# Patient Record
Sex: FEMALE | Race: WHITE | NOT HISPANIC OR LATINO | Employment: FULL TIME | URBAN - METROPOLITAN AREA
[De-identification: names, ages, dates, MRNs, and addresses within clinical notes are randomized per-mention and may not be internally consistent; named-entity substitution may affect disease eponyms.]

---

## 2017-12-07 ENCOUNTER — OFFICE VISIT (OUTPATIENT)
Dept: URGENT CARE | Age: 57
End: 2017-12-07
Payer: OTHER MISCELLANEOUS

## 2017-12-07 PROCEDURE — 99284 EMERGENCY DEPT VISIT MOD MDM: CPT | Performed by: PREVENTIVE MEDICINE

## 2017-12-07 PROCEDURE — G0383 LEV 4 HOSP TYPE B ED VISIT: HCPCS | Performed by: PREVENTIVE MEDICINE

## 2018-01-05 ENCOUNTER — OFFICE VISIT (OUTPATIENT)
Dept: URGENT CARE | Facility: CLINIC | Age: 58
End: 2018-01-05
Payer: COMMERCIAL

## 2018-01-05 LAB
BILIRUB UR QL STRIP: NEGATIVE
CLARITY UR: NORMAL
COLOR UR: YELLOW
GLUCOSE (HISTORICAL): 1000
HGB UR QL STRIP.AUTO: NEGATIVE
KETONES UR STRIP-MCNC: NEGATIVE MG/DL
LEUKOCYTE ESTERASE UR QL STRIP: NEGATIVE
NITRITE UR QL STRIP: NEGATIVE
PH UR STRIP.AUTO: 5 [PH]
PROT UR STRIP-MCNC: 100 MG/DL
SP GR UR STRIP.AUTO: 1.01
UROBILINOGEN UR QL STRIP.AUTO: 0.2

## 2018-01-05 PROCEDURE — 99213 OFFICE O/P EST LOW 20 MIN: CPT

## 2018-01-05 PROCEDURE — 81002 URINALYSIS NONAUTO W/O SCOPE: CPT

## 2018-01-10 NOTE — PROGRESS NOTES
Assessment   1  Abdominal pain (789 00) (R10 9)   2  Right flank pain (789 09) (R10 9)    Plan   Abdominal pain    · Urine Dip Non-Automated- POC; Status:Resulted - Requires Verification,Retrospective    By Protocol Authorization;   Done: [id number] 01:28PM    Discussion/Summary   Discussion Summary:    Patient is exquisitely tender right upper quadrant and has flank pain  May be muscular in nature but I recommend the patient go to the emergency room for further testing and evaluation  She agrees, will go to [facility]  Chief Complaint   1  Back Pain  Chief Complaint Free Text Note Form: C/O pain in right low back with no known injury x 4 days  Pt denies any radiation of pain  Pt denies any urinary symptoms  is being treated for sinusitis with Bactrim and has taken 2 days worth  History of Present Illness   HPI: 62year old female here with pain in her right flank area  Denies any injury, no radiation of the pain  No urinary symptoms  Is being treated for a sinusitis with Bactrim and was given a steroid 2 days ago  No fever, chills, nausea, vomiting  Has a good appetite  Hospital Based Practices Required Assessment:      Pain Assessment      the patient states they have pain  The pain is located in the right low back  The patient describes the pain as aching  (on a scale of 0 to 10, the patient rates the pain at 10 )      Abuse And Domestic Violence Screen       Yes, the patient is safe at home  -- The patient states no one is hurting them  Depression And Suicide Screen  No, the patient has not had thoughts of hurting themself  No, the patient has not felt depressed in the past 7 days  Back Pain: Rafaela Cavazos presents with complaints of back pain        Associated symptoms include abdominal pain, but-- no spasm,-- no stiffness,-- no fever,-- no night sweats,-- no general malaise,-- no weight loss,-- no arm numbness,-- no leg numbness,-- no arm weakness,-- no leg weakness,-- no urinary incontinence,-- no fecal incontinence,-- no urinary retention-- and-- no rash localized to the area of pain  Review of Systems   Focused-Female:      Constitutional: No fever, no chills, feels well, no tiredness, no recent weight gain or loss  ENT: no ear ache, no loss of hearing, no nosebleeds or nasal discharge, no sore throat or hoarseness  Cardiovascular: no complaints of slow or fast heart rate, no chest pain, no palpitations, no leg claudication or lower extremity edema  Respiratory: no complaints of shortness of breath, no wheezing, no dyspnea on exertion, no orthopnea or PND  Breasts: no complaints of breast pain, breast lump or nipple discharge  Gastrointestinal: as noted in HPI  Musculoskeletal: as noted in HPI  ROS Reviewed:    ROS reviewed  Active Problems   1  Abdominal pain (789 00) (R10 9)   2  Abnormal mammogram (793 80) (R92 8)   3  Arthritis (716 90) (M19 90)   4  Chest pain (786 50) (R07 9)   5  Costal chondritis (733 6) (M94 0)   6  Diabetes mellitus, type 2 (250 00) (E11 9)   7  Encounter for routine gynecological examination (V72 31) (Z01 419)   8  Encounter for screening mammogram for malignant neoplasm of breast (V76 12)     (Z12 31)   9  Hypercholesterolemia (272 0) (E78 00)   10  Hypertension (401 9) (I10)   11  Malignant neoplasm of breast, unspecified laterality   12  Neuropathy (355 9) (G62 9)   13  Right shoulder tendinitis (726 10) (M75 81)   14  Screening for STD (sexually transmitted disease) (V74 5) (Z11 3)   15  Status post arthroscopy of shoulder (V45 89) (Z98 890)   16  Superior glenoid labrum lesion of right shoulder, subsequent encounter (V58 89,840 7)      (S43 431D)   17  Uterine Disorders (621 9)    Past Medical History   1  History Of 4  Previous Pregnancies (V61 5)   2  History of Previously Pregnant Including 2  Miscarriage(S)  Active Problems And Past Medical History Reviewed:     The active problems and past medical history were reviewed and updated today  Family History   Mother    1  Family history of Cancer   2  Family history of Hypertension  Father    3  Family history of Cancer  Sister    4  Family history of Coronary artery disease, occlusive   5  Family history of Diabetes mellitus   6  Family history of Hypertension  Maternal Aunt    7  Family history of Malignant neoplasm of breast, unspecified laterality  Family History    8  Family history of Cancer  Family History Reviewed: The family history was reviewed and updated today  Social History    · Denied: History of Alcohol use   · Denied: History of Drug use   · Former smoker (V15 82) (U99 554)   ·   Social History Reviewed: The social history was reviewed and updated today  The social history was reviewed and is unchanged  Surgical History   1  History of Breast Surgery Lumpectomy   2  History of Cholecystectomy   3  History of Dental Surgery   4  History of Knee Surgery   5  History of Shoulder Surgery   6  History of Tubal Ligation  Surgical History Reviewed: The surgical history was reviewed and updated today  Current Meds    1  No Reported Medications Recorded  Medication List Reviewed: The medication list was reviewed and updated today  Allergies   1  Betadine Skin Cleanser SOLN   2  Codeine Derivatives   3  Iodine SOLN   4  Penicillins    Vitals   Signs   Recorded: 30ARM5181 01:09PM   Temperature: 96 8 F  Heart Rate: 94  Respiration: 18  Systolic: 887  Diastolic: 88  O2 Saturation: 98    Physical Exam        Constitutional      General appearance: No acute distress, well appearing and well nourished  Ears, Nose, Mouth, and Throat      External inspection of ears and nose: Normal        Otoscopic examination: Tympanic membranes translucent with normal light reflex  Canals patent without erythema  Nasal mucosa, septum, and turbinates: Abnormal   There was a mucoid discharge from both nares   The bilateral nasal mucosa was edematous  Oropharynx: Abnormal   The posterior pharynx was erythematous, but-- did not have an exudate  Pulmonary      Respiratory effort: No increased work of breathing or signs of respiratory distress  Auscultation of lungs: Clear to auscultation  Cardiovascular      Auscultation of heart: Normal rate and rhythm, normal S1 and S2, without murmurs  Abdomen      Abdomen: Abnormal   The abdomen was flat  Bowel sounds were normal  There was moderate tenderness in the right upper quadrant  The abdomen was not firm-- and-- not rigid  No rebound tenderness  No guarding  no masses palpated  The abdomen was normal to percussion  right CVA tenderness        Results/Data   Urine Dip Non-Automated- POC [id number] 01:28PM Joe Santosr      Test Name Result Flag Reference   Color Yellow     Clarity Transparent     Leukocytes negative     Nitrite negative     Blood negative     Bilirubin negative     Urobilinogen 0 2     Protein 100     Ph 5 0     Specific Gravity 1 015     Ketone negative     Glucose 1000     Color Yellow     Clarity Transparent     Leukocytes negative     Nitrite negative     Blood negative     Bilirubin negative     Urobilinogen 0 2     Protein 100     Ph 5 0     Specific Gravity 1 015     Ketone negative     Glucose 1000                Signatures    Electronically signed by : YONIS Rudolph; Jan 5 2018  1:45PM EST                       (Author)     Electronically signed by : NITIN Stringer ; Jan 9 2018  9:08AM EST                       (Co-author)

## 2018-01-15 NOTE — PROGRESS NOTES
Assessment    1  Encounter for routine gynecological examination (V72 31) (Z01 419)    Plan  Encounter for routine gynecological examination    · (1) THIN PREP PAP WITH IMAGING; Status: In Progress - Specimen/Data  Collected,Retrospective By Protocol Authorization;   Done: [id number]   Perform:[facility] In Office Collection; CRX:[id number]; Last Updated By:Maura Durán; 1/18/2016 2:36:14 PM;Ordered;  For:Encounter for routine gynecological examination; Ordered By:Brando Kincaid; Maturation index required? : No  Date? : 30YSF2285  HPV? : if ASCUS    Discussion/Summary    Results of mammograms and breast ultrasound discussed with patient  After her second opinion regarding her breast cyst even though biopsy of the same was not recommended-only bilateral mammogram in one year  He will consider the same  Chief Complaint  Pt presents today for annual exam and post mammogram/post brest US  Active Problems    1  Abdominal pain (789 00) (R10 9)   2  Abnormal mammogram (793 80) (R92 8)   3  Chest pain (786 50) (R07 9)   4  Costal chondritis (733 6) (M94 0)   5  Diabetes mellitus, type 2 (250 00) (E11 9)   6  Encounter for routine gynecological examination (V72 31) (Z01 419)   7  Encounter for screening mammogram for malignant neoplasm of breast (V76 12)   (Z12 31)   8  Hypercholesterolemia (272 0) (E78 0)   9  Hypertension (401 9) (I10)   10  Malignant neoplasm of breast, unspecified laterality   11  Neuropathy (355 9) (G62 9)   12  Screening for STD (sexually transmitted disease) (V74 5) (Z11 3)   13   Uterine Disorders (621 9)    Past Medical History    · History Of 4  Previous Pregnancies (V61 5)   · History of Previously Pregnant Including 2  Miscarriage(S)    Surgical History    · History of Breast Surgery Lumpectomy   · History of Cholecystectomy   · History of Dental Surgery   · History of Knee Surgery   · History of Tubal Ligation    Family History    · Family history of Cancer   · Family history of Hypertension    · Family history of Cancer    · Family history of Coronary artery disease, occlusive   · Family history of Diabetes mellitus   · Family history of Hypertension    · Family history of Malignant neoplasm of breast, unspecified laterality    · Family history of Cancer    Social History    · Denied: History of Alcohol use   · Denied: History of Drug use   · Former smoker (V15 82) (G08 593)   ·     Current Meds   1  Ibuprofen 800 MG Oral Tablet; TAKE 1 TABLET 3 TIMES DAILY AFTER MEALS; Therapy: 84CTF4068 to (Evaluate:58Qgj8335)  Requested for: 10WLE4233; Last   Rx:04Jan2016 Ordered    Allergies    1  Betadine Skin Cleanser SOLN   2  Codeine Derivatives   3  Iodine SOLN   4  Penicillins    Vitals   Recorded: 97OJS6278 55:54ST   Systolic 997   Diastolic 84   Height 5 ft 10 in   Weight 260 lb    BMI Calculated 37 31   BSA Calculated 2 34     Physical Exam    Genitourinary   External genitalia: Normal and no lesions appreciated  Vagina: Normal, no lesions or dryness appreciated  Urethra: Normal     Urethral meatus: Normal     Bladder: Normal, soft, non-tender and no prolapse or masses appreciated  Cervix: Normal, no palpable masses  Uterus: Normal, non-tender, not enlarged, and no palpable masses  Adnexa/parametria: Normal, non-tender and no fullness or masses appreciated  Breasts: normal in appearance, no palpable masses and no nipple discharge        Signatures   Electronically signed by : Ej Atkinson MD; Jan 18 2016  3:01PM EST                       (Author)

## 2018-01-23 VITALS
TEMPERATURE: 96.8 F | DIASTOLIC BLOOD PRESSURE: 88 MMHG | RESPIRATION RATE: 18 BRPM | SYSTOLIC BLOOD PRESSURE: 142 MMHG | HEART RATE: 94 BPM | OXYGEN SATURATION: 98 %

## 2018-02-02 RX ORDER — IBUPROFEN 800 MG/1
1 TABLET ORAL EVERY 8 HOURS
COMMUNITY
Start: 2016-01-04 | End: 2018-06-30 | Stop reason: HOSPADM

## 2018-02-02 RX ORDER — NAPROXEN SODIUM 220 MG
TABLET ORAL
COMMUNITY
End: 2018-06-28

## 2018-02-05 ENCOUNTER — OFFICE VISIT (OUTPATIENT)
Dept: PULMONOLOGY | Facility: CLINIC | Age: 58
End: 2018-02-05
Payer: COMMERCIAL

## 2018-02-05 VITALS
HEART RATE: 76 BPM | RESPIRATION RATE: 16 BRPM | SYSTOLIC BLOOD PRESSURE: 130 MMHG | WEIGHT: 238 LBS | DIASTOLIC BLOOD PRESSURE: 84 MMHG | HEIGHT: 69 IN | TEMPERATURE: 97 F | OXYGEN SATURATION: 96 % | BODY MASS INDEX: 35.25 KG/M2

## 2018-02-05 DIAGNOSIS — R06.00 DOE (DYSPNEA ON EXERTION): ICD-10-CM

## 2018-02-05 DIAGNOSIS — Z77.120 MOLD EXPOSURE: ICD-10-CM

## 2018-02-05 DIAGNOSIS — Z12.2 ENCOUNTER FOR SCREENING FOR LUNG CANCER: ICD-10-CM

## 2018-02-05 DIAGNOSIS — J18.9 PNEUMONIA OF RIGHT UPPER LOBE DUE TO INFECTIOUS ORGANISM: Primary | ICD-10-CM

## 2018-02-05 PROBLEM — R93.89 ABNORMAL CXR (CHEST X-RAY): Status: RESOLVED | Noted: 2018-02-05 | Resolved: 2018-02-05

## 2018-02-05 PROBLEM — R93.89 ABNORMAL CXR (CHEST X-RAY): Status: ACTIVE | Noted: 2018-02-05

## 2018-02-05 PROBLEM — R06.09 DOE (DYSPNEA ON EXERTION): Status: ACTIVE | Noted: 2018-02-05

## 2018-02-05 PROCEDURE — 99244 OFF/OP CNSLTJ NEW/EST MOD 40: CPT | Performed by: INTERNAL MEDICINE

## 2018-02-05 NOTE — ASSESSMENT & PLAN NOTE
Doubt of any significance, patient did not have much symptoms after the exposure and it was a one time incident  No further intervention at this time
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
## 2018-02-05 NOTE — ASSESSMENT & PLAN NOTE
Resolved, improved on chest x-ray in November, patient will bring the chest x-ray from January  Also will see low-dose CT scan for lung cancer screening to evaluate lung parenchyma

## 2018-02-20 ENCOUNTER — HOSPITAL ENCOUNTER (OUTPATIENT)
Dept: RADIOLOGY | Facility: HOSPITAL | Age: 58
Discharge: HOME | End: 2018-02-20
Attending: INTERNAL MEDICINE
Payer: COMMERCIAL

## 2018-02-20 ENCOUNTER — HOSPITAL ENCOUNTER (OUTPATIENT)
Dept: PULMONOLOGY | Facility: HOSPITAL | Age: 58
Discharge: HOME | End: 2018-02-20
Attending: INTERNAL MEDICINE
Payer: COMMERCIAL

## 2018-02-20 DIAGNOSIS — Z12.2 ENCOUNTER FOR SCREENING FOR LUNG CANCER: ICD-10-CM

## 2018-02-20 DIAGNOSIS — R06.00 DOE (DYSPNEA ON EXERTION): ICD-10-CM

## 2018-02-20 PROCEDURE — 94726 PLETHYSMOGRAPHY LUNG VOLUMES: CPT | Performed by: INTERNAL MEDICINE

## 2018-02-20 PROCEDURE — 94726 PLETHYSMOGRAPHY LUNG VOLUMES: CPT

## 2018-02-20 PROCEDURE — 94060 EVALUATION OF WHEEZING: CPT

## 2018-02-20 PROCEDURE — 94760 N-INVAS EAR/PLS OXIMETRY 1: CPT

## 2018-02-20 PROCEDURE — 94729 DIFFUSING CAPACITY: CPT | Performed by: INTERNAL MEDICINE

## 2018-02-20 PROCEDURE — 94060 EVALUATION OF WHEEZING: CPT | Performed by: INTERNAL MEDICINE

## 2018-02-20 PROCEDURE — 94729 DIFFUSING CAPACITY: CPT

## 2018-02-20 RX ORDER — ALBUTEROL SULFATE 2.5 MG/3ML
2.5 SOLUTION RESPIRATORY (INHALATION) ONCE
Status: COMPLETED | OUTPATIENT
Start: 2018-02-20 | End: 2018-02-20

## 2018-02-20 RX ORDER — ALBUTEROL SULFATE 2.5 MG/3ML
SOLUTION RESPIRATORY (INHALATION)
Status: COMPLETED
Start: 2018-02-20 | End: 2018-02-20

## 2018-02-20 RX ADMIN — ALBUTEROL SULFATE 2.5 MG: 2.5 SOLUTION RESPIRATORY (INHALATION) at 10:42

## 2018-02-26 ENCOUNTER — TELEPHONE (OUTPATIENT)
Dept: PULMONOLOGY | Facility: CLINIC | Age: 58
End: 2018-02-26

## 2018-02-26 NOTE — TELEPHONE ENCOUNTER
Telephone note- Pulmonary Medicine   Strawn Gianni 62 y o  female MRN: [id number]    No problem-specific Assessment & Plan notes found for this encounter          Pertinent details:    I called patient and left detail message about the negative lung cancer screening CT scan and the need for CT scan in 1 year from now

## 2018-02-28 NOTE — TELEPHONE ENCOUNTER
Telephone note- Pulmonary Medicine   Griselda Sherlydelano 62 y o  female MRN: [id number]    No problem-specific Assessment & Plan notes found for this encounter          Pertinent details:    I called patient and left a message about a normal PFTs

## 2018-06-28 ENCOUNTER — HOSPITAL ENCOUNTER (INPATIENT)
Facility: HOSPITAL | Age: 58
LOS: 1 days | Discharge: HOME | DRG: 247 | End: 2018-06-30
Attending: EMERGENCY MEDICINE | Admitting: INTERNAL MEDICINE
Payer: COMMERCIAL

## 2018-06-28 ENCOUNTER — APPOINTMENT (EMERGENCY)
Dept: RADIOLOGY | Facility: HOSPITAL | Age: 58
DRG: 247 | End: 2018-06-28
Payer: COMMERCIAL

## 2018-06-28 DIAGNOSIS — I10 ESSENTIAL HYPERTENSION: ICD-10-CM

## 2018-06-28 DIAGNOSIS — R07.9 CHEST PAIN: Primary | ICD-10-CM

## 2018-06-28 DIAGNOSIS — E11.9 DIABETES MELLITUS, TYPE 2 (HCC): ICD-10-CM

## 2018-06-28 DIAGNOSIS — I21.4 NSTEMI (NON-ST ELEVATED MYOCARDIAL INFARCTION) (HCC): ICD-10-CM

## 2018-06-28 DIAGNOSIS — R06.02 SHORTNESS OF BREATH: ICD-10-CM

## 2018-06-28 PROBLEM — J00 ACUTE NASOPHARYNGITIS: Status: ACTIVE | Noted: 2018-06-28

## 2018-06-28 LAB
ALBUMIN SERPL BCP-MCNC: 3.8 G/DL (ref 3.5–5)
ALP SERPL-CCNC: 91 U/L (ref 46–116)
ALT SERPL W P-5'-P-CCNC: 32 U/L (ref 12–78)
ANION GAP SERPL CALCULATED.3IONS-SCNC: 12 MMOL/L (ref 4–13)
AST SERPL W P-5'-P-CCNC: 14 U/L (ref 5–45)
BASOPHILS # BLD AUTO: 0 THOUSANDS/ΜL (ref 0–0.1)
BASOPHILS NFR BLD AUTO: 0 % (ref 0–1)
BILIRUB SERPL-MCNC: 0.9 MG/DL (ref 0.2–1)
BUN SERPL-MCNC: 20 MG/DL (ref 5–25)
CALCIUM SERPL-MCNC: 9.5 MG/DL (ref 8.3–10.1)
CHLORIDE SERPL-SCNC: 95 MMOL/L (ref 100–108)
CO2 SERPL-SCNC: 25 MMOL/L (ref 21–32)
CREAT SERPL-MCNC: 0.96 MG/DL (ref 0.6–1.3)
DEPRECATED D DIMER PPP: 496 NG/ML (FEU) (ref 0–424)
EOSINOPHIL # BLD AUTO: 0 THOUSAND/ΜL (ref 0–0.61)
EOSINOPHIL NFR BLD AUTO: 0 % (ref 0–6)
ERYTHROCYTE [DISTWIDTH] IN BLOOD BY AUTOMATED COUNT: 12.3 % (ref 11.6–15.1)
GFR SERPL CREATININE-BSD FRML MDRD: 65 ML/MIN/1.73SQ M
GLUCOSE SERPL-MCNC: 407 MG/DL (ref 65–140)
GLUCOSE SERPL-MCNC: 490 MG/DL (ref 65–140)
HCT VFR BLD AUTO: 40 % (ref 34.8–46.1)
HGB BLD-MCNC: 14.4 G/DL (ref 11.5–15.4)
IMM GRANULOCYTES # BLD AUTO: 0.07 THOUSAND/UL (ref 0–0.2)
IMM GRANULOCYTES NFR BLD AUTO: 1 % (ref 0–2)
LYMPHOCYTES # BLD AUTO: 0.79 THOUSANDS/ΜL (ref 0.6–4.47)
LYMPHOCYTES NFR BLD AUTO: 8 % (ref 14–44)
MCH RBC QN AUTO: 29.5 PG (ref 26.8–34.3)
MCHC RBC AUTO-ENTMCNC: 36 G/DL (ref 31.4–37.4)
MCV RBC AUTO: 82 FL (ref 82–98)
MONOCYTES # BLD AUTO: 0.44 THOUSAND/ΜL (ref 0.17–1.22)
MONOCYTES NFR BLD AUTO: 4 % (ref 4–12)
NEUTROPHILS # BLD AUTO: 9.01 THOUSANDS/ΜL (ref 1.85–7.62)
NEUTS SEG NFR BLD AUTO: 87 % (ref 43–75)
NRBC BLD AUTO-RTO: 0 /100 WBCS
PLATELET # BLD AUTO: 224 THOUSANDS/UL (ref 149–390)
PMV BLD AUTO: 11.2 FL (ref 8.9–12.7)
POTASSIUM SERPL-SCNC: 3.9 MMOL/L (ref 3.5–5.3)
PROT SERPL-MCNC: 7.6 G/DL (ref 6.4–8.2)
RBC # BLD AUTO: 4.88 MILLION/UL (ref 3.81–5.12)
SODIUM SERPL-SCNC: 132 MMOL/L (ref 136–145)
TROPONIN I SERPL-MCNC: 0.06 NG/ML
TROPONIN I SERPL-MCNC: 0.16 NG/ML
WBC # BLD AUTO: 10.31 THOUSAND/UL (ref 4.31–10.16)

## 2018-06-28 PROCEDURE — 71046 X-RAY EXAM CHEST 2 VIEWS: CPT

## 2018-06-28 PROCEDURE — 80053 COMPREHEN METABOLIC PANEL: CPT | Performed by: EMERGENCY MEDICINE

## 2018-06-28 PROCEDURE — 85025 COMPLETE CBC W/AUTO DIFF WBC: CPT | Performed by: EMERGENCY MEDICINE

## 2018-06-28 PROCEDURE — 99285 EMERGENCY DEPT VISIT HI MDM: CPT

## 2018-06-28 PROCEDURE — 36415 COLL VENOUS BLD VENIPUNCTURE: CPT

## 2018-06-28 PROCEDURE — 85379 FIBRIN DEGRADATION QUANT: CPT | Performed by: EMERGENCY MEDICINE

## 2018-06-28 PROCEDURE — 84484 ASSAY OF TROPONIN QUANT: CPT | Performed by: INTERNAL MEDICINE

## 2018-06-28 PROCEDURE — 82948 REAGENT STRIP/BLOOD GLUCOSE: CPT

## 2018-06-28 PROCEDURE — 84484 ASSAY OF TROPONIN QUANT: CPT | Performed by: EMERGENCY MEDICINE

## 2018-06-28 PROCEDURE — 99220 PR INITIAL OBSERVATION CARE/DAY 70 MINUTES: CPT | Performed by: INTERNAL MEDICINE

## 2018-06-28 PROCEDURE — 93005 ELECTROCARDIOGRAM TRACING: CPT

## 2018-06-28 RX ORDER — ONDANSETRON 2 MG/ML
4 INJECTION INTRAMUSCULAR; INTRAVENOUS EVERY 6 HOURS PRN
Status: DISCONTINUED | OUTPATIENT
Start: 2018-06-28 | End: 2018-06-30 | Stop reason: HOSPADM

## 2018-06-28 RX ORDER — HYDRALAZINE HYDROCHLORIDE 20 MG/ML
10 INJECTION INTRAMUSCULAR; INTRAVENOUS EVERY 6 HOURS PRN
Status: DISCONTINUED | OUTPATIENT
Start: 2018-06-28 | End: 2018-06-30 | Stop reason: HOSPADM

## 2018-06-28 RX ORDER — ASPIRIN 325 MG
325 TABLET ORAL ONCE
Status: DISCONTINUED | OUTPATIENT
Start: 2018-06-28 | End: 2018-06-29

## 2018-06-28 RX ORDER — ECHINACEA PURPUREA EXTRACT 125 MG
1 TABLET ORAL
Status: DISCONTINUED | OUTPATIENT
Start: 2018-06-28 | End: 2018-06-30 | Stop reason: HOSPADM

## 2018-06-28 RX ORDER — LOSARTAN POTASSIUM 50 MG/1
50 TABLET ORAL DAILY
Status: DISCONTINUED | OUTPATIENT
Start: 2018-06-29 | End: 2018-06-30 | Stop reason: HOSPADM

## 2018-06-28 RX ORDER — ASPIRIN 81 MG/1
81 TABLET, CHEWABLE ORAL DAILY
Status: DISCONTINUED | OUTPATIENT
Start: 2018-06-29 | End: 2018-06-30 | Stop reason: HOSPADM

## 2018-06-28 RX ORDER — INSULIN GLARGINE 100 [IU]/ML
10 INJECTION, SOLUTION SUBCUTANEOUS
Status: DISCONTINUED | OUTPATIENT
Start: 2018-06-28 | End: 2018-06-29

## 2018-06-28 RX ORDER — PANTOPRAZOLE SODIUM 40 MG/1
40 TABLET, DELAYED RELEASE ORAL
Status: DISCONTINUED | OUTPATIENT
Start: 2018-06-29 | End: 2018-06-30 | Stop reason: HOSPADM

## 2018-06-28 RX ORDER — ACETAMINOPHEN 325 MG/1
650 TABLET ORAL EVERY 4 HOURS PRN
Status: DISCONTINUED | OUTPATIENT
Start: 2018-06-28 | End: 2018-06-30 | Stop reason: HOSPADM

## 2018-06-28 RX ORDER — GLYBURIDE 5 MG/1
5 TABLET ORAL
COMMUNITY
End: 2018-06-30 | Stop reason: HOSPADM

## 2018-06-28 RX ORDER — SODIUM CHLORIDE 9 MG/ML
100 INJECTION, SOLUTION INTRAVENOUS CONTINUOUS
Status: DISCONTINUED | OUTPATIENT
Start: 2018-06-28 | End: 2018-06-29

## 2018-06-28 RX ORDER — LOSARTAN POTASSIUM 50 MG/1
50 TABLET ORAL DAILY
COMMUNITY
End: 2020-02-12 | Stop reason: SDUPTHER

## 2018-06-28 RX ADMIN — SODIUM CHLORIDE 1000 ML: 0.9 INJECTION, SOLUTION INTRAVENOUS at 19:21

## 2018-06-28 RX ADMIN — SODIUM CHLORIDE 100 ML/HR: 0.9 INJECTION, SOLUTION INTRAVENOUS at 21:23

## 2018-06-28 RX ADMIN — INSULIN GLARGINE 10 UNITS: 100 INJECTION, SOLUTION SUBCUTANEOUS at 21:22

## 2018-06-28 RX ADMIN — INSULIN LISPRO 4 UNITS: 100 INJECTION, SOLUTION INTRAVENOUS; SUBCUTANEOUS at 23:28

## 2018-06-28 NOTE — LETTER
[address]  [address] [address]  Dept: [phone]    July 2, 2018     Patient: Hung Shrestha   YOB: 1960   Date of Visit: 6/28/2018       To Whom it May Concern:    Hung Shrestha was under my professional care  She was admitted to the hospital from 6/28/2018   to 06/30/2018  She may return to work on 7/4/2018  If you have any questions or concerns, please don't hesitate to call           Sincerely,          Adali Kenney MD  Hospitalist, [facility]  Office: [phone]
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
## 2018-06-28 NOTE — LETTER
[address]  [address] [address]  [address] [address]  Dept: [phone]    July 2, 2018     Patient: Divina Goncalves   YOB: 1960   Date of Visit: 6/28/2018       To Whom it May Concern:    Divina Goncalves was under my professional care  She was admitted to the hospital from 6/28/2018   to 07/02/18  She may return to work on 7/4/2018  If you have any questions or concerns, please don't hesitate to call           Sincerely,          David Saul MD  Hospitalist, [address] [place] [address]  Office: [phone]

## 2018-06-28 NOTE — ED ATTENDING ATTESTATION
Sujey BUSCH DO, saw and evaluated the patient  I have discussed the patient with the resident/non-physician practitioner and agree with the resident's/non-physician practitioner's findings, Plan of Care, and MDM as documented in the resident's/non-physician practitioner's note, except where noted  All available labs and Radiology studies were reviewed  At this point I agree with the current assessment done in the Emergency Department  I have conducted an independent evaluation of this patient a history and physical is as follows:      Critical Care Time  CritCare Time    Procedures     62 yr fem to the ED with chest pain / pressure for the last few days with incr with exhertion  Better with resp  Occ radiation to the neck wo back pain  No fever but has had some congestion wo cough  No prior hx of the same  Not taking diabetic and HTN meds  Exm: heart: mild tachy wo mrg  Lungs: cta  No CWT  Abd: soft and obese; No rash  EKG: not available  No leg pain or swelling  Pln: Cardiac screen and adm

## 2018-06-29 ENCOUNTER — APPOINTMENT (INPATIENT)
Dept: NON INVASIVE DIAGNOSTICS | Facility: HOSPITAL | Age: 58
DRG: 247 | End: 2018-06-29
Payer: COMMERCIAL

## 2018-06-29 PROBLEM — I21.4 NSTEMI (NON-ST ELEVATED MYOCARDIAL INFARCTION) (HCC): Status: ACTIVE | Noted: 2018-02-05

## 2018-06-29 LAB
ANION GAP SERPL CALCULATED.3IONS-SCNC: 8 MMOL/L (ref 4–13)
APTT PPP: 74 SECONDS (ref 24–36)
ATRIAL RATE: 85 BPM
ATRIAL RATE: 88 BPM
BASOPHILS # BLD AUTO: 0 THOUSANDS/ΜL (ref 0–0.1)
BASOPHILS NFR BLD AUTO: 0 % (ref 0–1)
BUN SERPL-MCNC: 17 MG/DL (ref 5–25)
CALCIUM SERPL-MCNC: 8.9 MG/DL (ref 8.3–10.1)
CHLORIDE SERPL-SCNC: 101 MMOL/L (ref 100–108)
CHOLEST SERPL-MCNC: 286 MG/DL (ref 50–200)
CO2 SERPL-SCNC: 26 MMOL/L (ref 21–32)
CREAT SERPL-MCNC: 0.56 MG/DL (ref 0.6–1.3)
EOSINOPHIL # BLD AUTO: 0 THOUSAND/ΜL (ref 0–0.61)
EOSINOPHIL NFR BLD AUTO: 0 % (ref 0–6)
ERYTHROCYTE [DISTWIDTH] IN BLOOD BY AUTOMATED COUNT: 12.3 % (ref 11.6–15.1)
ERYTHROCYTE [DISTWIDTH] IN BLOOD BY AUTOMATED COUNT: 12.3 % (ref 11.6–15.1)
EST. AVERAGE GLUCOSE BLD GHB EST-MCNC: 260 MG/DL
GFR SERPL CREATININE-BSD FRML MDRD: 103 ML/MIN/1.73SQ M
GLUCOSE SERPL-MCNC: 283 MG/DL (ref 65–140)
GLUCOSE SERPL-MCNC: 312 MG/DL (ref 65–140)
GLUCOSE SERPL-MCNC: 334 MG/DL (ref 65–140)
GLUCOSE SERPL-MCNC: 334 MG/DL (ref 65–140)
GLUCOSE SERPL-MCNC: 350 MG/DL (ref 65–140)
HBA1C MFR BLD: 10.7 % (ref 4.2–6.3)
HCT VFR BLD AUTO: 37.5 % (ref 34.8–46.1)
HCT VFR BLD AUTO: 38.2 % (ref 34.8–46.1)
HDLC SERPL-MCNC: 54 MG/DL (ref 40–60)
HGB BLD-MCNC: 13 G/DL (ref 11.5–15.4)
HGB BLD-MCNC: 13.2 G/DL (ref 11.5–15.4)
IMM GRANULOCYTES # BLD AUTO: 0.04 THOUSAND/UL (ref 0–0.2)
IMM GRANULOCYTES NFR BLD AUTO: 1 % (ref 0–2)
INR PPP: 1.2 (ref 0.86–1.17)
KCT BLD-ACNC: 242 SEC (ref 89–137)
LDLC SERPL CALC-MCNC: 190 MG/DL (ref 0–100)
LYMPHOCYTES # BLD AUTO: 1 THOUSANDS/ΜL (ref 0.6–4.47)
LYMPHOCYTES NFR BLD AUTO: 12 % (ref 14–44)
MCH RBC QN AUTO: 29 PG (ref 26.8–34.3)
MCH RBC QN AUTO: 29 PG (ref 26.8–34.3)
MCHC RBC AUTO-ENTMCNC: 34.6 G/DL (ref 31.4–37.4)
MCHC RBC AUTO-ENTMCNC: 34.7 G/DL (ref 31.4–37.4)
MCV RBC AUTO: 84 FL (ref 82–98)
MCV RBC AUTO: 84 FL (ref 82–98)
MONOCYTES # BLD AUTO: 0.44 THOUSAND/ΜL (ref 0.17–1.22)
MONOCYTES NFR BLD AUTO: 5 % (ref 4–12)
NEUTROPHILS # BLD AUTO: 6.7 THOUSANDS/ΜL (ref 1.85–7.62)
NEUTS SEG NFR BLD AUTO: 82 % (ref 43–75)
NRBC BLD AUTO-RTO: 0 /100 WBCS
P AXIS: 43 DEGREES
P AXIS: 54 DEGREES
PLATELET # BLD AUTO: 196 THOUSANDS/UL (ref 149–390)
PLATELET # BLD AUTO: 199 THOUSANDS/UL (ref 149–390)
PMV BLD AUTO: 11 FL (ref 8.9–12.7)
PMV BLD AUTO: 11.4 FL (ref 8.9–12.7)
POTASSIUM SERPL-SCNC: 4 MMOL/L (ref 3.5–5.3)
PR INTERVAL: 138 MS
PR INTERVAL: 140 MS
PROTHROMBIN TIME: 15.3 SECONDS (ref 11.8–14.2)
QRS AXIS: -15 DEGREES
QRS AXIS: -32 DEGREES
QRSD INTERVAL: 92 MS
QRSD INTERVAL: 96 MS
QT INTERVAL: 356 MS
QT INTERVAL: 402 MS
QTC INTERVAL: 423 MS
QTC INTERVAL: 486 MS
RBC # BLD AUTO: 4.48 MILLION/UL (ref 3.81–5.12)
RBC # BLD AUTO: 4.55 MILLION/UL (ref 3.81–5.12)
SODIUM SERPL-SCNC: 135 MMOL/L (ref 136–145)
SPECIMEN SOURCE: ABNORMAL
T WAVE AXIS: 36 DEGREES
T WAVE AXIS: 61 DEGREES
TRIGL SERPL-MCNC: 209 MG/DL
TROPONIN I SERPL-MCNC: 0.22 NG/ML
TROPONIN I SERPL-MCNC: 0.34 NG/ML
TROPONIN I SERPL-MCNC: 0.37 NG/ML
TROPONIN I SERPL-MCNC: 0.49 NG/ML
VENTRICULAR RATE: 85 BPM
VENTRICULAR RATE: 88 BPM
WBC # BLD AUTO: 8.18 THOUSAND/UL (ref 4.31–10.16)
WBC # BLD AUTO: 8.7 THOUSAND/UL (ref 4.31–10.16)

## 2018-06-29 PROCEDURE — C9600 PERC DRUG-EL COR STENT SING: HCPCS | Performed by: NURSE PRACTITIONER

## 2018-06-29 PROCEDURE — B240ZZ3 ULTRASONOGRAPHY OF SINGLE CORONARY ARTERY, INTRAVASCULAR: ICD-10-PCS | Performed by: INTERNAL MEDICINE

## 2018-06-29 PROCEDURE — C1894 INTRO/SHEATH, NON-LASER: HCPCS | Performed by: NURSE PRACTITIONER

## 2018-06-29 PROCEDURE — 99222 1ST HOSP IP/OBS MODERATE 55: CPT | Performed by: INTERNAL MEDICINE

## 2018-06-29 PROCEDURE — 84484 ASSAY OF TROPONIN QUANT: CPT | Performed by: INTERNAL MEDICINE

## 2018-06-29 PROCEDURE — 84484 ASSAY OF TROPONIN QUANT: CPT | Performed by: PHYSICIAN ASSISTANT

## 2018-06-29 PROCEDURE — 85347 COAGULATION TIME ACTIVATED: CPT

## 2018-06-29 PROCEDURE — 85610 PROTHROMBIN TIME: CPT | Performed by: INTERNAL MEDICINE

## 2018-06-29 PROCEDURE — C1769 GUIDE WIRE: HCPCS | Performed by: NURSE PRACTITIONER

## 2018-06-29 PROCEDURE — 82948 REAGENT STRIP/BLOOD GLUCOSE: CPT

## 2018-06-29 PROCEDURE — 99152 MOD SED SAME PHYS/QHP 5/>YRS: CPT | Performed by: INTERNAL MEDICINE

## 2018-06-29 PROCEDURE — 93454 CORONARY ARTERY ANGIO S&I: CPT | Performed by: NURSE PRACTITIONER

## 2018-06-29 PROCEDURE — 85027 COMPLETE CBC AUTOMATED: CPT | Performed by: INTERNAL MEDICINE

## 2018-06-29 PROCEDURE — 99153 MOD SED SAME PHYS/QHP EA: CPT | Performed by: NURSE PRACTITIONER

## 2018-06-29 PROCEDURE — 85025 COMPLETE CBC W/AUTO DIFF WBC: CPT | Performed by: INTERNAL MEDICINE

## 2018-06-29 PROCEDURE — 80048 BASIC METABOLIC PNL TOTAL CA: CPT | Performed by: INTERNAL MEDICINE

## 2018-06-29 PROCEDURE — B2111ZZ FLUOROSCOPY OF MULTIPLE CORONARY ARTERIES USING LOW OSMOLAR CONTRAST: ICD-10-PCS | Performed by: INTERNAL MEDICINE

## 2018-06-29 PROCEDURE — C1725 CATH, TRANSLUMIN NON-LASER: HCPCS | Performed by: NURSE PRACTITIONER

## 2018-06-29 PROCEDURE — 83036 HEMOGLOBIN GLYCOSYLATED A1C: CPT | Performed by: INTERNAL MEDICINE

## 2018-06-29 PROCEDURE — 80061 LIPID PANEL: CPT | Performed by: INTERNAL MEDICINE

## 2018-06-29 PROCEDURE — 93005 ELECTROCARDIOGRAM TRACING: CPT

## 2018-06-29 PROCEDURE — 4A023N7 MEASUREMENT OF CARDIAC SAMPLING AND PRESSURE, LEFT HEART, PERCUTANEOUS APPROACH: ICD-10-PCS | Performed by: INTERNAL MEDICINE

## 2018-06-29 PROCEDURE — 99152 MOD SED SAME PHYS/QHP 5/>YRS: CPT | Performed by: NURSE PRACTITIONER

## 2018-06-29 PROCEDURE — 92978 ENDOLUMINL IVUS OCT C 1ST: CPT | Performed by: NURSE PRACTITIONER

## 2018-06-29 PROCEDURE — C1887 CATHETER, GUIDING: HCPCS | Performed by: NURSE PRACTITIONER

## 2018-06-29 PROCEDURE — 92978 ENDOLUMINL IVUS OCT C 1ST: CPT | Performed by: INTERNAL MEDICINE

## 2018-06-29 PROCEDURE — 99232 SBSQ HOSP IP/OBS MODERATE 35: CPT | Performed by: INTERNAL MEDICINE

## 2018-06-29 PROCEDURE — 027034Z DILATION OF CORONARY ARTERY, ONE ARTERY WITH DRUG-ELUTING INTRALUMINAL DEVICE, PERCUTANEOUS APPROACH: ICD-10-PCS | Performed by: INTERNAL MEDICINE

## 2018-06-29 PROCEDURE — 93454 CORONARY ARTERY ANGIO S&I: CPT | Performed by: INTERNAL MEDICINE

## 2018-06-29 PROCEDURE — 93306 TTE W/DOPPLER COMPLETE: CPT

## 2018-06-29 PROCEDURE — C1753 CATH, INTRAVAS ULTRASOUND: HCPCS | Performed by: NURSE PRACTITIONER

## 2018-06-29 PROCEDURE — C1874 STENT, COATED/COV W/DEL SYS: HCPCS

## 2018-06-29 PROCEDURE — 92928 PRQ TCAT PLMT NTRAC ST 1 LES: CPT | Performed by: INTERNAL MEDICINE

## 2018-06-29 PROCEDURE — 85730 THROMBOPLASTIN TIME PARTIAL: CPT | Performed by: INTERNAL MEDICINE

## 2018-06-29 RX ORDER — HEPARIN SODIUM 10000 [USP'U]/100ML
3-20 INJECTION, SOLUTION INTRAVENOUS
Status: DISCONTINUED | OUTPATIENT
Start: 2018-06-29 | End: 2018-06-29

## 2018-06-29 RX ORDER — SODIUM CHLORIDE 9 MG/ML
100 INJECTION, SOLUTION INTRAVENOUS CONTINUOUS
Status: DISPENSED | OUTPATIENT
Start: 2018-06-29 | End: 2018-06-29

## 2018-06-29 RX ORDER — NITROGLYCERIN 0.4 MG/1
TABLET SUBLINGUAL
Status: COMPLETED
Start: 2018-06-29 | End: 2018-06-29

## 2018-06-29 RX ORDER — MIDAZOLAM HYDROCHLORIDE 1 MG/ML
INJECTION INTRAMUSCULAR; INTRAVENOUS CODE/TRAUMA/SEDATION MEDICATION
Status: COMPLETED | OUTPATIENT
Start: 2018-06-29 | End: 2018-06-29

## 2018-06-29 RX ORDER — HEPARIN SODIUM 1000 [USP'U]/ML
2000 INJECTION, SOLUTION INTRAVENOUS; SUBCUTANEOUS AS NEEDED
Status: DISCONTINUED | OUTPATIENT
Start: 2018-06-29 | End: 2018-06-29

## 2018-06-29 RX ORDER — HEPARIN SODIUM 1000 [USP'U]/ML
4000 INJECTION, SOLUTION INTRAVENOUS; SUBCUTANEOUS ONCE
Status: COMPLETED | OUTPATIENT
Start: 2018-06-29 | End: 2018-06-29

## 2018-06-29 RX ORDER — HEPARIN SODIUM 1000 [USP'U]/ML
INJECTION, SOLUTION INTRAVENOUS; SUBCUTANEOUS CODE/TRAUMA/SEDATION MEDICATION
Status: COMPLETED | OUTPATIENT
Start: 2018-06-29 | End: 2018-06-29

## 2018-06-29 RX ORDER — METOPROLOL TARTRATE 5 MG/5ML
5 INJECTION INTRAVENOUS ONCE
Status: DISCONTINUED | OUTPATIENT
Start: 2018-06-29 | End: 2018-06-29

## 2018-06-29 RX ORDER — VERAPAMIL HCL 2.5 MG/ML
AMPUL (ML) INTRAVENOUS CODE/TRAUMA/SEDATION MEDICATION
Status: COMPLETED | OUTPATIENT
Start: 2018-06-29 | End: 2018-06-29

## 2018-06-29 RX ORDER — NITROGLYCERIN 0.4 MG/1
0.4 TABLET SUBLINGUAL
Status: DISCONTINUED | OUTPATIENT
Start: 2018-06-29 | End: 2018-06-30 | Stop reason: HOSPADM

## 2018-06-29 RX ORDER — NITROGLYCERIN 20 MG/100ML
INJECTION INTRAVENOUS CODE/TRAUMA/SEDATION MEDICATION
Status: COMPLETED | OUTPATIENT
Start: 2018-06-29 | End: 2018-06-29

## 2018-06-29 RX ORDER — CLOPIDOGREL BISULFATE 75 MG/1
75 TABLET ORAL DAILY
Status: DISCONTINUED | OUTPATIENT
Start: 2018-06-30 | End: 2018-06-30 | Stop reason: HOSPADM

## 2018-06-29 RX ORDER — AMLODIPINE BESYLATE 10 MG/1
10 TABLET ORAL DAILY
Status: DISCONTINUED | OUTPATIENT
Start: 2018-06-29 | End: 2018-06-30 | Stop reason: HOSPADM

## 2018-06-29 RX ORDER — CLOPIDOGREL BISULFATE 75 MG/1
600 TABLET ORAL ONCE
Status: COMPLETED | OUTPATIENT
Start: 2018-06-29 | End: 2018-06-29

## 2018-06-29 RX ORDER — ATORVASTATIN CALCIUM 40 MG/1
40 TABLET, FILM COATED ORAL
Status: DISCONTINUED | OUTPATIENT
Start: 2018-06-29 | End: 2018-06-30 | Stop reason: HOSPADM

## 2018-06-29 RX ORDER — HEPARIN SODIUM 1000 [USP'U]/ML
4000 INJECTION, SOLUTION INTRAVENOUS; SUBCUTANEOUS AS NEEDED
Status: DISCONTINUED | OUTPATIENT
Start: 2018-06-29 | End: 2018-06-29

## 2018-06-29 RX ORDER — FENTANYL CITRATE 50 UG/ML
INJECTION, SOLUTION INTRAMUSCULAR; INTRAVENOUS CODE/TRAUMA/SEDATION MEDICATION
Status: COMPLETED | OUTPATIENT
Start: 2018-06-29 | End: 2018-06-29

## 2018-06-29 RX ORDER — INSULIN GLARGINE 100 [IU]/ML
20 INJECTION, SOLUTION SUBCUTANEOUS
Status: DISCONTINUED | OUTPATIENT
Start: 2018-06-29 | End: 2018-06-30 | Stop reason: HOSPADM

## 2018-06-29 RX ORDER — ASPIRIN 325 MG
325 TABLET ORAL ONCE
Status: COMPLETED | OUTPATIENT
Start: 2018-06-29 | End: 2018-06-29

## 2018-06-29 RX ADMIN — FENTANYL CITRATE 25 MCG: 50 INJECTION, SOLUTION INTRAMUSCULAR; INTRAVENOUS at 12:46

## 2018-06-29 RX ADMIN — IOHEXOL 130 ML: 350 INJECTION, SOLUTION INTRAVENOUS at 13:06

## 2018-06-29 RX ADMIN — HEPARIN SODIUM 4000 UNITS: 1000 INJECTION, SOLUTION INTRAVENOUS; SUBCUTANEOUS at 09:00

## 2018-06-29 RX ADMIN — INSULIN LISPRO 5 UNITS: 100 INJECTION, SOLUTION INTRAVENOUS; SUBCUTANEOUS at 16:41

## 2018-06-29 RX ADMIN — METOPROLOL TARTRATE 25 MG: 25 TABLET ORAL at 21:32

## 2018-06-29 RX ADMIN — NITROGLYCERIN 400 MCG: 20 INJECTION INTRAVENOUS at 12:43

## 2018-06-29 RX ADMIN — ENOXAPARIN SODIUM 40 MG: 100 INJECTION SUBCUTANEOUS at 08:24

## 2018-06-29 RX ADMIN — AMLODIPINE BESYLATE 10 MG: 10 TABLET ORAL at 18:04

## 2018-06-29 RX ADMIN — HYDRALAZINE HYDROCHLORIDE 10 MG: 20 INJECTION INTRAMUSCULAR; INTRAVENOUS at 16:55

## 2018-06-29 RX ADMIN — HEPARIN SODIUM 4000 UNITS: 1000 INJECTION INTRAVENOUS; SUBCUTANEOUS at 12:25

## 2018-06-29 RX ADMIN — FENTANYL CITRATE 50 MCG: 50 INJECTION, SOLUTION INTRAMUSCULAR; INTRAVENOUS at 12:25

## 2018-06-29 RX ADMIN — ATORVASTATIN CALCIUM 40 MG: 40 TABLET, FILM COATED ORAL at 16:41

## 2018-06-29 RX ADMIN — NITROGLYCERIN 0.4 MG: 0.4 TABLET SUBLINGUAL at 09:17

## 2018-06-29 RX ADMIN — METOPROLOL TARTRATE 25 MG: 25 TABLET ORAL at 09:35

## 2018-06-29 RX ADMIN — MIDAZOLAM 1 MG: 1 INJECTION INTRAMUSCULAR; INTRAVENOUS at 12:46

## 2018-06-29 RX ADMIN — HYDRALAZINE HYDROCHLORIDE 10 MG: 20 INJECTION INTRAMUSCULAR; INTRAVENOUS at 08:23

## 2018-06-29 RX ADMIN — NITROGLYCERIN 200 MCG: 20 INJECTION INTRAVENOUS at 12:25

## 2018-06-29 RX ADMIN — VERAPAMIL HYDROCHLORIDE 1.25 MG: 2.5 INJECTION, SOLUTION INTRAVENOUS at 12:25

## 2018-06-29 RX ADMIN — HEPARIN SODIUM 5000 UNITS: 1000 INJECTION INTRAVENOUS; SUBCUTANEOUS at 12:33

## 2018-06-29 RX ADMIN — INSULIN LISPRO 3 UNITS: 100 INJECTION, SOLUTION INTRAVENOUS; SUBCUTANEOUS at 21:30

## 2018-06-29 RX ADMIN — CLOPIDOGREL BISULFATE 600 MG: 75 TABLET, FILM COATED ORAL at 19:44

## 2018-06-29 RX ADMIN — ASPIRIN 325 MG: 325 TABLET ORAL at 09:36

## 2018-06-29 RX ADMIN — MIDAZOLAM 2 MG: 1 INJECTION INTRAMUSCULAR; INTRAVENOUS at 12:26

## 2018-06-29 RX ADMIN — INSULIN GLARGINE 20 UNITS: 100 INJECTION, SOLUTION SUBCUTANEOUS at 21:32

## 2018-06-29 RX ADMIN — NITROGLYCERIN 400 MCG: 20 INJECTION INTRAVENOUS at 12:41

## 2018-06-29 RX ADMIN — MIDAZOLAM 2 MG: 1 INJECTION INTRAMUSCULAR; INTRAVENOUS at 12:21

## 2018-06-29 RX ADMIN — HEPARIN SODIUM AND DEXTROSE 11.1 UNITS/KG/HR: 10000; 5 INJECTION INTRAVENOUS at 08:58

## 2018-06-29 RX ADMIN — FENTANYL CITRATE 50 MCG: 50 INJECTION, SOLUTION INTRAMUSCULAR; INTRAVENOUS at 12:21

## 2018-06-29 RX ADMIN — LOSARTAN POTASSIUM 50 MG: 50 TABLET, FILM COATED ORAL at 09:27

## 2018-06-29 RX ADMIN — TICAGRELOR 180 MG: 90 TABLET ORAL at 11:12

## 2018-06-29 NOTE — SOCIAL WORK
Met with pt and explained Cm role  Pt lives with her  in a 2 story house with 2 MICA[address]A with 12-13 steps to second floor  Pt was independent PTA and was able to drive  Pt's PCP is Dr Madhav Patiño  Pt has a w/c, walker, cane, and commode at home  No hx of HHC or rehab  No hx of mental health issues or drug or alcohol use  Pt has a prescription plan and uses [facility]  Primary contact is pt's  Dorothea Moore, contact # [phone]  Pt's  will provide pt with transportation home upon discharge  Pt does not have a POA  CM reviewed d/c planning process including the following: identifying help at home, patient preference for d/c planning needs, Discharge Lounge, [facility] Meds to Bed program, availability of treatment team to discuss questions or concerns patient and/or family may have regarding understanding medications and recognizing signs and symptoms once discharged  CM also encouraged patient to follow up with all recommended appointments after discharge  Patient advised of importance for patient and family to participate in managing patients medical well being  Patient/caregiver received discharge checklist  Content reviewed  Patient/caregiver encouraged to participate in discharge plan of care prior to discharge home

## 2018-06-29 NOTE — ASSESSMENT & PLAN NOTE
Lab Results   Component Value Date    HGBA1C 9 0 (H) 11/01/2014       No results for input(s): POCGLU in the last 72 hours  Blood Sugar Average: Last 72 hrs:    Suspect that her hyperglycemia is worsened secondary to a corticosteroid injection to the right knee yesterday  Does not take her metformin secondary to diarrhea  She was prescribed glyburide but has not taken either  Check hemoglobin A1c  While hospitalized she will be given Lantus and Humalog  His hemoglobin A1c is below 9 she may be able to be managed without insulin  Above 9 consideration should be given to at least daily Lantus for blood glucose control

## 2018-06-29 NOTE — CASE MANAGEMENT
Initial Clinical Review    Admission: Date/Time/Statement:  OBSERVATION  6-28-18 1928  CHANGED TO INPATIENT  6/29/18 @ 5461     Orders Placed This Encounter    Inpatient Admission     Standing Status:   Standing     Number of Occurrences:   1     Order Specific Question:   Admitting Physician     Answer:   Janey Krabbe [[id number]]     Order Specific Question:   Level of Care     Answer:   Med Surg [[id number]]     Order Specific Question:   Estimated length of stay     Answer:   More than 2 Midnights     Order Specific Question:   Certification     Answer:   I certify that inpatient services are medically necessary for this patient for a duration of greater than two midnights  See H&P and MD Progress Notes for additional information about the patient's course of treatment  ED: Date/Time/Mode of Arrival:   ED Arrival Information     Expected Arrival Acuity Means of Arrival Escorted By Service Admission Type    - 6/28/2018 16:37 Urgent Ambulance [facility] General Medicine Urgent    Arrival Complaint    SOB          Chief Complaint:   Chief Complaint   Patient presents with    Chest Pain     pt with shortness of breath for the last 4 days and some chest pain  Pt a diabetic and has a history of HTN        History of Illness:      Sofiya Palacios is a 62 y o  female who presents with exertional dyspnea  She states that she is no longer able to walk [place] without having to stop and catch her breath, approximately 150 yd flat surface        ED Vital Signs:   ED Triage Vitals   Temperature Pulse Respirations Blood Pressure SpO2   06/28/18 1651 06/28/18 1651 06/28/18 1651 06/28/18 1651 06/28/18 1651   98 1 °F (36 7 °C) 100 20 157/88 97 %      Pain Score       5       06/28/18 107 kg (235 lb 14 3 oz)       Vital Signs (abnormal):  06/29/18 0917  97 5 °F (36 4 °C)  76  --   217/114  97 %  Nasal cannula  --   06/29/18 0910  97 5 °F (36 4 °C)  76   24   217/114  96 % Nasal cannula         06/28/18 1846  --  100  --   188/100  98 %  None (Room air)  Sitting         Abnormal Labs/Diagnostic Test Results:   Troponin I   1 0 06 (H)     Troponin I    2 0 16 (H)     Troponin I    3 0 22 (H)     Troponin I    4 0 49 (H)       D-Dimer, Quant 496 (H)     EKG  RATE  88    QT INTERVAL 402   QTC  INTERVAL 486     Normal sinus rhythm with sinus arrhythmia  Prolonged QT   Abnormal ECG  When compared with ECG of 28-JUN-2018 17:03  QT has lengthened    ED Treatment:   Medication Administration from 06/28/2018 1635 to 06/28/2018 2015       Date/Time Order Dose Route     06/28/2018 1921 sodium chloride 0 9 % bolus 1,000 mL 1,000 mL Intravenous       Past Medical/Surgical History:     Diagnosis Date Noted    SLAP (superior labrum from anterior to posterior) tear 05/16/2016    [facility] mellitus[facility] ) 01/30/2014    Arthritis 02/12/2016    Neuropathy 11/07/2014    Mal[facility]oplasm of breast ([facility] ) 01/30/2014    Hypertension 01/30/2014    Hypercholesterolemia 01/30/2014    Pneumonia due to infectious organism 02/05/2018    Exertional dyspnea 02/05/2018    Encounter for screening for lung cancer 02/05/2018    Mold exposure 02/05/2018       Admitting Diagnosis: Shortness of breath [R06 02]  Chest pain [R07 9]    Age/Sex: 62 y o  female    Assessment/Plan:    Exertional dyspnea   Assessment & Plan     Given the worsening of her dyspnea with elevated troponin 0 placed in observation overnight to evaluate for non STEMI  Trend troponin x3  Monitor on telemetry  Stress test tomorrow morning if cardiac enzymes are negative  Start heparin and consult with Cardiology if troponins continue to trend up  Continue aspirin  Check lipid panel     CONSULT CARDIOLOGY    Plan:  1  Her chest pain with elevated troponin certainly could be from occlusive coronary artery disease additionally I am concerned about hypertensive urgency as well   Will proceed with left heart catheterization to look for occlusive coronary artery disease and treat as appropriate      2  Regardless,  she needs aggressive risk factor modification  Suggest dietary consult  Discussed medical compliance      3, Blood pressure control  Her Cozaar has been resumed  I will start on Lopressor as well  She has given full dose aspirin today  We will load her with Brilinta  Atorvastatin 40 mg daily, IV heparin      4   Check a transthoracic echocardiogram       Admission Orders:    TREND TROPONIN  HEPARIN INFUSION PROTOCOL  6-29-18  CARDIAC CATH  6-29-18  ECHO     Scheduled Meds:   Current Facility-Administered Medications:  acetaminophen 650 mg Oral Q4H PRN   aspirin 81 mg Oral Daily   atorvastatin 40 mg Oral Daily With Dinner   fentanyl citrate (PF)  Intravenous Code/Trauma/Sedation Med   heparin (porcine) 3-20 Units/kg/hr (Order-Specific) Intravenous Titrated   heparin (porcine) 2,000 Units Intravenous PRN   heparin (porcine) 4,000 Units Intravenous PRN   heparin (porcine)   Code/Trauma/Sedation Med   hydrALAZINE 10 mg Intravenous Q6H PRN   insulin glargine 10 Units Subcutaneous HS   insulin lispro 1-5 Units Subcutaneous HS   insulin lispro 1-6 Units Subcutaneous TID AC   insulin lispro 5 Units Subcutaneous TID With Meals   losartan 50 mg Oral Daily   metoprolol tartrate 25 mg Oral Q12H TEJAL   midazolam   Code/Trauma/Sedation Med   nitroglycerin 0 4 mg Sublingual Q5 Min PRN   nitroGLYcerin  Intra-arterial Code/Trauma/Sedation Med   nitroGLYcerin  Other Code/Trauma/Sedation Med   ondansetron 4 mg Intravenous Q6H PRN   pantoprazole 40 mg Oral Early Morning   pneumococcal 13-valent conjugate vaccine 0 5 mL Intramuscular Prior to discharge   sodium chloride 1 spray Each Nare Q1H PRN   verapamil  Intra-arterial Code/Trauma/Sedation Med     Continuous Infusions:   heparin (porcine) 3-20 Units/kg/hr (Order-Specific)

## 2018-06-29 NOTE — PLAN OF CARE
CARDIOVASCULAR - ADULT     Maintains optimal cardiac output and hemodynamic stability Progressing        DISCHARGE PLANNING     Discharge to home or other facility with appropriate resources Progressing        INFECTION - ADULT     Absence or prevention of progression during hospitalization Progressing        Knowledge Deficit     Patient/family/caregiver demonstrates understanding of disease process, treatment plan, medications, and discharge instructions Progressing        METABOLIC, FLUID AND ELECTROLYTES - ADULT     Glucose maintained within target range Progressing        PAIN - ADULT     Verbalizes/displays adequate comfort level or baseline comfort level Progressing        Potential for Falls     Patient will remain free of falls Progressing        SAFETY ADULT     Maintain or return to baseline ADL function Progressing     Maintain or return mobility status to optimal level Progressing

## 2018-06-29 NOTE — ASSESSMENT & PLAN NOTE
Troponin slowly trending up  Patient is still having active chest pain this morning  EKG negative for ST elevation  Will discontinue cardiac stress test   Start the patient on IV heparin  Cardiology consulted  Appreciate recommendations  Plan for heart catheterization today    Echo ordered

## 2018-06-29 NOTE — PROGRESS NOTES
Troponin continued to elevate to 0 34, SLIM made aware  Troponin trend reordered  Will continue to monitor

## 2018-06-29 NOTE — DISCHARGE INSTRUCTIONS
1  Please see the post angioplasty discharge instructions  No heavy lifting, greater than 10 lbs  or strenuous activity for 1 week  Follow angioplasty discharge instructions  2 Remove band aid tomorrow  Shower and wash area- wrist gently with soap and water- beginning tomorrow  Rinse and pat dry  Apply new water seal band aid  Repeat this process for 5 days  No powders, creams lotions or antibiotic ointments  for 5 days  No tub baths, hot tubs or swimming for 5 days  3  Call [facility] ([phone]) if you develop a fever, redness or drainage at your wrist access site  4  No driving for 2 days    5  Do not stop aspirin or Plavix (clopiogrel) any reason without a cardiologists consent, or the stent could block up and cause a heart attack  6  Stent card and book  Coronary Artery Disease   WHAT YOU NEED TO KNOW:   Coronary artery disease (CAD) is narrowing of the arteries to your heart caused by a buildup of plaque  Plaque is made up of cholesterol and other substances  The narrowing in your arteries decreases the amount of blood that can flow to your heart  This causes your heart to get less oxygen  DISCHARGE INSTRUCTIONS:   Call 911 for any of the following:   · You have any of the following signs of a heart attack:      ¨ Squeezing, pressure, or pain in your chest that lasts longer than 5 minutes or returns    ¨ Discomfort or pain in your back, neck, jaw, stomach, or arm     ¨ Trouble breathing    ¨ Nausea or vomiting    ¨ Lightheadedness or a sudden cold sweat, especially with chest pain or trouble breathing    Contact your healthcare provider if:   · You have chest pain that is more frequent, or you have chest pain at rest     · You have questions or concerns about your condition or care  Cardiac rehabilitation:  Your healthcare provider may recommend that you attend cardiac rehabilitation (rehab)   This is a program run by specialists who will help you safely strengthen your heart and reduce the risk for more heart disease  The plan includes exercise, relaxation, stress management, and heart-healthy nutrition  Healthcare providers will also check to make sure any medicines you are taking are working  Medicines: You may  need any of the following:  · Blood pressure medicines  are given to lower your blood pressure  These medicines may include ACE inhibitors and beta-blockers  ACE inhibitors help keep your blood vessels relaxed and open, which helps keep blood flowing into your heart  Beta-blockers keep your heart pumping strongly and regularly  This helps keep your heart from working too hard to get oxygen  · Cholesterol medicines  help lower blood cholesterol levels  · Nitrates , such as nitroglycerin, relax the arteries of your heart so it gets more oxygen  They help to relieve your chest pain  · Antiplatelets , such as aspirin, help prevent blood clots  Take your antiplatelet medicine exactly as directed  These medicines make it more likely for you to bleed or bruise  If you are told to take aspirin, do not take acetaminophen or ibuprofen instead  · Blood thinners    help prevent blood clots  Examples of blood thinners include heparin and warfarin  Clots can cause strokes, heart attacks, and death  The following are general safety guidelines to follow while you are taking a blood thinner:    ¨ Watch for bleeding and bruising while you take blood thinners  Watch for bleeding from your gums or nose  Watch for blood in your urine and bowel movements  Use a soft washcloth on your skin, and a soft toothbrush to brush your teeth  This can keep your skin and gums from bleeding  If you shave, use an electric shaver  Do not play contact sports  ¨ Tell your dentist and other healthcare providers that you take anticoagulants  Wear a bracelet or necklace that says you take this medicine       ¨ Do not start or stop any medicines unless your healthcare provider tells you to  Many medicines cannot be used with blood thinners  ¨ Tell your healthcare provider right away if you forget to take the medicine, or if you take too much  ¨ Warfarin  is a blood thinner that you may need to take  The following are things you should be aware of if you take warfarin  § Foods and medicines can affect the amount of warfarin in your blood  Do not make major changes to your diet while you take warfarin  Warfarin works best when you eat about the same amount of vitamin K every day  Vitamin K is found in green leafy vegetables and certain other foods  Ask for more information about what to eat when you are taking warfarin  § You will need to see your healthcare provider for follow-up visits when you are on warfarin  You will need regular blood tests  These tests are used to decide how much medicine you need  · Do not take certain medicines without asking your healthcare provider first   These include NSAIDs, herbal or vitamin supplements, or hormones (estrogen or progestin)  · Take your medicine as directed  Contact your healthcare provider if you think your medicine is not helping or if you have side effects  Tell him or her if you are allergic to any medicine  Keep a list of the medicines, vitamins, and herbs you take  Include the amounts, and when and why you take them  Bring the list or the pill bottles to follow-up visits  Carry your medicine list with you in case of an emergency  Follow up with your healthcare provider as directed: You may need to return for other tests  You may also be referred to a cardiac surgeon  Write down your questions so you remember to ask them during your visits  Manage CAD:   · Do not smoke  Nicotine and other chemicals in cigarettes and cigars can cause heart and lung damage  Ask your healthcare provider for information if you currently smoke and need help to quit  E-cigarettes or smokeless tobacco still contain nicotine  Talk to your healthcare provider before you use these products  · Exercise regularly  Exercise at least 30 minutes each day, on most days of the week  Exercise helps to lower high cholesterol and high blood pressure  It can also help you maintain a healthy weight  Ask your healthcare provider about the kind of exercise you should do and how to get started  · Maintain a healthy weight  If you are overweight, talk to your healthcare provider about how to lose weight  A weight loss of 10% can improve your heart health  · Eat heart-healthy foods  Include fresh fruits and vegetables in your meal plan  Choose low-fat foods, such as skim or 1% fat milk, low-fat cheese and yogurt, fish, chicken (without skin), and lean meats  Eat two 4-ounce servings of fish high in omega-3 fats each week, such as salmon, fresh tuna, and herring  Do not eat foods that are high in sodium, such as canned foods, potato chips, salty snacks, and cold cuts  Put less table salt on your food  · Limit or do not drink alcohol  A drink of alcohol is 12 ounces of beer, 5 ounces of wine, or 1½ ounces of liquor  · Manage other health conditions  Follow your healthcare provider's advice on how to manage other conditions that can affect your heart health  These include diabetes, high blood pressure, and high cholesterol  You may need to take medicines for these conditions and make other lifestyle changes  · Ask if you should have a flu vaccine  The flu can be dangerous for a person who has CAD  The flu vaccine is available every year in the fall  © 2017 [address] [employer] Information is for End User's use only and may not be sold, redistributed or otherwise used for commercial purposes  All illustrations and images included in CareNotes® are the copyrighted property of [employer]  or Juan Mccollum  The above information is an  only   It is not intended as medical advice for individual conditions or treatments  Talk to your doctor, nurse or pharmacist before following any medical regimen to see if it is safe and effective for you  Coronary Intravascular Stent Placement   WHAT YOU SHOULD KNOW:   Coronary intravascular stent placement is a procedure to place a stent in an artery of your heart that has plaque buildup  Plaque is a mixture of fat and cholesterol  A stent is a small mesh tube made of metal that helps keep your artery open  Your caregiver may place a bare metal stent or a drug-eluting stent (TEVIN) in your artery  A TEVIN is coated with medicine that is slowly released and helps prevent more plaque buildup in the area where the stent is placed  The stent remains in your artery for life  You may need more than one stent  AFTER YOU LEAVE:   Medicines: You will be given any of the following:  · Antiplatelets  prevent blood clots from forming  You will need to take aspirin and another type of platelet medicine  Take this medicine daily as directed  Do not stop taking aspirin or other type of antiplatelet medicine  · Nitrates , such as nitroglycerin, relax the arteries of your heart so it gets more oxygen  This medicine helps to relieve chest pain  · Cholesterol medicine  helps decrease the amount of cholesterol in your blood  · Blood pressure medicine  lowers your blood pressure  · Take your medicine as directed  Call your healthcare provider if you think your medicine is not helping or if you have side effects  Tell him if you are allergic to any medicine  Keep a list of the medicines, vitamins, and herbs you take  Include the amounts, and when and why you take them  Bring the list or the pill bottles to follow-up visits  Carry your medicine list with you in case of an emergency  Follow up with your cardiologist as directed:  Write down your questions so you remember to ask them during your visits    Activity:   · Avoid unnecessary stair climbing for 48 hours, if a catheter was put in your groin  · Do not place pressure on your arm, hand, or wrist, if the catheter was placed in your wrist  Avoid pushing, pulling, or heavy lifting with that arm  · If you need to cough, support the area where the catheter was inserted with your hand  · Ask your cardiologist how long you need to limit movement and avoid certain activities  · You may feel like resting more after your procedure  Slowly start to do more each day  Rest when you feel it is needed  Wound care:  Ask your cardiologist about how to care for your incision wound  Ask when you can get into a tub, shower, or pool  Do not smoke: If you smoke, it is never too late to quit  Smoking increases your risk for heart disease and stroke  Ask your cardiologist for information if you need help quitting  Cardiac rehab:  Your cardiologist may recommend that you attend cardiac rehabilitation (rehab)  This is a program run by specialists who will help you safely strengthen your heart and reduce the risk of more heart disease  The plan includes exercise, relaxation, stress management, and heart-healthy nutrition  Caregivers will also check to make sure any medicines you are taking are working  Contact your cardiologist if:   · You have a fever or chills  · You have questions or concerns about your condition or care  Seek care immediately or call 911 if:   · Your leg or arm, used for the procedure, becomes numb or turns white or blue  · The area where the catheter was placed is swollen, red, or has pus or foul-smelling fluid coming from it  · Your arm or leg feels warm, tender, and painful  It may look swollen and red  · You start to bleed from your catheter site again      · You have any of the following signs of a heart attack:     ¨ Squeezing, pressure, fullness, or pain in your chest that lasts longer than a few minutes or returns     ¨ Discomfort or pain in your back, neck, jaw, stomach, or arm    ¨ Shortness of breath or breathing problems    ¨ A sudden cold sweat, lightheadedness, dizziness, or nausea, especially with chest pain or trouble breathing    · You have any of the following signs of a stroke:     ¨ Part of your face droops or is numb    ¨ Weakness in an arm or leg    ¨ Confusion or difficulty speaking    ¨ Dizziness, a severe headache, or vision loss  © 2014 [address] [employer] is for End User's use only and may not be sold, redistributed or otherwise used for commercial purposes  All illustrations and images included in CareNotes® are the copyrighted property of [employer]  or Juan Mccollum  The above information is an  only  It is not intended as medical advice for individual conditions or treatments  Talk to your doctor, nurse or pharmacist before following any medical regimen to see if it is safe and effective for you
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
## 2018-06-29 NOTE — ASSESSMENT & PLAN NOTE
Lab Results   Component Value Date    HGBA1C 10 7 (H) 06/29/2018       Recent Labs      06/28/18   2052  06/29/18   0625  06/29/18   1103  06/29/18   1617   POCGLU  407*  334*  283*  334*       Blood Sugar Average: Last 72 hrs: Will increase Lantus to 20 units and HUmalog to 8 units  Continue to monitor
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
## 2018-06-29 NOTE — SOCIAL WORK
MCG Guide Used for Initial Round: Myocardial Infarction RRG  Optimal GLOS: 2  Hospital Day: 0 days  DC Readiness:   Discharge Readiness  Return to top of Myocardial Infarction RRG - ISC  · Discharge readiness is indicated by patient meeting Recovery Milestones, including ALL of the following:  ? Hemodynamic stability  ? Dangerous arrhythmia absent  ? Chest pain, dyspnea, or anginal equivalent absent  ? Oxygen absent  ? No evidence of bleeding or recurrent myocardial ischemia  ? Vascular access site without evidence of infection, aneurysm, or growing hematoma  ? Renal function at baseline or acceptable for next level of care  ? Ambulatory  ? Oral hydration, medications, and diet  ?  Discharge plans and education understood    Identified Barriers: Cardiac cath today  Discussion Date (Time): 06/29/18 with Dr Poncho Schultz

## 2018-06-29 NOTE — ASSESSMENT & PLAN NOTE
This is noted in her records but she does not take any medications for this    Check lipid panel in the morning

## 2018-06-29 NOTE — ASSESSMENT & PLAN NOTE
Given the worsening of her dyspnea with elevated troponin 0 placed in observation overnight to evaluate for non STEMI  Trend troponin x3  Monitor on telemetry  Stress test tomorrow morning if cardiac enzymes are negative  Start heparin and consult with Cardiology if troponins continue to trend up    Continue aspirin  Check lipid panel

## 2018-06-29 NOTE — PROGRESS NOTES
Progress Note - Diaan Charlton 1960, 62 y o  female MRN: [id number]    [facility] Encounter: [id number]    Primary Care Provider: Karel Ozuna MD   Date and time admitted to hospital: 6/28/2018  4:37 PM        * NSTEMI (non-ST elevated myocardial infarction) [facility])   Assessment & Plan    Troponin slowly trending up  Patient is still having active chest pain this morning  EKG negative for ST elevation  Will discontinue cardiac stress test   Start the patient on IV heparin  Cardiology consulted  Appreciate recommendations  Plan for heart catheterization today  Echo ordered        Hypertension   Assessment & Plan    Not well controlled  Continue losartan and metoprolol  Had amlodipine 10 mg  Diabetes mellitus, type 2 [facility])   Assessment & Plan    Lab Results   Component Value Date    HGBA1C 10 7 (H) 06/29/2018       Recent Labs      06/28/18   2052  06/29/18   0625  06/29/18   1103  06/29/18   1617   POCGLU  407*  334*  283*  334*       Blood Sugar Average: Last 72 hrs: Will increase Lantus to 20 units and HUmalog to 8 units  Continue to monitor  Will change to inpatient    VTE Pharmacologic Prophylaxis:   Pharmacologic: Heparin Drip  Mechanical VTE Prophylaxis in Place: Yes    Patient Centered Rounds: I have performed bedside rounds with nursing staff today  Discussions with Specialists or Other Care Team Provider: cardio    Education and Discussions with Family / Patient: pt    Time Spent for Care: 30 minutes  More than 50% of total time spent on counseling and coordination of care as described above      Current Length of Stay: 0 day(s)    Current Patient Status: Inpatient   Certification Statement: The patient will continue to require additional inpatient hospital stay due to above    Discharge Plan: pending cardiac cath    Code Status: Level 1 - Full Code      Subjective:   Patient complained of sudden severe chest pain when she walked to bathroom this morning  This was relieved with sublingual nitro  Overnight she did not have any chest pain  Objective:     Vitals:   Temp (24hrs), Av 9 °F (36 6 °C), Min:97 5 °F (36 4 °C), Max:98 4 °F (36 9 °C)    HR:  [] 68  Resp:  [16-24] 16  BP: (140-217)/() 186/75  SpO2:  [93 %-100 %] 96 %  Body mass index is 34 84 kg/m²  Input and Output Summary (last 24 hours): Intake/Output Summary (Last 24 hours) at 18 1650  Last data filed at 18 1612   Gross per 24 hour   Intake          1983 33 ml   Output             2775 ml   Net          -791 67 ml       Physical Exam:     Physical Exam    Constitutional: Pt appears well-developed and well-nourished  Not in any acute distress  Obese  HENT:   Head: Normocephalic and atraumatic  Eyes: EOM are normal    Neck: Neck supple  Cardiovascular: Normal rate, regular rhythm, normal heart sounds  Exam reveals no gallop and no friction rub  No murmur heard  Pulmonary/Chest: Effort normal and breath sounds normal  No respiratory distress  Pt has no wheezes or rales  Abdominal: Soft  Non-distended, Non-tender  Bowel sounds are normal    Musculoskeletal: Normal range of motion  Neurological: alert and oriented to person, place, and time  Normal strength and sensations    Psychiatric:  Anxious    Additional Data:     Labs:      Results from last 7 days  Lab Units 18  0920 18  0607   WBC Thousand/uL 8 70 8 18   HEMOGLOBIN g/dL 13 0 13 2   HEMATOCRIT % 37 5 38 2   PLATELETS Thousands/uL 196 199   NEUTROS PCT %  --  82*   LYMPHS PCT %  --  12*   MONOS PCT %  --  5   EOS PCT %  --  0       Results from last 7 days  Lab Units 18  0607 18  1716   SODIUM mmol/L 135* 132*   POTASSIUM mmol/L 4 0 3 9   CHLORIDE mmol/L 101 95*   CO2 mmol/L 26 25   BUN mg/dL 17 20   CREATININE mg/dL 0 56* 0 96   CALCIUM mg/dL 8 9 9 5   TOTAL PROTEIN g/dL  --  7 6   BILIRUBIN TOTAL mg/dL  --  0 90   ALK PHOS U/L  --  91   ALT U/L  --  32   AST U/L  --  14 GLUCOSE RANDOM mg/dL 312* 490*       Results from last 7 days  Lab Units 06/29/18  0920   INR  1 20*       Results from last 7 days  Lab Units 06/29/18  1617 06/29/18  1103 06/29/18  0625 06/28/18  2052   POC GLUCOSE mg/dl 334* 283* 334* 407*       Results from last 7 days  Lab Units 06/29/18  0607   HEMOGLOBIN A1C % 10 7*         * I Have Reviewed All Lab Data Listed Above  * Additional Pertinent Lab Tests Reviewed:  [address] Admission Reviewed    Imaging:    Imaging Reports Reviewed Today Include:   Imaging Personally Reviewed by Myself Includes:      Recent Cultures (last 7 days):           Last 24 Hours Medication List:     Current Facility-Administered Medications:  acetaminophen 650 mg Oral Q4H PRMEDINA Fritz MD    amLODIPine 10 mg Oral Daily Tl Anders MD    aspirin 81 mg Oral Daily Wendy Fritz MD    atorvastatin 40 mg Oral Daily With Pauline Cain MD    clopidogrel 600 mg Oral Once Josue Benitez MD    Followed by        Hector Scott ON 6/30/2018] clopidogrel 75 mg Oral Daily Thor Victor MD    hydrALAZINE 10 mg Intravenous Q6H PRN Wendy Fritz MD    insulin glargine 20 Units Subcutaneous HS Tl Anders MD    insulin lispro 1-5 Units Subcutaneous HS Mohini Barrios PA-C    insulin lispro 1-6 Units Subcutaneous TID AC Mohini Barrios PA-C    [START ON 6/30/2018] insulin lispro 8 Units Subcutaneous TID With Meals lT Anders MD    losartan 50 mg Oral Daily Wendy Fritz MD    metoprolol tartrate 25 mg Oral Q12H [facility] REINALDO Ann    nitroglycerin 0 4 mg Sublingual Q5 Min PRN REINALDO Ann    ondansetron 4 mg Intravenous Q6H PRMEDINA Fritz MD    pantoprazole 40 mg Oral Early Morning Wendy Fritz MD    pneumococcal 13-valent conjugate vaccine 0 5 mL Intramuscular Prior to discharge Wendy Fritz MD    sodium chloride 1 spray Each Nare Q1H PRN Wendy Fritz MD    sodium chloride 100 mL/hr Intravenous Continuous Shaye Mcrae MD Last Rate: 100 mL/hr (06/29/18 1342)        Today, Patient Was Seen By: Cinthia Levy MD    ** Please Note: Dictation voice to text software may have been used in the creation of this document   **

## 2018-06-29 NOTE — PROGRESS NOTES
Notified SLIM in regards to patient's troponin 0 49  Patient asymptomatic  No further orders given   Will continue to monitor patient

## 2018-06-29 NOTE — PROGRESS NOTES
Patient arrived to unit with IV in left arm placed by EMS  Patient has history of left breast cancer with surgical resection roughly 20 years ago   Left limb alert bracelet applied, IV to be removed and replaced per protocol in AM

## 2018-06-29 NOTE — PROGRESS NOTES
Patient ambulated back to bed from bathroom  Patient states she has 6/10 pain  2L O2 placed on patient  EKG being done  Heparin gtt initiated  Troponin, ptt, cbc, being drawn  Cardiologist at bedside   Nitrostat sublingual administered per Cardiologist      Will continue to monitor patient

## 2018-06-30 VITALS
RESPIRATION RATE: 18 BRPM | SYSTOLIC BLOOD PRESSURE: 120 MMHG | DIASTOLIC BLOOD PRESSURE: 67 MMHG | OXYGEN SATURATION: 96 % | HEIGHT: 69 IN | HEART RATE: 68 BPM | TEMPERATURE: 98 F | BODY MASS INDEX: 34.94 KG/M2 | WEIGHT: 235.89 LBS

## 2018-06-30 LAB
ANION GAP SERPL CALCULATED.3IONS-SCNC: 8 MMOL/L (ref 4–13)
BUN SERPL-MCNC: 15 MG/DL (ref 5–25)
CALCIUM SERPL-MCNC: 8.9 MG/DL (ref 8.3–10.1)
CHLORIDE SERPL-SCNC: 99 MMOL/L (ref 100–108)
CO2 SERPL-SCNC: 29 MMOL/L (ref 21–32)
CREAT SERPL-MCNC: 0.56 MG/DL (ref 0.6–1.3)
GFR SERPL CREATININE-BSD FRML MDRD: 103 ML/MIN/1.73SQ M
GLUCOSE SERPL-MCNC: 290 MG/DL (ref 65–140)
GLUCOSE SERPL-MCNC: 305 MG/DL (ref 65–140)
GLUCOSE SERPL-MCNC: 321 MG/DL (ref 65–140)
MAGNESIUM SERPL-MCNC: 2.3 MG/DL (ref 1.6–2.6)
POTASSIUM SERPL-SCNC: 4 MMOL/L (ref 3.5–5.3)
SODIUM SERPL-SCNC: 136 MMOL/L (ref 136–145)

## 2018-06-30 PROCEDURE — 99239 HOSP IP/OBS DSCHRG MGMT >30: CPT | Performed by: INTERNAL MEDICINE

## 2018-06-30 PROCEDURE — G8978 MOBILITY CURRENT STATUS: HCPCS

## 2018-06-30 PROCEDURE — 93306 TTE W/DOPPLER COMPLETE: CPT | Performed by: INTERNAL MEDICINE

## 2018-06-30 PROCEDURE — G8980 MOBILITY D/C STATUS: HCPCS

## 2018-06-30 PROCEDURE — G8979 MOBILITY GOAL STATUS: HCPCS

## 2018-06-30 PROCEDURE — 83735 ASSAY OF MAGNESIUM: CPT | Performed by: INTERNAL MEDICINE

## 2018-06-30 PROCEDURE — 82948 REAGENT STRIP/BLOOD GLUCOSE: CPT

## 2018-06-30 PROCEDURE — 99232 SBSQ HOSP IP/OBS MODERATE 35: CPT | Performed by: INTERNAL MEDICINE

## 2018-06-30 PROCEDURE — 97162 PT EVAL MOD COMPLEX 30 MIN: CPT

## 2018-06-30 PROCEDURE — 80048 BASIC METABOLIC PNL TOTAL CA: CPT | Performed by: INTERNAL MEDICINE

## 2018-06-30 RX ORDER — METOPROLOL SUCCINATE 50 MG/1
50 TABLET, EXTENDED RELEASE ORAL DAILY
Qty: 30 TABLET | Refills: 0 | Status: SHIPPED | OUTPATIENT
Start: 2018-07-01 | End: 2020-04-03

## 2018-06-30 RX ORDER — METOPROLOL SUCCINATE 50 MG/1
50 TABLET, EXTENDED RELEASE ORAL DAILY
Status: DISCONTINUED | OUTPATIENT
Start: 2018-07-01 | End: 2018-06-30 | Stop reason: HOSPADM

## 2018-06-30 RX ORDER — AMLODIPINE BESYLATE 10 MG/1
10 TABLET ORAL DAILY
Qty: 30 TABLET | Refills: 0 | Status: SHIPPED | OUTPATIENT
Start: 2018-07-01 | End: 2018-08-15

## 2018-06-30 RX ORDER — ATORVASTATIN CALCIUM 40 MG/1
40 TABLET, FILM COATED ORAL
Qty: 30 TABLET | Refills: 0 | Status: SHIPPED | OUTPATIENT
Start: 2018-06-30 | End: 2020-08-20 | Stop reason: SDUPTHER

## 2018-06-30 RX ORDER — CLOPIDOGREL BISULFATE 75 MG/1
75 TABLET ORAL DAILY
Qty: 30 TABLET | Refills: 0 | Status: SHIPPED | OUTPATIENT
Start: 2018-07-01 | End: 2020-08-20 | Stop reason: SDUPTHER

## 2018-06-30 RX ADMIN — PANTOPRAZOLE SODIUM 40 MG: 40 TABLET, DELAYED RELEASE ORAL at 05:39

## 2018-06-30 RX ADMIN — CLOPIDOGREL BISULFATE 75 MG: 75 TABLET, FILM COATED ORAL at 08:39

## 2018-06-30 RX ADMIN — METOPROLOL TARTRATE 25 MG: 25 TABLET ORAL at 08:39

## 2018-06-30 RX ADMIN — INSULIN LISPRO 5 UNITS: 100 INJECTION, SOLUTION INTRAVENOUS; SUBCUTANEOUS at 08:38

## 2018-06-30 RX ADMIN — AMLODIPINE BESYLATE 10 MG: 10 TABLET ORAL at 08:39

## 2018-06-30 RX ADMIN — INSULIN LISPRO 4 UNITS: 100 INJECTION, SOLUTION INTRAVENOUS; SUBCUTANEOUS at 11:22

## 2018-06-30 RX ADMIN — ASPIRIN 81 MG 81 MG: 81 TABLET ORAL at 08:39

## 2018-06-30 RX ADMIN — LOSARTAN POTASSIUM 50 MG: 50 TABLET, FILM COATED ORAL at 08:39

## 2018-06-30 NOTE — PLAN OF CARE
CARDIOVASCULAR - ADULT     Maintains optimal cardiac output and hemodynamic stability Progressing        DISCHARGE PLANNING     Discharge to home or other facility with appropriate resources Progressing        DISCHARGE PLANNING - CARE MANAGEMENT     Discharge to post-acute care or home with appropriate resources Progressing        INFECTION - ADULT     Absence or prevention of progression during hospitalization Progressing        Knowledge Deficit     Patient/family/caregiver demonstrates understanding of disease process, treatment plan, medications, and discharge instructions Progressing        METABOLIC, FLUID AND ELECTROLYTES - ADULT     Glucose maintained within target range Progressing        PAIN - ADULT     Verbalizes/displays adequate comfort level or baseline comfort level Progressing        Potential for Falls     Patient will remain free of falls Progressing        SAFETY ADULT     Maintain or return to baseline ADL function Progressing     Maintain or return mobility status to optimal level Progressing

## 2018-06-30 NOTE — PROGRESS NOTES
Progress Note - Cardiology   Rolm Mohs 62 y o  female MRN: [id number]  Unit/Bed#: [facility] 724-01 Encounter: [id number]        Principal Problem:    NSTEMI (non-ST elevated myocardial infarction) [facility])  Active Problems:    Diabetes mellitus, type 2 (HCC)    Hypertension    Hypercholesterolemia    Acute nasopharyngitis        Assessment/Plan    1  NSTEMI 1 - small  S/P CC- LAD TEVIN  Echo done- report pending  F/U office 1-2 weeks  Asa/plavix, bb, statin  Pt wishes to return to work on Wednesday which is fine    2  Uncontrolled diabetes-hemoglobin A1c 10 7%  She is currently in a on medical therapy  Defer to medicine     4  Hypertensive urgency-home medication includes Cozaar however she is not taking this medication  BB/ norvasc added  Cozaar resumed  Improved     5  Obesity- weight reduction     6   Untreated dyslipidemia-  Statin added, started with lipitor 40, monitor for s/e     7  Medical noncompliance-many reasons for not taking her medications including forgetting, side effects and simply not liking to take medications"   Explained importance of compliance to pt     Subjective/Objective   Chief Complaint/Subjective  No recurrent cp  Feeling much better        Vitals: /78 (BP Location: Right arm)   Pulse 65   Temp 98 2 °F (36 8 °C) (Oral)   Resp 18   Ht 5' 9" (1 753 m)   Wt 107 kg (235 lb 14 3 oz)   SpO2 96%   BMI 34 84 kg/m²     Vitals:    06/28/18 1651 06/28/18 2026   Weight: 108 kg (238 lb 1 6 oz) 107 kg (235 lb 14 3 oz)     Orthostatic Blood Pressures      Most Recent Value   Blood Pressure  141/78 filed at 06/30/2018 0724   Patient Position - Orthostatic VS  Sitting filed at 06/30/2018 0724            Intake/Output Summary (Last 24 hours) at 06/30/18 0949  Last data filed at 06/30/18 0800   Gross per 24 hour   Intake              955 ml   Output             1475 ml   Net             -520 ml       Invasive Devices     Peripheral Intravenous Line            Peripheral IV 06/29/18 Right Forearm 1 day          Airway            Supraglottic Airway LMA 4 774 days                Current Facility-Administered Medications   Medication Dose Route Frequency    acetaminophen (TYLENOL) tablet 650 mg  650 mg Oral Q4H PRN    amLODIPine (NORVASC) tablet 10 mg  10 mg Oral Daily    aspirin chewable tablet 81 mg  81 mg Oral Daily    atorvastatin (LIPITOR) tablet 40 mg  40 mg Oral Daily With Dinner    clopidogrel (PLAVIX) tablet 75 mg  75 mg Oral Daily    hydrALAZINE (APRESOLINE) injection 10 mg  10 mg Intravenous Q6H PRN    insulin glargine (LANTUS) subcutaneous injection 20 Units 0 2 mL  20 Units Subcutaneous HS    insulin lispro (HumaLOG) 100 units/mL subcutaneous injection 1-5 Units  1-5 Units Subcutaneous HS    insulin lispro (HumaLOG) 100 units/mL subcutaneous injection 1-6 Units  1-6 Units Subcutaneous TID AC    insulin lispro (HumaLOG) 100 units/mL subcutaneous injection 8 Units  8 Units Subcutaneous TID With Meals    losartan (COZAAR) tablet 50 mg  50 mg Oral Daily    metoprolol tartrate (LOPRESSOR) tablet 25 mg  25 mg Oral Q12H TEJAL    nitroglycerin (NITROSTAT) SL tablet 0 4 mg  0 4 mg Sublingual Q5 Min PRN    ondansetron (ZOFRAN) injection 4 mg  4 mg Intravenous Q6H PRN    pantoprazole (PROTONIX) EC tablet 40 mg  40 mg Oral Early Morning    pneumococcal 13-valent conjugate vaccine (PREVNAR-13) IM injection 0 5 mL  0 5 mL Intramuscular Prior to discharge    sodium chloride (OCEAN) 0 65 % nasal spray 1 spray  1 spray Each Nare Q1H PRN         Physical Exam: /78 (BP Location: Right arm)   Pulse 65   Temp 98 2 °F (36 8 °C) (Oral)   Resp 18   Ht 5' 9" (1 753 m)   Wt 107 kg (235 lb 14 3 oz)   SpO2 96%   BMI 34 84 kg/m²     General Appearance:    Alert, cooperative, no distress, appears stated age   Head:    Normocephalic, no scleral icterus   Eyes:    PERRL   Nose:   Nares normal, septum midline, no drainage    Throat:   Lips, mucosa, and tongue normal   Neck: Supple, symmetrical, trachea midline,             Lungs:     Clear to auscultation bilaterally, respirations unlabored        Heart:    Regular rate and rhythm, S1 and S2 normal, no murmur, rub   or gallop       Extremities:   Extremities normal, atraumatic, no cyanosis or edema   Right wrist dry and intact   Pulses:   2+ and symmetric all extremities   Skin:   Skin color, texture, turgor normal, no rashes or lesions   Neurologic:   Alert and oriented to person place and time, no focal deficits                 Lab Results:   Recent Results (from the past 72 hour(s))   ECG 12 lead    Collection Time: 06/28/18  5:03 PM   Result Value Ref Range    Ventricular Rate 85 BPM    Atrial Rate 85 BPM    MD Interval 140 ms    QRSD Interval 92 ms    QT Interval 356 ms    QTC Interval 423 ms    P [place] 54 degrees    QRS Axis -32 degrees    T Wave Axis 61 degrees   Comprehensive metabolic panel    Collection Time: 06/28/18  5:16 PM   Result Value Ref Range    Sodium 132 (L) 136 - 145 mmol/L    Potassium 3 9 3 5 - 5 3 mmol/L    Chloride 95 (L) 100 - 108 mmol/L    CO2 25 21 - 32 mmol/L    Anion Gap 12 4 - 13 mmol/L    BUN 20 5 - 25 mg/dL    Creatinine 0 96 0 60 - 1 30 mg/dL    Glucose 490 (H) 65 - 140 mg/dL    Calcium 9 5 8 3 - 10 1 mg/dL    AST 14 5 - 45 U/L    ALT 32 12 - 78 U/L    Alkaline Phosphatase 91 46 - 116 U/L    Total Protein 7 6 6 4 - 8 2 g/dL    Albumin 3 8 3 5 - 5 0 g/dL    Total Bilirubin 0 90 0 20 - 1 00 mg/dL    eGFR 65 ml/min/1 73sq m   CBC and differential    Collection Time: 06/28/18  5:16 PM   Result Value Ref Range    WBC 10 31 (H) 4 31 - 10 16 Thousand/uL    RBC 4 88 3 81 - 5 12 Million/uL    Hemoglobin 14 4 11 5 - 15 4 g/dL    Hematocrit 40 0 34 8 - 46 1 %    MCV 82 82 - 98 fL    MCH 29 5 26 8 - 34 3 pg    MCHC 36 0 31 4 - 37 4 g/dL    RDW 12 3 11 6 - 15 1 %    MPV 11 2 8 9 - 12 7 fL    Platelets 013 984 - 888 Thousands/uL    nRBC 0 /100 WBCs    Neutrophils Relative 87 (H) 43 - 75 %    Immat GRANS % 1 0 - 2 % Lymphocytes Relative 8 (L) 14 - 44 %    Monocytes Relative 4 4 - 12 %    Eosinophils Relative 0 0 - 6 %    Basophils Relative 0 0 - 1 %    Neutrophils Absolute 9 01 (H) 1 85 - 7 62 Thousands/µL    Immature Grans Absolute 0 07 0 00 - 0 20 Thousand/uL    Lymphocytes Absolute 0 79 0 60 - 4 47 Thousands/µL    Monocytes Absolute 0 44 0 17 - 1 22 Thousand/µL    Eosinophils Absolute 0 00 0 00 - 0 61 Thousand/µL    Basophils Absolute 0 00 0 00 - 0 10 Thousands/µL   Troponin I    Collection Time: 06/28/18  5:16 PM   Result Value Ref Range    Troponin I 0 06 (H) <=0 04 ng/mL   D-dimer, quantitative    Collection Time: 06/28/18  5:16 PM   Result Value Ref Range    D-Dimer, Quant 496 (H) 0 - 424 ng/ml (FEU)   Fingerstick Glucose (POCT)    Collection Time: 06/28/18  8:52 PM   Result Value Ref Range    POC Glucose 407 (H) 65 - 140 mg/dl   Troponin I    Collection Time: 06/28/18  9:07 PM   Result Value Ref Range    Troponin I 0 16 (H) <=0 04 ng/mL   Troponin I    Collection Time: 06/28/18 11:46 PM   Result Value Ref Range    Troponin I 0 22 (H) <=0 04 ng/mL   Troponin I    Collection Time: 06/29/18  2:48 AM   Result Value Ref Range    Troponin I 0 34 (H) <=0 04 ng/mL   CBC and differential    Collection Time: 06/29/18  6:07 AM   Result Value Ref Range    WBC 8 18 4 31 - 10 16 Thousand/uL    RBC 4 55 3 81 - 5 12 Million/uL    Hemoglobin 13 2 11 5 - 15 4 g/dL    Hematocrit 38 2 34 8 - 46 1 %    MCV 84 82 - 98 fL    MCH 29 0 26 8 - 34 3 pg    MCHC 34 6 31 4 - 37 4 g/dL    RDW 12 3 11 6 - 15 1 %    MPV 11 4 8 9 - 12 7 fL    Platelets 746 262 - 338 Thousands/uL    nRBC 0 /100 WBCs    Neutrophils Relative 82 (H) 43 - 75 %    Immat GRANS % 1 0 - 2 %    Lymphocytes Relative 12 (L) 14 - 44 %    Monocytes Relative 5 4 - 12 %    Eosinophils Relative 0 0 - 6 %    Basophils Relative 0 0 - 1 %    Neutrophils Absolute 6 70 1 85 - 7 62 Thousands/µL    Immature Grans Absolute 0 04 0 00 - 0 20 Thousand/uL    Lymphocytes Absolute 1 00 0 60 - 4 47 Thousands/µL    Monocytes Absolute 0 44 0 17 - 1 22 Thousand/µL    Eosinophils Absolute 0 00 0 00 - 0 61 Thousand/µL    Basophils Absolute 0 00 0 00 - 0 10 Thousands/µL   Basic metabolic panel    Collection Time: 06/29/18  6:07 AM   Result Value Ref Range    Sodium 135 (L) 136 - 145 mmol/L    Potassium 4 0 3 5 - 5 3 mmol/L    Chloride 101 100 - 108 mmol/L    CO2 26 21 - 32 mmol/L    Anion Gap 8 4 - 13 mmol/L    BUN 17 5 - 25 mg/dL    Creatinine 0 56 (L) 0 60 - 1 30 mg/dL    Glucose 312 (H) 65 - 140 mg/dL    Calcium 8 9 8 3 - 10 1 mg/dL    eGFR 103 ml/min/1 73sq m   Lipid Panel with Direct LDL reflex    Collection Time: 06/29/18  6:07 AM   Result Value Ref Range    Cholesterol 286 (H) 50 - 200 mg/dL    Triglycerides 209 (H) <=150 mg/dL    HDL, Direct 54 40 - 60 mg/dL    LDL Calculated 190 (H) 0 - 100 mg/dL   Hemoglobin A1c w/EAG Estimation (Orders if not completed within the last 90 days)    Collection Time: 06/29/18  6:07 AM   Result Value Ref Range    Hemoglobin A1C 10 7 (H) 4 2 - 6 3 %     mg/dl   Troponin I    Collection Time: 06/29/18  6:08 AM   Result Value Ref Range    Troponin I 0 49 (H) <=0 04 ng/mL   Fingerstick Glucose (POCT)    Collection Time: 06/29/18  6:25 AM   Result Value Ref Range    POC Glucose 334 (H) 65 - 140 mg/dl   ECG 12 lead    Collection Time: 06/29/18  8:14 AM   Result Value Ref Range    Ventricular Rate 88 BPM    Atrial Rate 88 BPM    ID Interval 138 ms    QRSD Interval 96 ms    QT Interval 402 ms    QTC Interval 486 ms    P [place] 43 degrees    QRS Axis -15 degrees    T Wave Axis 36 degrees   Troponin I    Collection Time: 06/29/18  9:20 AM   Result Value Ref Range    Troponin I 0 37 (H) <=0 04 ng/mL   APTT six (6) hours after Heparin bolus/drip initiation or dosing change    Collection Time: 06/29/18  9:20 AM   Result Value Ref Range    PTT 74 (H) 24 - 36 seconds   CBC    Collection Time: 06/29/18  9:20 AM   Result Value Ref Range    WBC 8 70 4 31 - 10 16 Thousand/uL    RBC 4 48 3 81 - 5 12 Million/uL    Hemoglobin 13 0 11 5 - 15 4 g/dL    Hematocrit 37 5 34 8 - 46 1 %    MCV 84 82 - 98 fL    MCH 29 0 26 8 - 34 3 pg    MCHC 34 7 31 4 - 37 4 g/dL    RDW 12 3 11 6 - 15 1 %    Platelets 367 035 - 681 Thousands/uL    MPV 11 0 8 9 - 12 7 fL   Protime-INR    Collection Time: 18  9:20 AM   Result Value Ref Range    Protime 15 3 (H) 11 8 - 14 2 seconds    INR 1 20 (H) 0 86 - 1 17   Fingerstick Glucose (POCT)    Collection Time: 18 11:03 AM   Result Value Ref Range    POC Glucose 283 (H) 65 - 140 mg/dl   POCT activated clotting time    Collection Time: 18 12:47 PM   Result Value Ref Range    Activated Clotting Time, i-STAT 242 (H) 89 - 137 sec    Specimen Type VENOUS    Fingerstick Glucose (POCT)    Collection Time: 18  4:17 PM   Result Value Ref Range    POC Glucose 334 (H) 65 - 140 mg/dl   Fingerstick Glucose (POCT)    Collection Time: 18  9:10 PM   Result Value Ref Range    POC Glucose 350 (H) 65 - 140 mg/dl   Basic metabolic panel    Collection Time: 18  5:04 AM   Result Value Ref Range    Sodium 136 136 - 145 mmol/L    Potassium 4 0 3 5 - 5 3 mmol/L    Chloride 99 (L) 100 - 108 mmol/L    CO2 29 21 - 32 mmol/L    Anion Gap 8 4 - 13 mmol/L    BUN 15 5 - 25 mg/dL    Creatinine 0 56 (L) 0 60 - 1 30 mg/dL    Glucose 305 (H) 65 - 140 mg/dL    Calcium 8 9 8 3 - 10 1 mg/dL    eGFR 103 ml/min/1 73sq m   Magnesium    Collection Time: 18  5:04 AM   Result Value Ref Range    Magnesium 2 3 1 6 - 2 6 mg/dL   Fingerstick Glucose (POCT)    Collection Time: 18  6:15 AM   Result Value Ref Range    POC Glucose 321 (H) 65 - 140 mg/dl     [address]  [phone]     [facility]     Invasive Cardiovascular Lab Complete Report     Patient: Zach West  MR number: [id number]  Account number: [de-identified]  Study date: 2018  Gender: Female  : 1960  Height: 68 9 in  Weight: 235 4 lb  BSA: 2 21 m squared     Allergies: CODEINE, IODINATED DIAGNOSTIC AGENTS, PENICILLINS     Diagnostic Cardiologist:  Joshua King MD  Interventional Cardiologist:  Joshua King MD  Primary Physician:  Bjorn Lanes, MD     SUMMARY     CORONARY CIRCULATION:  Left main: Normal   LAD: The vessel was normal sized  There was a 99% stenosis in mid vessel with DAMIÁN 1 distal flow  This was the culprit for the patient's NSTEMI  Circumflex: The vessel was normal sized and dominant, giving rise to two large OM branches, a posterolateral branch, and the PDA  There were no siginficant lesions  RCA: The vessel was medium sized and non-dominant  There was moderate diffuse plaque  There were no significant lesions      1ST LESION INTERVENTIONS:  Following pre-dilation and IVUS interrogation, a Xience Kia Rx 3 0 x 28mm drug-eluting stent was placed across the 99% lesion in mid LAD and deployed at a maximum inflation pressure of 14 logan  IVUS dislcosed full stent apposition  After  post-dilation, there was no residual stenosis, and distal runoff was normal      REPORT ELEMENT SELECTION:  Right radial access was employed      Summary:  Single vessel disease, with a 99% stenosis of the mid LAD  Successful IVUS-guided PCI, mid LAD      Plan: aggressive risk factor modification; medical therapy, including DAPT and statin therapy        Imaging: I have personally reviewed pertinent reports  Tele- NSR    Counseling / Coordination of Care  Total time spent today30 minutes  Greater than 50% of total time was spent with the patient and / or family counseling and / or coordination of care

## 2018-06-30 NOTE — ASSESSMENT & PLAN NOTE
Troponin slowly trending up  Patient is still having active chest pain this morning  EKG negative for ST elevation  Status post left heart catheterization on 6/29/2018 showing 90% lad stenosis status post drug-eluting stent  Continue aspirin, Plavix, statin, beta-blocker    Echocardiogram showing normal ejection fraction with some wall motion abnormalities
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
## 2018-06-30 NOTE — DISCHARGE SUMMARY
Discharge- Alonso Najera 1960, 62 y o  female MRN: [id number]    Unit/Bed#: [facility] 724-01 Encounter: [id number]    Primary Care Provider: Eder Torres MD   Date and time admitted to hospital: 6/28/2018  4:37 PM        * NSTEMI (non-ST elevated myocardial infarction) [facility])   Assessment & Plan    Troponin slowly trending up  Patient is still having active chest pain this morning  EKG negative for ST elevation  Status post left heart catheterization on 6/29/2018 showing 90% lad stenosis status post drug-eluting stent  Continue aspirin, Plavix, statin, beta-blocker  Echocardiogram showing normal ejection fraction with some wall motion abnormalities        Hypertension   Assessment & Plan    Not well controlled  Continue losartan and metoprolol  Started amlodipine 10 mg  Diabetes mellitus, type 2 [facility])   Assessment & Plan    Lab Results   Component Value Date    HGBA1C 10 7 (H) 06/29/2018       Recent Labs      06/29/18   1617  06/29/18   2110  06/30/18   0615  06/30/18   1103   POCGLU  334*  350*  321*  290*       Blood sugar is not well controlled  Patient was supposed to be taking glyburide and metformin at home but apparently she was not taking either of this  She tried metformin for 3 months per it was giving her very bad diarrhea so she stopped taking it  Last HbA1c yesterday 10 7  Currently on Lantus and Humalog  Patient's insurance does not cover Lantus or Humalog  She refuses to take metformin  Pt will be discharged on Novolin 70 - 30 25 units twice daily  Patient has been advised to monitor her sugars regularly 3-4 times daily and follow up with her family doctor in 1 week to adjust the dose accordingly  As per the patient she got a recent steroid injection in her knee joint after which her blood sugars increased but even prior to that her sugars usually run > 250s                  Discharging Physician / Practitioner: Soraida Mann MD  PCP: Eder Torres MD  Admission Date:   Admission Orders     Ordered        06/29/18 0846  Inpatient Admission  Once         06/28/18 1928  Place in Observation (expected length of stay for this patient is less than two midnights)  Once             Discharge Date: 06/30/18    Resolved Problems  Date Reviewed: 6/30/2018    None          Consultations During Hospital Stay:  · Cardiology    Procedures Performed:     · Cardiac catheterization on 6/29/2018 with drug-eluting stent placed in LAD  Significant Findings / Test Results:     Echocardiogram:  Left Ventricle:  Systolic function was normal  Ejection fraction was estimated to be 55 %  There was moderate hypokinesis of the mid anteroseptal, apical inferior, and apical septal wall(s)  No significant valvular abnormalities    Incidental Findings:   · none     Test Results Pending at Discharge (will require follow up):   · none     Outpatient Tests Requested:  · none    Complications:  none    Reason for Admission:  Chest pain and exertional shortness of breath  Hospital Course:     Andrés Gibson is a 62 y o  female patient who originally presented to the hospital on 6/28/2018 due to chest pain and exertional shortness of breath  Her troponin was mildly elevated on admission which slowly trended up  Patient continued to have chest pain  EKG did not show ST elevations  She was evaluated by Cardiology and was taken for cardiac catheterization on 6/29/2018 and was found to have significant LAD obstruction for which she was had a drug-eluting stent placed  Echocardiogram was done which showed normal ejection fraction with wall motion abnormalities as above  Her symptoms resolved after the stent placement  She should continue to take the medicines as advised and follow up with her primary care physician within 1 week and with cardiologist as advised      Her blood pressure was not well controlled for which she was started on amlodipine after which her blood pressure has been well controlled  Her blood sugars were also not well controlled because of noncompliance with metformin and glyburide that she was supposed to take at home  She was not taking metformin because of diarrhea  She refused to take metformin  HbA1c was 10 7  She will be discharged on Novolin 70-30 25 units twice daily with regular blood sugar checks and dosing adjustment as per primary care physician  Please see above list of diagnoses and related plan for additional information  Condition at Discharge: good     Discharge Day Visit / Exam:     Subjective:  Pt seen and examined by me this morning  Pt denied any complaints  No chest pain or shortness of breath  She walked down the hallway 2-3 times without any problems  Vitals: Blood Pressure: 120/67 (06/30/18 1100)  Pulse: 68 (06/30/18 1100)  Temperature: 98 °F (36 7 °C) (06/30/18 1100)  Temp Source: Oral (06/30/18 1100)  Respirations: 18 (06/30/18 1100)  Height: 5' 9" (175 3 cm) (06/28/18 2026)  Weight - Scale: 107 kg (235 lb 14 3 oz) (06/28/18 2026)  SpO2: 96 % (06/30/18 1100)     Exam:   Physical Exam    Constitutional: Pt appears well-developed and well-nourished  Not in any acute distress  Morbidly obese  HENT:   Head: Normocephalic and atraumatic  Eyes: EOM are normal    Neck: Neck supple  Cardiovascular: Normal rate, regular rhythm, normal heart sounds  Exam reveals no gallop and no friction rub  No murmur heard  Pulmonary/Chest: Effort normal and breath sounds normal  No respiratory distress  Pt has no wheezes or rales  Abdominal: Soft  Non-distended, Non-tender  Bowel sounds are normal    Musculoskeletal: Normal range of motion  Neurological: alert and oriented to person, place, and time  Normal strength and sensations  Psychiatric: normal mood and affect  Discussion with Family: patient    Discharge instructions/Information to patient and family:   See after visit summary for information provided to patient and family  Provisions for Follow-Up Care:  See after visit summary for information related to follow-up care and any pertinent home health orders  Disposition:     Home    For Discharges to [facility] SNF:   · Not Applicable to this Patient - Not Applicable to this Patient    Planned Readmission: no     Discharge Statement:  I spent 40 minutes discharging the patient  This time was spent on the day of discharge  I had direct contact with the patient on the day of discharge  Greater than 50% of the total time was spent examining patient, answering all patient questions, arranging and discussing plan of care with patient as well as directly providing post-discharge instructions  Additional time then spent on discharge activities  Discharge Medications:  See after visit summary for reconciled discharge medications provided to patient and family        ** Please Note: This note has been constructed using a voice recognition system **

## 2018-06-30 NOTE — SOCIAL WORK
CM was requested to check cost of Humalog Kwikpen and Lantus Solostar  Pt requested scripts be faxed to [facility] in [address] [place] [address]  Scripts faxed  Received a call from [employer] at [address] who states Humalog is $250 and Lantus is not covered  CM informed Dr Aleisha Smith of same--he will discuss with patient

## 2018-06-30 NOTE — ASSESSMENT & PLAN NOTE
Lab Results   Component Value Date    HGBA1C 10 7 (H) 06/29/2018       Recent Labs      06/29/18   1617  06/29/18   2110  06/30/18   0615  06/30/18   1103   POCGLU  334*  350*  321*  290*       Blood sugar is not well controlled  Patient was supposed to be taking glyburide and metformin at home but apparently she was not taking either of this  She tried metformin for 3 months per it was giving her very bad diarrhea so she stopped taking it  Last HbA1c yesterday 10 7  Currently on Lantus and Humalog  Patient's insurance does not cover Lantus or Humalog  She refuses to take metformin  Pt will be discharged on Novolin 70 - 30 25 units twice daily  Patient has been advised to monitor her sugars regularly 3-4 times daily and follow up with her family doctor in 1 week to adjust the dose accordingly  As per the patient she got a recent steroid injection in her knee joint after which her blood sugars increased but even prior to that her sugars usually run > 250s

## 2018-07-02 NOTE — CASE MANAGEMENT
Notification of Inpatient Admission/Inpatient Authorization Request  This is a Notification of Inpatient Admission/Request for Inpatient Authorization to our facility [facility]  Please be advised that this patient is currently in our facility under Inpatient Status  Below you will find the Attending Physician and Facilitys information including NPI# and contact information for the Utilization  assigned to the [facility] where the patient is receiving services  Please feel free to contact the Utilization Review Department with any questions  Patient Information:  PATIENT NAME: Opal Rosales  MRN: [id number]  YOB: 1960    PRESENTATION DATE: 6/28/2018  4:37 PM  IP ADMISSION DATE: 6/29/18 0846  DISCHARGE DATE: 6/30/2018  3:35 PM   DISPOSITION: Home/Self Care    Attending Physician:  BRIDGETTE Sifuentes  [facility] Practioner ID- [id number]  [address] [place] [address]  Phone 1: [phone]  Fax: [phone]  Zohaib Banks RN Registered Nurse Signed   Case Management Date of Service: 6/29/2018 12:50 PM         []Hide copied text        Initial Clinical Review     Admission: Date/Time/Statement:  OBSERVATION  6-28-18 1928  CHANGED TO INPATIENT  6/29/18 @ 0846            Orders Placed This Encounter    Inpatient Admission       Standing Status:   Standing       Number of Occurrences:   1       Order Specific Question:   Admitting Physician       Answer:   Kali Crenshaw       Order Specific Question:   Level of Care       Answer:   Med Surg [16]       Order Specific Question:   Estimated length of stay       Answer:   More than 2 Midnights       Order Specific Question:   Certification       Answer:   I certify that inpatient services are medically necessary for this patient for a duration of greater than two midnights   See H&P and MD Progress Notes for additional information about the patient's course of treatment             ED: Date/Time/Mode of Arrival:             ED Arrival Information      Expected Arrival Acuity Means of Arrival Escorted By Service Admission Type     - 6/28/2018 16:37 Urgent Ambulance [facility] General Medicine Urgent     Arrival Complaint     SOB             Chief Complaint:        Chief Complaint   Patient presents with    Chest Pain       pt with shortness of breath for the last 4 days and some chest pain  Pt a diabetic and has a history of HTN          History of Illness:       Ines Ivy is a 62 y  o  female who presents with exertional dyspnea   She states that she is no longer able to walk the length of the [employer] where she works without having to stop and catch her breath, approximately 150 yd flat surface         ED Vital Signs:            ED Triage Vitals   Temperature Pulse Respirations Blood Pressure SpO2   06/28/18 1651 06/28/18 1651 06/28/18 1651 06/28/18 1651 06/28/18 1651   98 1 °F (36 7 °C) 100 20 157/88 97 %       Pain Score           5           06/28/18 107 kg (235 lb 14 3 oz)         Vital Signs (abnormal):  06/29/18 0917   97 5 °F (36 4 °C)   76   --    217/114   97 %   Nasal cannula   --   06/29/18 0910   97 5 °F (36 4 °C)   76    24    217/114   96 %   Nasal cannula             06/28/18 1846   --   100   --    188/100   98 %   None (Room air)   Sitting            Abnormal Labs/Diagnostic Test Results:   Troponin I   1 0 06 (H)      Troponin I    2 0 16 (H)      Troponin I    3 0 22 (H)      Troponin I    4 0 49 (H)         D-Dimer, Quant 496 (H)      EKG  RATE  88    QT INTERVAL 402   QTC  INTERVAL 486     Normal sinus rhythm with sinus arrhythmia  Prolonged QT   Abnormal ECG  When compared with ECG of 28-JUN-2018 17:03  QT has lengthened     ED Treatment:            Medication Administration from 06/28/2018 1635 to 06/28/2018 2015        Date/Time Order Dose Route       06/28/2018 1921 sodium chloride 0 9 % bolus 1,000 mL 1,000 mL Intravenous         Past Medical/Surgical History:     Diagnosis Date Noted    SLAP (superior labrum from anterior to posterior) tear 05/16/2016    Diabetes mellitus, type 2 [facility] ) 01/30/2014    Arthritis 02/12/2016    Neuropathy 11/07/2014    Malignant neoplasm of breast ([facility] ) 01/30/2014    Hypertension 01/30/2014    Hypercholesterolemia 01/30/2014    Pneumonia due to infectious organism 02/05/2018    Exertional dyspnea 02/05/2018    Encounter for screening for lung cancer 02/05/2018    Mold exposure 02/05/2018         Admitting Diagnosis: Shortness of breath [R06 02]  Chest pain [R07 9]     Age/Sex: 62 y o  female     Assessment/Plan:         Exertional dyspnea   Assessment & Plan     Given the worsening of her dyspnea with elevated troponin 0 placed in observation overnight to evaluate for non STEMI  Trend troponin x3  Monitor on telemetry  Stress test tomorrow morning if cardiac enzymes are negative  Start heparin and consult with Cardiology if troponins continue to trend up  Continue aspirin  Check lipid panel      CONSULT CARDIOLOGY     Plan:  1  Her chest pain with elevated troponin certainly could be from occlusive coronary artery disease additionally I am concerned about hypertensive urgency as well  Will proceed with left heart catheterization to look for occlusive coronary artery disease and treat as appropriate      2  Regardless,  she needs aggressive risk factor modification   Suggest dietary consult   Discussed medical compliance      3, Blood pressure control  Verita Solum has been resumed   I will start on Lopressor as well  She has given full dose aspirin today   We will load her with Brilinta   Atorvastatin 40 mg daily, IV heparin      4   Check a transthoracic echocardiogram         Admission Orders:     TREND TROPONIN  HEPARIN INFUSION PROTOCOL  6-29-18  CARDIAC CATH  6-29-18  ECHO      Scheduled Meds:   Current Facility-Administered Medications:  acetaminophen 650 mg Oral Q4H PRN   aspirin 81 mg Oral Daily   atorvastatin 40 mg Oral Daily With Dinner   fentanyl citrate (PF)   Intravenous Code/Trauma/Sedation Med   heparin (porcine) 3-20 Units/kg/hr (Order-Specific) Intravenous Titrated   heparin (porcine) 2,000 Units Intravenous PRN   heparin (porcine) 4,000 Units Intravenous PRN   heparin (porcine)     Code/Trauma/Sedation Med   hydrALAZINE 10 mg Intravenous Q6H PRN   insulin glargine 10 Units Subcutaneous HS   insulin lispro 1-5 Units Subcutaneous HS   insulin lispro 1-6 Units Subcutaneous TID AC   insulin lispro 5 Units Subcutaneous TID With Meals   losartan 50 mg Oral Daily   metoprolol tartrate 25 mg Oral Q12H TEJAL   midazolam     Code/Trauma/Sedation Med   nitroglycerin 0 4 mg Sublingual Q5 Min PRN   nitroGLYcerin   Intra-arterial Code/Trauma/Sedation Med   nitroGLYcerin   Other Code/Trauma/Sedation Med   ondansetron 4 mg Intravenous Q6H PRN   pantoprazole 40 mg Oral Early Morning   pneumococcal 13-valent conjugate vaccine 0 5 mL Intramuscular Prior to discharge   sodium chloride 1 spray Each Nare Q1H PRN   verapamil   Intra-arterial Code/Trauma/Sedation Med      Continuous Infusions:   heparin (porcine) 3-20 Units/kg/hr (Order-Specific)            Notification of Discharge  This is a Notification of Discharge from our facility [address]  Please be advised that this patient has been discharge from our facility  Below you will find the admission and discharge date and time including the patients disposition  PRESENTATION DATE: 6/28/2018  4:37 PM  IP ADMISSION DATE: 6/29/18 0846  DISCHARGE DATE: 6/30/2018  3:35 PM  DISPOSITION: [address] [place] [address] in the [facility] by [facility] Utilization Review Department  Phone: [phone]; Fax [phone]  ATTENTION: The Network Utilization Review Department is now centralized for our 9 Facilities   Make a note that we have a new phone and fax numbers for our Department  Please call with any questions or concerns to [phone] and carefully follow the prompts so that you are directed to the right person  All voicemails are confidential  Fax any determinations, approvals, denials, and requests for initial or continue stay review clinical to [phone]  Due to HIGH CALL volume, it would be easier if you could please send faxed requests to expedite your requests and in part, help us provide discharge notifications faster

## 2018-07-03 NOTE — CASE MANAGEMENT
Continued Stay Review    Date: 6-30-18      Vital Signs: /67 (BP Location: Right arm)   Pulse 68   Temp 98 °F (36 7 °C) (Oral)   Resp 18   Ht 5' 9" (1 753 m)   Wt 107 kg (235 lb 14 3 oz)   SpO2 96%   BMI 34 84 kg/m²     Medications:   Medication Dose Route Frequency    acetaminophen (TYLENOL) tablet 650 mg  650 mg Oral Q4H PRN    amLODIPine (NORVASC) tablet 10 mg  10 mg Oral Daily    aspirin chewable tablet 81 mg  81 mg Oral Daily    atorvastatin (LIPITOR) tablet 40 mg  40 mg Oral Daily With Dinner    clopidogrel (PLAVIX) tablet 75 mg  75 mg Oral Daily    hydrALAZINE (APRESOLINE) injection 10 mg  10 mg Intravenous Q6H PRN    insulin glargine (LANTUS) subcutaneous injection 20 Units 0 2 mL  20 Units Subcutaneous HS    insulin lispro (HumaLOG) 100 units/mL subcutaneous injection 1-5 Units  1-5 Units Subcutaneous HS    insulin lispro (HumaLOG) 100 units/mL subcutaneous injection 1-6 Units  1-6 Units Subcutaneous TID AC    insulin lispro (HumaLOG) 100 units/mL subcutaneous injection 8 Units  8 Units Subcutaneous TID With Meals    losartan (COZAAR) tablet 50 mg  50 mg Oral Daily    metoprolol tartrate (LOPRESSOR) tablet 25 mg  25 mg Oral Q12H TEJAL    nitroglycerin (NITROSTAT) SL tablet 0 4 mg  0 4 mg Sublingual Q5 Min PRN    ondansetron (ZOFRAN) injection 4 mg  4 mg Intravenous Q6H PRN    pantoprazole (PROTONIX) EC tablet 40 mg  40 mg Oral Early Morning    pneumococcal 13-valent conjugate vaccine (PREVNAR-13) IM injection 0 5 mL  0 5 mL Intramuscular Prior to discharge    sodium chloride (OCEAN) 0 65 % nasal spray 1 spray  1 spray Each Nare Q1H PRN             Abnormal Labs/Diagnostic Results:     TROPONIN  22  34  49  37       Diagnostic Cardiologist: Dayan Baeza MD  Interventional Cardiologist: Dayan Baeza MD  Primary Physician: Evy Sanchez MD     SUMMARY     CORONARY CIRCULATION:  Left main: Normal   LAD: The vessel was normal sized   There was a 99% stenosis in mid vessel with DAMIÁN 1 distal flow  This was the culprit for the patient's NSTEMI  Circumflex: The vessel was normal sized and dominant, giving rise to two large OM branches, a posterolateral branch, and the PDA  There were no siginficant lesions  RCA: The vessel was medium sized and non-dominant  There was moderate diffuse plaque  There were no significant lesions      1ST LESION INTERVENTIONS:  Following pre-dilation and IVUS interrogation, a Xience Kia Rx 3 0 x 28mm drug-eluting stent was placed across the 99% lesion in mid LAD and deployed at a maximum inflation pressure of 14 logan  IVUS dislcosed full stent apposition  After  post-dilation, there was no residual stenosis, and distal runoff was normal      REPORT ELEMENT SELECTION:  Right radial access was employed      Summary:  Single vessel disease, with a 99% stenosis of the mid LAD  Successful IVUS-guided PCI, mid LAD      Plan: aggressive risk factor modification; medical therapy, including DAPT and statin therapy             Age/Sex: 62 y o  female     Assessment/Plan:      CARDIOLOGY    Principal Problem:    NSTEMI (non-ST elevated myocardial infarction) ([facility] )  Active Problems:    Diabetes mellitus, type 2 ([facility] )    Hypertension    Hypercholesterolemia    Acute nasopharyngitis      Assessment/Plan     1   NSTEMI 1 - small  S/P CC- LAD TEVIN  Echo done- report pending  F/U office 1-2 weeks  Asa/plavix, bb, statin  Pt wishes to return to work on Wednesday which is fine     2   Uncontrolled diabetes-hemoglobin A1c 10 7%  Uyen Burleson is currently in a on medical therapy  Defer to medicine     4   Hypertensive urgency-home medication includes Cozaar however she is not taking this medication  BB/ norvasc added  Cozaar resumed  Improved     5   Obesity- weight reduction     6   Untreated dyslipidemia-   Statin added, started with lipitor 40, monitor for s/e     7   Medical noncompliance-many reasons for not taking her medications including forgetting, side effects and simply not liking to take medications"   Explained importance of compliance to pt      Discharge Plan:  [id number]

## 2018-08-15 ENCOUNTER — OFFICE VISIT (OUTPATIENT)
Dept: CARDIOLOGY CLINIC | Facility: CLINIC | Age: 58
End: 2018-08-15
Payer: COMMERCIAL

## 2018-08-15 VITALS
BODY MASS INDEX: 36.73 KG/M2 | WEIGHT: 248 LBS | SYSTOLIC BLOOD PRESSURE: 124 MMHG | OXYGEN SATURATION: 97 % | HEART RATE: 68 BPM | HEIGHT: 69 IN | DIASTOLIC BLOOD PRESSURE: 68 MMHG

## 2018-08-15 DIAGNOSIS — I10 ESSENTIAL HYPERTENSION: ICD-10-CM

## 2018-08-15 DIAGNOSIS — I21.4 NSTEMI (NON-ST ELEVATED MYOCARDIAL INFARCTION) (HCC): ICD-10-CM

## 2018-08-15 DIAGNOSIS — E11.8 TYPE 2 DIABETES MELLITUS WITH COMPLICATION, WITHOUT LONG-TERM CURRENT USE OF INSULIN (HCC): ICD-10-CM

## 2018-08-15 DIAGNOSIS — I25.10 CORONARY ARTERY DISEASE INVOLVING NATIVE CORONARY ARTERY OF NATIVE HEART WITHOUT ANGINA PECTORIS: Primary | ICD-10-CM

## 2018-08-15 DIAGNOSIS — E78.00 HYPERCHOLESTEROLEMIA: ICD-10-CM

## 2018-08-15 PROCEDURE — 99214 OFFICE O/P EST MOD 30 MIN: CPT | Performed by: INTERNAL MEDICINE

## 2018-08-15 NOTE — PATIENT INSTRUCTIONS
Decrease norvasc to 5mg and start 25mg of the metoprolol  If blood pressure remains stable after 1 week, then increase to 50mg of the metoprolol and stop the amlodipine entirely
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
## 2018-08-27 ENCOUNTER — TELEPHONE (OUTPATIENT)
Dept: CARDIOLOGY CLINIC | Facility: CLINIC | Age: 58
End: 2018-08-27

## 2018-08-27 NOTE — TELEPHONE ENCOUNTER
Keith Casiano called to say that she cannot do the physical therapy in the current timeline provided  She is asking that the therapy be reduced to 1 hour sessions, so she can get to work on time  What do we need to do to change that?

## 2018-08-27 NOTE — TELEPHONE ENCOUNTER
I don't believe she's started cardiac rehab yet  Regardless, she needs to discuss this with them  I'm not sure this is possible depending on what their routine is, but if she is able to do abbreviated sessions, I'm fine with that  If they cannot do shortened sessions then we need to have her increase activity on her own without cardiac rehab

## 2018-09-19 ENCOUNTER — TRANSCRIBE ORDERS (OUTPATIENT)
Dept: ADMINISTRATIVE | Facility: HOSPITAL | Age: 58
End: 2018-09-19

## 2018-09-19 ENCOUNTER — APPOINTMENT (OUTPATIENT)
Dept: LAB | Facility: HOSPITAL | Age: 58
End: 2018-09-19
Payer: COMMERCIAL

## 2018-09-19 DIAGNOSIS — D50.8 IRON DEFICIENCY ANEMIA SECONDARY TO INADEQUATE DIETARY IRON INTAKE: ICD-10-CM

## 2018-09-19 DIAGNOSIS — R53.83 FATIGUE, UNSPECIFIED TYPE: ICD-10-CM

## 2018-09-19 DIAGNOSIS — Z79.4 TYPE 2 DIABETES MELLITUS WITHOUT COMPLICATION, WITH LONG-TERM CURRENT USE OF INSULIN (HCC): ICD-10-CM

## 2018-09-19 DIAGNOSIS — E11.9 TYPE 2 DIABETES MELLITUS WITHOUT COMPLICATION, WITH LONG-TERM CURRENT USE OF INSULIN (HCC): ICD-10-CM

## 2018-09-19 DIAGNOSIS — E78.2 MIXED HYPERLIPIDEMIA: ICD-10-CM

## 2018-09-19 DIAGNOSIS — R53.83 FATIGUE, UNSPECIFIED TYPE: Primary | ICD-10-CM

## 2018-09-19 LAB
25(OH)D3 SERPL-MCNC: 11.7 NG/ML (ref 30–100)
ALBUMIN SERPL BCP-MCNC: 4.2 G/DL (ref 3.5–5.7)
ALP SERPL-CCNC: 67 U/L (ref 40–150)
ALT SERPL W P-5'-P-CCNC: 16 U/L (ref 7–52)
ANION GAP SERPL CALCULATED.3IONS-SCNC: 5 MMOL/L (ref 4–13)
AST SERPL W P-5'-P-CCNC: 12 U/L (ref 13–39)
BASOPHILS # BLD AUTO: 0 THOUSANDS/ΜL (ref 0–0.1)
BASOPHILS NFR BLD AUTO: 1 % (ref 0–2)
BILIRUB DIRECT SERPL-MCNC: 0.1 MG/DL (ref 0–0.2)
BILIRUB SERPL-MCNC: 0.6 MG/DL (ref 0.2–1)
BUN SERPL-MCNC: 13 MG/DL (ref 7–25)
CALCIUM SERPL-MCNC: 9.5 MG/DL (ref 8.6–10.5)
CHLORIDE SERPL-SCNC: 99 MMOL/L (ref 98–107)
CHOLEST SERPL-MCNC: 167 MG/DL (ref 0–200)
CO2 SERPL-SCNC: 32 MMOL/L (ref 21–31)
CREAT SERPL-MCNC: 0.52 MG/DL (ref 0.6–1.2)
EOSINOPHIL # BLD AUTO: 0.1 THOUSAND/ΜL (ref 0–0.61)
EOSINOPHIL NFR BLD AUTO: 2 % (ref 0–5)
ERYTHROCYTE [DISTWIDTH] IN BLOOD BY AUTOMATED COUNT: 12.7 % (ref 11.5–14.5)
EST. AVERAGE GLUCOSE BLD GHB EST-MCNC: 214 MG/DL
FERRITIN SERPL-MCNC: 186 NG/ML (ref 8–388)
GFR SERPL CREATININE-BSD FRML MDRD: 106 ML/MIN/1.73SQ M
GLUCOSE P FAST SERPL-MCNC: 255 MG/DL (ref 65–99)
HBA1C MFR BLD: 9.1 % (ref 4.2–6.3)
HCT VFR BLD AUTO: 37.7 % (ref 34.8–46.1)
HDLC SERPL-MCNC: 47 MG/DL (ref 40–60)
HGB BLD-MCNC: 13 G/DL (ref 12–16)
IRON SATN MFR SERPL: 18 %
IRON SERPL-MCNC: 59 UG/DL (ref 50–170)
LDLC SERPL CALC-MCNC: 74 MG/DL (ref 75–193)
LYMPHOCYTES # BLD AUTO: 1.7 THOUSANDS/ΜL (ref 0.6–4.47)
LYMPHOCYTES NFR BLD AUTO: 35 % (ref 21–51)
MCH RBC QN AUTO: 29.1 PG (ref 26–34)
MCHC RBC AUTO-ENTMCNC: 34.6 G/DL (ref 31–37)
MCV RBC AUTO: 84 FL (ref 81–99)
MONOCYTES # BLD AUTO: 0.3 THOUSAND/ΜL (ref 0.17–1.22)
MONOCYTES NFR BLD AUTO: 7 % (ref 2–12)
NEUTROPHILS # BLD AUTO: 2.6 THOUSANDS/ΜL (ref 1.4–6.5)
NEUTS SEG NFR BLD AUTO: 55 % (ref 42–75)
NONHDLC SERPL-MCNC: 120 MG/DL
NRBC BLD AUTO-RTO: 0 /100 WBCS
PLATELET # BLD AUTO: 164 THOUSANDS/UL (ref 149–390)
PMV BLD AUTO: 9.6 FL (ref 8.6–11.7)
POTASSIUM SERPL-SCNC: 4.2 MMOL/L (ref 3.5–5.5)
PROT SERPL-MCNC: 6.8 G/DL (ref 6.4–8.9)
RBC # BLD AUTO: 4.48 MILLION/UL (ref 3.9–5.2)
SODIUM SERPL-SCNC: 136 MMOL/L (ref 134–143)
T4 FREE SERPL-MCNC: 1.03 NG/DL (ref 0.76–1.46)
TIBC SERPL-MCNC: 335 UG/DL (ref 250–450)
TRIGL SERPL-MCNC: 231 MG/DL (ref 44–166)
TSH SERPL DL<=0.05 MIU/L-ACNC: 2.41 UIU/ML (ref 0.45–5.33)
VIT B12 SERPL-MCNC: 405 PG/ML (ref 100–900)
WBC # BLD AUTO: 4.7 THOUSAND/UL (ref 4.8–10.8)

## 2018-09-19 PROCEDURE — 80061 LIPID PANEL: CPT

## 2018-09-19 PROCEDURE — 84443 ASSAY THYROID STIM HORMONE: CPT

## 2018-09-19 PROCEDURE — 83550 IRON BINDING TEST: CPT

## 2018-09-19 PROCEDURE — 82306 VITAMIN D 25 HYDROXY: CPT

## 2018-09-19 PROCEDURE — 82607 VITAMIN B-12: CPT

## 2018-09-19 PROCEDURE — 83540 ASSAY OF IRON: CPT

## 2018-09-19 PROCEDURE — 83036 HEMOGLOBIN GLYCOSYLATED A1C: CPT

## 2018-09-19 PROCEDURE — 82728 ASSAY OF FERRITIN: CPT

## 2018-09-19 PROCEDURE — 80076 HEPATIC FUNCTION PANEL: CPT

## 2018-09-19 PROCEDURE — 85025 COMPLETE CBC W/AUTO DIFF WBC: CPT

## 2018-09-19 PROCEDURE — 80048 BASIC METABOLIC PNL TOTAL CA: CPT

## 2018-09-19 PROCEDURE — 36415 COLL VENOUS BLD VENIPUNCTURE: CPT

## 2018-09-19 PROCEDURE — 84439 ASSAY OF FREE THYROXINE: CPT

## 2018-10-17 ENCOUNTER — TRANSCRIBE ORDERS (OUTPATIENT)
Dept: ADMINISTRATIVE | Facility: HOSPITAL | Age: 58
End: 2018-10-17

## 2018-10-17 DIAGNOSIS — Z12.31 ENCOUNTER FOR SCREENING MAMMOGRAM FOR MALIGNANT NEOPLASM OF BREAST: Primary | ICD-10-CM

## 2018-10-17 DIAGNOSIS — R92.2 BREAST DENSITY: ICD-10-CM

## 2018-10-23 ENCOUNTER — APPOINTMENT (EMERGENCY)
Dept: RADIOLOGY | Facility: HOSPITAL | Age: 58
End: 2018-10-23
Payer: COMMERCIAL

## 2018-10-23 ENCOUNTER — HOSPITAL ENCOUNTER (OUTPATIENT)
Facility: HOSPITAL | Age: 58
Setting detail: OBSERVATION
End: 2018-10-25
Attending: HOSPITALIST | Admitting: HOSPITALIST
Payer: COMMERCIAL

## 2018-10-23 DIAGNOSIS — I25.10 CORONARY ARTERY DISEASE INVOLVING NATIVE CORONARY ARTERY OF NATIVE HEART WITHOUT ANGINA PECTORIS: ICD-10-CM

## 2018-10-23 DIAGNOSIS — R07.9 CHEST PAIN: Primary | ICD-10-CM

## 2018-10-23 PROBLEM — J18.9 PNEUMONIA DUE TO INFECTIOUS ORGANISM: Status: RESOLVED | Noted: 2018-02-05 | Resolved: 2018-10-23

## 2018-10-23 PROBLEM — J00 ACUTE NASOPHARYNGITIS: Status: RESOLVED | Noted: 2018-06-28 | Resolved: 2018-10-23

## 2018-10-23 LAB
ALBUMIN SERPL BCP-MCNC: 4.2 G/DL (ref 3.5–5.7)
ALP SERPL-CCNC: 68 U/L (ref 40–150)
ALT SERPL W P-5'-P-CCNC: 16 U/L (ref 7–52)
ANION GAP SERPL CALCULATED.3IONS-SCNC: 9 MMOL/L (ref 4–13)
AST SERPL W P-5'-P-CCNC: 13 U/L (ref 13–39)
ATRIAL RATE: 63 BPM
BASOPHILS # BLD AUTO: 0 THOUSANDS/ΜL (ref 0–0.1)
BASOPHILS NFR BLD AUTO: 1 % (ref 0–2)
BILIRUB SERPL-MCNC: 0.5 MG/DL (ref 0.2–1)
BILIRUB UR QL STRIP: NEGATIVE
BNP SERPL-MCNC: 50 PG/ML (ref 1–100)
BUN SERPL-MCNC: 17 MG/DL (ref 7–25)
CALCIUM SERPL-MCNC: 9.4 MG/DL (ref 8.6–10.5)
CHLORIDE SERPL-SCNC: 100 MMOL/L (ref 98–107)
CLARITY UR: CLEAR
CO2 SERPL-SCNC: 28 MMOL/L (ref 21–31)
COLOR UR: ABNORMAL
CREAT SERPL-MCNC: 0.49 MG/DL (ref 0.6–1.2)
EOSINOPHIL # BLD AUTO: 0.1 THOUSAND/ΜL (ref 0–0.61)
EOSINOPHIL NFR BLD AUTO: 2 % (ref 0–5)
ERYTHROCYTE [DISTWIDTH] IN BLOOD BY AUTOMATED COUNT: 12.5 % (ref 11.5–14.5)
GFR SERPL CREATININE-BSD FRML MDRD: 108 ML/MIN/1.73SQ M
GLUCOSE SERPL-MCNC: 174 MG/DL (ref 65–140)
GLUCOSE SERPL-MCNC: 229 MG/DL (ref 65–140)
GLUCOSE SERPL-MCNC: 231 MG/DL (ref 65–99)
GLUCOSE UR STRIP-MCNC: ABNORMAL MG/DL
HCT VFR BLD AUTO: 37.2 % (ref 34.8–46.1)
HGB BLD-MCNC: 12.8 G/DL (ref 12–16)
HGB UR QL STRIP.AUTO: NEGATIVE
INR PPP: 0.98 (ref 0.9–1.5)
KETONES UR STRIP-MCNC: NEGATIVE MG/DL
LEUKOCYTE ESTERASE UR QL STRIP: NEGATIVE
LYMPHOCYTES # BLD AUTO: 2.3 THOUSANDS/ΜL (ref 0.6–4.47)
LYMPHOCYTES NFR BLD AUTO: 35 % (ref 21–51)
MAGNESIUM SERPL-MCNC: 1.8 MG/DL (ref 1.9–2.7)
MCH RBC QN AUTO: 28.9 PG (ref 26–34)
MCHC RBC AUTO-ENTMCNC: 34.4 G/DL (ref 31–37)
MCV RBC AUTO: 84 FL (ref 81–99)
MONOCYTES # BLD AUTO: 0.4 THOUSAND/ΜL (ref 0.17–1.22)
MONOCYTES NFR BLD AUTO: 7 % (ref 2–12)
NEUTROPHILS # BLD AUTO: 3.7 THOUSANDS/ΜL (ref 1.4–6.5)
NEUTS SEG NFR BLD AUTO: 56 % (ref 42–75)
NITRITE UR QL STRIP: NEGATIVE
NRBC BLD AUTO-RTO: 0 /100 WBCS
P AXIS: 49 DEGREES
PH UR STRIP.AUTO: 6 [PH] (ref 5–8)
PLATELET # BLD AUTO: 175 THOUSANDS/UL (ref 149–390)
PMV BLD AUTO: 9 FL (ref 8.6–11.7)
POTASSIUM SERPL-SCNC: 3.6 MMOL/L (ref 3.5–5.5)
PR INTERVAL: 162 MS
PROT SERPL-MCNC: 6.5 G/DL (ref 6.4–8.9)
PROT UR STRIP-MCNC: NEGATIVE MG/DL
PROTHROMBIN TIME: 11.4 SECONDS (ref 10.1–12.9)
QRS AXIS: -8 DEGREES
QRSD INTERVAL: 90 MS
QT INTERVAL: 418 MS
QTC INTERVAL: 427 MS
RBC # BLD AUTO: 4.42 MILLION/UL (ref 3.9–5.2)
SODIUM SERPL-SCNC: 137 MMOL/L (ref 134–143)
SP GR UR STRIP.AUTO: <=1.005 (ref 1–1.03)
T WAVE AXIS: 65 DEGREES
TROPONIN I SERPL-MCNC: <0.03 NG/ML
UROBILINOGEN UR QL STRIP.AUTO: 0.2 E.U./DL
VENTRICULAR RATE: 63 BPM
WBC # BLD AUTO: 6.5 THOUSAND/UL (ref 4.8–10.8)

## 2018-10-23 PROCEDURE — 71046 X-RAY EXAM CHEST 2 VIEWS: CPT

## 2018-10-23 PROCEDURE — 81003 URINALYSIS AUTO W/O SCOPE: CPT | Performed by: PHYSICIAN ASSISTANT

## 2018-10-23 PROCEDURE — 84484 ASSAY OF TROPONIN QUANT: CPT | Performed by: PHYSICIAN ASSISTANT

## 2018-10-23 PROCEDURE — 99220 PR INITIAL OBSERVATION CARE/DAY 70 MINUTES: CPT | Performed by: PHYSICIAN ASSISTANT

## 2018-10-23 PROCEDURE — 83880 ASSAY OF NATRIURETIC PEPTIDE: CPT | Performed by: PHYSICIAN ASSISTANT

## 2018-10-23 PROCEDURE — 99285 EMERGENCY DEPT VISIT HI MDM: CPT

## 2018-10-23 PROCEDURE — 85025 COMPLETE CBC W/AUTO DIFF WBC: CPT | Performed by: PHYSICIAN ASSISTANT

## 2018-10-23 PROCEDURE — 93010 ELECTROCARDIOGRAM REPORT: CPT | Performed by: INTERNAL MEDICINE

## 2018-10-23 PROCEDURE — 85610 PROTHROMBIN TIME: CPT | Performed by: PHYSICIAN ASSISTANT

## 2018-10-23 PROCEDURE — 83735 ASSAY OF MAGNESIUM: CPT | Performed by: PHYSICIAN ASSISTANT

## 2018-10-23 PROCEDURE — 93005 ELECTROCARDIOGRAM TRACING: CPT

## 2018-10-23 PROCEDURE — 36415 COLL VENOUS BLD VENIPUNCTURE: CPT | Performed by: PHYSICIAN ASSISTANT

## 2018-10-23 PROCEDURE — 80053 COMPREHEN METABOLIC PANEL: CPT | Performed by: PHYSICIAN ASSISTANT

## 2018-10-23 PROCEDURE — 82948 REAGENT STRIP/BLOOD GLUCOSE: CPT

## 2018-10-23 RX ORDER — NITROGLYCERIN 0.4 MG/1
0.4 TABLET SUBLINGUAL
Status: DISCONTINUED | OUTPATIENT
Start: 2018-10-23 | End: 2018-10-25 | Stop reason: HOSPADM

## 2018-10-23 RX ORDER — ATORVASTATIN CALCIUM 40 MG/1
40 TABLET, FILM COATED ORAL
Status: DISCONTINUED | OUTPATIENT
Start: 2018-10-23 | End: 2018-10-25 | Stop reason: HOSPADM

## 2018-10-23 RX ORDER — GLYBURIDE 5 MG/1
7.5 TABLET ORAL EVERY 12 HOURS
COMMUNITY
End: 2019-09-26 | Stop reason: ALTCHOICE

## 2018-10-23 RX ORDER — ASPIRIN 81 MG/1
324 TABLET, CHEWABLE ORAL ONCE
Status: COMPLETED | OUTPATIENT
Start: 2018-10-23 | End: 2018-10-23

## 2018-10-23 RX ORDER — METOPROLOL SUCCINATE 50 MG/1
50 TABLET, EXTENDED RELEASE ORAL DAILY
Status: DISCONTINUED | OUTPATIENT
Start: 2018-10-24 | End: 2018-10-25 | Stop reason: HOSPADM

## 2018-10-23 RX ORDER — 0.9 % SODIUM CHLORIDE 0.9 %
3 VIAL (ML) INJECTION AS NEEDED
Status: DISCONTINUED | OUTPATIENT
Start: 2018-10-23 | End: 2018-10-25 | Stop reason: HOSPADM

## 2018-10-23 RX ORDER — GLYBURIDE 1.25 MG/1
5 TABLET ORAL 2 TIMES DAILY WITH MEALS
Status: ON HOLD | COMMUNITY
End: 2018-10-23

## 2018-10-23 RX ORDER — ONDANSETRON 2 MG/ML
4 INJECTION INTRAMUSCULAR; INTRAVENOUS EVERY 6 HOURS PRN
Status: DISCONTINUED | OUTPATIENT
Start: 2018-10-23 | End: 2018-10-25 | Stop reason: HOSPADM

## 2018-10-23 RX ORDER — LOSARTAN POTASSIUM 50 MG/1
50 TABLET ORAL DAILY
Status: DISCONTINUED | OUTPATIENT
Start: 2018-10-24 | End: 2018-10-25 | Stop reason: HOSPADM

## 2018-10-23 RX ORDER — ASPIRIN 81 MG/1
81 TABLET, CHEWABLE ORAL DAILY
Status: DISCONTINUED | OUTPATIENT
Start: 2018-10-24 | End: 2018-10-25 | Stop reason: HOSPADM

## 2018-10-23 RX ORDER — PANTOPRAZOLE SODIUM 20 MG/1
20 TABLET, DELAYED RELEASE ORAL
Status: DISCONTINUED | OUTPATIENT
Start: 2018-10-24 | End: 2018-10-25 | Stop reason: HOSPADM

## 2018-10-23 RX ORDER — ALBUTEROL SULFATE 2.5 MG/3ML
2.5 SOLUTION RESPIRATORY (INHALATION) EVERY 4 HOURS PRN
Status: DISCONTINUED | OUTPATIENT
Start: 2018-10-23 | End: 2018-10-25 | Stop reason: HOSPADM

## 2018-10-23 RX ORDER — CLOPIDOGREL BISULFATE 75 MG/1
75 TABLET ORAL DAILY
Status: DISCONTINUED | OUTPATIENT
Start: 2018-10-24 | End: 2018-10-25 | Stop reason: HOSPADM

## 2018-10-23 RX ORDER — ACETAMINOPHEN 325 MG/1
650 TABLET ORAL EVERY 4 HOURS PRN
Status: DISCONTINUED | OUTPATIENT
Start: 2018-10-23 | End: 2018-10-25 | Stop reason: HOSPADM

## 2018-10-23 RX ADMIN — INSULIN DETEMIR 25 UNITS: 100 INJECTION, SOLUTION SUBCUTANEOUS at 22:58

## 2018-10-23 RX ADMIN — INSULIN LISPRO 4 UNITS: 100 INJECTION, SOLUTION INTRAVENOUS; SUBCUTANEOUS at 19:03

## 2018-10-23 RX ADMIN — Medication 324 MG: at 16:33

## 2018-10-23 RX ADMIN — ATORVASTATIN CALCIUM 40 MG: 40 TABLET, FILM COATED ORAL at 19:03

## 2018-10-23 NOTE — ASSESSMENT & PLAN NOTE
Lab Results   Component Value Date    HGBA1C 9 1 (H) 09/19/2018       No results for input(s): POCGLU in the last 72 hours      Blood Sugar Average: Last 72 hrs:  · continue to monitor blood sugars SSI and acu-checks  · A1C improved from previous at 10 7  · Continue Levemir 15u AM, 25 u PM

## 2018-10-23 NOTE — NURSING NOTE
Patient admitted to the Med Surg floor A+ox4, oriented to the room and the use of the call bell  Will continue to monitor
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
## 2018-10-23 NOTE — ASSESSMENT & PLAN NOTE
· Monitor trops and telemetry  · Cardiology consult  · Hx of recent NSTEMI and known CAD w/ stent  · Check echocardiogram
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
## 2018-10-23 NOTE — PLAN OF CARE
Problem: PAIN - ADULT  Goal: Verbalizes/displays adequate comfort level or baseline comfort level  Interventions:  - Encourage patient to monitor pain and request assistance  - Assess pain using appropriate pain scale  - Administer analgesics based on type and severity of pain and evaluate response  - Implement non-pharmacological measures as appropriate and evaluate response  - Consider cultural and social influences on pain and pain management  - Notify physician/advanced practitioner if interventions unsuccessful or patient reports new pain   Outcome: Progressing  Patient denies pain  Will continue to monitor

## 2018-10-24 ENCOUNTER — APPOINTMENT (OUTPATIENT)
Dept: NON INVASIVE DIAGNOSTICS | Facility: HOSPITAL | Age: 58
End: 2018-10-24
Payer: COMMERCIAL

## 2018-10-24 LAB
ANION GAP SERPL CALCULATED.3IONS-SCNC: 7 MMOL/L (ref 4–13)
ATRIAL RATE: 66 BPM
BUN SERPL-MCNC: 11 MG/DL (ref 7–25)
CALCIUM SERPL-MCNC: 9.4 MG/DL (ref 8.6–10.5)
CHLORIDE SERPL-SCNC: 100 MMOL/L (ref 98–107)
CHOLEST SERPL-MCNC: 161 MG/DL (ref 0–200)
CO2 SERPL-SCNC: 33 MMOL/L (ref 21–31)
CREAT SERPL-MCNC: 0.47 MG/DL (ref 0.6–1.2)
GFR SERPL CREATININE-BSD FRML MDRD: 109 ML/MIN/1.73SQ M
GLUCOSE SERPL-MCNC: 110 MG/DL (ref 65–140)
GLUCOSE SERPL-MCNC: 120 MG/DL (ref 65–99)
GLUCOSE SERPL-MCNC: 167 MG/DL (ref 65–140)
GLUCOSE SERPL-MCNC: 207 MG/DL (ref 65–140)
GLUCOSE SERPL-MCNC: 212 MG/DL (ref 65–140)
HDLC SERPL-MCNC: 42 MG/DL (ref 40–60)
LDLC SERPL CALC-MCNC: 64 MG/DL (ref 75–193)
MAGNESIUM SERPL-MCNC: 1.9 MG/DL (ref 1.9–2.7)
NONHDLC SERPL-MCNC: 119 MG/DL
P AXIS: 22 DEGREES
POTASSIUM SERPL-SCNC: 3.4 MMOL/L (ref 3.5–5.5)
PR INTERVAL: 148 MS
QRS AXIS: -11 DEGREES
QRSD INTERVAL: 90 MS
QT INTERVAL: 428 MS
QTC INTERVAL: 448 MS
SODIUM SERPL-SCNC: 140 MMOL/L (ref 134–143)
T WAVE AXIS: 36 DEGREES
TRIGL SERPL-MCNC: 277 MG/DL (ref 44–166)
VENTRICULAR RATE: 66 BPM

## 2018-10-24 PROCEDURE — 99225 PR SBSQ OBSERVATION CARE/DAY 25 MINUTES: CPT | Performed by: NURSE PRACTITIONER

## 2018-10-24 PROCEDURE — 94760 N-INVAS EAR/PLS OXIMETRY 1: CPT

## 2018-10-24 PROCEDURE — 82948 REAGENT STRIP/BLOOD GLUCOSE: CPT

## 2018-10-24 PROCEDURE — 93010 ELECTROCARDIOGRAM REPORT: CPT | Performed by: INTERNAL MEDICINE

## 2018-10-24 PROCEDURE — 93321 DOPPLER ECHO F-UP/LMTD STD: CPT | Performed by: INTERNAL MEDICINE

## 2018-10-24 PROCEDURE — 99226 PR SBSQ OBSERVATION CARE/DAY 35 MINUTES: CPT | Performed by: INTERNAL MEDICINE

## 2018-10-24 PROCEDURE — 80048 BASIC METABOLIC PNL TOTAL CA: CPT | Performed by: PHYSICIAN ASSISTANT

## 2018-10-24 PROCEDURE — 93308 TTE F-UP OR LMTD: CPT | Performed by: INTERNAL MEDICINE

## 2018-10-24 PROCEDURE — 83735 ASSAY OF MAGNESIUM: CPT | Performed by: PHYSICIAN ASSISTANT

## 2018-10-24 PROCEDURE — 93005 ELECTROCARDIOGRAM TRACING: CPT

## 2018-10-24 PROCEDURE — 93308 TTE F-UP OR LMTD: CPT

## 2018-10-24 PROCEDURE — 93325 DOPPLER ECHO COLOR FLOW MAPG: CPT | Performed by: INTERNAL MEDICINE

## 2018-10-24 PROCEDURE — 80061 LIPID PANEL: CPT | Performed by: PHYSICIAN ASSISTANT

## 2018-10-24 RX ORDER — ISOSORBIDE DINITRATE 10 MG/1
20 TABLET ORAL
Status: DISCONTINUED | OUTPATIENT
Start: 2018-10-24 | End: 2018-10-25 | Stop reason: HOSPADM

## 2018-10-24 RX ORDER — AMLODIPINE BESYLATE 5 MG/1
5 TABLET ORAL DAILY
Status: DISCONTINUED | OUTPATIENT
Start: 2018-10-24 | End: 2018-10-25 | Stop reason: HOSPADM

## 2018-10-24 RX ADMIN — INSULIN DETEMIR 13 UNITS: 100 INJECTION, SOLUTION SUBCUTANEOUS at 22:49

## 2018-10-24 RX ADMIN — ENOXAPARIN SODIUM 40 MG: 40 INJECTION SUBCUTANEOUS at 08:13

## 2018-10-24 RX ADMIN — INSULIN LISPRO 4 UNITS: 100 INJECTION, SOLUTION INTRAVENOUS; SUBCUTANEOUS at 16:38

## 2018-10-24 RX ADMIN — ISOSORBIDE DINITRATE 20 MG: 10 TABLET ORAL at 16:38

## 2018-10-24 RX ADMIN — PANTOPRAZOLE SODIUM 20 MG: 20 TABLET, DELAYED RELEASE ORAL at 05:53

## 2018-10-24 RX ADMIN — CLOPIDOGREL BISULFATE 75 MG: 75 TABLET ORAL at 08:13

## 2018-10-24 RX ADMIN — INSULIN DETEMIR 15 UNITS: 100 INJECTION, SOLUTION SUBCUTANEOUS at 08:13

## 2018-10-24 RX ADMIN — ATORVASTATIN CALCIUM 40 MG: 40 TABLET, FILM COATED ORAL at 15:57

## 2018-10-24 RX ADMIN — METOPROLOL SUCCINATE 50 MG: 50 TABLET, EXTENDED RELEASE ORAL at 08:13

## 2018-10-24 RX ADMIN — ASPIRIN 81 MG 81 MG: 81 TABLET ORAL at 08:13

## 2018-10-24 RX ADMIN — LOSARTAN POTASSIUM 50 MG: 50 TABLET, FILM COATED ORAL at 08:13

## 2018-10-24 NOTE — PLAN OF CARE
Problem: DISCHARGE PLANNING - CARE MANAGEMENT  Goal: Discharge to post-acute care or home with appropriate resources  INTERVENTIONS:  - Conduct assessment to determine patient/family and health care team treatment goals, and need for post-acute services based on payer coverage, community resources, and patient preferences, and barriers to discharge  - Address psychosocial, clinical, and financial barriers to discharge as identified in assessment in conjunction with the patient/family and health care team  - Arrange appropriate level of post-acute services according to patient's   needs and preference and payer coverage in collaboration with the physician and health care team  - Communicate with and update the patient/family, physician, and health care team regarding progress on the discharge plan  - Arrange appropriate transportation to post-acute venues    Pt to be transferred to [facility] for a cardiac cath  Outcome: Completed Date Met: 10/24/18

## 2018-10-24 NOTE — UTILIZATION REVIEW
Initial Clinical Review    Admission: Date/Time/Statement: 10/23/18 @ [facility] [address] This Encounter   Procedures    Place in Observation     Standing Status:   Standing     Number of Occurrences:   1     Order Specific Question:   Admitting Physician     Answer:   Pastor Singh [[id number]]     Order Specific Question:   Level of Care     Answer:   Med Surg [16]     Order Specific Question:   Bed request comments     Answer:   telemetry     ED: Date/Time/Mode of Arrival:   ED Arrival Information     Expected Arrival [address] of Arrival Escorted By Service Admission Type    - 10/23/2018 15:19 Emergent [facility]In [facility] Hospitalist Emergency    Arrival Complaint    chest pressure sob        Chief Complaint:   Chief Complaint   Patient presents with    Chest Pain     feels like heart is beating out of her chest today    Shortness of Breath     yesterday, feels like cant walk a few feet without getting SOB    Cough     bringing up green phlegm        History of Illness: Zainab Gudino is a 62 y o  female who presents with shortness of breath and chest tightness/fullness  She reports similar symptom from prior MI  Had facial flushing, diaphoresis and elevated BP, did not have any CP (which is the only thing different from prior MI)  No jaw, neck, back pain  Fullness feeling started yesterday and persisted all day today  Was walking in grocery store became very SOB, had to stop and catch breath  SOB lasted approximately 30minutes  Did not take any medications  Currently feels little tightness, better than before with currently at rest   Patient was on Nabumetone (NSAID) and Robaxin for 1 week for suspected pulled muscle  Had MI June 29th, 2018, was seen at [facility], had cardiac cath and stent placed  Saw Dr Arabella Elena in August with medication adjustments    Scheduled to see him again next month          ED Vital Signs:   ED Triage Vitals [10/23/18 1525]   Temperature Pulse Respirations Blood Pressure SpO2   98 1 °F (36 7 °C) 66 22 (!) 190/88 98 %   Pain Score       8        Wt Readings from Last 1 Encounters:   10/23/18 117 kg (257 lb 13 3 oz)     Vital Signs: WNL    Abnormal Labs/Diagnostic Test Results:     Troponin = <0 03, <0 03, <0 03    EKG:      10/23/18 1537     Ventricular Rate BPM 63    Atrial Rate BPM 63    UT Interval ms 162    QRSD Interval ms 90    QT Interval ms 418    QTC Interval ms 427    P Axis degrees 49    QRS Axis degrees -8    T Wave Axis degrees 65      Normal sinus rhythm        Chest x-ray: WNL    Glucose = 231    ED Treatment:   Medication Administration from 10/23/2018 1519 to 10/23/2018 1817       Date/Time Order Dose Route Action     10/23/2018 1633 aspirin chewable tablet 324 mg 324 mg Oral Given        Past Medical/Surgical History:    Active Ambulatory Problems     Diagnosis Date Noted    SLAP (superior labrum from anterior to posterior) tear 05/16/2016    Diabetes mellitus, type 2 ([facility] [address] ) 01/30/2014    Arthritis 02/12/2016    Neuropathy 11/07/2014    Malignant neoplasm of breast ([facility] [address] ) 01/30/2014    Essential hypertension 01/30/2014    Hypercholesterolemia 01/30/2014    NSTEMI (non-ST elevated myocardial infarction) ([facility] [address] ) 02/05/2018    Encounter for screening for lung cancer 02/05/2018    Mold exposure 02/05/2018    Coronary artery disease involving native coronary artery of native heart without angina pectoris 08/15/2018     Past Medical History:   Diagnosis Date    Arthritis     Cancer [facility])     Cardiac disease     Coronary artery disease     Diabetes mellitus ([facility] [address] )     Heartburn     Hypercholesteremia     Hyperlipidemia     Hypertension     Neuropathy        Admitting Diagnosis: Chest pain [R07 9]  Chest pressure [R07 89]  Shortness of breath [R06 02]  Coronary artery disease involving native coronary artery of native heart without angina pectoris [I25 10]    Age/Sex: 62 y o  female    Assessment/Plan:     * Chest pain   Assessment & Plan     · Monitor trops and telemetry  · Cardiology consult  · Hx of recent NSTEMI and known CAD w/ stent  · Check echocardiogram      Coronary artery disease involving native coronary artery of native heart without angina pectoris   Assessment & Plan     · Continue home medications  · Stent June 2019      Diabetes mellitus, type 2 ([address] )   Assessment & Plan             Lab Results   Component Value Date     HGBA1C 9 1 (H) 09/19/2018         No results for input(s): POCGLU in the last 72 hours      Blood Sugar Average: Last 72 hrs:  · continue to monitor blood sugars SSI and acu-checks  · A1C improved from previous at 10 7  · Continue Levemir 15u AM, 25 u PM      Essential hypertension   Assessment & Plan     · Continue cozaar and toprol xl  · Monitor BP  · Hold parameters      Hypercholesterolemia   Assessment & Plan     · Continue statin        Admission Orders:  Continuous cardiac monitoring and pulse oximetry, Cardiology consult, ECHO, BMP, Mag, Lipid panel, OOB as tolerated        Scheduled Meds:   Current Facility-Administered Medications:  acetaminophen 650 mg Oral Q4H PRN   albuterol 2 5 mg Nebulization Q4H PRN   amLODIPine 5 mg Oral Daily   aspirin 81 mg Oral Daily   atorvastatin 40 mg Oral Daily With Dinner   clopidogrel 75 mg Oral Daily   enoxaparin 40 mg Subcutaneous Daily   influenza vaccine 0 5 mL Intramuscular Prior to discharge   insulin detemir 15 Units Subcutaneous After Breakfast   insulin detemir 25 Units Subcutaneous HS   insulin lispro 4-20 Units Subcutaneous TID AC   isosorbide dinitrate 20 mg Oral BID after meals   losartan 50 mg Oral Daily   metoprolol succinate 50 mg Oral Daily   nitroglycerin 0 4 mg Sublingual Q5 Min PRN   ondansetron 4 mg Intravenous Q6H PRN   pantoprazole 20 mg Oral Early Morning   sodium chloride (PF) 3 mL Intravenous PRN     =====================================================  10/24/18 Cardiology Consult:    New onset of exertional chest fullness and heaviness and breathlessness in a patient with known coronary artery disease status post drug-eluting stent to critically narrowed LAD in June 2018  Symptoms sound very suggestive of myocardial ischemia      Essential hypertension not optimally controlled, will add Norvasc 5 mg daily  Will also add long-acting nitrates in view of chest pain and shortness of breath  Will review 2D echo    Will discuss with [facility] transfer for cardiac catheterization     ============================================================  Continued Stay Review    Date: 10/24/18 @ 1634    Vital Signs: /73 (BP Location: Left arm)   Pulse 71   Temp 97 7 °F (36 5 °C) (Temporal)   Resp 19   Ht 5' 10" (1 778 m)   Wt 117 kg (257 lb 13 3 oz)   SpO2 95%   BMI 36 99 kg/m² 0/10 pain    Medications:   Scheduled Meds:   Current Facility-Administered Medications:  acetaminophen 650 mg Oral Q4H PRN   albuterol 2 5 mg Nebulization Q4H PRN   amLODIPine 5 mg Oral Daily   aspirin 81 mg Oral Daily   atorvastatin 40 mg Oral Daily With Dinner   clopidogrel 75 mg Oral Daily   enoxaparin 40 mg Subcutaneous Daily   influenza vaccine 0 5 mL Intramuscular Prior to discharge   insulin detemir 13 Units Subcutaneous HS   insulin detemir 15 Units Subcutaneous After Breakfast   [START ON 10/25/2018] insulin detemir 25 Units Subcutaneous HS   insulin lispro 4-20 Units Subcutaneous TID AC   isosorbide dinitrate 20 mg Oral BID after meals   losartan 50 mg Oral Daily   metoprolol succinate 50 mg Oral Daily   nitroglycerin 0 4 mg Sublingual Q5 Min PRN   ondansetron 4 mg Intravenous Q6H PRN   pantoprazole 20 mg Oral Early Morning   sodium chloride (PF) 3 mL Intravenous PRN     Abnormal Labs/Diagnostic Results:     Glucose = 207     Ref Range & Units 10/24/18 0942   Ventricular Rate BPM 66    Atrial Rate BPM 66    GA Interval ms 148    QRSD Interval ms 90    QT Interval ms 428    QTC Interval ms 448    P Leroy degrees 22    QRS Axis degrees -11    T Wave Axis degrees 36      Normal sinus rhythm  Normal ECG        ECHO:      LEFT VENTRICLE: Size was normal  Systolic function was normal  Ejection fraction was estimated to be 60 %  There were no regional wall motion abnormalities  Wall thickness was mildly increased  No evidence of apical thrombus  DOPPLER: Left  ventricular diastolic function parameters were normal      RIGHT VENTRICLE: The size was normal  Systolic function was normal  Wall thickness was normal      LEFT ATRIUM: Size was normal      RIGHT ATRIUM: Size was normal     Age/Sex: 62 y o  female     Assessment/Plan:    * Chest pain   Assessment & Plan     · Cardiology input appreciated   · Hx of recent NSTEMI and known CAD w/ stent  · Troponin- negative  · ECHO shows LVEF 60%  There were no regional wall motion abnormalities  · Continue to have chest pressure with activities  Cardiology recommends to transfer to [facility] for cardiac catherization in AM    · At this time will continue with current regimen with ASA, plavix, statin, imdur and beta-blocker        Coronary artery disease involving native coronary artery of native heart without angina pectoris   Assessment & Plan     · Continue home medications  · Stent June 2018       Hypercholesterolemia   Assessment & Plan     · Continue statin      Essential hypertension   Assessment & Plan     · Continue cozaar and toprol xl  · Monitor BP  · Hold parameters      Diabetes mellitus, type 2 [facility])   Assessment & Plan             Lab Results   Component Value Date     HGBA1C 9 1 (H) 09/19/2018                Recent Labs      10/23/18   1826  10/23/18   2032  10/24/18   0647  10/24/18   1145   POCGLU  174*  229*  110  167*         Blood Sugar Average: Last 72 hrs:  · (P) 170   · continue to monitor blood sugars SSI and acu-checks  · A1C improved from previous at 10 7  · Continue Levemir 15u AM, 25 u PM  · Will hold off on long acting insulin tomorrow AM once NPO   Will give half dose this PM           Discharge Plan: Transfer to [facility] for cardiac cath in a m  Thank you,  [address]  Utilization Review Department  Phone: [phone]; Fax [phone]  ATTENTION: Please call with any questions or concerns to [phone]  and carefully follow the prompts so that you are directed to the right person  Send all requests for admission clinical reviews, approved or denied determinations and any other requests to fax [phone]   All voicemails are confidential

## 2018-10-24 NOTE — PROGRESS NOTES
Progress Note - Steve Bueno 1960, 62 y o  female MRN: [id number]    Unit/Bed#: -01 Encounter: [id number]    Primary Care Provider: Baldomero Flynn MD   Date and time admitted to hospital: 10/23/2018  3:29 PM        * Chest pain   Assessment & Plan    · Cardiology input appreciated   · Hx of recent NSTEMI and known CAD w/ stent  · Troponin- negative  · ECHO shows LVEF 60%  There were no regional wall motion abnormalities  · Continue to have chest pressure with activities  Cardiology recommends to transfer to [facility] for cardiac catherization in AM    · At this time will continue with current regimen with ASA, plavix, statin, imdur and beta-blocker  Coronary artery disease involving native coronary artery of native heart without angina pectoris   Assessment & Plan    · Continue home medications  · Stent June 2018      Hypercholesterolemia   Assessment & Plan    · Continue statin     Essential hypertension   Assessment & Plan    · Continue cozaar and toprol xl  · Monitor BP  · Hold parameters     Diabetes mellitus, type 2 [facility])   Assessment & Plan    Lab Results   Component Value Date    HGBA1C 9 1 (H) 09/19/2018       Recent Labs      10/23/18   1826  10/23/18   2032  10/24/18   0647  10/24/18   1145   POCGLU  174*  229*  110  167*       Blood Sugar Average: Last 72 hrs:  · (P) 170   · continue to monitor blood sugars SSI and acu-checks  · A1C improved from previous at 10 7  · Continue Levemir 15u AM, 25 u PM  · Will hold off on long acting insulin tomorrow AM once NPO  Will give half dose this PM           VTE Pharmacologic Prophylaxis: Pharmacologic: Enoxaparin (Lovenox)    Patient Centered Rounds: I have performed bedside rounds with nursing staff today  Discussions with Specialists or Other Care Team Provider: nursing and cardiology   Education and Discussions with Family / Patient: patient     Time Spent for Care: 20 minutes    More than 50% of total time spent on counseling and coordination of care as described above  Current Length of Stay: 0 day(s)    Current Patient Status: Observation   Certification Statement: The patient will continue to require additional inpatient hospital stay due to chest pain, pending transfer to [facility] for cardiac cath    Discharge Plan: in AM     Code Status: Level 1 - Full Code    Subjective:   Feeling somewhat better but continues to have chest pressure once ambulates with slight SOB  No symptoms at rest  Denies any palpitation, dizziness or lightheadedness  Objective:     Vitals:   Temp (24hrs), Av 7 °F (36 5 °C), Min:97 5 °F (36 4 °C), Max:98 1 °F (36 7 °C)    Temp:  [97 5 °F (36 4 °C)-98 1 °F (36 7 °C)] 97 5 °F (36 4 °C)  HR:  [56-69] 66  Resp:  [16-22] 18  BP: (133-190)/(68-88) 163/77  SpO2:  [93 %-98 %] 95 %  Body mass index is 36 99 kg/m²  Input and Output Summary (last 24 hours): Intake/Output Summary (Last 24 hours) at 10/24/18 1330  Last data filed at 10/24/18 0906   Gross per 24 hour   Intake              450 ml   Output                0 ml   Net              450 ml       Physical Exam:     Physical Exam   Constitutional: She is oriented to person, place, and time  She appears well-developed and well-nourished  HENT:   Head: Normocephalic and atraumatic  Mouth/Throat: Oropharynx is clear and moist    Eyes: Pupils are equal, round, and reactive to light  Conjunctivae and EOM are normal    Neck: Normal range of motion  Neck supple  Cardiovascular: Normal rate, regular rhythm and normal heart sounds  Exam reveals no gallop and no friction rub  No murmur heard  Pulmonary/Chest: Effort normal and breath sounds normal  She has no wheezes  She has no rales  Abdominal: Soft  Bowel sounds are normal  She exhibits no distension  There is no tenderness  There is no rebound  Musculoskeletal: Normal range of motion  She exhibits no edema, tenderness or deformity     Neurological: She is alert and oriented to person, place, and time  No cranial nerve deficit  Skin: Skin is warm and dry  No rash noted  No erythema  Psychiatric: She has a normal mood and affect  Her behavior is normal  Thought content normal    Vitals reviewed  Additional Data:     Labs:      Results from last 7 days  Lab Units 10/23/18  1620   WBC Thousand/uL 6 50   HEMOGLOBIN g/dL 12 8   HEMATOCRIT % 37 2   PLATELETS Thousands/uL 175   NEUTROS PCT % 56   LYMPHS PCT % 35   MONOS PCT % 7   EOS PCT % 2       Results from last 7 days  Lab Units 10/24/18  0824 10/23/18  1620   SODIUM mmol/L 140 137   POTASSIUM mmol/L 3 4* 3 6   CHLORIDE mmol/L 100 100   CO2 mmol/L 33* 28   BUN mg/dL 11 17   CREATININE mg/dL 0 47* 0 49*   CALCIUM mg/dL 9 4 9 4   ALK PHOS U/L  --  68   ALT U/L  --  16   AST U/L  --  13       Results from last 7 days  Lab Units 10/23/18  1628   INR  0 98       Results from last 7 days  Lab Units 10/24/18  1145 10/24/18  0647 10/23/18  2032 10/23/18  1826   POC GLUCOSE mg/dl 167* 110 229* 174*           * I Have Reviewed All Lab Data Listed Above  * Additional Pertinent Lab Tests Reviewed:  [facility] Admission  Reviewed    Imaging:  Imaging Reports Reviewed Today Include: CXR    Recent Cultures (last 7 days):           Last 24 Hours Medication List:     Current Facility-Administered Medications:  acetaminophen 650 mg Oral Q4H PRN Natalio Rugby, PA-C   albuterol 2 5 mg Nebulization Q4H PRN Marjorie Maher MD   amLODIPine 5 mg Oral Daily Serena Hopper MD   aspirin 81 mg Oral Daily Natalio Rugby, PA-C   atorvastatin 40 mg Oral Daily With Dinner Natalio Rugby, PA-C   clopidogrel 75 mg Oral Daily Natalio Rugby, PA-C   enoxaparin 40 mg Subcutaneous Daily Natalio Rugby, PA-C   influenza vaccine 0 5 mL Intramuscular Prior to discharge Natalio Rugby, PA-C   insulin detemir 15 Units Subcutaneous After Breakfast [place] Rugby, PA-C   insulin detemir 25 Units Subcutaneous HS Janell Wilkins PA-C   insulin lispro 4-20 Units Subcutaneous TID AC Janell Wilkins PA-C   isosorbide dinitrate 20 mg Oral BID after meals Lexi Dey MD   losartan 50 mg Oral Daily Sonal Cruz PA-C   metoprolol succinate 50 mg Oral Daily Baltazarry FREEDOM Cruz   nitroglycerin 0 4 mg Sublingual Q5 Min PRN Baltazarry FREEDOM Cruz   ondansetron 4 mg Intravenous Q6H PRN Sonal Cruz PA-C   pantoprazole 20 mg Oral Early Morning Sonal Cruz PA-C   sodium chloride (PF) 3 mL Intravenous PRN Hubert Orellana PA-C        Today, Patient Was Seen By: REINALDO Duarte    ** Please Note: Dictation voice to text software may have been used in the creation of this document   **

## 2018-10-24 NOTE — ASSESSMENT & PLAN NOTE
Lab Results   Component Value Date    HGBA1C 9 1 (H) 09/19/2018       Recent Labs      10/23/18   1826  10/23/18   2032  10/24/18   0647  10/24/18   1145   POCGLU  174*  229*  110  167*       Blood Sugar Average: Last 72 hrs:  · (P) 170   · continue to monitor blood sugars SSI and acu-checks  · A1C improved from previous at 10 7  · Continue Levemir 15u AM, 25 u PM  · Will hold off on long acting insulin tomorrow AM once NPO   Will give half dose this PM

## 2018-10-24 NOTE — CONSULTS
Patient is a 70-year-old moderately obese female who had prior LAD drug eluting stent in June 2018 for 99% lesion and a non ST segment elevation MI  Review of cardiac catheterization revealed no significant obstructive disease in the right coronary artery or circumflex coronary artery  The patient was doing well for several months until the day prior to admission when she developed recurrent symptoms of chest fullness and heaviness and shortness of breath on minimal activity relieved by rest lasting 1-2 minutes  She denied any discomfort or shortness of breath at rest   Concerned that the symptoms were similar to those prior to her last heart attack she came to the emergency room for further evaluation  Her cardiac enzyme profile is negative x2 and her initial EKG showed no acute ischemic changes  She is presently chest pain-free  A 2D echo has been completed and is to be reviewed though the technician did not find any major new wall motion abnormalities  The patient has been compliant with her medications  Her blood pressure was elevated in the emergency room  Past medical history notable for essential hypertension dyslipidemia and type 2 diabetes  A review of symptoms negative for heart palpitations dizziness lightheadedness orthopnea nocturnal dyspnea ankle edema claudication or symptoms of cerebral vascular insufficiency  No hemoptysis or pleurisy  Social history negative for tobacco or alcohol use  She is  and works in a [employer]  On physical examination the patient is awake alert and oriented in no acute distress her blood pressure is 130/80 respiratory rate 20 per minute  O2 sat 96% on room air  Carotid upstroke and volume are normal no carotid bruits no neck vein distention no cervical adenopathy  Head eyes ears nose and throat are normal  Lungs are clear without wheezing rales or rhonchi no chest wall deformity no pleural friction rub   Cardiac exam reveals a normal PMI no heaves lifts or thrills no murmurs rubs or gallop sounds no chest wall tenderness  Abdomen is morbidly obese but soft nontender no masses or bruits or organomegaly bowel sounds are normal   Femoral pulses palpable bilaterally  Extremities are without edema cyanosis or clubbing no calf pain swelling or tenderness Homans sign is negative  Medications currently include aspirin statins Plavix insulin losartan metoprolol  Impression and plans:    New onset of exertional chest fullness and heaviness and breathlessness in a patient with known coronary artery disease status post drug-eluting stent to critically narrowed LAD in June 2018  Symptoms sound very suggestive of myocardial ischemia  Essential hypertension not optimally controlled, will add Norvasc 5 mg daily  Will also add long-acting nitrates in view of chest pain and shortness of breath  Will review 2D echo  Will discuss with [facility] transfer for cardiac catheterization

## 2018-10-24 NOTE — SOCIAL WORK
Chart reviewed by case management, assessment was completed, pt is independent and lives with her disabled , she states he can drive and is able to care for himself, she has made an apartment in their basementt for him and she lives upstairs, she states he is ok alone, bathroom on the 1st &2nd floor, no steps outside 13 steps to the basement, [place], pt has a rx plan at [facility] , pt will be transfering all her meds to [facility], pt wears glasses and she states she can read her medication labels, pt denies any d/c needs, pt is to be transferred to [facility] for a cardiac cath when a bed is availiable, pt is in agreement with the transfer  CM reviewed d/c planning process including the following: identifying help at home, patient preference for d/c planning needs, availability of treatment team to discuss questions or concerns patient and/or family may have regarding understanding medications and recognizing signs and symptoms once discharged  CM also encouraged patient to follow up with all recommended appointments after discharge  Patient advised of importance for patient and family to participate in managing patients medical well being

## 2018-10-24 NOTE — PROGRESS NOTES
Pt aware that she will be NPO after MN for cardiac cath tomorrow  Clear liquid diet today  Blood sugars monitored and covered per Nov[employer] sliding scale  No c/o chest pain   here  Ambulating in [facility]

## 2018-10-24 NOTE — ASSESSMENT & PLAN NOTE
· Cardiology input appreciated   · Hx of recent NSTEMI and known CAD w/ stent  · Troponin- negative  · ECHO shows LVEF 60%  There were no regional wall motion abnormalities  · Continue to have chest pressure with activities  Cardiology recommends to transfer to [facility] for cardiac catherization in AM    · At this time will continue with current regimen with ASA, plavix, statin, imdur and beta-blocker

## 2018-10-24 NOTE — SOCIAL WORK
Pt was made aware that she is here as an obs  Status and obs booklet was given, outpt case management referral has been made, d/c plan is for the pt to be transferred for a cardiac cath

## 2018-10-25 ENCOUNTER — APPOINTMENT (INPATIENT)
Dept: NON INVASIVE DIAGNOSTICS | Facility: HOSPITAL | Age: 58
End: 2018-10-25
Payer: COMMERCIAL

## 2018-10-25 ENCOUNTER — HOSPITAL ENCOUNTER (OUTPATIENT)
Facility: HOSPITAL | Age: 58
Setting detail: OBSERVATION
Discharge: HOME | End: 2018-10-26
Attending: INTERNAL MEDICINE | Admitting: INTERNAL MEDICINE
Payer: COMMERCIAL

## 2018-10-25 VITALS
TEMPERATURE: 98 F | SYSTOLIC BLOOD PRESSURE: 119 MMHG | DIASTOLIC BLOOD PRESSURE: 59 MMHG | HEIGHT: 70 IN | BODY MASS INDEX: 36.91 KG/M2 | WEIGHT: 257.83 LBS | RESPIRATION RATE: 18 BRPM | HEART RATE: 61 BPM | OXYGEN SATURATION: 98 %

## 2018-10-25 DIAGNOSIS — I10 ESSENTIAL HYPERTENSION: Primary | ICD-10-CM

## 2018-10-25 DIAGNOSIS — R07.9 CHEST PAIN WITH MODERATE RISK FOR CARDIAC ETIOLOGY: ICD-10-CM

## 2018-10-25 LAB
GLUCOSE SERPL-MCNC: 154 MG/DL (ref 65–140)
GLUCOSE SERPL-MCNC: 163 MG/DL (ref 65–140)
GLUCOSE SERPL-MCNC: 202 MG/DL (ref 65–140)
GLUCOSE SERPL-MCNC: 219 MG/DL (ref 65–140)

## 2018-10-25 PROCEDURE — 93454 CORONARY ARTERY ANGIO S&I: CPT | Performed by: PHYSICIAN ASSISTANT

## 2018-10-25 PROCEDURE — C1769 GUIDE WIRE: HCPCS | Performed by: PHYSICIAN ASSISTANT

## 2018-10-25 PROCEDURE — 82948 REAGENT STRIP/BLOOD GLUCOSE: CPT

## 2018-10-25 PROCEDURE — 99233 SBSQ HOSP IP/OBS HIGH 50: CPT | Performed by: INTERNAL MEDICINE

## 2018-10-25 PROCEDURE — 99152 MOD SED SAME PHYS/QHP 5/>YRS: CPT | Performed by: INTERNAL MEDICINE

## 2018-10-25 PROCEDURE — C1894 INTRO/SHEATH, NON-LASER: HCPCS | Performed by: PHYSICIAN ASSISTANT

## 2018-10-25 PROCEDURE — 99152 MOD SED SAME PHYS/QHP 5/>YRS: CPT | Performed by: PHYSICIAN ASSISTANT

## 2018-10-25 PROCEDURE — 99217 PR OBSERVATION CARE DISCHARGE MANAGEMENT: CPT | Performed by: NURSE PRACTITIONER

## 2018-10-25 PROCEDURE — 93454 CORONARY ARTERY ANGIO S&I: CPT | Performed by: INTERNAL MEDICINE

## 2018-10-25 RX ORDER — DIPHENHYDRAMINE HYDROCHLORIDE 50 MG/ML
50 INJECTION INTRAMUSCULAR; INTRAVENOUS
Status: COMPLETED | OUTPATIENT
Start: 2018-10-25 | End: 2018-10-25

## 2018-10-25 RX ORDER — ASPIRIN 81 MG/1
TABLET, CHEWABLE ORAL CODE/TRAUMA/SEDATION MEDICATION
Status: COMPLETED | OUTPATIENT
Start: 2018-10-25 | End: 2018-10-25

## 2018-10-25 RX ORDER — ATORVASTATIN CALCIUM 40 MG/1
40 TABLET, FILM COATED ORAL
Status: DISCONTINUED | OUTPATIENT
Start: 2018-10-25 | End: 2018-10-26 | Stop reason: HOSPADM

## 2018-10-25 RX ORDER — ATORVASTATIN CALCIUM 40 MG/1
40 TABLET, FILM COATED ORAL
Status: CANCELLED | OUTPATIENT
Start: 2018-10-25

## 2018-10-25 RX ORDER — ASPIRIN 81 MG/1
81 TABLET, CHEWABLE ORAL DAILY
Status: DISCONTINUED | OUTPATIENT
Start: 2018-10-26 | End: 2018-10-26 | Stop reason: HOSPADM

## 2018-10-25 RX ORDER — MIDAZOLAM HYDROCHLORIDE 1 MG/ML
INJECTION INTRAMUSCULAR; INTRAVENOUS CODE/TRAUMA/SEDATION MEDICATION
Status: COMPLETED | OUTPATIENT
Start: 2018-10-25 | End: 2018-10-25

## 2018-10-25 RX ORDER — HEPARIN SODIUM 1000 [USP'U]/ML
INJECTION, SOLUTION INTRAVENOUS; SUBCUTANEOUS CODE/TRAUMA/SEDATION MEDICATION
Status: COMPLETED | OUTPATIENT
Start: 2018-10-25 | End: 2018-10-25

## 2018-10-25 RX ORDER — ACETAMINOPHEN 325 MG/1
650 TABLET ORAL EVERY 4 HOURS PRN
Status: DISCONTINUED | OUTPATIENT
Start: 2018-10-25 | End: 2018-10-26 | Stop reason: HOSPADM

## 2018-10-25 RX ORDER — PANTOPRAZOLE SODIUM 20 MG/1
20 TABLET, DELAYED RELEASE ORAL
Status: DISCONTINUED | OUTPATIENT
Start: 2018-10-26 | End: 2018-10-26 | Stop reason: HOSPADM

## 2018-10-25 RX ORDER — ALBUTEROL SULFATE 2.5 MG/3ML
2.5 SOLUTION RESPIRATORY (INHALATION) EVERY 4 HOURS PRN
Status: CANCELLED | OUTPATIENT
Start: 2018-10-25

## 2018-10-25 RX ORDER — ISOSORBIDE DINITRATE 10 MG/1
20 TABLET ORAL
Status: DISCONTINUED | OUTPATIENT
Start: 2018-10-25 | End: 2018-10-26 | Stop reason: HOSPADM

## 2018-10-25 RX ORDER — 0.9 % SODIUM CHLORIDE 0.9 %
3 VIAL (ML) INJECTION AS NEEDED
Status: CANCELLED | OUTPATIENT
Start: 2018-10-25

## 2018-10-25 RX ORDER — ALBUTEROL SULFATE 2.5 MG/3ML
2.5 SOLUTION RESPIRATORY (INHALATION) EVERY 4 HOURS PRN
Status: DISCONTINUED | OUTPATIENT
Start: 2018-10-25 | End: 2018-10-26 | Stop reason: HOSPADM

## 2018-10-25 RX ORDER — CLOPIDOGREL BISULFATE 75 MG/1
75 TABLET ORAL DAILY
Status: DISCONTINUED | OUTPATIENT
Start: 2018-10-26 | End: 2018-10-26 | Stop reason: HOSPADM

## 2018-10-25 RX ORDER — FENTANYL CITRATE 50 UG/ML
INJECTION, SOLUTION INTRAMUSCULAR; INTRAVENOUS CODE/TRAUMA/SEDATION MEDICATION
Status: COMPLETED | OUTPATIENT
Start: 2018-10-25 | End: 2018-10-25

## 2018-10-25 RX ORDER — AMLODIPINE BESYLATE 5 MG/1
5 TABLET ORAL DAILY
Status: CANCELLED | OUTPATIENT
Start: 2018-10-25

## 2018-10-25 RX ORDER — NITROGLYCERIN 20 MG/100ML
INJECTION INTRAVENOUS CODE/TRAUMA/SEDATION MEDICATION
Status: COMPLETED | OUTPATIENT
Start: 2018-10-25 | End: 2018-10-25

## 2018-10-25 RX ORDER — ONDANSETRON 2 MG/ML
4 INJECTION INTRAMUSCULAR; INTRAVENOUS EVERY 6 HOURS PRN
Status: CANCELLED | OUTPATIENT
Start: 2018-10-25

## 2018-10-25 RX ORDER — LOSARTAN POTASSIUM 50 MG/1
50 TABLET ORAL DAILY
Status: CANCELLED | OUTPATIENT
Start: 2018-10-25

## 2018-10-25 RX ORDER — PANTOPRAZOLE SODIUM 20 MG/1
20 TABLET, DELAYED RELEASE ORAL
Status: CANCELLED | OUTPATIENT
Start: 2018-10-26

## 2018-10-25 RX ORDER — ASPIRIN 81 MG/1
81 TABLET, CHEWABLE ORAL DAILY
Status: CANCELLED | OUTPATIENT
Start: 2018-10-25

## 2018-10-25 RX ORDER — LOSARTAN POTASSIUM 50 MG/1
50 TABLET ORAL DAILY
Status: DISCONTINUED | OUTPATIENT
Start: 2018-10-26 | End: 2018-10-26 | Stop reason: HOSPADM

## 2018-10-25 RX ORDER — ISOSORBIDE DINITRATE 10 MG/1
20 TABLET ORAL
Status: CANCELLED | OUTPATIENT
Start: 2018-10-25

## 2018-10-25 RX ORDER — SODIUM CHLORIDE 9 MG/ML
100 INJECTION, SOLUTION INTRAVENOUS ONCE
Status: COMPLETED | OUTPATIENT
Start: 2018-10-25 | End: 2018-10-25

## 2018-10-25 RX ORDER — ACETAMINOPHEN 325 MG/1
650 TABLET ORAL EVERY 4 HOURS PRN
Status: CANCELLED | OUTPATIENT
Start: 2018-10-25

## 2018-10-25 RX ORDER — CLOPIDOGREL BISULFATE 75 MG/1
75 TABLET ORAL DAILY
Status: CANCELLED | OUTPATIENT
Start: 2018-10-25

## 2018-10-25 RX ORDER — AMLODIPINE BESYLATE 5 MG/1
5 TABLET ORAL DAILY
Status: DISCONTINUED | OUTPATIENT
Start: 2018-10-26 | End: 2018-10-26 | Stop reason: HOSPADM

## 2018-10-25 RX ORDER — ONDANSETRON 2 MG/ML
4 INJECTION INTRAMUSCULAR; INTRAVENOUS EVERY 6 HOURS PRN
Status: DISCONTINUED | OUTPATIENT
Start: 2018-10-25 | End: 2018-10-26 | Stop reason: HOSPADM

## 2018-10-25 RX ORDER — NITROGLYCERIN 0.4 MG/1
0.4 TABLET SUBLINGUAL
Status: CANCELLED | OUTPATIENT
Start: 2018-10-25

## 2018-10-25 RX ORDER — NITROGLYCERIN 0.4 MG/1
0.4 TABLET SUBLINGUAL
Status: DISCONTINUED | OUTPATIENT
Start: 2018-10-25 | End: 2018-10-26 | Stop reason: HOSPADM

## 2018-10-25 RX ORDER — METOPROLOL SUCCINATE 50 MG/1
50 TABLET, EXTENDED RELEASE ORAL DAILY
Status: CANCELLED | OUTPATIENT
Start: 2018-10-25

## 2018-10-25 RX ORDER — VERAPAMIL HYDROCHLORIDE 2.5 MG/ML
INJECTION, SOLUTION INTRAVENOUS CODE/TRAUMA/SEDATION MEDICATION
Status: COMPLETED | OUTPATIENT
Start: 2018-10-25 | End: 2018-10-25

## 2018-10-25 RX ORDER — 0.9 % SODIUM CHLORIDE 0.9 %
3 VIAL (ML) INJECTION AS NEEDED
Status: DISCONTINUED | OUTPATIENT
Start: 2018-10-25 | End: 2018-10-25

## 2018-10-25 RX ORDER — LIDOCAINE HYDROCHLORIDE 10 MG/ML
INJECTION, SOLUTION INFILTRATION; PERINEURAL CODE/TRAUMA/SEDATION MEDICATION
Status: COMPLETED | OUTPATIENT
Start: 2018-10-25 | End: 2018-10-25

## 2018-10-25 RX ORDER — CLOPIDOGREL BISULFATE 75 MG/1
TABLET ORAL CODE/TRAUMA/SEDATION MEDICATION
Status: COMPLETED | OUTPATIENT
Start: 2018-10-25 | End: 2018-10-25

## 2018-10-25 RX ORDER — METOPROLOL SUCCINATE 50 MG/1
50 TABLET, EXTENDED RELEASE ORAL DAILY
Status: DISCONTINUED | OUTPATIENT
Start: 2018-10-26 | End: 2018-10-26 | Stop reason: HOSPADM

## 2018-10-25 RX ADMIN — ATORVASTATIN CALCIUM 40 MG: 40 TABLET, FILM COATED ORAL at 16:53

## 2018-10-25 RX ADMIN — AMLODIPINE BESYLATE 5 MG: 5 TABLET ORAL at 08:11

## 2018-10-25 RX ADMIN — HYDROCORTISONE SODIUM SUCCINATE 100 MG: 100 INJECTION, POWDER, FOR SOLUTION INTRAMUSCULAR; INTRAVENOUS at 13:30

## 2018-10-25 RX ADMIN — FAMOTIDINE 20 MG: 10 INJECTION, SOLUTION INTRAVENOUS at 13:17

## 2018-10-25 RX ADMIN — ISOSORBIDE DINITRATE 20 MG: 10 TABLET ORAL at 08:12

## 2018-10-25 RX ADMIN — ISOSORBIDE DINITRATE 20 MG: 10 TABLET ORAL at 16:53

## 2018-10-25 RX ADMIN — INSULIN LISPRO 4 UNITS: 100 INJECTION, SOLUTION INTRAVENOUS; SUBCUTANEOUS at 18:18

## 2018-10-25 RX ADMIN — ASPIRIN 81 MG 81 MG: 81 TABLET ORAL at 14:26

## 2018-10-25 RX ADMIN — FENTANYL CITRATE 50 MCG: 50 INJECTION, SOLUTION INTRAMUSCULAR; INTRAVENOUS at 14:33

## 2018-10-25 RX ADMIN — LOSARTAN POTASSIUM 50 MG: 50 TABLET, FILM COATED ORAL at 08:12

## 2018-10-25 RX ADMIN — VERAPAMIL HYDROCHLORIDE 2.5 MG: 2.5 INJECTION, SOLUTION INTRAVENOUS at 14:35

## 2018-10-25 RX ADMIN — CLOPIDOGREL 75 MG: 75 TABLET, FILM COATED ORAL at 14:26

## 2018-10-25 RX ADMIN — LIDOCAINE HYDROCHLORIDE 1 ML: 10 INJECTION, SOLUTION INFILTRATION; PERINEURAL at 14:34

## 2018-10-25 RX ADMIN — MIDAZOLAM 2 MG: 1 INJECTION INTRAMUSCULAR; INTRAVENOUS at 14:33

## 2018-10-25 RX ADMIN — INSULIN DETEMIR 25 UNITS: 100 INJECTION, SOLUTION SUBCUTANEOUS at 23:31

## 2018-10-25 RX ADMIN — IOHEXOL 60 ML: 350 INJECTION, SOLUTION INTRAVENOUS at 14:42

## 2018-10-25 RX ADMIN — NITROGLYCERIN 200 MCG: 20 INJECTION INTRAVENOUS at 14:34

## 2018-10-25 RX ADMIN — HEPARIN SODIUM 4000 UNITS: 1000 INJECTION INTRAVENOUS; SUBCUTANEOUS at 14:35

## 2018-10-25 RX ADMIN — DIPHENHYDRAMINE HYDROCHLORIDE 50 MG: 50 INJECTION INTRAMUSCULAR; INTRAVENOUS at 13:17

## 2018-10-25 RX ADMIN — METOPROLOL SUCCINATE 50 MG: 50 TABLET, EXTENDED RELEASE ORAL at 08:12

## 2018-10-25 RX ADMIN — SODIUM CHLORIDE 100 ML/HR: 0.9 INJECTION, SOLUTION INTRAVENOUS at 11:28

## 2018-10-25 NOTE — ASSESSMENT & PLAN NOTE
Lab Results   Component Value Date    HGBA1C 9 1 (H) 09/19/2018       Recent Labs      10/24/18   1145  10/24/18   1627  10/24/18   2021  10/25/18   0631   POCGLU  167*  207*  212*  154*       Blood Sugar Average: Last 72 hrs:  · (P) 179   · continue to monitor blood sugars SSI and acu-checks  A1C improved from previous at 10 7  Holding long acting insulin while NPO
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
## 2018-10-25 NOTE — EMTALA/ACUTE CARE TRANSFER
[address] [facility] SUR[address]  [facility] [address] [address]  [phone]  Dept: [phone]      ACUTE CARE TRANSFER CONSENT    NAME Darryl Mccollum                                         1960                              MRN [id number]    I have been informed of my rights regarding examination, treatment, and transfer   by Dr Shanika Simpson MD/REINALDO Martinez     Benefits:  cardiac catherization     Risks:  transportation risks       Transfer Request:  I acknowledge that my medical condition has been evaluated and explained to me by the treating physician or other qualified medical person and/or my attending physician who has recommended and offered to me further medical examination and treatment  I understand the Hospital's obligation with respect to the treatment and stabilization of my medical condition  I nevertheless request to be transferred  I release the Hospital, the doctor, and any other persons caring for me from all responsibility or liability for any injury or ill effects that may result from my transfer and agree to accept all responsibility for the consequences of my choice to transfer, rather than receive stabilizing treatment at the Hospital  I understand that because the transfer is my request, my insurance may not provide reimbursement for the services  The Hospital will assist and direct me and my family in how to make arrangements for transfer, but the hospital is not liable for any fees charged by the transport service  In spite of this understanding, I refuse to consent to further medical examination and treatment which has been offered to me, and request transfer to    I authorize the performance of emergency medical procedures and treatments upon me in both transit and upon arrival at the receiving facility    Additionally, I authorize the release of any and all medical records to the receiving facility and request they be transported with me, if possible  I authorize the performance of emergency medical procedures and treatments upon me in both transit and upon arrival at the receiving facility  Additionally, I authorize the release of any and all medical records to the receiving facility and request they be transported with me, if possible  I understand that the safest mode of transportation during a medical emergency is an ambulance and that the Hospital advocates the use of this mode of transport  Risks of traveling to the receiving facility by car, including absence of medical control, life sustaining equipment, such as oxygen, and medical personnel has been explained to me and I fully understand them  (EDVIN CORRECT BOX BELOW)  [  ]  I consent to the stated transfer and to be transported by ambulance/helicopter  [  ]  I consent to the stated transfer, but refuse transportation by ambulance and accept full responsibility for my transportation by car  I understand the risks of non-ambulance transfers and I exonerate the Hospital and its staff from any deterioration in my condition that results from this refusal     X___________________________________________    DATE  10/25/18  TIME________  Signature of patient or legally responsible individual signing on patient behalf           RELATIONSHIP TO PATIENT_________________________          Provider Certification    NAME Pb Greene                                        [facility] 1960                              MRN [id number]    A medical screening exam was performed on the above named patient    Based on the examination:    Condition Necessitating Transfer chest pain requiring cardiac cath     Patient Condition:  stable     Reason for Transfer:  service not available     Transfer Requirements: Facility     · Space available and qualified personnel available for treatment as acknowledged by    · Agreed to accept transfer and to provide appropriate medical treatment as acknowledged by          · Appropriate medical records of the examination and treatment of the patient are provided at the time of transfer   [address] [facility], [address] _______  · Transfer will be performed by qualified personnel from    and appropriate transfer equipment as required, including the use of necessary and appropriate life support measures  Provider Certification: I have examined the patient and explained the following risks and benefits of being transferred/refusing transfer to the patient/family:         Based on these reasonable risks and benefits to the patient and/or the unborn child(donis), and based upon the information available at the time of the patients examination, I certify that the medical benefits reasonably to be expected from the provision of appropriate medical treatments at another medical facility outweigh the increasing risks, if any, to the individuals medical condition, and in the case of labor to the unborn child, from effecting the transfer      X____________________________________________ DATE 10/25/18        TIME_______      ORIGINAL - SEND TO MEDICAL RECORDS   COPY - SEND WITH PATIENT DURING TRANSFER

## 2018-10-25 NOTE — NURSING NOTE
Patient belongings returned, no AVS sent with patient as medication reconciliation not complete, on cardiac monitor with [place] transport for transfer to [facility] [address], left vxm for [facility] to give report

## 2018-10-25 NOTE — PLAN OF CARE
CARDIOVASCULAR - ADULT     Maintains optimal cardiac output and hemodynamic stability Progressing     Absence of cardiac dysrhythmias or at baseline rhythm Progressing        DISCHARGE PLANNING     Discharge to home or other facility with appropriate resources Progressing        Knowledge Deficit     Patient/family/caregiver demonstrates understanding of disease process, treatment plan, medications, and discharge instructions Progressing        METABOLIC, FLUID AND ELECTROLYTES - ADULT     Electrolytes maintained within normal limits Progressing     Fluid balance maintained Progressing     Glucose maintained within target range Progressing        PAIN - ADULT     Verbalizes/displays adequate comfort level or baseline comfort level Progressing        Potential for Falls     Patient will remain free of falls Progressing        RESPIRATORY - ADULT     Achieves optimal ventilation and oxygenation Progressing        SAFETY ADULT     Maintain or return to baseline ADL function Progressing     Maintain or return mobility status to optimal level Progressing

## 2018-10-25 NOTE — BRIEF OP NOTE (RAD/CATH)
Cardiac catheterization: Right radial artery    Angiography:  The coronary anatomy is a left dominant system the left main is unobstructed the left anterior descending artery is a large vessel that reaches the apex it was previously stented in its mid segment the stented segment is widely patent proximal to this there is a tubular 40% obstruction distally as the vessel near the apex there is a 50% obstruction  Both these lesions were present at the termination of the previous catheterization when the stent was placed  The circumflex is a large dominant vessel free of obstruction  The right coronary artery is a nondominant vessel with diffuse luminal irregularities and no focal obstruction  Impressions:  Nonobstructive epicardial coronary artery disease with widely patent LAD stent    Medical therapy is recommended

## 2018-10-25 NOTE — DISCHARGE SUMMARY
Discharge- Brianne Gonzalez 1960, 62 y o  female MRN: [id number]    Unit/Bed#: -01 Encounter: [id number]    Primary Care Provider: John Carter MD   Date and time admitted to hospital: 10/23/2018  3:29 PM        * Chest pain   Assessment & Plan    · Cardiology input appreciated   · Hx of recent NSTEMI and known CAD w/ stent  · Troponin- negative  · ECHO shows LVEF 60%  There were no regional wall motion abnormalities  · Continue to have chest pressure with activities  Cardiology recommends to transfer to [facility] for cardiac catherization  · At this time will continue with current regimen with ASA, plavix, statin, imdur and beta-blocker   ASA and plavix were held this AM       Coronary artery disease involving native coronary artery of native heart without angina pectoris   Assessment & Plan    · Continue home medications  · Stent June 2018      Hypercholesterolemia   Assessment & Plan    · Continue statin     Essential hypertension   Assessment & Plan    · Continue cozaar and toprol xl  · Monitor BP  · Hold parameters     Diabetes mellitus, type 2 [facility])   Assessment & Plan    Lab Results   Component Value Date    HGBA1C 9 1 (H) 09/19/2018       Recent Labs      10/24/18   1145  10/24/18   1627  10/24/18   2021  10/25/18   0631   POCGLU  167*  207*  212*  154*       Blood Sugar Average: Last 72 hrs:  · (P) 179   · continue to monitor blood sugars SSI and acu-checks  A1C improved from previous at 10 7  Holding long acting insulin while NPO          Discharging Physician / Practitioner: Dago Chapman  PCP: John Carter MD  Admission Date:   Admission Orders     Ordered        10/23/18 1744  Place in Observation  Once         10/23/18 1746  Place in Observation (expected length of stay for this patient is less than two midnights)  Once             Discharge Date: 10/25/18    Resolved Problems  Date Reviewed: 10/25/2018    None          Consultations During Hospital Stay:  · Cardiology Procedures Performed:   · None     Significant Findings / Test Results:   · CXR 10/23 normal chest evaluation  Lungs are clear  · ECHO LVEF 60% no regional wall motion abnormalities  Incidental Findings:   · None      Test Results Pending at Discharge (will require follow up): · None      Outpatient Tests Requested:  · none    Complications:  None     Reason for Admission: shortness of breath and chest tightness    Hospital Course:     Joaquina Archer is a 62 y o  female patient who originally presented to the hospital on 10/23/2018 due to increasing shortness of breath and chest tightness/fullness  She has history of NSTEMI and underwent cardiac cath with LAD drug eluting stent placement in June 2018  The patient reported that the chest tightness/fullness which shortness of breath are similar symptoms from prior MI  She was admitted for chest pain rule out  Troponin were negative  There is no ischemic changes noted on the EKG  Cardiology however feels that the patient is a candidate for cardiac catheterization  The patient will be transferred to [address] [address] under Dr Kilgore's service today  Please see above list of diagnoses and related plan for additional information  Condition at Discharge: good     Discharge Day Visit / Exam:     Subjective:  Feeling ok  Continues to have some chest pressure and SOB with ambulation but that is much improved  Denies any pain n/v    Vitals: Blood Pressure: 119/59 (10/25/18 0739)  Pulse: 61 (10/25/18 0739)  Temperature: 98 °F (36 7 °C) (10/25/18 0739)  Temp Source: Tympanic (10/25/18 0739)  Respirations: 18 (10/25/18 0739)  Height: 5' 10" (177 8 cm) (10/23/18 1820)  Weight - Scale: 117 kg (257 lb 13 3 oz) (10/23/18 1820)  SpO2: 98 % (10/25/18 0739)  Exam:   Physical Exam   Constitutional: She is oriented to person, place, and time  She appears well-developed and well-nourished  No distress  HENT:   Head: Normocephalic and atraumatic  Mouth/Throat: Oropharynx is clear and moist    Eyes: Pupils are equal, round, and reactive to light  Conjunctivae and EOM are normal    Neck: Normal range of motion  Neck supple  Cardiovascular: Normal rate, regular rhythm and normal heart sounds  Exam reveals no gallop and no friction rub  No murmur heard  Pulmonary/Chest: Effort normal and breath sounds normal  She has no wheezes  She has no rales  Abdominal: Soft  Bowel sounds are normal  She exhibits no distension  There is no tenderness  There is no rebound  Musculoskeletal: Normal range of motion  She exhibits no edema, tenderness or deformity  Neurological: She is alert and oriented to person, place, and time  No cranial nerve deficit  Skin: Skin is warm and dry  No rash noted  No erythema  Psychiatric: She has a normal mood and affect  Her behavior is normal  Judgment and thought content normal    Vitals reviewed  Discussion with Family: n/a     Discharge instructions/Information to patient and family:   See after visit summary for information provided to patient and family  Provisions for Follow-Up Care:  See after visit summary for information related to follow-up care and any pertinent home health orders  Disposition:     [address] [facility]  [address] Transfer to [address] [place] [address] to [facility]:   · Not Applicable to this Patient - Not Applicable to this Patient    Planned Readmission: no      Discharge Statement:  I spent 35 minutes discharging the patient  This time was spent on the day of discharge  I had direct contact with the patient on the day of discharge  Greater than 50% of the total time was spent examining patient, answering all patient questions, arranging and discussing plan of care with patient as well as directly providing post-discharge instructions  Additional time then spent on discharge activities      Discharge Medications:  See after visit summary for reconciled discharge medications provided to patient and family        ** Please Note: This note has been constructed using a voice recognition system **

## 2018-10-25 NOTE — ASSESSMENT & PLAN NOTE
· Cardiology input appreciated   · Hx of recent NSTEMI and known CAD w/ stent  · Troponin- negative  · ECHO shows LVEF 60%  There were no regional wall motion abnormalities  · Continue to have chest pressure with activities  Cardiology recommends to transfer to [facility] for cardiac catherization  · At this time will continue with current regimen with ASA, plavix, statin, imdur and beta-blocker   ASA and plavix were held this AM

## 2018-10-25 NOTE — EMTALA/ACUTE CARE TRANSFER
[address] [facility] SURGICAL UNIT  [facility] [address] [address]  [phone]  Dept: [phone]      ACUTE CARE TRANSFER CONSENT    NAME Gianni Hayden                                         1960                              MRN [id number]    I have been informed of my rights regarding examination, treatment, and transfer   by Dr Anamaria Escobar MD/REINALDO Shore     Benefits:  cardiac catherization     Risks:  transportation risks       Transfer Request:  I acknowledge that my medical condition has been evaluated and explained to me by the treating physician or other qualified medical person and/or my attending physician who has recommended and offered to me further medical examination and treatment  I understand the Hospital's obligation with respect to the treatment and stabilization of my medical condition  I nevertheless request to be transferred  I release the Hospital, the doctor, and any other persons caring for me from all responsibility or liability for any injury or ill effects that may result from my transfer and agree to accept all responsibility for the consequences of my choice to transfer, rather than receive stabilizing treatment at the Hospital  I understand that because the transfer is my request, my insurance may not provide reimbursement for the services  The Hospital will assist and direct me and my family in how to make arrangements for transfer, but the hospital is not liable for any fees charged by the transport service  In spite of this understanding, I refuse to consent to further medical examination and treatment which has been offered to me, and request transfer to    I authorize the performance of emergency medical procedures and treatments upon me in both transit and upon arrival at the receiving facility    Additionally, I authorize the release of any and all medical records to the receiving facility and request they be transported with me, if possible  I authorize the performance of emergency medical procedures and treatments upon me in both transit and upon arrival at the receiving facility  Additionally, I authorize the release of any and all medical records to the receiving facility and request they be transported with me, if possible  I understand that the safest mode of transportation during a medical emergency is an ambulance and that the Hospital advocates the use of this mode of transport  Risks of traveling to the receiving facility by car, including absence of medical control, life sustaining equipment, such as oxygen, and medical personnel has been explained to me and I fully understand them  (EDVIN CORRECT BOX BELOW)  [  ]  I consent to the stated transfer and to be transported by ambulance/helicopter  [  ]  I consent to the stated transfer, but refuse transportation by ambulance and accept full responsibility for my transportation by car  I understand the risks of non-ambulance transfers and I exonerate the Hospital and its staff from any deterioration in my condition that results from this refusal     X___________________________________________    DATE  10/25/18  TIME________  Signature of patient or legally responsible individual signing on patient behalf           RELATIONSHIP TO PATIENT_________________________          Provider Certification    NAME Bailey Peña                                        [facility] 1960                              MRN [id number]    A medical screening exam was performed on the above named patient    Based on the examination:    Condition Necessitating Transfer chest pain requiring cardiac cath     Patient Condition:  stable     Reason for Transfer:  service not available     Transfer Requirements: Facility     · Space available and qualified personnel available for treatment as acknowledged by    · Agreed to accept transfer and to provide appropriate medical treatment as acknowledged by          · Appropriate medical records of the examination and treatment of the patient are provided at the time of transfer   [address] [facility], [address] _______  · Transfer will be performed by qualified personnel from    and appropriate transfer equipment as required, including the use of necessary and appropriate life support measures  Provider Certification: I have examined the patient and explained the following risks and benefits of being transferred/refusing transfer to the patient/family:         Based on these reasonable risks and benefits to the patient and/or the unborn child(donis), and based upon the information available at the time of the patients examination, I certify that the medical benefits reasonably to be expected from the provision of appropriate medical treatments at another medical facility outweigh the increasing risks, if any, to the individuals medical condition, and in the case of labor to the unborn child, from effecting the transfer      X____________________________________________ DATE 10/25/18        TIME_______      ORIGINAL - SEND TO MEDICAL RECORDS   COPY - SEND WITH PATIENT DURING TRANSFER

## 2018-10-26 VITALS
HEART RATE: 61 BPM | RESPIRATION RATE: 18 BRPM | DIASTOLIC BLOOD PRESSURE: 64 MMHG | HEIGHT: 68 IN | OXYGEN SATURATION: 96 % | WEIGHT: 254 LBS | BODY MASS INDEX: 38.49 KG/M2 | SYSTOLIC BLOOD PRESSURE: 111 MMHG | TEMPERATURE: 97.7 F

## 2018-10-26 LAB — GLUCOSE SERPL-MCNC: 200 MG/DL (ref 65–140)

## 2018-10-26 PROCEDURE — 82948 REAGENT STRIP/BLOOD GLUCOSE: CPT

## 2018-10-26 PROCEDURE — 99238 HOSP IP/OBS DSCHRG MGMT 30/<: CPT | Performed by: INTERNAL MEDICINE

## 2018-10-26 PROCEDURE — 90682 RIV4 VACC RECOMBINANT DNA IM: CPT | Performed by: PHYSICIAN ASSISTANT

## 2018-10-26 RX ORDER — AMLODIPINE BESYLATE 5 MG/1
5 TABLET ORAL DAILY
Refills: 0
Start: 2018-10-27 | End: 2019-09-26 | Stop reason: SDUPTHER

## 2018-10-26 RX ORDER — ACETAMINOPHEN 325 MG/1
650 TABLET ORAL EVERY 6 HOURS PRN
Qty: 30 TABLET | Refills: 0
Start: 2018-10-26 | End: 2019-03-14

## 2018-10-26 RX ADMIN — INSULIN DETEMIR 15 UNITS: 100 INJECTION, SOLUTION SUBCUTANEOUS at 08:30

## 2018-10-26 RX ADMIN — ASPIRIN 81 MG: 81 TABLET, CHEWABLE ORAL at 08:35

## 2018-10-26 RX ADMIN — CLOPIDOGREL 75 MG: 75 TABLET, FILM COATED ORAL at 08:35

## 2018-10-26 RX ADMIN — INSULIN LISPRO 4 UNITS: 100 INJECTION, SOLUTION INTRAVENOUS; SUBCUTANEOUS at 13:19

## 2018-10-26 RX ADMIN — PANTOPRAZOLE SODIUM 20 MG: 40 TABLET, DELAYED RELEASE ORAL at 06:05

## 2018-10-26 RX ADMIN — LOSARTAN POTASSIUM 50 MG: 50 TABLET ORAL at 08:35

## 2018-10-26 RX ADMIN — INFLUENZA A VIRUS A/MICHIGAN/45/2015 (H1N1) RECOMBINANT HEMAGGLUTININ ANTIGEN, INFLUENZA A VIRUS A/SINGAPORE/INFIMH-16-0019/2016 (H3N2) RECOMBINANT HEMAGGLUTININ ANTIGEN, INFLUENZA B VIRUS B/MARYLAND/15/2016 RECOMBINANT HEMAGGLUTININ ANTIGEN, AND INFLUENZA B VIRUS B/PHUKET/3073/2013 RECOMBINANT HEMAGGLUTININ ANTIGEN 0.5 ML: 45; 45; 45; 45 INJECTION INTRAMUSCULAR at 14:48

## 2018-10-26 RX ADMIN — METOPROLOL SUCCINATE 50 MG: 50 TABLET, EXTENDED RELEASE ORAL at 08:35

## 2018-10-26 RX ADMIN — ISOSORBIDE DINITRATE 20 MG: 10 TABLET ORAL at 08:35

## 2018-10-26 RX ADMIN — INSULIN LISPRO 8 UNITS: 100 INJECTION, SOLUTION INTRAVENOUS; SUBCUTANEOUS at 08:30

## 2018-10-26 NOTE — DISCHARGE SUMMARY
Discharge Summary    Boom Amaro 62 y o  female MRN: [id number]    Unit/Bed#: E4 -01 Encounter: [id number]      Admission Date: 10/25/2018   Discharge Date: 10/26/2018      Admitting Diagnosis: Unstable angina ([address] ) [I20 0]  Discharge Diagnosis: Principal Problem:    Chest pain with moderate risk for cardiac etiology  Active Problems:    Essential hypertension    Hypercholesterolemia    Coronary artery disease involving native coronary artery of native heart without angina pectoris       Condition at Discharge: stable   Disposition: Home  Planned Readmission: No    HPI/Reason For Admission:   Ms Kerin Carrel is a 29-year-old patient of cardiologist Dr Lubna Diaz  In June of this year she had a non STEMI with catheterization to include stenting of the mid LAD with limited residual disease  On 10/23 patient presented to the emergency department at the [address] [place] [address],[address] where she was subsequently admitted  She had presented with complaints of pressure fullness in the inframammary/upper abdominal area  Cardiac biomarkers and ECGs were normal   There she was was seen in consultation by Cardiology who with concern for her symptoms and history had recommended cardiac catheterization  She is transferred to the [facility] on 10/25 for the same  Hospital Course: The patient has been pain-free since arrival   Catheterization was performed on 10/25 showing a widely patent stent with unchanged and non flow limiting disease before and after the stent  Prior to coming to the [facility] the patient was placed on amlodipine for betterment of her BP trend  She has been maintaining a normotensive BP trend since then and it is postulated the perhaps prior exclusion of amlodipine and pre-hospital utilization of nonsteroidal anti-inflammatories may have provoked some increased BP as a potential source of her chest discomfort    Her hospitalization has otherwise been unremarkable and she is now discharged home in stable and satisfactory condition  Procedures Performed:   Orders Placed This Encounter   Procedures    Cardiac catheterization       Complications:  None    Other Significant Findings/Treatment:   Cardiac catheterization  SUMMARY:       CORONARY CIRCULATION:   The coronary circulation is left dominant  Left main: Normal    Proximal LAD: There was a tubular 40 % stenosis  Mid LAD: There was a 0 % stenosis at the site of a prior stent  Distal LAD: There was a 50 % stenosis  Circumflex: Normal    RCA: Angiography showed minor luminal irregularities        Discharge Medications:  See after visit summary for reconciled discharge medications provided to patient and family  Discharge instructions/Information to patient and family:   See after visit summary for information provided to patient and family  Provisions for Follow-Up Care:  See after visit summary for information related to follow-up care and any pertinent home health orders  Discharge Statement   I had direct contact with the patient on the day of discharge  I spent 40 minutes discharging the patient  This time was spent on the day of discharge including: education, examination, paperwork  All questions were answered to patient satisfaction

## 2018-10-26 NOTE — UTILIZATION REVIEW
Thank you,  Bobby Joyan  Utilization Review Department  Phone: [phone]; Fax [phone]  ATTENTION: Please call with any questions or concerns to [phone]  and carefully follow the prompts so that you are directed to the right person  Send all requests for admission clinical reviews, approved or denied determinations and any other requests to fax [phone]  All voicemails are confidential    Initial Clinical Review    Admission: Date/Time/Statement:INPT  10/25/18 @ 1009, CHANGED TO OBS [email or web address]    Orders Placed This Encounter   Procedures    Inpatient Admission     Standing Status:   Standing     Number of Occurrences:   1     Order Specific Question:   Admitting Physician     Answer:   Layne Gaitan     Order Specific Question:   Level of Care     Answer:   Med Surg [16]     Order Specific Question:   Estimated length of stay     Answer:   More than 2 Midnights     Order Specific Question:   Certification     Answer:   I certify that inpatient services are medically necessary for this patient for a duration of greater than two midnights  See H&P and MD Progress Notes for additional information about the patient's course of treatment   Place in Observation     Standing Status:   Standing     Number of Occurrences:   1     Order Specific Question:   Admitting Physician     Answer:   Layne Gaitan     Order Specific Question:   Level of Care     Answer:   Med Surg [16]         ED: Date/Time/Mode of Arrival:   ED Arrival Information     Patient not seen in ED                       Chief Complaint: No chief complaint on file  History of Illness: 62year-old patient of Dr Chanel Bhatti of our group (typically seen at [facility] office)  She has a history of diabetes, dyslipidemia, hypertension, and CAD with non STEMI June of this year  At that time she had single-vessel CAD with a high-grade stenosis of the mid LAD treated with PCI/TEVIN with good results    Echocardiogram at that time showed preserved EF with moderate hypokinesia of the mid anteroseptal, apical inferior, and apical septal walls  During her hospitalization she also had significant hypertension later bettered on a regimen of Cozaar and metoprolol      In the aftermath of the patient's MI she has been doing rather well  Approximately 10 days ago the patient developed some right paraspinal back discomfort without obvious injury  For this she was prescribed Relafen and Robaxin with some improvement in symptoms  Beginning approximately 4 days ago the patient developed a sense of some fullness - or perhaps mild pressure across her upper abdomen/inframammary area  She described this as a constant sensation without other associated symptoms to include palpitation, dysphagia, dyspepsia, abdominal bloating, or otherwise  Two days ago, on 10/23, the patient was ambulating to the grocery store when she had what felt like an acute escalation of her chest discomfort associated with with the patient reported as trouble catching her breath  Her symptoms persisted for approximately 30 minutes and then while driving home the seem to nilo  Though not like her prior angina she was concerned her symptoms may be cardiac related prompting her to go to the emergency department at the [facility] ([facility] where she was admitted  There she had normal cardiac biomarkers and ECGs and was seen in consultation by Cardiology  With concern for effort angina catheterization was advised  She is now transferred to the [facility] in [place] of the same      ED Vital Signs:   ED Triage Vitals   Temperature Pulse Respirations Blood Pressure SpO2   10/25/18 0950 10/25/18 0950 10/25/18 0950 10/25/18 0950 10/25/18 0950   97 7 °F (36 5 °C) 63 17 107/65 96 %      Temp Source Heart Rate Source Patient Position - Orthostatic VS BP Location FiO2 (%)   10/25/18 0950 10/25/18 1515 10/25/18 0950 10/25/18 0950 --   Temporal Monitor Sitting Right arm       Pain Score       10/25/18 0937       3        Wt Readings from Last 1 Encounters:   10/25/18 115 kg (254 lb)       Vital Signs (abnormal):   10/25/18 1515  --  56  16  118/68  78  93 %  None (Room air)  Lying   10/25/18 1500  --  62  17  129/68  --  94 %  None (Room air)  Lying   10/25/18 1323  --   52  --  147/75  --  98 %  None (Room air)  Sitting   10/25/18 0950  97 7 °F (36 5 °C)  63  17  107/65  --  96 %  None (Room air)           Abnormal Labs/Diagnostic Test Results:   Glu 231  Troponin <0 03,<0 03,<0 03 ( at [facility])      Cardiac cath 10/25:CORONARY CIRCULATION:  The coronary circulation is left dominant  Left main: Normal   Proximal LAD: There was a tubular 40 % stenosis  Mid LAD: There was a 0 % stenosis at the site of a prior stent  Distal LAD: There was a 50 % stenosis  Circumflex: Normal   RCA: Angiography showed minor luminal irregularities      PROCEDURES PERFORMED     --  Left coronary angiography  --  Right coronary angiography  --  Mod Sedation Same Physician Initial 15min  --  Coronary Catheterization (w/o [facility])  ED Treatment:   Medication Administration - No Administrations Displayed (No Start Event Found)     None          Past Medical/Surgical History:    Active Ambulatory Problems     Diagnosis Date Noted    SLAP (superior labrum from anterior to posterior) tear 05/16/2016    Diabetes mellitus, type 2 ([facility] [address] ) 01/30/2014    Arthritis 02/12/2016    Neuropathy 11/07/2014    Malignant neoplasm of breast ([facility] [address] ) 01/30/2014    Essential hypertension 01/30/2014    Hypercholesterolemia 01/30/2014    NSTEMI (non-ST elevated myocardial infarction) ([facility] [address] ) 02/05/2018    Encounter for screening for lung cancer 02/05/2018    Mold exposure 02/05/2018    Coronary artery disease involving native coronary artery of native heart without angina pectoris 08/15/2018    Chest pain 11/07/2014     Resolved Ambulatory Problems     Diagnosis Date Noted    Pneumonia due to infectious organism 02/05/2018    Abnormal CXR (chest x-ray) 02/05/2018    Acute nasopharyngitis 06/28/2018     Past Medical History:   Diagnosis Date    Arthritis     Cancer [facility])     Cardiac disease     Coronary artery disease     Diabetes mellitus ([facility] [address] )     Heartburn     Hypercholesteremia     Hyperlipidemia     Hypertension     Neuropathy        Admitting Diagnosis: Unstable angina ([facility] [address] ) [I20 0]    Age/Sex: 62 y o  female    Assessment/Plan: 61 yo female transferred from [facility] for accelerating angina(exertional CP and SOB) for cardiac cath, Tele, asa, plavix, metoprolol, stain, losartan, amlodipine  1  Chest discomfort:  No objective ischemia with resolution of symptoms since initial hospital presentation               -Though some atypical features unstable angina is possible and as such catheterization advised for definitive evaluation ~~> will proceed today  Will consider alternative etiologies pending outcome of catheterization (perhaps increased hypertension in the setting of NSAID use)               -Patient taking DAPT (aspirin +Plavix), beta-blocker, and statin prior to admission and ongoing at present   2  CAD, non STEMI -June 2018:              -Catheterization 6/29/2018 single-vessel CAD-99% mid LAD stenosis treated with PCI/TEVIN with good results               -Continue aspirin, Plavix, beta-blocker, statin  3  Hypertension:              -Currently controlled on regimen of losartan, Toprol, and amlodipine (restarted this hospitalization)  4   Dyslipidemia:  Ongoing statin with notable improvement in lipid levels compared to June of this year ~~> continue current (Lipitor 40 mg)    Admission Orders:    CARDIAC CATH  CARDIAC DIET    Scheduled Meds:   Current Facility-Administered Medications:  acetaminophen 650 mg Oral Q4H PRN Holland Lr PA-C   albuterol 2 5 mg Nebulization Q4H PRN Holland Lr PA-C   amLODIPine 5 mg Oral Daily Holland Lr PA-C   aspirin 81 mg Oral Daily Holland Lr FREEDOM   atorvastatin 40 mg Oral Daily With [facility], FREEDOM   clopidogrel 75 mg Oral Daily Naomia , PA-LUKE   enoxaparin 40 mg Subcutaneous Daily Naomia , FREEDOM   influenza vaccine 0 5 mL Intramuscular Prior to discharge Montse Rome, FREEDOM   insulin detemir 15 Units Subcutaneous After Breakfast Camilaomia , FREEDOM   insulin detemir 25 Units Subcutaneous HS Naomia , PA-LUKE   insulin lispro 4-20 Units Subcutaneous TID AC Naomia , FREEDOM   isosorbide dinitrate 20 mg Oral BID after meals Naomia , FREEDOM   losartan 50 mg Oral Daily Naomia Friar, PA-LUKE   metoprolol succinate 50 mg Oral Daily Naomia Friar, FREEDOM   nitroglycerin 0 4 mg Sublingual Q5 Min PRN Naomia Friar, PA-C   ondansetron 4 mg Intravenous Q6H PRN Naomia Friar, FREEDOM   pantoprazole 20 mg Oral Early Morning Naomia , FREEDOM     Continuous Infusions:    PRN Meds:   acetaminophen    albuterol    influenza vaccine    nitroglycerin    ondansetron

## 2018-10-31 ENCOUNTER — HOSPITAL ENCOUNTER (OUTPATIENT)
Dept: MAMMOGRAPHY | Facility: HOSPITAL | Age: 58
Discharge: HOME | End: 2018-10-31
Payer: COMMERCIAL

## 2018-10-31 ENCOUNTER — HOSPITAL ENCOUNTER (OUTPATIENT)
Dept: ULTRASOUND IMAGING | Facility: HOSPITAL | Age: 58
Discharge: HOME | End: 2018-10-31
Payer: COMMERCIAL

## 2018-10-31 DIAGNOSIS — Z12.31 ENCOUNTER FOR SCREENING MAMMOGRAM FOR MALIGNANT NEOPLASM OF BREAST: ICD-10-CM

## 2018-10-31 DIAGNOSIS — R92.2 BREAST DENSITY: ICD-10-CM

## 2018-10-31 PROCEDURE — 77067 SCR MAMMO BI INCL CAD: CPT

## 2018-10-31 PROCEDURE — 77063 BREAST TOMOSYNTHESIS BI: CPT

## 2019-03-14 ENCOUNTER — OFFICE VISIT (OUTPATIENT)
Dept: CARDIOLOGY CLINIC | Facility: CLINIC | Age: 59
End: 2019-03-14
Payer: COMMERCIAL

## 2019-03-14 VITALS
HEART RATE: 68 BPM | BODY MASS INDEX: 39.56 KG/M2 | DIASTOLIC BLOOD PRESSURE: 80 MMHG | SYSTOLIC BLOOD PRESSURE: 122 MMHG | HEIGHT: 68 IN | WEIGHT: 261 LBS

## 2019-03-14 DIAGNOSIS — E78.00 HYPERCHOLESTEROLEMIA: ICD-10-CM

## 2019-03-14 DIAGNOSIS — I25.10 CORONARY ARTERY DISEASE INVOLVING NATIVE CORONARY ARTERY OF NATIVE HEART WITHOUT ANGINA PECTORIS: Primary | ICD-10-CM

## 2019-03-14 DIAGNOSIS — I10 ESSENTIAL HYPERTENSION: ICD-10-CM

## 2019-03-14 PROCEDURE — 99214 OFFICE O/P EST MOD 30 MIN: CPT | Performed by: INTERNAL MEDICINE
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
## 2019-06-12 ENCOUNTER — APPOINTMENT (OUTPATIENT)
Dept: LAB | Facility: HOSPITAL | Age: 59
End: 2019-06-12
Payer: COMMERCIAL

## 2019-06-12 ENCOUNTER — TRANSCRIBE ORDERS (OUTPATIENT)
Dept: ADMINISTRATIVE | Facility: HOSPITAL | Age: 59
End: 2019-06-12

## 2019-06-12 DIAGNOSIS — D50.8 OTHER IRON DEFICIENCY ANEMIA: ICD-10-CM

## 2019-06-12 DIAGNOSIS — E11.8 TYPE 2 DIABETES MELLITUS WITH COMPLICATION, UNSPECIFIED WHETHER LONG TERM INSULIN USE: ICD-10-CM

## 2019-06-12 DIAGNOSIS — R53.83 FATIGUE, UNSPECIFIED TYPE: Primary | ICD-10-CM

## 2019-06-12 DIAGNOSIS — E78.2 MIXED HYPERLIPIDEMIA: ICD-10-CM

## 2019-06-12 DIAGNOSIS — R53.83 FATIGUE, UNSPECIFIED TYPE: ICD-10-CM

## 2019-06-12 LAB
25(OH)D3 SERPL-MCNC: 25.6 NG/ML (ref 30–100)
ALBUMIN SERPL BCP-MCNC: 4.3 G/DL (ref 3.5–5.7)
ALP SERPL-CCNC: 73 U/L (ref 40–150)
ALT SERPL W P-5'-P-CCNC: 16 U/L (ref 7–52)
ANION GAP SERPL CALCULATED.3IONS-SCNC: 5 MMOL/L (ref 4–13)
AST SERPL W P-5'-P-CCNC: 13 U/L (ref 13–39)
BASOPHILS # BLD AUTO: 0 THOUSANDS/ΜL (ref 0–0.1)
BASOPHILS NFR BLD AUTO: 1 % (ref 0–2)
BILIRUB DIRECT SERPL-MCNC: 0.1 MG/DL (ref 0–0.2)
BILIRUB SERPL-MCNC: 0.7 MG/DL (ref 0.2–1)
BUN SERPL-MCNC: 13 MG/DL (ref 7–25)
CALCIUM SERPL-MCNC: 9.6 MG/DL (ref 8.6–10.5)
CHLORIDE SERPL-SCNC: 97 MMOL/L (ref 98–107)
CHOLEST SERPL-MCNC: 164 MG/DL (ref 0–200)
CO2 SERPL-SCNC: 34 MMOL/L (ref 21–31)
CREAT SERPL-MCNC: 0.57 MG/DL (ref 0.6–1.2)
EOSINOPHIL # BLD AUTO: 0.1 THOUSAND/ΜL (ref 0–0.61)
EOSINOPHIL NFR BLD AUTO: 2 % (ref 0–5)
ERYTHROCYTE [DISTWIDTH] IN BLOOD BY AUTOMATED COUNT: 13.2 % (ref 11.5–14.5)
EST. AVERAGE GLUCOSE BLD GHB EST-MCNC: 229 MG/DL
FERRITIN SERPL-MCNC: 270 NG/ML (ref 8–388)
GFR SERPL CREATININE-BSD FRML MDRD: 102 ML/MIN/1.73SQ M
GLUCOSE P FAST SERPL-MCNC: 249 MG/DL (ref 65–99)
HBA1C MFR BLD: 9.6 % (ref 4.2–6.3)
HCT VFR BLD AUTO: 39.2 % (ref 42–47)
HDLC SERPL-MCNC: 44 MG/DL (ref 40–60)
HGB BLD-MCNC: 13.9 G/DL (ref 12–16)
IRON SATN MFR SERPL: 17 %
IRON SERPL-MCNC: 64 UG/DL (ref 50–170)
LDLC SERPL CALC-MCNC: 68 MG/DL (ref 0–100)
LYMPHOCYTES # BLD AUTO: 1.8 THOUSANDS/ΜL (ref 0.6–4.47)
LYMPHOCYTES NFR BLD AUTO: 33 % (ref 21–51)
MCH RBC QN AUTO: 29.7 PG (ref 26–34)
MCHC RBC AUTO-ENTMCNC: 35.4 G/DL (ref 31–37)
MCV RBC AUTO: 84 FL (ref 81–99)
MONOCYTES # BLD AUTO: 0.4 THOUSAND/ΜL (ref 0.17–1.22)
MONOCYTES NFR BLD AUTO: 7 % (ref 2–12)
NEUTROPHILS # BLD AUTO: 3.2 THOUSANDS/ΜL (ref 1.4–6.5)
NEUTS SEG NFR BLD AUTO: 58 % (ref 42–75)
NONHDLC SERPL-MCNC: 120 MG/DL
PLATELET # BLD AUTO: 193 THOUSANDS/UL (ref 149–390)
PMV BLD AUTO: 9.4 FL (ref 8.6–11.7)
POTASSIUM SERPL-SCNC: 4 MMOL/L (ref 3.5–5.5)
PROT SERPL-MCNC: 6.9 G/DL (ref 6.4–8.9)
RBC # BLD AUTO: 4.67 MILLION/UL (ref 3.9–5.2)
SODIUM SERPL-SCNC: 136 MMOL/L (ref 134–143)
T4 FREE SERPL-MCNC: 1.11 NG/DL (ref 0.76–1.46)
TIBC SERPL-MCNC: 367 UG/DL (ref 250–450)
TRIGL SERPL-MCNC: 261 MG/DL (ref 44–166)
TSH SERPL DL<=0.05 MIU/L-ACNC: 2.7 UIU/ML (ref 0.45–5.33)
VIT B12 SERPL-MCNC: 412 PG/ML (ref 100–900)
WBC # BLD AUTO: 5.6 THOUSAND/UL (ref 4.8–10.8)

## 2019-06-12 PROCEDURE — 83036 HEMOGLOBIN GLYCOSYLATED A1C: CPT

## 2019-06-12 PROCEDURE — 82607 VITAMIN B-12: CPT

## 2019-06-12 PROCEDURE — 84439 ASSAY OF FREE THYROXINE: CPT

## 2019-06-12 PROCEDURE — 80048 BASIC METABOLIC PNL TOTAL CA: CPT

## 2019-06-12 PROCEDURE — 83550 IRON BINDING TEST: CPT

## 2019-06-12 PROCEDURE — 36415 COLL VENOUS BLD VENIPUNCTURE: CPT

## 2019-06-12 PROCEDURE — 80076 HEPATIC FUNCTION PANEL: CPT

## 2019-06-12 PROCEDURE — 83540 ASSAY OF IRON: CPT

## 2019-06-12 PROCEDURE — 80061 LIPID PANEL: CPT

## 2019-06-12 PROCEDURE — 82728 ASSAY OF FERRITIN: CPT

## 2019-06-12 PROCEDURE — 82306 VITAMIN D 25 HYDROXY: CPT

## 2019-06-12 PROCEDURE — 85025 COMPLETE CBC W/AUTO DIFF WBC: CPT

## 2019-06-12 PROCEDURE — 84443 ASSAY THYROID STIM HORMONE: CPT

## 2019-07-02 ENCOUNTER — APPOINTMENT (EMERGENCY)
Dept: RADIOLOGY | Facility: HOSPITAL | Age: 59
End: 2019-07-02
Payer: COMMERCIAL

## 2019-07-02 ENCOUNTER — HOSPITAL ENCOUNTER (OUTPATIENT)
Facility: HOSPITAL | Age: 59
Setting detail: OBSERVATION
Discharge: HOME | End: 2019-07-03
Attending: EMERGENCY MEDICINE | Admitting: INTERNAL MEDICINE
Payer: COMMERCIAL

## 2019-07-02 DIAGNOSIS — I25.10 CORONARY ARTERY DISEASE INVOLVING NATIVE CORONARY ARTERY OF NATIVE HEART WITHOUT ANGINA PECTORIS: ICD-10-CM

## 2019-07-02 DIAGNOSIS — R06.02 SHORTNESS OF BREATH: ICD-10-CM

## 2019-07-02 DIAGNOSIS — R07.9 CHEST PAIN: Primary | ICD-10-CM

## 2019-07-02 PROBLEM — I10 ACCELERATED HYPERTENSION: Status: ACTIVE | Noted: 2019-07-02

## 2019-07-02 PROBLEM — Z85.3 HISTORY OF BREAST CANCER: Status: ACTIVE | Noted: 2019-07-02

## 2019-07-02 LAB
ALBUMIN SERPL BCP-MCNC: 4 G/DL (ref 3.5–5)
ALP SERPL-CCNC: 92 U/L (ref 46–116)
ALT SERPL W P-5'-P-CCNC: 32 U/L (ref 12–78)
ANION GAP SERPL CALCULATED.3IONS-SCNC: 6 MMOL/L (ref 4–13)
AST SERPL W P-5'-P-CCNC: 21 U/L (ref 5–45)
ATRIAL RATE: 67 BPM
BASOPHILS # BLD AUTO: 0.03 THOUSANDS/ΜL (ref 0–0.1)
BASOPHILS NFR BLD AUTO: 1 % (ref 0–1)
BILIRUB SERPL-MCNC: 0.66 MG/DL (ref 0.2–1)
BUN SERPL-MCNC: 11 MG/DL (ref 5–25)
CALCIUM SERPL-MCNC: 9.5 MG/DL (ref 8.3–10.1)
CHLORIDE SERPL-SCNC: 103 MMOL/L (ref 100–108)
CO2 SERPL-SCNC: 31 MMOL/L (ref 21–32)
CREAT SERPL-MCNC: 0.63 MG/DL (ref 0.6–1.3)
EOSINOPHIL # BLD AUTO: 0.09 THOUSAND/ΜL (ref 0–0.61)
EOSINOPHIL NFR BLD AUTO: 2 % (ref 0–6)
ERYTHROCYTE [DISTWIDTH] IN BLOOD BY AUTOMATED COUNT: 12.3 % (ref 11.6–15.1)
GFR SERPL CREATININE-BSD FRML MDRD: 98 ML/MIN/1.73SQ M
GLUCOSE SERPL-MCNC: 175 MG/DL (ref 65–140)
GLUCOSE SERPL-MCNC: 192 MG/DL (ref 65–140)
GLUCOSE SERPL-MCNC: 242 MG/DL (ref 65–140)
HCT VFR BLD AUTO: 38 % (ref 34.8–46.1)
HGB BLD-MCNC: 13.1 G/DL (ref 11.5–15.4)
IMM GRANULOCYTES # BLD AUTO: 0.03 THOUSAND/UL (ref 0–0.2)
IMM GRANULOCYTES NFR BLD AUTO: 1 % (ref 0–2)
LYMPHOCYTES # BLD AUTO: 1.93 THOUSANDS/ΜL (ref 0.6–4.47)
LYMPHOCYTES NFR BLD AUTO: 36 % (ref 14–44)
MCH RBC QN AUTO: 28.7 PG (ref 26.8–34.3)
MCHC RBC AUTO-ENTMCNC: 34.5 G/DL (ref 31.4–37.4)
MCV RBC AUTO: 83 FL (ref 82–98)
MONOCYTES # BLD AUTO: 0.4 THOUSAND/ΜL (ref 0.17–1.22)
MONOCYTES NFR BLD AUTO: 7 % (ref 4–12)
NEUTROPHILS # BLD AUTO: 2.93 THOUSANDS/ΜL (ref 1.85–7.62)
NEUTS SEG NFR BLD AUTO: 53 % (ref 43–75)
NRBC BLD AUTO-RTO: 0 /100 WBCS
NT-PROBNP SERPL-MCNC: 122 PG/ML
P AXIS: 68 DEGREES
PLATELET # BLD AUTO: 180 THOUSANDS/UL (ref 149–390)
PLATELET # BLD AUTO: 181 THOUSANDS/UL (ref 149–390)
PMV BLD AUTO: 11.1 FL (ref 8.9–12.7)
PMV BLD AUTO: 11.2 FL (ref 8.9–12.7)
POTASSIUM SERPL-SCNC: 3.9 MMOL/L (ref 3.5–5.3)
PR INTERVAL: 132 MS
PROT SERPL-MCNC: 7.4 G/DL (ref 6.4–8.2)
QRS AXIS: 51 DEGREES
QRSD INTERVAL: 90 MS
QT INTERVAL: 416 MS
QTC INTERVAL: 439 MS
RBC # BLD AUTO: 4.56 MILLION/UL (ref 3.81–5.12)
SODIUM SERPL-SCNC: 140 MMOL/L (ref 136–145)
T WAVE AXIS: 62 DEGREES
TROPONIN I SERPL-MCNC: <0.02 NG/ML
VENTRICULAR RATE: 67 BPM
WBC # BLD AUTO: 5.41 THOUSAND/UL (ref 4.31–10.16)

## 2019-07-02 PROCEDURE — 93010 ELECTROCARDIOGRAM REPORT: CPT | Performed by: INTERNAL MEDICINE

## 2019-07-02 PROCEDURE — 84484 ASSAY OF TROPONIN QUANT: CPT | Performed by: INTERNAL MEDICINE

## 2019-07-02 PROCEDURE — 71046 X-RAY EXAM CHEST 2 VIEWS: CPT

## 2019-07-02 PROCEDURE — 93005 ELECTROCARDIOGRAM TRACING: CPT

## 2019-07-02 PROCEDURE — 99285 EMERGENCY DEPT VISIT HI MDM: CPT

## 2019-07-02 PROCEDURE — 82948 REAGENT STRIP/BLOOD GLUCOSE: CPT

## 2019-07-02 PROCEDURE — 85025 COMPLETE CBC W/AUTO DIFF WBC: CPT | Performed by: EMERGENCY MEDICINE

## 2019-07-02 PROCEDURE — 84484 ASSAY OF TROPONIN QUANT: CPT | Performed by: EMERGENCY MEDICINE

## 2019-07-02 PROCEDURE — 99284 EMERGENCY DEPT VISIT MOD MDM: CPT | Performed by: EMERGENCY MEDICINE

## 2019-07-02 PROCEDURE — 80053 COMPREHEN METABOLIC PANEL: CPT | Performed by: EMERGENCY MEDICINE

## 2019-07-02 PROCEDURE — 83880 ASSAY OF NATRIURETIC PEPTIDE: CPT | Performed by: INTERNAL MEDICINE

## 2019-07-02 PROCEDURE — 36415 COLL VENOUS BLD VENIPUNCTURE: CPT

## 2019-07-02 PROCEDURE — 85049 AUTOMATED PLATELET COUNT: CPT | Performed by: INTERNAL MEDICINE

## 2019-07-02 PROCEDURE — 99220 PR INITIAL OBSERVATION CARE/DAY 70 MINUTES: CPT | Performed by: INTERNAL MEDICINE

## 2019-07-02 RX ORDER — LATANOPROST 50 UG/ML
1 SOLUTION/ DROPS OPHTHALMIC
Status: DISCONTINUED | OUTPATIENT
Start: 2019-07-02 | End: 2019-07-03 | Stop reason: HOSPADM

## 2019-07-02 RX ORDER — LOSARTAN POTASSIUM 50 MG/1
50 TABLET ORAL DAILY
Status: DISCONTINUED | OUTPATIENT
Start: 2019-07-03 | End: 2019-07-03 | Stop reason: HOSPADM

## 2019-07-02 RX ORDER — ACETAMINOPHEN 325 MG/1
650 TABLET ORAL EVERY 6 HOURS PRN
Status: DISCONTINUED | OUTPATIENT
Start: 2019-07-02 | End: 2019-07-03 | Stop reason: HOSPADM

## 2019-07-02 RX ORDER — ONDANSETRON 2 MG/ML
4 INJECTION INTRAMUSCULAR; INTRAVENOUS EVERY 6 HOURS PRN
Status: DISCONTINUED | OUTPATIENT
Start: 2019-07-02 | End: 2019-07-03 | Stop reason: HOSPADM

## 2019-07-02 RX ORDER — NITROGLYCERIN 0.4 MG/1
0.4 TABLET SUBLINGUAL
Status: DISCONTINUED | OUTPATIENT
Start: 2019-07-02 | End: 2019-07-03 | Stop reason: HOSPADM

## 2019-07-02 RX ORDER — ASPIRIN 81 MG/1
81 TABLET, CHEWABLE ORAL DAILY
Status: DISCONTINUED | OUTPATIENT
Start: 2019-07-03 | End: 2019-07-03 | Stop reason: HOSPADM

## 2019-07-02 RX ORDER — HEPARIN SODIUM 5000 [USP'U]/ML
5000 INJECTION, SOLUTION INTRAVENOUS; SUBCUTANEOUS EVERY 8 HOURS SCHEDULED
Status: DISCONTINUED | OUTPATIENT
Start: 2019-07-02 | End: 2019-07-03 | Stop reason: HOSPADM

## 2019-07-02 RX ORDER — NITROGLYCERIN 0.4 MG/1
0.4 TABLET SUBLINGUAL ONCE
Status: COMPLETED | OUTPATIENT
Start: 2019-07-02 | End: 2019-07-02

## 2019-07-02 RX ORDER — ATORVASTATIN CALCIUM 40 MG/1
40 TABLET, FILM COATED ORAL
Status: DISCONTINUED | OUTPATIENT
Start: 2019-07-02 | End: 2019-07-03 | Stop reason: HOSPADM

## 2019-07-02 RX ORDER — METOPROLOL SUCCINATE 50 MG/1
50 TABLET, EXTENDED RELEASE ORAL
Status: DISCONTINUED | OUTPATIENT
Start: 2019-07-02 | End: 2019-07-03 | Stop reason: HOSPADM

## 2019-07-02 RX ORDER — AMLODIPINE BESYLATE 5 MG/1
5 TABLET ORAL DAILY
Status: DISCONTINUED | OUTPATIENT
Start: 2019-07-03 | End: 2019-07-03 | Stop reason: HOSPADM

## 2019-07-02 RX ORDER — METOPROLOL TARTRATE 5 MG/5ML
5 INJECTION INTRAVENOUS EVERY 6 HOURS PRN
Status: DISCONTINUED | OUTPATIENT
Start: 2019-07-02 | End: 2019-07-03 | Stop reason: HOSPADM

## 2019-07-02 RX ORDER — PANTOPRAZOLE SODIUM 40 MG/1
40 TABLET, DELAYED RELEASE ORAL
Status: DISCONTINUED | OUTPATIENT
Start: 2019-07-03 | End: 2019-07-03 | Stop reason: HOSPADM

## 2019-07-02 RX ORDER — ASPIRIN 81 MG/1
324 TABLET, CHEWABLE ORAL ONCE
Status: COMPLETED | OUTPATIENT
Start: 2019-07-02 | End: 2019-07-02

## 2019-07-02 RX ORDER — GABAPENTIN 300 MG/1
300 CAPSULE ORAL 2 TIMES DAILY
Status: DISCONTINUED | OUTPATIENT
Start: 2019-07-02 | End: 2019-07-03 | Stop reason: HOSPADM

## 2019-07-02 RX ORDER — CLOPIDOGREL BISULFATE 75 MG/1
75 TABLET ORAL DAILY
Status: DISCONTINUED | OUTPATIENT
Start: 2019-07-03 | End: 2019-07-03 | Stop reason: HOSPADM

## 2019-07-02 RX ADMIN — ASPIRIN 81 MG 324 MG: 81 TABLET ORAL at 14:58

## 2019-07-02 RX ADMIN — LATANOPROST 1 DROP: 50 SOLUTION OPHTHALMIC at 21:25

## 2019-07-02 RX ADMIN — NITROGLYCERIN 0.4 MG: 0.4 TABLET SUBLINGUAL at 14:57

## 2019-07-02 RX ADMIN — INSULIN DETEMIR 30 UNITS: 100 INJECTION, SOLUTION SUBCUTANEOUS at 21:25

## 2019-07-02 RX ADMIN — INSULIN LISPRO 2 UNITS: 100 INJECTION, SOLUTION INTRAVENOUS; SUBCUTANEOUS at 18:21

## 2019-07-02 RX ADMIN — ATORVASTATIN CALCIUM 40 MG: 40 TABLET, FILM COATED ORAL at 17:15

## 2019-07-02 RX ADMIN — GABAPENTIN 300 MG: 300 CAPSULE ORAL at 17:15

## 2019-07-02 RX ADMIN — INSULIN LISPRO 4 UNITS: 100 INJECTION, SOLUTION INTRAVENOUS; SUBCUTANEOUS at 21:24

## 2019-07-02 RX ADMIN — METOPROLOL SUCCINATE 50 MG: 50 TABLET, EXTENDED RELEASE ORAL at 21:25

## 2019-07-02 RX ADMIN — HEPARIN SODIUM 5000 UNITS: 5000 INJECTION INTRAVENOUS; SUBCUTANEOUS at 17:15

## 2019-07-02 RX ADMIN — HEPARIN SODIUM 5000 UNITS: 5000 INJECTION INTRAVENOUS; SUBCUTANEOUS at 21:25

## 2019-07-02 NOTE — ASSESSMENT & PLAN NOTE
- s/p prior left lumpectomy  - supportive care - outpatient follow-up with PCP/oncology as necessary
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
## 2019-07-02 NOTE — ASSESSMENT & PLAN NOTE
- s/p drug-eluting stent to mid-LAD in June 2018 with repeat heart catheterization in in October 2018 with residual 40% proximal and 50% distal LAD stenosis  - in light of shortness of breath, check echocardiogram to evaluate for developing ischemic cardiomyopathy (last limited study in October 2018 revealed an EF of 60% with mild TR)   - continue dual anti-platelet therapy with ASA/Plavix - continue statin therapy with Lipitor (LDL of 68 earlier this month) - continue Cozaar/Toprol-XL  - telemetry monitoring - cardiology input to be appreciated
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
## 2019-07-02 NOTE — ASSESSMENT & PLAN NOTE
- HbA1c of 9 6 earlier this month  - hold oral hypoglycemics - continue home basal insulin course - additional SSI coverage per [employer] on board  - continue Gabapentin for neuropathy

## 2019-07-02 NOTE — ED PROCEDURE NOTE
PROCEDURE  ECG 12 Lead Documentation Only  Date/Time: 7/2/2019 2:46 PM  Performed by: Heena Shepherd DO  Authorized by: Heena Shepherd DO     Indications / Diagnosis:  Chest pain  ECG reviewed by me, the ED Provider: yes    Patient location:  ED  Previous ECG:     Previous ECG:  Compared to current    Similarity:  No change  QRS:     QRS axis:  Normal  T waves:     T waves: non-specific           Heena Shepherd DO  07/02/19 1447

## 2019-07-02 NOTE — ASSESSMENT & PLAN NOTE
- presented with a BP of 211/95 with progressive improvement in the ER 2157/71 s/p sublingual nitroglycerin   - low-sodium diet  - continue home Norvasc/Cozaar/Toprol-XL

## 2019-07-03 VITALS
OXYGEN SATURATION: 97 % | BODY MASS INDEX: 38.09 KG/M2 | RESPIRATION RATE: 18 BRPM | DIASTOLIC BLOOD PRESSURE: 65 MMHG | HEIGHT: 69 IN | HEART RATE: 63 BPM | TEMPERATURE: 97 F | WEIGHT: 257.2 LBS | SYSTOLIC BLOOD PRESSURE: 131 MMHG

## 2019-07-03 LAB
GLUCOSE SERPL-MCNC: 138 MG/DL (ref 65–140)
GLUCOSE SERPL-MCNC: 227 MG/DL (ref 65–140)
TSH SERPL DL<=0.05 MIU/L-ACNC: 2.26 UIU/ML (ref 0.36–3.74)

## 2019-07-03 PROCEDURE — 82948 REAGENT STRIP/BLOOD GLUCOSE: CPT

## 2019-07-03 PROCEDURE — 84443 ASSAY THYROID STIM HORMONE: CPT | Performed by: INTERNAL MEDICINE

## 2019-07-03 PROCEDURE — 99217 PR OBSERVATION CARE DISCHARGE MANAGEMENT: CPT | Performed by: NURSE PRACTITIONER

## 2019-07-03 PROCEDURE — 97166 OT EVAL MOD COMPLEX 45 MIN: CPT

## 2019-07-03 PROCEDURE — G8988 SELF CARE GOAL STATUS: HCPCS

## 2019-07-03 PROCEDURE — 99214 OFFICE O/P EST MOD 30 MIN: CPT | Performed by: INTERNAL MEDICINE

## 2019-07-03 PROCEDURE — G8987 SELF CARE CURRENT STATUS: HCPCS

## 2019-07-03 RX ORDER — FUROSEMIDE 20 MG/1
20 TABLET ORAL DAILY
Qty: 30 TABLET | Refills: 0 | Status: SHIPPED | OUTPATIENT
Start: 2019-07-04 | End: 2019-12-11 | Stop reason: SDUPTHER

## 2019-07-03 RX ORDER — FUROSEMIDE 20 MG/1
20 TABLET ORAL DAILY
Status: DISCONTINUED | OUTPATIENT
Start: 2019-07-03 | End: 2019-07-03 | Stop reason: HOSPADM

## 2019-07-03 RX ADMIN — CLOPIDOGREL BISULFATE 75 MG: 75 TABLET ORAL at 08:55

## 2019-07-03 RX ADMIN — GABAPENTIN 300 MG: 300 CAPSULE ORAL at 08:55

## 2019-07-03 RX ADMIN — LOSARTAN POTASSIUM 50 MG: 50 TABLET, FILM COATED ORAL at 08:55

## 2019-07-03 RX ADMIN — HEPARIN SODIUM 5000 UNITS: 5000 INJECTION INTRAVENOUS; SUBCUTANEOUS at 06:08

## 2019-07-03 RX ADMIN — PANTOPRAZOLE SODIUM 40 MG: 40 TABLET, DELAYED RELEASE ORAL at 06:07

## 2019-07-03 RX ADMIN — INSULIN DETEMIR 30 UNITS: 100 INJECTION, SOLUTION SUBCUTANEOUS at 08:55

## 2019-07-03 RX ADMIN — AMLODIPINE BESYLATE 5 MG: 5 TABLET ORAL at 08:55

## 2019-07-03 RX ADMIN — INSULIN LISPRO 4 UNITS: 100 INJECTION, SOLUTION INTRAVENOUS; SUBCUTANEOUS at 11:35

## 2019-07-03 RX ADMIN — ASPIRIN 81 MG 81 MG: 81 TABLET ORAL at 08:55

## 2019-07-03 RX ADMIN — FUROSEMIDE 20 MG: 20 TABLET ORAL at 09:27
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
## 2019-07-03 NOTE — DISCHARGE SUMMARY
Discharge Summary - [facility] Internal Medicine    Patient Information: Miguel Delarosa 61 y o  female MRN: [id number]  Unit/Bed#: -01 Encounter: [id number]    Discharging Physician / Practitioner: REINALDO Park  PCP: Taran White MD  Admission Date: 7/2/2019  Discharge Date: 07/03/19    Reason for Admission:  Shortness of breath and chest pain    Discharge Diagnoses:     Principal Problem (Resolved):    Chest pain - Shortness of breath  Active Problems:    Insulin dependent diabetes mellitus - Diabetic neuropathy    Hyperlipidemia    CAD (coronary artery disease)    Accelerated hypertension on admission - history of Essential hypertension    History of breast cancer      Consultations During Hospital Stay:  · Cardiology    Procedures Performed:     · None    Significant Findings / Test Results:     · Chest x-ray negative  · EKG normal sinus rhythm, no change compared to prior  · Serial troponins times were negative  ·   · CBC CMP unremarkable  · TSH 2 260    Incidental Findings:   · None    Test Results Pending at Discharge (will require follow up): · None     Outpatient Tests Requested:  · Outpatient follow-up PCP  · Outpatient follow-up with Cardiology on 07/16, patient aware  Would get BMP prior to appointment  Discussed with patient  Complications:  None    Hospital Course:     Miguel Delarosa is a 61 y o  female patient with past medical history of CAD status post PCI on dual antiplatelet therapy, uncontrolled diabetes mellitus, hypertension, hyperlipidemia and neuropathy who originally presented to the hospital on 7/2/2019 due to an episode of shortness of breath and chest pain  Patient was found to be hypertensive on admission with a blood pressure of 211/95, this is improved with nitro administration  Given her cardiac history, she was evaluated by Cardiology    Prior to admission patient did take a dose of oral Lasix and felt that this helped her shortness of breath and pain  At this point cardiology is recommending daily maintenance diuretic of Lasix 20 mg  Patient is okay with this  She does report feeling better on the days that she takes her Lasix at home  She has not been taking it on work days because of urinary frequency associated with diuretic  Otherwise, no need for echo were inpatient stress test per my discussion with Cardiology  She will follow up with Cardiology on 07/16, this was communicated with patient  I will also get a BMP prior to this visit to ensure her creatinine and electrolytes are stable on Lasix  Patient feels well  She is ambulating in the room without any chest pain or shortness of breath  She is agreeable to discharge plan  Discussed importance of low-sodium diet with patient  Condition at Discharge: stable     Discharge Day Visit / Exam:     Subjective:  Patient offers no complaints  Feels well  No chest pain, shortness further palpitations  Vitals: Blood Pressure: 131/65 (07/03/19 0722)  Pulse: 63 (07/03/19 0722)  Temperature: (!) 97 °F (36 1 °C) (07/03/19 0722)  Temp Source: Oral (07/02/19 1557)  Respirations: 18 (07/03/19 0722)  Height: 5' 9" (175 3 cm) (07/02/19 1557)  Weight - Scale: 117 kg (257 lb 3 2 oz) (07/02/19 1557)  SpO2: 97 % (07/03/19 0722)     Exam:   Physical Exam   Constitutional: She is oriented to person, place, and time  No distress  HENT:   Head: Normocephalic  Eyes: Conjunctivae are normal    Neck: Normal range of motion  Cardiovascular: Normal rate  No murmur heard  Pulmonary/Chest: Breath sounds normal    Abdominal: Bowel sounds are normal    Musculoskeletal: Normal range of motion  Neurological: She is alert and oriented to person, place, and time  Skin: Skin is warm and dry  Psychiatric: She has a normal mood and affect  Nursing note and vitals reviewed        Discussion with Family:  Patient    Discharge instructions/Information to patient and family:   See after visit summary for information provided to patient and family  Provisions for Follow-Up Care:  See after visit summary for information related to follow-up care and any pertinent home health orders  Disposition:     Home    For Discharges to [facility] SNF:   · Not Applicable to this Patient - Not Applicable to this Patient    Planned Readmission: no     Discharge Statement:  I spent 45 minutes discharging the patient  This time was spent on the day of discharge  I had direct contact with the patient on the day of discharge  Greater than 50% of the total time was spent examining patient, answering all patient questions, arranging and discussing plan of care with patient as well as directly providing post-discharge instructions  Additional time then spent on discharge activities  Discharge Medications:  See after visit summary for reconciled discharge medications provided to patient and family        ** Please Note: This note has been constructed using a voice recognition system **

## 2019-07-03 NOTE — DISCHARGE INSTR - AVS FIRST PAGE
· Please have lab work done at any [facility] lab the Friday or Monday prior to your follow-up cardiology appointment

## 2019-07-03 NOTE — PLAN OF CARE
Problem: PAIN - ADULT  Goal: Verbalizes/displays adequate comfort level or baseline comfort level  Description  Interventions:  - Encourage patient to monitor pain and request assistance  - Assess pain using appropriate pain scale  - Administer analgesics based on type and severity of pain and evaluate response  - Implement non-pharmacological measures as appropriate and evaluate response  - Consider cultural and social influences on pain and pain management  - Notify physician/advanced practitioner if interventions unsuccessful or patient reports new pain  Outcome: Progressing     Problem: INFECTION - ADULT  Goal: Absence or prevention of progression during hospitalization  Description  INTERVENTIONS:  - Assess and monitor for signs and symptoms of infection  - Monitor lab/diagnostic results  - Monitor all insertion sites, i e  indwelling lines, tubes, and drains  - Monitor endotracheal (as able) and nasal secretions for changes in amount and color  - [place] appropriate cooling/warming therapies per order  - Administer medications as ordered  - Instruct and encourage patient and family to use good hand hygiene technique  - Identify and instruct in appropriate isolation precautions for identified infection/condition  Outcome: Progressing  Goal: Absence of fever/infection during neutropenic period  Description  INTERVENTIONS:  - Monitor WBC  - Implement neutropenic guidelines  Outcome: Progressing     Problem: SAFETY ADULT  Goal: Patient will remain free of falls  Description  INTERVENTIONS:  - Assess patient frequently for physical needs  -  Identify cognitive and physical deficits and behaviors that affect risk of falls    -  [place] fall precautions as indicated by assessment   - Educate patient/family on patient safety including physical limitations  - Instruct patient to call for assistance with activity based on assessment  - Modify environment to reduce risk of injury  - Consider OT/PT consult to assist with strengthening/mobility  Outcome: Progressing  Goal: Maintain or return to baseline ADL function  Description  INTERVENTIONS:  -  Assess patient's ability to carry out ADLs; assess patient's baseline for ADL function and identify physical deficits which impact ability to perform ADLs (bathing, care of mouth/teeth, toileting, grooming, dressing, etc )  - Assess/evaluate cause of self-care deficits   - Assess range of motion  - Assess patient's mobility; develop plan if impaired  - Assess patient's need for assistive devices and provide as appropriate  - Encourage maximum independence but intervene and supervise when necessary  ¯ Involve family in performance of ADLs  ¯ Assess for home care needs following discharge   ¯ Request OT consult to assist with ADL evaluation and planning for discharge  ¯ Provide patient education as appropriate  Outcome: Progressing  Goal: Maintain or return mobility status to optimal level  Description  INTERVENTIONS:  - Assess patient's baseline mobility status (ambulation, transfers, stairs, etc )    - Identify cognitive and physical deficits and behaviors that affect mobility  - Identify mobility aids required to assist with transfers and/or ambulation (gait belt, sit-to-stand, lift, walker, cane, etc )  - [place] fall precautions as indicated by assessment  - Record patient progress and toleration of activity level on Mobility SBAR; progress patient to next Phase/Stage  - Instruct patient to call for assistance with activity based on assessment  - Request Rehabilitation consult to assist with strengthening/weightbearing, etc   Outcome: Progressing     Problem: DISCHARGE PLANNING  Goal: Discharge to home or other facility with appropriate resources  Description  INTERVENTIONS:  - Identify barriers to discharge w/patient and caregiver  - Arrange for needed discharge resources and transportation as appropriate  - Identify discharge learning needs (meds, wound care, etc )  - Arrange for interpretive services to assist at discharge as needed  - Refer to Case Management Department for coordinating discharge planning if the patient needs post-hospital services based on physician/advanced practitioner order or complex needs related to functional status, cognitive ability, or social support system  Outcome: Progressing     Problem: Knowledge Deficit  Goal: Patient/family/caregiver demonstrates understanding of disease process, treatment plan, medications, and discharge instructions  Description  Complete learning assessment and assess knowledge base    Interventions:  - Provide teaching at level of understanding  - Provide teaching via preferred learning methods  Outcome: Progressing
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
## 2019-07-03 NOTE — UTILIZATION REVIEW
Initial Clinical Review    Admission: Date/Time/Statement:  07-02-19 @ 1449     Orders Placed This Encounter   Procedures    Place in Observation (expected length of stay for this patient is less than two midnights)     Standing Status:   Standing     Number of Occurrences:   1     Order Specific Question:   Admitting Physician     Answer:   Kimmy Serrato     Order Specific Question:   Level of Care     Answer:   Med Surg [16]     ED Arrival Information     Expected Arrival Acuity Means of Arrival Escorted By Service Admission Type    - 7/2/2019 13:07 Emergent Walk-In Self Hospitalist Emergency    Arrival Complaint    -        Chief Complaint   Patient presents with    Chest Pain     Pt reports chest pressure, dizziness, sob  Pt reports it feels the same as when she had an MI last year  Pt reports pain starting a week ago     Assessment/Plan: 60 yo female presented to ER as observation status from home with c/o chest pain and SOB  Patient states chest fullness and heaviness with palpitations also noted  Patient has hx for CAD s/p NSTEMI with TEVIN to LAD Type II DM HTN and hyperlipidemia  On exam  (+) dyspnea with exeration and abdominal bloating, leg swelling  Plan consult cardiology monitor chest pain and troponin with supportive care  Anticipated Length of Stay:  Patient will be admitted on an Observation basis with an anticipated length of stay of  less than 2 midnights  Justification for Hospital Stay:  Chest pain with prior history of CAD requiring cardiac workup and telemetry monitoring      ED Triage Vitals   Temperature Pulse Respirations Blood Pressure SpO2   07/02/19 1557 07/02/19 1320 07/02/19 1320 07/02/19 1322 07/02/19 1320   98 °F (36 7 °C) 77 20 (!) 211/95 95 %      Temp Source Heart Rate Source Patient Position - Orthostatic VS BP Location FiO2 (%)   07/02/19 1557 07/02/19 1320 07/02/19 1320 07/02/19 1320 --   Oral Monitor Sitting Right arm       Pain Score       07/02/19 1320       6 Wt Readings from Last 1 Encounters:   07/02/19 117 kg (257 lb 3 2 oz)     Additional Vital Signs:   07/03/19 07:22:16  97 °F (36 1 °C)Abnormal   63  18  131/65  87  97 %     07/03/19 03:15:14  97 6 °F (36 4 °C)  61    131/65  87  96 %     07/02/19 22:58:45  97 5 °F (36 4 °C)  64    114/60  65  95 %     07/02/19 21:24:27    74    141/68  92  94 %     07/02/19 19:30:28    74    127/71  90  96 %     07/02/19 15:57:13  98 °F (36 7 °C)  82  18  152/82  105  98 %     07/02/19 1515        169/73         07/02/19 1500        157/71         07/02/19 1322        211/95Abnormal              Pertinent Labs/Diagnostic Test Results:   Results from last 7 days   Lab Units 07/02/19  1719 07/02/19  1324   WBC Thousand/uL  --  5 41   HEMOGLOBIN g/dL  --  13 1   HEMATOCRIT %  --  38 0   PLATELETS Thousands/uL 181 180   NEUTROS ABS Thousands/µL  --  2 93     Results from last 7 days   Lab Units 07/02/19  1324   SODIUM mmol/L 140   POTASSIUM mmol/L 3 9   CHLORIDE mmol/L 103   CO2 mmol/L 31   ANION GAP mmol/L 6   BUN mg/dL 11   CREATININE mg/dL 0 63   EGFR ml/min/1 73sq m 98   CALCIUM mg/dL 9 5     Results from last 7 days   Lab Units 07/02/19  1324   AST U/L 21   ALT U/L 32   ALK PHOS U/L 92   TOTAL PROTEIN g/dL 7 4   ALBUMIN g/dL 4 0   TOTAL BILIRUBIN mg/dL 0 66     Results from last 7 days   Lab Units 07/03/19  1047 07/03/19  0556 07/02/19  2102 07/02/19  1604   POC GLUCOSE mg/dl 227* 138 242* 192*     Results from last 7 days   Lab Units 07/02/19  1324   GLUCOSE RANDOM mg/dL 175*     Results from last 7 days   Lab Units 07/02/19  2017 07/02/19  1719 07/02/19  1324   TROPONIN I ng/mL <0 02 <0 02 <0 02     Results from last 7 days   Lab Units 07/02/19  1457   NT-PRO BNP pg/mL 122       cxr 07-02-19  NAD    ekg 07-02-19  normal    ED Treatment:   Medication Administration from 07/02/2019 1307 to 07/02/2019 1544       Date/Time Order Dose Route Action     07/02/2019 1458 aspirin chewable tablet 324 mg 324 mg Oral Given     07/02/2019 1457 nitroglycerin (NITROSTAT) SL tablet 0 4 mg 0 4 mg Sublingual Given        Past Medical History:   Diagnosis Date    Arthritis     Cancer ([facility] )     L breast    Cardiac disease     Coronary artery disease     Diabetes mellitus ([facility] )     Heartburn     Hypercholesteremia     Hyperlipidemia     Hypertension     Neuropathy     bilateral feet     Present on Admission:  **None**      Admitting Diagnosis: Palpitations [R00 2]  Chest pain [R07 9]  Coronary artery disease involving native coronary artery of native heart without angina pectoris [I25 10]  Age/Sex: 61 y o  female  Admission Orders:    Current Facility-Administered Medications:  acetaminophen 650 mg Oral Q6H PRN   amLODIPine 5 mg Oral Daily   aspirin 81 mg Oral Daily   atorvastatin 40 mg Oral Daily With Dinner   clopidogrel 75 mg Oral Daily   furosemide 20 mg Oral Daily   gabapentin 300 mg Oral BID   heparin (porcine) 5,000 Units Subcutaneous Q8H [address]   insulin detemir 30 Units Subcutaneous BID   insulin lispro 2-12 Units Subcutaneous 4x Daily (AC & HS)   latanoprost 1 drop Both Eyes HS   losartan 50 mg Oral Daily   metoprolol 5 mg Intravenous Q6H PRN   metoprolol succinate 50 mg Oral HS   nitroglycerin 0 4 mg Sublingual Q5 Min PRN   ondansetron 4 mg Intravenous Q6H PRN   pantoprazole 40 mg Oral Early Morning       IP CONSULT TO CARDIOLOGY   Telemetry  scd      Network Utilization Review Department  Phone: [phone]; Fax [phone]  [email or web address]  ATTENTION: Please call with any questions or concerns to [phone]  and carefully listen to the prompts so that you are directed to the right person  Send all requests for admission clinical reviews, approved or denied determinations and any other requests to fax [phone]   All voicemails are confidential

## 2019-07-03 NOTE — SOCIAL WORK
CM met w/ pt for d/c planning  P aware of CM role  PTA Pt lives w/ , Too Nickerson in 2 story home, w/ bed & baths on 2nd level  & No steps to enter from garage  Emergency contact is Too Nickerson [phone] who  will drive home  Pt is independent w/ all ADLS & Ambulation without assistive device  Pt has DME rolling walker & cane, No VNA, short term rehab, psych or D & A adm,ission Declines HOST  Pt has PCP Dr Sanjay Preciado  Pt uses [employer] in [employer]    Pt drives and is employed  No POA  CM reviewed d/c planning process including the following: identifying help at home, patient preference for d/c planning needs, Discharge Lounge, [facility] Meds to Bed program, availability of treatment team to discuss questions or concerns patient and/or family may have regarding understanding medications and recognizing signs and symptoms once discharged  CM also encouraged patient to follow up with all recommended appointments after discharge  Patient advised of importance for patient and family to participate in managing patients medical well being    Discharge checklist discussed with patient

## 2019-09-26 ENCOUNTER — OFFICE VISIT (OUTPATIENT)
Dept: CARDIOLOGY CLINIC | Facility: CLINIC | Age: 59
End: 2019-09-26
Payer: COMMERCIAL

## 2019-09-26 VITALS
HEART RATE: 72 BPM | HEIGHT: 69 IN | SYSTOLIC BLOOD PRESSURE: 130 MMHG | WEIGHT: 255.6 LBS | DIASTOLIC BLOOD PRESSURE: 68 MMHG | BODY MASS INDEX: 37.86 KG/M2

## 2019-09-26 DIAGNOSIS — I25.118 CORONARY ARTERY DISEASE OF NATIVE ARTERY OF NATIVE HEART WITH STABLE ANGINA PECTORIS (HCC): Primary | ICD-10-CM

## 2019-09-26 DIAGNOSIS — I10 ESSENTIAL HYPERTENSION: ICD-10-CM

## 2019-09-26 PROCEDURE — 3075F SYST BP GE 130 - 139MM HG: CPT | Performed by: INTERNAL MEDICINE

## 2019-09-26 PROCEDURE — 3078F DIAST BP <80 MM HG: CPT | Performed by: INTERNAL MEDICINE

## 2019-09-26 PROCEDURE — 99214 OFFICE O/P EST MOD 30 MIN: CPT | Performed by: INTERNAL MEDICINE

## 2019-09-26 RX ORDER — AMLODIPINE BESYLATE 5 MG/1
5 TABLET ORAL DAILY
Qty: 90 TABLET | Refills: 3 | Status: SHIPPED | OUTPATIENT
Start: 2019-09-26 | End: 2020-10-09

## 2019-09-26 RX ORDER — OMEGA-3S/DHA/EPA/FISH OIL/D3 300MG-1000
400 CAPSULE ORAL DAILY
COMMUNITY
End: 2019-11-06 | Stop reason: SDUPTHER

## 2019-09-26 RX ORDER — REPAGLINIDE 2 MG/1
2 TABLET ORAL
COMMUNITY
End: 2020-12-30
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
## 2019-11-06 ENCOUNTER — OFFICE VISIT (OUTPATIENT)
Dept: URGENT CARE | Facility: CLINIC | Age: 59
End: 2019-11-06
Payer: COMMERCIAL

## 2019-11-06 ENCOUNTER — APPOINTMENT (OUTPATIENT)
Dept: RADIOLOGY | Facility: CLINIC | Age: 59
End: 2019-11-06
Payer: COMMERCIAL

## 2019-11-06 VITALS
RESPIRATION RATE: 18 BRPM | OXYGEN SATURATION: 98 % | TEMPERATURE: 98.8 F | SYSTOLIC BLOOD PRESSURE: 178 MMHG | HEART RATE: 73 BPM | DIASTOLIC BLOOD PRESSURE: 86 MMHG

## 2019-11-06 DIAGNOSIS — B96.89 ACUTE BACTERIAL BRONCHITIS: Primary | ICD-10-CM

## 2019-11-06 DIAGNOSIS — E55.9 VITAMIN D DEFICIENCY: ICD-10-CM

## 2019-11-06 DIAGNOSIS — J20.8 ACUTE BACTERIAL BRONCHITIS: Primary | ICD-10-CM

## 2019-11-06 DIAGNOSIS — R05.9 COUGH: ICD-10-CM

## 2019-11-06 PROCEDURE — 99203 OFFICE O/P NEW LOW 30 MIN: CPT | Performed by: PHYSICIAN ASSISTANT

## 2019-11-06 PROCEDURE — 71046 X-RAY EXAM CHEST 2 VIEWS: CPT

## 2019-11-06 RX ORDER — OMEGA-3S/DHA/EPA/FISH OIL/D3 300MG-1000
400 CAPSULE ORAL DAILY
Qty: 30 TABLET | Refills: 1 | Status: SHIPPED | OUTPATIENT
Start: 2019-11-06 | End: 2020-06-03

## 2019-11-06 RX ORDER — AZITHROMYCIN 250 MG/1
TABLET, FILM COATED ORAL
Qty: 6 TABLET | Refills: 0 | Status: SHIPPED | OUTPATIENT
Start: 2019-11-06 | End: 2019-11-10

## 2019-11-06 RX ORDER — ALBUTEROL SULFATE 90 UG/1
2 AEROSOL, METERED RESPIRATORY (INHALATION) EVERY 4 HOURS PRN
Qty: 18 G | Refills: 0 | Status: SHIPPED | OUTPATIENT
Start: 2019-11-06 | End: 2019-12-11 | Stop reason: ALTCHOICE
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
## 2019-11-06 NOTE — PATIENT INSTRUCTIONS
I have prescribed an antibiotic for the infection  Please take the antibiotic as prescribed and finish the entire prescription  I recommend that the patient takes an over the counter probiotic or eats yogurt with live cultures in it [place]) to keep good bacteria in the gut and help prevent diarrhea  Wash hands frequently to prevent the spread of infection  Can use over the counter cough and cold medications to help with symptoms  Ibuprofen and/or tylenol as needed for pain or fever  If not improving over the next 7-10 days, follow up with PCP  Albuterol inhaler as needed

## 2019-12-10 ENCOUNTER — TRANSCRIBE ORDERS (OUTPATIENT)
Dept: LAB | Facility: HOSPITAL | Age: 59
End: 2019-12-10

## 2019-12-10 DIAGNOSIS — I10 BENIGN HYPERTENSION: ICD-10-CM

## 2019-12-10 DIAGNOSIS — E78.5 HYPERLIPIDEMIA, UNSPECIFIED HYPERLIPIDEMIA TYPE: ICD-10-CM

## 2019-12-10 DIAGNOSIS — E11.65 UNCONTROLLED TYPE 2 DIABETES MELLITUS WITH HYPERGLYCEMIA (HCC): Primary | ICD-10-CM

## 2019-12-11 ENCOUNTER — TRANSCRIBE ORDERS (OUTPATIENT)
Dept: LAB | Facility: HOSPITAL | Age: 59
End: 2019-12-11

## 2019-12-11 ENCOUNTER — APPOINTMENT (OUTPATIENT)
Dept: RADIOLOGY | Facility: CLINIC | Age: 59
End: 2019-12-11
Payer: COMMERCIAL

## 2019-12-11 ENCOUNTER — OFFICE VISIT (OUTPATIENT)
Dept: INTERNAL MEDICINE CLINIC | Facility: CLINIC | Age: 59
End: 2019-12-11
Payer: COMMERCIAL

## 2019-12-11 ENCOUNTER — APPOINTMENT (OUTPATIENT)
Dept: LAB | Facility: HOSPITAL | Age: 59
End: 2019-12-11
Payer: COMMERCIAL

## 2019-12-11 VITALS
RESPIRATION RATE: 18 BRPM | TEMPERATURE: 97.8 F | OXYGEN SATURATION: 99 % | DIASTOLIC BLOOD PRESSURE: 80 MMHG | HEIGHT: 69 IN | WEIGHT: 261.2 LBS | HEART RATE: 70 BPM | SYSTOLIC BLOOD PRESSURE: 152 MMHG | BODY MASS INDEX: 38.69 KG/M2

## 2019-12-11 DIAGNOSIS — E66.01 SEVERE OBESITY (BMI 35.0-39.9) WITH COMORBIDITY (HCC): ICD-10-CM

## 2019-12-11 DIAGNOSIS — H25.89 OTHER AGE-RELATED CATARACT, UNSPECIFIED LATERALITY: ICD-10-CM

## 2019-12-11 DIAGNOSIS — I50.22 CHRONIC SYSTOLIC CONGESTIVE HEART FAILURE (HCC): ICD-10-CM

## 2019-12-11 DIAGNOSIS — E11.9 ENCOUNTER FOR DIABETIC FOOT EXAM (HCC): ICD-10-CM

## 2019-12-11 DIAGNOSIS — E04.1 THYROID NODULE: ICD-10-CM

## 2019-12-11 DIAGNOSIS — E11.42 TYPE 2 DIABETES MELLITUS WITH DIABETIC POLYNEUROPATHY, WITH LONG-TERM CURRENT USE OF INSULIN (HCC): Primary | ICD-10-CM

## 2019-12-11 DIAGNOSIS — R06.02 SHORTNESS OF BREATH: ICD-10-CM

## 2019-12-11 DIAGNOSIS — Z11.59 NEED FOR HEPATITIS C SCREENING TEST: ICD-10-CM

## 2019-12-11 DIAGNOSIS — Z13.31 NEGATIVE DEPRESSION SCREENING: ICD-10-CM

## 2019-12-11 DIAGNOSIS — E11.9 DIABETIC EYE EXAM (HCC): ICD-10-CM

## 2019-12-11 DIAGNOSIS — Z12.39 SCREENING FOR BREAST CANCER: ICD-10-CM

## 2019-12-11 DIAGNOSIS — I25.118 CORONARY ARTERY DISEASE OF NATIVE ARTERY OF NATIVE HEART WITH STABLE ANGINA PECTORIS (HCC): ICD-10-CM

## 2019-12-11 DIAGNOSIS — E78.2 MIXED HYPERLIPIDEMIA: ICD-10-CM

## 2019-12-11 DIAGNOSIS — I10 BENIGN HYPERTENSION: ICD-10-CM

## 2019-12-11 DIAGNOSIS — M54.16 LUMBAR RADICULOPATHY: ICD-10-CM

## 2019-12-11 DIAGNOSIS — I10 ESSENTIAL HYPERTENSION: ICD-10-CM

## 2019-12-11 DIAGNOSIS — M15.9 PRIMARY OSTEOARTHRITIS INVOLVING MULTIPLE JOINTS: ICD-10-CM

## 2019-12-11 DIAGNOSIS — Z85.3 HISTORY OF BREAST CANCER: ICD-10-CM

## 2019-12-11 DIAGNOSIS — Z11.4 SCREENING FOR HIV (HUMAN IMMUNODEFICIENCY VIRUS): ICD-10-CM

## 2019-12-11 DIAGNOSIS — Z01.00 DIABETIC EYE EXAM (HCC): ICD-10-CM

## 2019-12-11 DIAGNOSIS — Z12.11 SCREEN FOR COLON CANCER: ICD-10-CM

## 2019-12-11 DIAGNOSIS — E11.65 UNCONTROLLED TYPE 2 DIABETES MELLITUS WITH HYPERGLYCEMIA (HCC): ICD-10-CM

## 2019-12-11 DIAGNOSIS — Z79.4 TYPE 2 DIABETES MELLITUS WITH DIABETIC POLYNEUROPATHY, WITH LONG-TERM CURRENT USE OF INSULIN (HCC): Primary | ICD-10-CM

## 2019-12-11 DIAGNOSIS — Z23 ENCOUNTER FOR VACCINATION: ICD-10-CM

## 2019-12-11 DIAGNOSIS — H40.89 OTHER GLAUCOMA OF BOTH EYES: ICD-10-CM

## 2019-12-11 DIAGNOSIS — E78.5 HYPERLIPIDEMIA, UNSPECIFIED HYPERLIPIDEMIA TYPE: ICD-10-CM

## 2019-12-11 PROBLEM — Z77.120 MOLD EXPOSURE: Status: RESOLVED | Noted: 2018-02-05 | Resolved: 2019-12-11

## 2019-12-11 PROBLEM — Z12.2 ENCOUNTER FOR SCREENING FOR LUNG CANCER: Status: RESOLVED | Noted: 2018-02-05 | Resolved: 2019-12-11

## 2019-12-11 PROBLEM — Z78.0 POST-MENOPAUSAL: Status: ACTIVE | Noted: 2019-12-11

## 2019-12-11 PROBLEM — Z87.891 FORMER SMOKER: Status: ACTIVE | Noted: 2019-12-11

## 2019-12-11 LAB
ALBUMIN SERPL BCP-MCNC: 4.2 G/DL (ref 3.5–5.7)
ALP SERPL-CCNC: 67 U/L (ref 40–150)
ALT SERPL W P-5'-P-CCNC: 17 U/L (ref 7–52)
ANION GAP SERPL CALCULATED.3IONS-SCNC: 7 MMOL/L (ref 4–13)
AST SERPL W P-5'-P-CCNC: 14 U/L (ref 13–39)
BACTERIA UR QL AUTO: ABNORMAL /HPF
BILIRUB SERPL-MCNC: 0.6 MG/DL (ref 0.2–1)
BILIRUB UR QL STRIP: NEGATIVE
BUN SERPL-MCNC: 11 MG/DL (ref 7–25)
CALCIUM SERPL-MCNC: 9.2 MG/DL (ref 8.6–10.5)
CHLORIDE SERPL-SCNC: 100 MMOL/L (ref 98–107)
CHOLEST SERPL-MCNC: 158 MG/DL (ref 0–200)
CLARITY UR: CLEAR
CO2 SERPL-SCNC: 33 MMOL/L (ref 21–31)
COLOR UR: YELLOW
CREAT SERPL-MCNC: 0.53 MG/DL (ref 0.6–1.2)
CREAT UR-MCNC: 148 MG/DL
EST. AVERAGE GLUCOSE BLD GHB EST-MCNC: 194 MG/DL
GFR SERPL CREATININE-BSD FRML MDRD: 104 ML/MIN/1.73SQ M
GLUCOSE P FAST SERPL-MCNC: 155 MG/DL (ref 65–99)
GLUCOSE UR STRIP-MCNC: NEGATIVE MG/DL
HBA1C MFR BLD: 8.4 % (ref 4.2–6.3)
HDLC SERPL-MCNC: 40 MG/DL
HGB UR QL STRIP.AUTO: NEGATIVE
KETONES UR STRIP-MCNC: NEGATIVE MG/DL
LDLC SERPL CALC-MCNC: 66 MG/DL (ref 0–100)
LEUKOCYTE ESTERASE UR QL STRIP: ABNORMAL
MICROALBUMIN UR-MCNC: 30.6 MG/L (ref 0–20)
MICROALBUMIN/CREAT 24H UR: 21 MG/G CREATININE (ref 0–30)
NITRITE UR QL STRIP: NEGATIVE
NON-SQ EPI CELLS URNS QL MICRO: ABNORMAL /HPF
NONHDLC SERPL-MCNC: 118 MG/DL
PH UR STRIP.AUTO: 5 [PH]
POTASSIUM SERPL-SCNC: 4.2 MMOL/L (ref 3.5–5.5)
PROT SERPL-MCNC: 6.8 G/DL (ref 6.4–8.9)
PROT UR STRIP-MCNC: NEGATIVE MG/DL
RBC #/AREA URNS AUTO: ABNORMAL /HPF
SODIUM SERPL-SCNC: 140 MMOL/L (ref 134–143)
SP GR UR STRIP.AUTO: >=1.03 (ref 1–1.03)
TRIGL SERPL-MCNC: 260 MG/DL (ref 44–166)
UROBILINOGEN UR QL STRIP.AUTO: 0.2 E.U./DL
WBC #/AREA URNS AUTO: ABNORMAL /HPF

## 2019-12-11 PROCEDURE — 80053 COMPREHEN METABOLIC PANEL: CPT

## 2019-12-11 PROCEDURE — 80061 LIPID PANEL: CPT

## 2019-12-11 PROCEDURE — 82043 UR ALBUMIN QUANTITATIVE: CPT

## 2019-12-11 PROCEDURE — 83036 HEMOGLOBIN GLYCOSYLATED A1C: CPT

## 2019-12-11 PROCEDURE — 90471 IMMUNIZATION ADMIN: CPT | Performed by: INTERNAL MEDICINE

## 2019-12-11 PROCEDURE — 81001 URINALYSIS AUTO W/SCOPE: CPT

## 2019-12-11 PROCEDURE — 90682 RIV4 VACC RECOMBINANT DNA IM: CPT | Performed by: INTERNAL MEDICINE

## 2019-12-11 PROCEDURE — 93000 ELECTROCARDIOGRAM COMPLETE: CPT | Performed by: INTERNAL MEDICINE

## 2019-12-11 PROCEDURE — 82088 ASSAY OF ALDOSTERONE: CPT

## 2019-12-11 PROCEDURE — 84244 ASSAY OF RENIN: CPT

## 2019-12-11 PROCEDURE — 99204 OFFICE O/P NEW MOD 45 MIN: CPT | Performed by: INTERNAL MEDICINE

## 2019-12-11 PROCEDURE — 72110 X-RAY EXAM L-2 SPINE 4/>VWS: CPT

## 2019-12-11 PROCEDURE — 1036F TOBACCO NON-USER: CPT | Performed by: INTERNAL MEDICINE

## 2019-12-11 PROCEDURE — 3061F NEG MICROALBUMINURIA REV: CPT | Performed by: INTERNAL MEDICINE

## 2019-12-11 PROCEDURE — 3008F BODY MASS INDEX DOCD: CPT | Performed by: INTERNAL MEDICINE

## 2019-12-11 PROCEDURE — 82570 ASSAY OF URINE CREATININE: CPT

## 2019-12-11 PROCEDURE — 36415 COLL VENOUS BLD VENIPUNCTURE: CPT

## 2019-12-11 RX ORDER — FUROSEMIDE 20 MG/1
20 TABLET ORAL DAILY
Qty: 90 TABLET | Refills: 1 | Status: SHIPPED | OUTPATIENT
Start: 2019-12-11 | End: 2020-06-10 | Stop reason: SDUPTHER

## 2019-12-11 RX ORDER — GABAPENTIN 100 MG/1
100 CAPSULE ORAL 2 TIMES DAILY
COMMUNITY
Start: 2019-12-10 | End: 2020-12-30 | Stop reason: SDUPTHER

## 2019-12-11 RX ORDER — FUROSEMIDE 20 MG/1
20 TABLET ORAL DAILY
Qty: 90 TABLET | Refills: 1 | Status: SHIPPED | OUTPATIENT
Start: 2019-12-11 | End: 2019-12-11 | Stop reason: SDUPTHER

## 2019-12-11 NOTE — PROGRESS NOTES
Assessment/Plan:  Problem List Items Addressed This Visit     None      Visit Diagnoses     Need for hepatitis C screening test    -  Primary    Screen for colon cancer        Diabetic eye exam ([facility] [address] )        Screening for breast cancer               Diagnoses and all orders for this visit:    Need for hepatitis C screening test  -     Hepatitis C antibody    Screen for colon cancer  -     Ambulatory referral to Gastroenterology; Future    Diabetic eye exam [facility])  -     Ambulatory referral to Ophthalmology; Future    Screening for breast cancer  -     Mammo screening bilateral w 3d & cad; Future    Other orders  -     gabapentin (NEURONTIN) 100 mg capsule        No problem-specific Assessment & Plan notes found for this encounter  A/P: Doing ok  Ekg w/o any acute changes  Will add additonal routine labs to the ones already at the lab  Will hold on her HIV and Hep C and CBC til next draw  Will up date he flu shot  Td is less than five years  Discussed BMI and with give information on diet and exercise  Will check xray of the LS spine and declined PT  May need to see pain management  Will restart the lasix  BP is up, but pt is upset about the labs  Will continue and if still up, adjust meds  Will check an alpha one  Keep f/u with endo and cards  Order mammo  Continue current treatment and RTC one month for f/u BP, labs, xrays, and back  Subjective:      Patient ID: Swathi Mccrary is a 61 y o  female  WF, former pt of [employer] and seeing cards and Dr Chris Ramey in endo, presents to establish  PMH includes DM with neuropathy, HTN, CAD s/p MI, DJD, breast cancer, hyperlipidemia, and CHF  PSH of choly, knee arthroscopy, shoulder sx, plantar fascitis sx, breast lumpectomy, and facial cosmetic sx  Former Smoker and former THC and former ETOH  Doing ok and no c/o's, but sugars elevated and her endo just adjusted her meds  Needs her lasix and has been out of it   Also, had labs recently done and there was a mix up at the lab and just had labs done again today  Only issue is continued LBP with sciatica and sees a DC and feels she needs an xray    Remains active w/o difficulty and no falls  Denies depression  No suicidal or violent ideations  Working full time as a computer op  No recent travel  No fever, chills, or sweats  No unexplained wt change  Denies CP, SOB, palpitations, edema, orthopnea, or PND  No sz or syncope  No changes in bowel or bladder habits  No trouble swallowing  Appetite and sleep are good  Doesn't see a DDS, but sees an eye doctor  Currently due for labs, vaccines, and mammo   The following portions of the patient's history were reviewed and updated as appropriate:   She has a past medical history of Arthritis, Cancer ([facility] [address] ), Cardiac disease, CHF (congestive heart failure) ([facility] [address] ), Coronary artery disease, Diabetes mellitus ([facility] [address] ), Heartburn, Hypercholesteremia, Hyperlipidemia, Hypertension, and Neuropathy  ,  does not have any pertinent problems on file  ,   has a past surgical history that includes Breast surgery (Left); Knee surgery; Nose surgery; Mouth surgery; Foot surgery (Left); Tubal ligation; Ovarian cyst removal (Left); Cholecystectomy; pr shldr arthroscop,part acromioplas (Right, 5/16/2016); pr arthroscopy shoulder surgical biceps tenodesis (Right, 5/16/2016); and Cardiac surgery  ,  family history includes Leukemia in her father; Lung cancer in her father and mother; Thyroid disease in her mother  ,   reports that she quit smoking about 19 years ago  Her smoking use included cigarettes  She smoked 1 00 pack per day  She has never used smokeless tobacco  She reports that she does not drink alcohol or use drugs  ,  is allergic to codeine; iodinated diagnostic agents; iodine; and penicillins     Current Outpatient Medications   Medication Sig Dispense Refill    amLODIPine (NORVASC) 5 mg tablet Take 1 tablet (5 mg total) by mouth daily 90 tablet 3    aspirin 81 MG tablet Take 81 mg by mouth daily   atorvastatin (LIPITOR) 40 mg tablet Take 1 tablet (40 mg total) by mouth daily with dinner 30 tablet 0    cholecalciferol (VITAMIN D3) 400 units tablet Take 1 tablet (400 Units total) by mouth daily (Patient taking differently: Take 1,000 Units by mouth daily ) 30 tablet 1    clopidogrel (PLAVIX) 75 mg tablet Take 1 tablet (75 mg total) by mouth daily 30 tablet 0    Dulaglutide (TRULICITY SC) Inject under the skin 1 5 ml once a week      furosemide (LASIX) 20 mg tablet Take 1 tablet (20 mg total) by mouth daily 30 tablet 0    gabapentin (NEURONTIN) 100 mg capsule       insulin detemir (LEVEMIR) 100 units/mL subcutaneous injection Inject 50 Units under the skin 2 (two) times a day       Insulin Syringe-Needle U-100 31G X 15/64" 0 5 ML MISC by Does not apply route 2 (two) times a day 90 each 0    LATANOPROST OP Apply 1 drop to eye 1 drop in each eye      losartan (COZAAR) 50 mg tablet Take 50 mg by mouth daily        metoprolol succinate (TOPROL-XL) 50 mg 24 hr tablet Take 1 tablet (50 mg total) by mouth daily (Patient taking differently: Take 50 mg by mouth daily at bedtime Pt states takes Metoprolol in the evening per physician orders  ) 30 tablet 0    omeprazole (PriLOSEC) 20 mg delayed release capsule Take 20 mg by mouth daily as needed   repaglinide (PRANDIN) 2 mg tablet Take 2 mg by mouth 3 (three) times a day before meals       No current facility-administered medications for this visit  Review of Systems   Constitutional: Negative for activity change, chills, diaphoresis, fatigue and fever  HENT: Negative  Eyes: Negative for visual disturbance  Respiratory: Negative for cough, chest tightness, shortness of breath and wheezing  Cardiovascular: Negative for chest pain, palpitations and leg swelling  Gastrointestinal: Negative for abdominal pain, constipation, diarrhea, nausea and vomiting  Endocrine: Negative for cold intolerance and heat intolerance  Genitourinary: Negative for difficulty urinating, dysuria and frequency  Musculoskeletal: Positive for back pain  Negative for arthralgias, gait problem and myalgias  Allergic/Immunologic: Negative for environmental allergies  Neurological: Negative for dizziness, seizures, syncope, weakness, light-headedness and headaches  Psychiatric/Behavioral: Negative for confusion, dysphoric mood, sleep disturbance and suicidal ideas  The patient is not nervous/anxious  PHQ-9 Depression Screening    PHQ-9:    Frequency of the following problems over the past two weeks:       Little interest or pleasure in doing things:  0 - not at all  Feeling down, depressed, or hopeless:  0 - not at all  PHQ-2 Score:  0        Objective:  Vitals:    12/11/19 1507   BP: 152/80   BP Location: Left arm   Patient Position: Sitting   Cuff Size: Adult   Pulse: 70   Resp: 18   Temp: 97 8 °F (36 6 °C)   TempSrc: Tympanic   SpO2: 99%   Weight: 118 kg (261 lb 3 2 oz)   Height: 5' 9" (1 753 m)     Body mass index is 38 57 kg/m²  Physical Exam   Constitutional: She is oriented to person, place, and time  She appears well-developed and well-nourished  No distress  HENT:   Head: Normocephalic and atraumatic  Mouth/Throat: Oropharynx is clear and moist    Eyes: Pupils are equal, round, and reactive to light  Conjunctivae and EOM are normal    Neck: Normal range of motion  Neck supple  No JVD present  No tracheal deviation present  No thyromegaly present  Cardiovascular: Normal rate, regular rhythm and normal heart sounds  Pulses are no weak pulses  Pulses:       Dorsalis pedis pulses are 1+ on the right side, and 1+ on the left side  Posterior tibial pulses are 1+ on the right side, and 1+ on the left side  Pulmonary/Chest: Effort normal and breath sounds normal  No respiratory distress  She has no wheezes  She has no rales  Abdominal: Soft  Bowel sounds are normal  She exhibits no distension  There is no tenderness  Musculoskeletal: Normal range of motion  She exhibits no edema, tenderness or deformity  Feet:   Right Foot:   Skin Integrity: Negative for ulcer, skin breakdown, erythema, warmth, callus or dry skin  Left Foot:   Skin Integrity: Negative for ulcer, skin breakdown, erythema, warmth, callus or dry skin  Lymphadenopathy:     She has no cervical adenopathy  Neurological: She is alert and oriented to person, place, and time  Psychiatric: She has a normal mood and affect  Her behavior is normal  Judgment and thought content normal    Nursing note and vitals reviewed  Right Foot/Ankle   Right Foot Inspection  Skin Exam: skin normal and skin intact no dry skin, no warmth, no callus, no erythema, no maceration, no abnormal color, no pre-ulcer, no ulcer and no callus                          Toe Exam: ROM and strength within normal limitsno swelling, no tenderness, erythema and  no right toe deformity  Sensory       Monofilament testing: absent  Vascular  Capillary refills: < 3 seconds  The right DP pulse is 1+  The right PT pulse is 1+  Left Foot/Ankle  Left Foot Inspection  Skin Exam: skin normal and skin intactno dry skin, no warmth, no erythema, no maceration, normal color, no pre-ulcer, no ulcer and no callus                         Toe Exam: ROM and strength within normal limitsno swelling, no tenderness, no erythema and no left toe deformity                   Sensory       Monofilament: absent  Vascular  Capillary refills: < 3 seconds  The left DP pulse is 1+  The left PT pulse is 1+  Assign Risk Category:  No deformity present; Loss of protective sensation; No weak pulses       Risk: 3      BMI Counseling: Body mass index is 38 57 kg/m²  The BMI is above normal  Nutrition recommendations include reducing portion sizes, decreasing overall calorie intake, reducing intake of saturated fat and trans fat and reducing intake of cholesterol   Exercise recommendations include moderate aerobic physical activity for 150 minutes/week

## 2019-12-11 NOTE — PROGRESS NOTES
Diabetic Foot Exam    Patient's shoes and socks removed  Right Foot/Ankle   Right Foot Inspection  Skin Exam: skin normal and skin intact no dry skin, no warmth, no callus, no erythema, no maceration, no abnormal color, no pre-ulcer, no ulcer and no callus                          Toe Exam: ROM and strength within normal limits  Sensory       Monofilament testing: intact      Left Foot/Ankle  Left Foot Inspection  Skin Exam: skin normal and skin intactno dry skin, no warmth, no erythema, no maceration, normal color, no pre-ulcer, no ulcer and no callus                         Toe Exam: ROM and strength within normal limits                   Sensory       Monofilament: intact

## 2019-12-11 NOTE — PATIENT INSTRUCTIONS
Type 2 Diabetes in Adults   WHAT YOU NEED TO KNOW:   What is type 2 diabetes? Type 2 diabetes is a disease that affects how your body uses glucose (sugar)  Normally, when the blood sugar level increases, the pancreas makes more insulin  Insulin helps move sugar out of the blood so it can be used for energy  Type 2 diabetes develops because either the body cannot make enough insulin, or it cannot use the insulin correctly  After many years, your pancreas may stop making insulin  What increases my risk for type 2 diabetes? · Obesity    · Physical inactivity    · Older age    · High blood pressure or high cholesterol    · A history of heart disease, gestational diabetes, or polycystic ovary syndrome     · A family member with diabetes    · Being [place] American, , , [place] American, or [facility]  What are the signs and symptoms of type 2 diabetes? You may have high blood sugar levels for a long time before symptoms appear  You may have any of the following:  · More hunger or thirst than usual     · Frequent urination     · Weight loss without trying     · Blurred vision  How is type 2 diabetes diagnosed? You may need tests to check for type 2 diabetes starting at age 39  You may need any of the following:  · An A1c test  shows the average amount of sugar in your blood over the past 2 to 3 months  Your healthcare provider will tell you the A1c level that is right for you  The goal for your A1c is usually below 7%  Your provider can help you make changes if a check shows the A1c is too high  · A fasting plasma glucose test  is when your blood sugar level is tested after you have not eaten for 8 hours  · A 2-hour plasma glucose test  starts with a blood sugar level check after you have not eaten for 8 hours  You are then given a glucose drink  Your blood sugar level is checked after 2 hours       · A random glucose test  may be done any time of day, no matter how long ago you ate   How is type 2 diabetes treated? Type 2 diabetes can be controlled to prevent damage to your heart, blood vessels, and other organs  The goal is to keep your blood sugar at a normal level  You must eat the right foods, and exercise regularly  You may need 1 or more hypoglycemic medicines or insulin if you cannot control your blood sugar level with nutrition and exercise  You may also need medicine to lower your risk for heart disease  An example includes medicine to lower or control your cholesterol  How do I check my blood sugar level? You will be taught how to check a small drop of blood in a glucose monitor  You will need to check your blood sugar level at least 3 times each day if you are on insulin  Ask your healthcare provider when and how often to check during the day  If you check your blood sugar level before a meal , it should be between 80 and 130 mg/dL  If you check your blood sugar level 1 to 2 hours after a meal , it should be less than 180 mg/dL  Ask your healthcare provider if these are good goals for you  Write down your results, and show them to your healthcare provider  Your provider may use the results to make changes to your medicine, food, and exercise schedules  What should I do if my blood sugar level is too low? Your blood sugar level is too low if it goes below 70 mg/dL  If the level is too low, eat or drink 15 grams of fast-acting carbohydrate  These are found naturally in fruits  Fast-acting carbohydrates will raise your blood sugar level quickly  Examples of 15 grams of fast-acting carbohydrate are 4 ounces (½ cup) of fruit juice or 4 ounces of regular soda  Other examples are 2 tablespoons of raisins or 3 to 4 glucose tablets  Check your blood sugar level 15 minutes later  If the level is still low (less than 100 mg/dL), eat another 15 grams of carbohydrate  When the level returns to 100 mg/dL, eat a snack or meal that contains carbohydrates   This will help prevent another drop in blood sugar  Always carefully follow your healthcare provider's instructions on how to treat low blood sugar levels  What do I need to know about nutrition? A dietitian will help you make a meal plan to keep your blood sugar level steady  Do not skip meals  Your blood sugar level may drop too low if you have taken diabetes medicine and do not eat  · Keep track of carbohydrates (sugar and starchy foods)  Your blood sugar level can get too high if you eat too many carbohydrates  Eat fruits, legumes, vegetables, and whole grains  Your dietitian will help you plan meals and snacks that have the right amount of carbohydrates  · Eat low-fat foods , such as skinless chicken and low-fat milk  · Eat less sodium (salt)  Limit high-sodium foods, such as soy sauce, potato chips, and soup  Do not add salt to food you cook  Limit your use of table salt  You should have less than 2,300 mg of sodium per day  · Eat high-fiber foods , such as vegetables, whole-grain breads, and beans  · Limit alcohol  Alcohol affects your blood sugar level and can make it harder to manage your diabetes  Limit alcohol to 1 drink a day if you are a woman  Limit alcohol to 2 drinks a day if you are a man  A drink of alcohol is 12 ounces of beer, 5 ounces of wine, or 1½ ounces of liquor  How much exercise do I need? Exercise can help keep your blood sugar level steady, decrease your risk of heart disease, and help you lose weight  Stretch before and after you exercise  Exercise for at least 150 minutes every week  Spread this amount of exercise over at least 3 days a week  Do not skip exercise more than 2 days in a row  Include muscle strengthening activities 2 to 3 days each week  Older adults should include balance training 2 to 3 times each week  Activities that help increase balance include yoga and esa chi  Work with your healthcare provider to create an exercise plan    · Check your blood sugar level before and after exercise  Healthcare providers may tell you to change the amount of insulin you take or food you eat  If your blood sugar level is high, check your blood or urine for ketones before you exercise  Do not exercise if your blood sugar level is high and you have ketones  · If your blood sugar level is less than 100 mg/dL, have a carbohydrate snack before you exercise  Examples are 4 to 6 crackers, ½ banana, 8 ounces (1 cup) of milk, or 4 ounces (½ cup) of juice  Drink water or liquids that do not contain sugar before, during, and after exercise  Ask your dietitian or healthcare provider which liquids you should drink when you exercise  · Do not sit for longer than 30 minutes  If you cannot walk around, at least stand up  This will help you stay active and keep your blood circulating  What else can I do to manage type 2 diabetes? · Check your feet each day for sores  Wear shoes and socks that fit correctly  Do not trim your toenails  Ask your healthcare provider for more information about foot care  · Maintain a healthy weight  Ask your healthcare provider how much you should weigh  A healthy weight can help you control your diabetes and prevent heart disease  Ask your provider to help you create a weight loss plan if you are overweight  Together you can set manageable weight loss goals  · Do not smoke  Nicotine and other chemicals in cigarettes and cigars can cause lung damage and make it more difficult to manage your diabetes  Ask your healthcare provider for information if you currently smoke and need help to quit  Do not use e-cigarettes or smokeless tobacco in place of cigarettes or to help you quit  They still contain nicotine  · Check your blood pressure as directed  Ask your healthcare provider what your blood pressure should be  Most adults with diabetes and high blood pressure should have a systolic blood pressure (first number) less than 140   Your diastolic blood pressure (second number) should be less than 90  · Wear medical alert identification  Wear medical alert jewelry or carry a card that says you have diabetes  Ask your healthcare provider where to get these items  · Ask about vaccines  You have a higher risk for serious illness if you get the flu, pneumonia, or hepatitis  Ask your healthcare provider if you should get a flu, pneumonia, or hepatitis B vaccine, and when to get the vaccine  What are the risks of type 2 diabetes? Uncontrolled diabetes can damage your nerves, veins, and arteries  High blood sugar levels may damage other body tissue and organs over time  Damage to arteries may increase your risk for heart attack and stroke  Nerve damage may also lead to other heart, stomach, and nerve problems  Diabetes is life-threatening if it is not controlled  Control your blood glucose levels to prevent health problems  Call 911 for any of the following:   · You have any of the following signs of a stroke:      ¨ Numbness or drooping on one side of your face     ¨ Weakness in an arm or leg    ¨ Confusion or difficulty speaking    ¨ Dizziness, a severe headache, or vision loss    · You have any of the following signs of a heart attack:      ¨ Squeezing, pressure, or pain in your chest that lasts longer than 5 minutes or returns    ¨ Discomfort or pain in your back, neck, jaw, stomach, or arm     ¨ Trouble breathing    ¨ Nausea or vomiting    ¨ Lightheadedness or a sudden cold sweat, especially with chest pain or trouble breathing  When should I seek immediate care? · You have severe abdominal pain, or the pain spreads to your back  You may also be vomiting  · You have trouble staying awake or focusing  · You are shaking or sweating  · You have blurred or double vision  · Your breath has a fruity, sweet smell  · Your breathing is deep and labored, or rapid and shallow  · Your heartbeat is fast and weak    When should I contact my healthcare provider? · You are vomiting or have diarrhea  · You have an upset stomach and cannot eat the foods on your meal plan  · You feel weak or more tired than usual      · You feel dizzy, have headaches, or are easily irritated  · Your skin is red, warm, dry, or swollen  · You have a wound that does not heal      · You have numbness in your arms or legs  · You have trouble coping with your illness, or you feel anxious or depressed  · You have questions or concerns about your condition or care  CARE AGREEMENT:   You have the right to help plan your care  Learn about your health condition and how it may be treated  Discuss treatment options with your caregivers to decide what care you want to receive  You always have the right to refuse treatment  The above information is an  only  It is not intended as medical advice for individual conditions or treatments  Talk to your doctor, nurse or pharmacist before following any medical regimen to see if it is safe and effective for you  © 2017 [address] Information is for End User's use only and may not be sold, redistributed or otherwise used for commercial purposes  All illustrations and images included in CareNotes® are the copyrighted property of [employer]  or Juan Chun  Obesity   AMBULATORY CARE:   Obesity  is when your body mass index (BMI) is greater than 30  Your healthcare provider will use your height and weight to measure your BMI  The risks of obesity include  many health problems, such as injuries or physical disability   You may need tests to check for the following:  · Diabetes     · High blood pressure or high cholesterol     · Heart disease     · Gallbladder or liver disease     · Cancer of the colon, breast, prostate, liver, or kidney     · Sleep apnea     · Arthritis or gout  Seek care immediately if:   · You have a severe headache, confusion, or difficulty speaking  · You have weakness on one side of your body  · You have chest pain, sweating, or shortness of breath  Contact your healthcare provider if:   · You have symptoms of gallbladder or liver disease, such as pain in your upper abdomen  · You have knee or hip pain and discomfort while walking  · You have symptoms of diabetes, such as intense hunger and thirst, and frequent urination  · You have symptoms of sleep apnea, such as snoring or daytime sleepiness  · You have questions or concerns about your condition or care  Treatment for obesity  focuses on helping you lose weight to improve your health  Even a small decrease in BMI can reduce the risk for many health problems  Your healthcare provider will help you set a weight-loss goal   · Lifestyle changes  are the first step in treating obesity  These include making healthy food choices and getting regular physical activity  Your healthcare provider may suggest a weight-loss program that involves coaching, education, and therapy  · Medicine  may help you lose weight when it is used with a healthy diet and physical activity  · Surgery  can help you lose weight if you are very obese and have other health problems  There are several types of weight-loss surgery  Ask your healthcare provider for more information  Be successful losing weight:   · Set small, realistic goals  An example of a small goal is to walk for 20 minutes 5 days a week  Anther goal is to lose 5% of your body weight  · Tell friends, family members, and coworkers about your goals  and ask for their support  Ask a friend to lose weight with you, or join a weight-loss support group  · Identify foods or triggers that may cause you to overeat , and find ways to avoid them  Remove tempting high-calorie foods from your home and workplace  Place a bowl of fresh fruit on your kitchen counter  If stress causes you to eat, then find other ways to cope with stress  · Keep a diary to track what you eat and drink  Also write down how many minutes of physical activity you do each day  Weigh yourself once a week and record it in your diary  Eating changes: You will need to eat 500 to 1,000 fewer calories each day than you currently eat to lose 1 to 2 pounds a week  The following changes will help you cut calories:  · Eat smaller portions  Use small plates, no larger than 9 inches in diameter  Fill your plate half full of fruits and vegetables  Measure your food using measuring cups until you know what a serving size looks like  · Eat 3 meals and 1 or 2 snacks each day  Plan your meals in advance  Kate Waldrop and eat at home most of the time  Eat slowly  · Eat fruits and vegetables at every meal   They are low in calories and high in fiber, which makes you feel full  Do not add butter, margarine, or cream sauce to vegetables  Use herbs to season steamed vegetables  · Eat less fat and fewer fried foods  Eat more baked or grilled chicken and fish  These protein sources are lower in calories and fat than red meat  Limit fast food  Dress your salads with olive oil and vinegar instead of bottled dressing  · Limit the amount of sugar you eat  Do not drink sugary beverages  Limit alcohol  Activity changes:  Physical activity is good for your body in many ways  It helps you burn calories and build strong muscles  It decreases stress and depression, and improves your mood  It can also help you sleep better  Talk to your healthcare provider before you begin an exercise program   · Exercise for at least 30 minutes 5 days a week  Start slowly  Set aside time each day for physical activity that you enjoy and that is convenient for you  It is best to do both weight training and an activity that increases your heart rate, such as walking, bicycling, or swimming  · Find ways to be more active  Do yard work and housecleaning  Walk up the stairs instead of using elevators  Spend your leisure time going to events that require walking, such as outdoor festivals or fairs  This extra physical activity can help you lose weight and keep it off  Follow up with your healthcare provider as directed: You may need to meet with a dietitian  Write down your questions so you remember to ask them during your visits  © 2017 [address] Information is for End User's use only and may not be sold, redistributed or otherwise used for commercial purposes  All illustrations and images included in CareNotes® are the copyrighted property of [employer]  or Juan Mccollum  The above information is an  only  It is not intended as medical advice for individual conditions or treatments  Talk to your doctor, nurse or pharmacist before following any medical regimen to see if it is safe and effective for you  Low Fat Diet   AMBULATORY CARE:   A low-fat diet  is an eating plan that is low in total fat, unhealthy fat, and cholesterol  You may need to follow a low-fat diet if you have trouble digesting or absorbing fat  You may also need to follow this diet if you have high cholesterol  You can also lower your cholesterol by increasing the amount of fiber in your diet  Soluble fiber is a type of fiber that helps to decrease cholesterol levels  Different types of fat in food:   · Limit unhealthy fats  A diet that is high in cholesterol, saturated fat, and trans fat may cause unhealthy cholesterol levels  Unhealthy cholesterol levels increase your risk of heart disease  ¨ Cholesterol:  Limit intake of cholesterol to less than 200 mg per day  Cholesterol is found in meat, eggs, and dairy  ¨ Saturated fat:  Limit saturated fat to less than 7% of your total daily calories  Ask your dietitian how many calories you need each day  Saturated fat is found in butter, cheese, ice cream, whole milk, and palm oil   Saturated fat is also found in meat, such as beef, pork, chicken skin, and processed meats  Processed meats include sausage, hot dogs, and bologna  ¨ Trans fat:  Avoid trans fat as much as possible  Trans fat is used in fried and baked foods  Foods that say trans fat free on the label may still have up to 0 5 grams of trans fat per serving  · Include healthy fats  Replace foods that are high in saturated and trans fat with foods high in healthy fats  This may help to decrease high cholesterol levels  ¨ Monounsaturated fats: These are found in avocados, nuts, and vegetable oils, such as olive, canola, and sunflower oil  ¨ Polyunsaturated fats: These can be found in vegetable oils, such as soybean or corn oil  Omega-3 fats can help to decrease the risk of heart disease  Omega-3 fats are found in fish, such as salmon, herring, trout, and tuna  Omega-3 fats can also be found in plant foods, such as walnuts, flaxseed, soybeans, and canola oil    Foods to limit or avoid:   · Grains:      ¨ Snacks that are made with partially hydrogenated oils, such as chips, regular crackers, and butter-flavored popcorn    ¨ High-fat baked goods, such as biscuits, croissants, doughnuts, pies, cookies, and pastries    · Dairy:      ¨ Whole milk, 2% milk, and yogurt and ice cream made with whole milk    ¨ Half and half creamer, heavy cream, and whipping cream    ¨ Cheese, cream cheese, and sour cream    · Meats and proteins:      ¨ High-fat cuts of meat (T-bone steak, regular hamburger, and ribs)    ¨ Fried meat, poultry (turkey and chicken), and fish    ¨ Poultry (chicken and turkey) with skin    ¨ Cold cuts (salami or bologna), hot dogs, mathews, and sausage    ¨ Whole eggs and egg yolks    · Vegetables and fruits with added fat:      ¨ Fried vegetables or vegetables in butter or high-fat sauces, such as cream or cheese sauces    ¨ Fried fruit or fruit served with butter or cream    · Fats:      ¨ Butter, stick margarine, and shortening    ¨ Coconut, palm oil, and palm kernel oil  Foods to include:   · Grains:      ¨ Whole-grain breads, cereals, pasta, and brown rice    ¨ Low-fat crackers and pretzels    · Vegetables and fruits:      ¨ Fresh, frozen, or canned vegetables (no salt or low-sodium)    ¨ Fresh, frozen, dried, or canned fruit (canned in light syrup or fruit juice)    ¨ Avocado    · Low-fat dairy products:      ¨ Nonfat (skim) or 1% milk    ¨ Nonfat or low-fat cheese, yogurt, and cottage cheese    · Meats and proteins:      ¨ Chicken or turkey with no skin    ¨ Baked or broiled fish    ¨ Lean beef and pork (loin, round, extra lean hamburger)    ¨ Beans and peas, unsalted nuts, soy products    ¨ Egg whites and substitutes    ¨ Seeds and nuts    · Fats:      ¨ Unsaturated oil, such as canola, olive, peanut, soybean, or sunflower oil    ¨ Soft or liquid margarine and vegetable oil spread    ¨ Low-fat salad dressing  Other ways to decrease fat:   · Read food labels before you buy foods  Choose foods that have less than 30% of calories from fat  Choose low-fat or fat-free dairy products  Remember that fat free does not mean calorie free  These foods still contain calories, and too many calories can lead to weight gain  · Trim fat from meat and avoid fried food  Trim all visible fat from meat before you cook it  Remove the skin from poultry  Do not salas meat, fish, or poultry  Bake, roast, boil, or broil these foods instead  Avoid fried foods  Eat a baked potato instead of [place] fries  Steam vegetables instead of sautéing them in butter  · Add less fat to foods  Use imitation mathews bits on salads and baked potatoes instead of regular mathews bits  Use fat-free or low-fat salad dressings instead of regular dressings  Use low-fat or nonfat butter-flavored topping instead of regular butter or margarine on popcorn and other foods  Ways to decrease fat in recipes:  Replace high-fat ingredients with low-fat or nonfat ones   This may cause baked goods to be drier than usual  You may need to use nonfat cooking spray on pans to prevent food from sticking  You also may need to change the amount of other ingredients, such as water, in the recipe  Try the following:  · Use low-fat or light margarine instead of regular margarine or shortening  · Use lean ground turkey breast or chicken, or lean ground beef (less than 5% fat) instead of hamburger  · Add 1 teaspoon of canola oil to 8 ounces of skim milk instead of using cream or half and half  · Use grated zucchini, carrots, or apples in breads instead of coconut  · Use blenderized, low-fat cottage cheese, plain tofu, or low-fat ricotta cheese instead of cream cheese  · Use 1 egg white and 1 teaspoon of canola oil, or use ¼ cup (2 ounces) of fat-free egg substitute instead of a whole egg  · Replace half of the oil that is called for in a recipe with applesauce when you bake  Use 3 tablespoons of cocoa powder and 1 tablespoon of canola oil instead of a square of baking chocolate  How to increase fiber:  Eat enough high-fiber foods to get 20 to 30 grams of fiber every day  Slowly increase your fiber intake to avoid stomach cramps, gas, and other problems  · Eat 3 ounces of whole-grain foods each day  An ounce is about 1 slice of bread  Eat whole-grain breads, such as whole-wheat bread  Whole wheat, whole-wheat flour, or other whole grains should be listed as the first ingredient on the food label  Replace white flour with whole-grain flour or use half of each in recipes  Whole-grain flour is heavier than white flour, so you may have to add more yeast or baking powder  · Eat a high-fiber cereal for breakfast   Oatmeal is a good source of soluble fiber  Look for cereals that have bran or fiber in the name  Choose whole-grain products, such as brown rice, barley, and whole-wheat pasta  · Eat more beans, peas, and lentils  For example, add beans to soups or salads  Eat at least 5 cups of fruits and vegetables each day  Eat fruits and vegetables with the peel because the peel is high in fiber  © 2017 [address] Information is for End User's use only and may not be sold, redistributed or otherwise used for commercial purposes  All illustrations and images included in CareNotes® are the copyrighted property of [employer]  or Juan Mccollum  The above information is an  only  It is not intended as medical advice for individual conditions or treatments  Talk to your doctor, nurse or pharmacist before following any medical regimen to see if it is safe and effective for you  Heart Healthy Diet   AMBULATORY CARE:   A heart healthy diet  is an eating plan low in total fat, unhealthy fats, and sodium (salt)  A heart healthy diet helps decrease your risk for heart disease and stroke  Limit the amount of fat you eat to 25% to 35% of your total daily calories  Limit sodium to less than 2,300 mg each day  Healthy fats:  Healthy fats can help improve cholesterol levels  The risk for heart disease is decreased when cholesterol levels are normal  Choose healthy fats, such as the following:  · Unsaturated fat  is found in foods such as soybean, canola, olive, corn, and safflower oils  It is also found in soft tub margarine that is made with liquid vegetable oil  · Omega-3 fat  is found in certain fish, such as salmon, tuna, and trout, and in walnuts and flaxseed  Unhealthy fats:  Unhealthy fats can cause unhealthy cholesterol levels in your blood and increase your risk of heart disease  Limit unhealthy fats, such as the following:  · Cholesterol  is found in animal foods, such as eggs and lobster, and in dairy products made from whole milk  Limit cholesterol to less than 300 milligrams (mg) each day  You may need to limit cholesterol to 200 mg each day if you have heart disease  · Saturated fat  is found in meats, such as mathews and hamburger   It is also found in chicken or turkey skin, whole milk, and butter  Limit saturated fat to less than 7% of your total daily calories  Limit saturated fat to less than 6% if you have heart disease or are at increased risk for it  · Trans fat  is found in packaged foods, such as potato chips and cookies  It is also in hard margarine, some fried foods, and shortening  Avoid trans fats as much as possible    Heart healthy foods and drinks to include:  Ask your dietitian or healthcare provider how many servings to have from each of the following food groups:  · Grains:      ¨ Whole-wheat breads, cereals, and pastas, and brown rice    ¨ Low-fat, low-sodium crackers and chips    · Vegetables:      ¨ Broccoli, green beans, green peas, and spinach    ¨ Collards, kale, and lima beans    ¨ Carrots, sweet potatoes, tomatoes, and peppers    ¨ Canned vegetables with no salt added    · Fruits:      ¨ Bananas, peaches, pears, and pineapple    ¨ Grapes, raisins, and dates    ¨ Oranges, tangerines, grapefruit, orange juice, and grapefruit juice    ¨ Apricots, mangoes, melons, and papaya    ¨ Raspberries and strawberries    ¨ Canned fruit with no added sugar    · Low-fat dairy products:      ¨ Nonfat (skim) milk, 1% milk, and low-fat almond, cashew, or soy milks fortified with calcium    ¨ Low-fat cheese, regular or frozen yogurt, and cottage cheese    · Meats and proteins , such as lean cuts of beef and pork (loin, leg, round), skinless chicken and turkey, legumes, soy products, egg whites, and nuts  Foods and drinks to limit or avoid:  Ask your dietitian or healthcare provider about these and other foods that are high in unhealthy fat, sodium, and sugar:  · Snack or packaged foods , such as frozen dinners, cookies, macaroni and cheese, and cereals with more than 300 mg of sodium per serving    · Canned or dry mixes  for cakes, soups, sauces, or gravies    · Vegetables with added sodium , such as instant potatoes, vegetables with added sauces, or regular canned vegetables    · Other foods high in sodium , such as ketchup, barbecue sauce, salad dressing, pickles, olives, soy sauce, and miso    · High-fat dairy foods  such as whole or 2% milk, cream cheese, or sour cream, and cheeses     · High-fat protein foods  such as high-fat cuts of beef (T-bone steaks, ribs), chicken or turkey with skin, and organ meats, such as liver    · Cured or smoked meats , such as hot dogs, mathews, and sausage    · Unhealthy fats and oils , such as butter, stick margarine, shortening, and cooking oils such as coconut or palm oil    · Food and drinks high in sugar , such as soft drinks (soda), sports drinks, sweetened tea, candy, cake, cookies, pies, and doughnuts  Other diet guidelines to follow:   · Eat more foods containing omega-3 fats  Eat fish high in omega-3 fats at least 2 times a week  · Limit alcohol  Too much alcohol can damage your heart and raise your blood pressure  Women should limit alcohol to 1 drink a day  Men should limit alcohol to 2 drinks a day  A drink of alcohol is 12 ounces of beer, 5 ounces of wine, or 1½ ounces of liquor  · Choose low-sodium foods  High-sodium foods can lead to high blood pressure  Add little or no salt to food you prepare  Use herbs and spices in place of salt  · Eat more fiber  to help lower cholesterol levels  Eat at least 5 servings of fruits and vegetables each day  Eat 3 ounces of whole-grain foods each day  Legumes (beans) are also a good source of fiber  Lifestyle guidelines:   · Do not smoke  Nicotine and other chemicals in cigarettes and cigars can cause lung and heart damage  Ask your healthcare provider for information if you currently smoke and need help to quit  E-cigarettes or smokeless tobacco still contain nicotine  Talk to your healthcare provider before you use these products  · Exercise regularly  to help you maintain a healthy weight and improve your blood pressure and cholesterol levels   Ask your healthcare provider about the best exercise plan for you  Do not start an exercise program without asking your healthcare provider  Follow up with your healthcare provider as directed:  Write down your questions so you remember to ask them during your visits  © 2017 [facility] Information is for End User's use only and may not be sold, redistributed or otherwise used for commercial purposes  All illustrations and images included in CareNotes® are the copyrighted property of A D A M [employer]  or Juan Mccollum  The above information is an  only  It is not intended as medical advice for individual conditions or treatments  Talk to your doctor, nurse or pharmacist before following any medical regimen to see if it is safe and effective for you

## 2019-12-14 LAB — RENIN PLAS-CCNC: 0.38 NG/ML/HR (ref 0.17–5.38)

## 2019-12-16 LAB — ALDOST SERPL-MCNC: 3.4 NG/DL (ref 0–30)

## 2020-02-12 DIAGNOSIS — I10 ESSENTIAL HYPERTENSION: Primary | ICD-10-CM

## 2020-02-12 RX ORDER — LOSARTAN POTASSIUM 50 MG/1
50 TABLET ORAL DAILY
Qty: 90 TABLET | Refills: 2 | Status: SHIPPED | OUTPATIENT
Start: 2020-02-12 | End: 2020-07-10 | Stop reason: SDUPTHER

## 2020-02-18 NOTE — ASSESSMENT & PLAN NOTE
- possibly secondary to progressive CAD (see plan below)   - EKG since the unremarkable for acute etiology - PA/lateral CXR fairly negative for worsening pulmonary vascular congestion/effusions  - echocardiogram pending   - per patient, takes Lasix "as needed" inconsistently due to inconvenience of increased urination - pro-BNP ordered in the ER pending   - initial troponin normal - trend serial sets - maintain oxygenation Labs stable  Refill sent

## 2020-03-26 ENCOUNTER — TELEMEDICINE (OUTPATIENT)
Dept: INTERNAL MEDICINE CLINIC | Facility: CLINIC | Age: 60
End: 2020-03-26
Payer: COMMERCIAL

## 2020-03-26 DIAGNOSIS — M79.18 RHOMBOID MUSCLE PAIN: ICD-10-CM

## 2020-03-26 DIAGNOSIS — M54.6 THORACIC SPINE PAIN: Primary | ICD-10-CM

## 2020-03-26 PROCEDURE — 99214 OFFICE O/P EST MOD 30 MIN: CPT | Performed by: INTERNAL MEDICINE

## 2020-03-26 RX ORDER — MELOXICAM 15 MG/1
15 TABLET ORAL DAILY
Qty: 30 TABLET | Refills: 1 | Status: SHIPPED | OUTPATIENT
Start: 2020-03-26 | End: 2020-08-18 | Stop reason: SDUPTHER

## 2020-03-26 RX ORDER — CYCLOBENZAPRINE HCL 10 MG
10 TABLET ORAL 3 TIMES DAILY PRN
Qty: 30 TABLET | Refills: 0 | Status: SHIPPED | OUTPATIENT
Start: 2020-03-26 | End: 2020-06-03
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
## 2020-03-30 ENCOUNTER — TELEPHONE (OUTPATIENT)
Dept: INTERNAL MEDICINE CLINIC | Facility: CLINIC | Age: 60
End: 2020-03-30

## 2020-03-30 NOTE — TELEPHONE ENCOUNTER
----- Message from Harmony Villarreal DO sent at 3/26/2020  3:07 PM EDT -----  Call 3/30 for update

## 2020-04-03 DIAGNOSIS — I21.4 NSTEMI (NON-ST ELEVATED MYOCARDIAL INFARCTION) (HCC): ICD-10-CM

## 2020-04-03 RX ORDER — METOPROLOL SUCCINATE 50 MG/1
50 TABLET, EXTENDED RELEASE ORAL DAILY
Qty: 30 TABLET | Refills: 8 | Status: SHIPPED | OUTPATIENT
Start: 2020-04-03 | End: 2021-01-13

## 2020-04-09 ENCOUNTER — TELEMEDICINE (OUTPATIENT)
Dept: CARDIOLOGY CLINIC | Facility: CLINIC | Age: 60
End: 2020-04-09
Payer: COMMERCIAL

## 2020-04-09 VITALS
BODY MASS INDEX: 37.33 KG/M2 | HEIGHT: 69 IN | WEIGHT: 252 LBS | HEART RATE: 83 BPM | SYSTOLIC BLOOD PRESSURE: 128 MMHG | DIASTOLIC BLOOD PRESSURE: 73 MMHG

## 2020-04-09 DIAGNOSIS — I25.118 CORONARY ARTERY DISEASE OF NATIVE ARTERY OF NATIVE HEART WITH STABLE ANGINA PECTORIS (HCC): Primary | ICD-10-CM

## 2020-04-09 DIAGNOSIS — E78.2 MIXED HYPERLIPIDEMIA: ICD-10-CM

## 2020-04-09 DIAGNOSIS — I10 ESSENTIAL HYPERTENSION: ICD-10-CM

## 2020-04-09 PROBLEM — I50.22 CHRONIC SYSTOLIC CONGESTIVE HEART FAILURE (HCC): Status: RESOLVED | Noted: 2019-12-11 | Resolved: 2020-04-09

## 2020-04-09 PROCEDURE — 99213 OFFICE O/P EST LOW 20 MIN: CPT | Performed by: INTERNAL MEDICINE

## 2020-05-25 ENCOUNTER — HOSPITAL ENCOUNTER (EMERGENCY)
Facility: HOSPITAL | Age: 60
Discharge: HOME | End: 2020-05-25
Attending: EMERGENCY MEDICINE | Admitting: EMERGENCY MEDICINE
Payer: COMMERCIAL

## 2020-05-25 VITALS
HEART RATE: 88 BPM | BODY MASS INDEX: 36.51 KG/M2 | SYSTOLIC BLOOD PRESSURE: 134 MMHG | OXYGEN SATURATION: 98 % | RESPIRATION RATE: 16 BRPM | WEIGHT: 255 LBS | HEIGHT: 70 IN | DIASTOLIC BLOOD PRESSURE: 89 MMHG

## 2020-05-25 DIAGNOSIS — S61.412A LACERATION OF LEFT PALM, INITIAL ENCOUNTER: Primary | ICD-10-CM

## 2020-05-25 PROCEDURE — 99282 EMERGENCY DEPT VISIT SF MDM: CPT

## 2020-05-25 PROCEDURE — 99284 EMERGENCY DEPT VISIT MOD MDM: CPT | Performed by: EMERGENCY MEDICINE

## 2020-05-25 PROCEDURE — 12041 INTMD RPR N-HF/GENIT 2.5CM/<: CPT | Performed by: EMERGENCY MEDICINE

## 2020-05-25 RX ORDER — GINSENG 100 MG
1 CAPSULE ORAL ONCE
Status: COMPLETED | OUTPATIENT
Start: 2020-05-25 | End: 2020-05-25

## 2020-05-25 RX ORDER — SULFAMETHOXAZOLE AND TRIMETHOPRIM 800; 160 MG/1; MG/1
1 TABLET ORAL 2 TIMES DAILY
Qty: 14 TABLET | Refills: 0 | Status: SHIPPED | OUTPATIENT
Start: 2020-05-25 | End: 2020-06-01

## 2020-05-25 RX ORDER — LIDOCAINE HYDROCHLORIDE 20 MG/ML
5 INJECTION, SOLUTION EPIDURAL; INFILTRATION; INTRACAUDAL; PERINEURAL ONCE
Status: DISCONTINUED | OUTPATIENT
Start: 2020-05-25 | End: 2020-05-25 | Stop reason: HOSPADM

## 2020-05-25 RX ORDER — SULFAMETHOXAZOLE AND TRIMETHOPRIM 800; 160 MG/1; MG/1
1 TABLET ORAL ONCE
Status: COMPLETED | OUTPATIENT
Start: 2020-05-25 | End: 2020-05-25

## 2020-05-25 RX ADMIN — BACITRACIN ZINC 1 SMALL APPLICATION: 500 OINTMENT TOPICAL at 18:48

## 2020-05-25 RX ADMIN — SULFAMETHOXAZOLE AND TRIMETHOPRIM 1 TABLET: 800; 160 TABLET ORAL at 18:48

## 2020-06-03 ENCOUNTER — OFFICE VISIT (OUTPATIENT)
Dept: INTERNAL MEDICINE CLINIC | Facility: CLINIC | Age: 60
End: 2020-06-03
Payer: COMMERCIAL

## 2020-06-03 VITALS
BODY MASS INDEX: 37.59 KG/M2 | OXYGEN SATURATION: 97 % | TEMPERATURE: 97.4 F | WEIGHT: 262.6 LBS | SYSTOLIC BLOOD PRESSURE: 150 MMHG | HEIGHT: 70 IN | DIASTOLIC BLOOD PRESSURE: 80 MMHG | HEART RATE: 82 BPM

## 2020-06-03 DIAGNOSIS — S61.211D LACERATION OF LEFT INDEX FINGER WITHOUT FOREIGN BODY WITHOUT DAMAGE TO NAIL, SUBSEQUENT ENCOUNTER: Primary | ICD-10-CM

## 2020-06-03 DIAGNOSIS — Z48.02 ENCOUNTER FOR REMOVAL OF SUTURES: ICD-10-CM

## 2020-06-03 PROCEDURE — 1036F TOBACCO NON-USER: CPT | Performed by: INTERNAL MEDICINE

## 2020-06-03 PROCEDURE — 3079F DIAST BP 80-89 MM HG: CPT | Performed by: INTERNAL MEDICINE

## 2020-06-03 PROCEDURE — 99212 OFFICE O/P EST SF 10 MIN: CPT | Performed by: INTERNAL MEDICINE

## 2020-06-03 PROCEDURE — 3008F BODY MASS INDEX DOCD: CPT | Performed by: INTERNAL MEDICINE

## 2020-06-03 PROCEDURE — 3077F SYST BP >= 140 MM HG: CPT | Performed by: INTERNAL MEDICINE

## 2020-06-10 DIAGNOSIS — R06.02 SHORTNESS OF BREATH: ICD-10-CM

## 2020-06-10 RX ORDER — FUROSEMIDE 20 MG/1
20 TABLET ORAL DAILY
Qty: 90 TABLET | Refills: 1 | Status: SHIPPED | OUTPATIENT
Start: 2020-06-10 | End: 2020-07-10 | Stop reason: SDUPTHER

## 2020-07-10 ENCOUNTER — OFFICE VISIT (OUTPATIENT)
Dept: CARDIOLOGY CLINIC | Facility: CLINIC | Age: 60
End: 2020-07-10
Payer: COMMERCIAL

## 2020-07-10 VITALS
WEIGHT: 267 LBS | DIASTOLIC BLOOD PRESSURE: 82 MMHG | BODY MASS INDEX: 38.22 KG/M2 | SYSTOLIC BLOOD PRESSURE: 150 MMHG | HEIGHT: 70 IN | HEART RATE: 83 BPM | OXYGEN SATURATION: 97 %

## 2020-07-10 DIAGNOSIS — E11.42 TYPE 2 DIABETES MELLITUS WITH DIABETIC POLYNEUROPATHY, WITH LONG-TERM CURRENT USE OF INSULIN (HCC): ICD-10-CM

## 2020-07-10 DIAGNOSIS — I10 ESSENTIAL HYPERTENSION: ICD-10-CM

## 2020-07-10 DIAGNOSIS — Z79.4 TYPE 2 DIABETES MELLITUS WITH DIABETIC POLYNEUROPATHY, WITH LONG-TERM CURRENT USE OF INSULIN (HCC): ICD-10-CM

## 2020-07-10 DIAGNOSIS — E78.2 MIXED HYPERLIPIDEMIA: ICD-10-CM

## 2020-07-10 DIAGNOSIS — I50.33 ACUTE ON CHRONIC DIASTOLIC (CONGESTIVE) HEART FAILURE (HCC): ICD-10-CM

## 2020-07-10 DIAGNOSIS — I25.118 CORONARY ARTERY DISEASE OF NATIVE ARTERY OF NATIVE HEART WITH STABLE ANGINA PECTORIS (HCC): Primary | ICD-10-CM

## 2020-07-10 DIAGNOSIS — R06.02 SHORTNESS OF BREATH: ICD-10-CM

## 2020-07-10 PROCEDURE — 3077F SYST BP >= 140 MM HG: CPT | Performed by: INTERNAL MEDICINE

## 2020-07-10 PROCEDURE — 1036F TOBACCO NON-USER: CPT | Performed by: INTERNAL MEDICINE

## 2020-07-10 PROCEDURE — 4010F ACE/ARB THERAPY RXD/TAKEN: CPT | Performed by: INTERNAL MEDICINE

## 2020-07-10 PROCEDURE — 99214 OFFICE O/P EST MOD 30 MIN: CPT | Performed by: INTERNAL MEDICINE

## 2020-07-10 PROCEDURE — 3008F BODY MASS INDEX DOCD: CPT | Performed by: INTERNAL MEDICINE

## 2020-07-10 PROCEDURE — 3079F DIAST BP 80-89 MM HG: CPT | Performed by: INTERNAL MEDICINE

## 2020-07-10 RX ORDER — LOSARTAN POTASSIUM 100 MG/1
100 TABLET ORAL DAILY
Qty: 90 TABLET | Refills: 3 | Status: SHIPPED | OUTPATIENT
Start: 2020-07-10 | End: 2021-08-06

## 2020-07-10 RX ORDER — FUROSEMIDE 40 MG/1
40 TABLET ORAL DAILY
Qty: 90 TABLET | Refills: 3 | Status: SHIPPED | OUTPATIENT
Start: 2020-07-10 | End: 2021-07-30 | Stop reason: SDUPTHER
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
## 2020-07-21 ENCOUNTER — HOSPITAL ENCOUNTER (OUTPATIENT)
Dept: NON INVASIVE DIAGNOSTICS | Facility: HOSPITAL | Age: 60
Discharge: HOME | End: 2020-07-21
Payer: COMMERCIAL

## 2020-07-21 DIAGNOSIS — I50.33 ACUTE ON CHRONIC DIASTOLIC (CONGESTIVE) HEART FAILURE (HCC): ICD-10-CM

## 2020-07-21 PROCEDURE — 93306 TTE W/DOPPLER COMPLETE: CPT

## 2020-07-21 PROCEDURE — 93306 TTE W/DOPPLER COMPLETE: CPT | Performed by: INTERNAL MEDICINE

## 2020-07-28 ENCOUNTER — APPOINTMENT (OUTPATIENT)
Dept: LAB | Facility: HOSPITAL | Age: 60
End: 2020-07-28
Payer: COMMERCIAL

## 2020-07-28 DIAGNOSIS — I50.33 ACUTE ON CHRONIC DIASTOLIC (CONGESTIVE) HEART FAILURE (HCC): ICD-10-CM

## 2020-07-28 LAB
ANION GAP SERPL CALCULATED.3IONS-SCNC: 7 MMOL/L (ref 4–13)
BUN SERPL-MCNC: 14 MG/DL (ref 7–25)
CALCIUM SERPL-MCNC: 9.5 MG/DL (ref 8.6–10.5)
CHLORIDE SERPL-SCNC: 100 MMOL/L (ref 98–107)
CO2 SERPL-SCNC: 30 MMOL/L (ref 21–31)
CREAT SERPL-MCNC: 0.48 MG/DL (ref 0.6–1.2)
GFR SERPL CREATININE-BSD FRML MDRD: 107 ML/MIN/1.73SQ M
GLUCOSE P FAST SERPL-MCNC: 224 MG/DL (ref 65–99)
NT-PROBNP SERPL-MCNC: 86 PG/ML
POTASSIUM SERPL-SCNC: 4 MMOL/L (ref 3.5–5.5)
SODIUM SERPL-SCNC: 137 MMOL/L (ref 134–143)

## 2020-07-28 PROCEDURE — 36415 COLL VENOUS BLD VENIPUNCTURE: CPT

## 2020-07-28 PROCEDURE — 80048 BASIC METABOLIC PNL TOTAL CA: CPT

## 2020-07-28 PROCEDURE — 83880 ASSAY OF NATRIURETIC PEPTIDE: CPT

## 2020-07-29 ENCOUNTER — TELEPHONE (OUTPATIENT)
Dept: CARDIOLOGY CLINIC | Facility: CLINIC | Age: 60
End: 2020-07-29

## 2020-07-29 NOTE — TELEPHONE ENCOUNTER
----- Message from Gerhardt Sabin, MD sent at 7/28/2020  4:58 PM EDT -----  Please let patient know blood work was normal   Sugar was high, but probably not fasting  Kidney function and her BNP were normal  This seems less likely to be heart failure based on these labs and her echo

## 2020-07-29 NOTE — TELEPHONE ENCOUNTER
Left message for patient to call office for lab report regard patient normal labs and  kidney function

## 2020-08-05 ENCOUNTER — TELEPHONE (OUTPATIENT)
Dept: INTERNAL MEDICINE CLINIC | Facility: CLINIC | Age: 60
End: 2020-08-05

## 2020-08-05 NOTE — TELEPHONE ENCOUNTER
Pt states she is taking meloxicam for muscle spasms and her cardiologist does not want her to take this because it is an nsaid  She would like to know if you can give her something different for inflammation? She uses [address]

## 2020-08-18 ENCOUNTER — OFFICE VISIT (OUTPATIENT)
Dept: INTERNAL MEDICINE CLINIC | Facility: CLINIC | Age: 60
End: 2020-08-18
Payer: COMMERCIAL

## 2020-08-18 VITALS
SYSTOLIC BLOOD PRESSURE: 128 MMHG | HEART RATE: 68 BPM | DIASTOLIC BLOOD PRESSURE: 80 MMHG | TEMPERATURE: 97.2 F | WEIGHT: 257.38 LBS | BODY MASS INDEX: 36.85 KG/M2 | HEIGHT: 70 IN | OXYGEN SATURATION: 96 %

## 2020-08-18 DIAGNOSIS — M15.9 PRIMARY OSTEOARTHRITIS INVOLVING MULTIPLE JOINTS: ICD-10-CM

## 2020-08-18 DIAGNOSIS — I10 ESSENTIAL HYPERTENSION: ICD-10-CM

## 2020-08-18 DIAGNOSIS — Z11.4 SCREENING FOR HIV (HUMAN IMMUNODEFICIENCY VIRUS): ICD-10-CM

## 2020-08-18 DIAGNOSIS — Z11.59 NEED FOR HEPATITIS C SCREENING TEST: ICD-10-CM

## 2020-08-18 DIAGNOSIS — E78.2 MIXED HYPERLIPIDEMIA: ICD-10-CM

## 2020-08-18 DIAGNOSIS — E11.42 TYPE 2 DIABETES MELLITUS WITH DIABETIC POLYNEUROPATHY, WITH LONG-TERM CURRENT USE OF INSULIN (HCC): Primary | ICD-10-CM

## 2020-08-18 DIAGNOSIS — M54.6 THORACIC SPINE PAIN: ICD-10-CM

## 2020-08-18 DIAGNOSIS — E04.1 THYROID NODULE: ICD-10-CM

## 2020-08-18 DIAGNOSIS — I25.118 CORONARY ARTERY DISEASE OF NATIVE ARTERY OF NATIVE HEART WITH STABLE ANGINA PECTORIS (HCC): ICD-10-CM

## 2020-08-18 DIAGNOSIS — M79.18 RHOMBOID MUSCLE PAIN: ICD-10-CM

## 2020-08-18 DIAGNOSIS — Z79.4 TYPE 2 DIABETES MELLITUS WITH DIABETIC POLYNEUROPATHY, WITH LONG-TERM CURRENT USE OF INSULIN (HCC): Primary | ICD-10-CM

## 2020-08-18 PROCEDURE — 99214 OFFICE O/P EST MOD 30 MIN: CPT | Performed by: INTERNAL MEDICINE

## 2020-08-18 PROCEDURE — 3008F BODY MASS INDEX DOCD: CPT | Performed by: INTERNAL MEDICINE

## 2020-08-18 PROCEDURE — 1036F TOBACCO NON-USER: CPT | Performed by: INTERNAL MEDICINE

## 2020-08-18 PROCEDURE — 3074F SYST BP LT 130 MM HG: CPT | Performed by: INTERNAL MEDICINE

## 2020-08-18 PROCEDURE — 3079F DIAST BP 80-89 MM HG: CPT | Performed by: INTERNAL MEDICINE

## 2020-08-18 RX ORDER — MELOXICAM 7.5 MG/1
7.5 TABLET ORAL DAILY
Qty: 90 TABLET | Refills: 1 | Status: SHIPPED | OUTPATIENT
Start: 2020-08-18 | End: 2020-12-30

## 2020-08-18 NOTE — PATIENT INSTRUCTIONS
Type 2 Diabetes in Adults   WHAT YOU NEED TO KNOW:   What is type 2 diabetes? Type 2 diabetes is a disease that affects how your body uses glucose (sugar)  Normally, when the blood sugar level increases, the pancreas makes more insulin  Insulin helps move sugar out of the blood so it can be used for energy  Type 2 diabetes develops because either the body cannot make enough insulin, or it cannot use the insulin correctly  After many years, your pancreas may stop making insulin  What increases my risk for type 2 diabetes? · Obesity    · Physical inactivity    · Older age    · High blood pressure or high cholesterol    · A history of heart disease, gestational diabetes, or polycystic ovary syndrome     · A family member with diabetes    · Being [place] American, , , [place] [employer], or [facility]  What are the signs and symptoms of type 2 diabetes? You may have high blood sugar levels for a long time before symptoms appear  You may have any of the following:  · More hunger or thirst than usual     · Frequent urination     · Weight loss without trying     · Blurred vision  How is type 2 diabetes diagnosed? You may need tests to check for type 2 diabetes starting at age 39  You may need any of the following:  · An A1c test  shows the average amount of sugar in your blood over the past 2 to 3 months  Your healthcare provider will tell you the A1c level that is right for you  The goal for your A1c is usually below 7%  Your provider can help you make changes if a check shows the A1c is too high  · A fasting plasma glucose test  is when your blood sugar level is tested after you have not eaten for 8 hours  · A 2-hour plasma glucose test  starts with a blood sugar level check after you have not eaten for 8 hours  You are then given a glucose drink  Your blood sugar level is checked after 2 hours       · A random glucose test  may be done any time of day, no matter how long ago you ate   How is type 2 diabetes treated? Type 2 diabetes can be controlled to prevent damage to your heart, blood vessels, and other organs  The goal is to keep your blood sugar at a normal level  You must eat the right foods, and exercise regularly  You may need 1 or more hypoglycemic medicines or insulin if you cannot control your blood sugar level with nutrition and exercise  You may also need medicine to lower your risk for heart disease  An example includes medicine to lower or control your cholesterol  How do I check my blood sugar level? You will be taught how to check a small drop of blood in a glucose monitor  You will need to check your blood sugar level at least 3 times each day if you are on insulin  Ask your healthcare provider when and how often to check during the day  If you check your blood sugar level before a meal , it should be between 80 and 130 mg/dL  If you check your blood sugar level 1 to 2 hours after a meal , it should be less than 180 mg/dL  Ask your healthcare provider if these are good goals for you  Write down your results, and show them to your healthcare provider  Your provider may use the results to make changes to your medicine, food, and exercise schedules  What should I do if my blood sugar level is too low? Your blood sugar level is too low if it goes below 70 mg/dL  If the level is too low, eat or drink 15 grams of fast-acting carbohydrate  These are found naturally in fruits  Fast-acting carbohydrates will raise your blood sugar level quickly  Examples of 15 grams of fast-acting carbohydrate are 4 ounces (½ cup) of fruit juice or 4 ounces of regular soda  Other examples are 2 tablespoons of raisins or 3 to 4 glucose tablets  Check your blood sugar level 15 minutes later  If the level is still low (less than 100 mg/dL), eat another 15 grams of carbohydrate  When the level returns to 100 mg/dL, eat a snack or meal that contains carbohydrates   This will help prevent another drop in blood sugar  Always carefully follow your healthcare provider's instructions on how to treat low blood sugar levels  What do I need to know about nutrition? A dietitian will help you make a meal plan to keep your blood sugar level steady  Do not skip meals  Your blood sugar level may drop too low if you have taken diabetes medicine and do not eat  · Keep track of carbohydrates (sugar and starchy foods)  Your blood sugar level can get too high if you eat too many carbohydrates  Eat fruits, legumes, vegetables, and whole grains  Your dietitian will help you plan meals and snacks that have the right amount of carbohydrates  · Eat low-fat foods , such as skinless chicken and low-fat milk  · Eat less sodium (salt)  Limit high-sodium foods, such as soy sauce, potato chips, and soup  Do not add salt to food you cook  Limit your use of table salt  You should have less than 2,300 mg of sodium per day  · Eat high-fiber foods , such as vegetables, whole-grain breads, and beans  · Limit alcohol  Alcohol affects your blood sugar level and can make it harder to manage your diabetes  Limit alcohol to 1 drink a day if you are a woman  Limit alcohol to 2 drinks a day if you are a man  A drink of alcohol is 12 ounces of beer, 5 ounces of wine, or 1½ ounces of liquor  How much exercise do I need? Exercise can help keep your blood sugar level steady, decrease your risk of heart disease, and help you lose weight  Stretch before and after you exercise  Exercise for at least 150 minutes every week  Spread this amount of exercise over at least 3 days a week  Do not skip exercise more than 2 days in a row  Include muscle strengthening activities 2 to 3 days each week  Older adults should include balance training 2 to 3 times each week  Activities that help increase balance include yoga and esa chi  Work with your healthcare provider to create an exercise plan    · Check your blood sugar level before and after exercise  Healthcare providers may tell you to change the amount of insulin you take or food you eat  If your blood sugar level is high, check your blood or urine for ketones before you exercise  Do not exercise if your blood sugar level is high and you have ketones  · If your blood sugar level is less than 100 mg/dL, have a carbohydrate snack before you exercise  Examples are 4 to 6 crackers, ½ banana, 8 ounces (1 cup) of milk, or 4 ounces (½ cup) of juice  Drink water or liquids that do not contain sugar before, during, and after exercise  Ask your dietitian or healthcare provider which liquids you should drink when you exercise  · Do not sit for longer than 30 minutes  If you cannot walk around, at least stand up  This will help you stay active and keep your blood circulating  What else can I do to manage type 2 diabetes? · Check your feet each day for sores  Wear shoes and socks that fit correctly  Do not trim your toenails  Ask your healthcare provider for more information about foot care  · Maintain a healthy weight  Ask your healthcare provider how much you should weigh  A healthy weight can help you control your diabetes and prevent heart disease  Ask your provider to help you create a weight loss plan if you are overweight  Together you can set manageable weight loss goals  · Do not smoke  Nicotine and other chemicals in cigarettes and cigars can cause lung damage and make it more difficult to manage your diabetes  Ask your healthcare provider for information if you currently smoke and need help to quit  Do not use e-cigarettes or smokeless tobacco in place of cigarettes or to help you quit  They still contain nicotine  · Check your blood pressure as directed  Ask your healthcare provider what your blood pressure should be  Most adults with diabetes and high blood pressure should have a systolic blood pressure (first number) less than 140   Your diastolic blood pressure (second number) should be less than 90  · Wear medical alert identification  Wear medical alert jewelry or carry a card that says you have diabetes  Ask your healthcare provider where to get these items  · Ask about vaccines  You have a higher risk for serious illness if you get the flu, pneumonia, or hepatitis  Ask your healthcare provider if you should get a flu, pneumonia, or hepatitis B vaccine, and when to get the vaccine  What are the risks of type 2 diabetes? Uncontrolled diabetes can damage your nerves, veins, and arteries  High blood sugar levels may damage other body tissue and organs over time  Damage to arteries may increase your risk for heart attack and stroke  Nerve damage may also lead to other heart, stomach, and nerve problems  Diabetes is life-threatening if it is not controlled  Control your blood glucose levels to prevent health problems  Call 911 for any of the following:   · You have any of the following signs of a stroke:      ¨ Numbness or drooping on one side of your face     ¨ Weakness in an arm or leg    ¨ Confusion or difficulty speaking    ¨ Dizziness, a severe headache, or vision loss    · You have any of the following signs of a heart attack:      ¨ Squeezing, pressure, or pain in your chest that lasts longer than 5 minutes or returns    ¨ Discomfort or pain in your back, neck, jaw, stomach, or arm     ¨ Trouble breathing    ¨ Nausea or vomiting    ¨ Lightheadedness or a sudden cold sweat, especially with chest pain or trouble breathing  When should I seek immediate care? · You have severe abdominal pain, or the pain spreads to your back  You may also be vomiting  · You have trouble staying awake or focusing  · You are shaking or sweating  · You have blurred or double vision  · Your breath has a fruity, sweet smell  · Your breathing is deep and labored, or rapid and shallow  · Your heartbeat is fast and weak    When should I contact my healthcare provider? · You are vomiting or have diarrhea  · You have an upset stomach and cannot eat the foods on your meal plan  · You feel weak or more tired than usual      · You feel dizzy, have headaches, or are easily irritated  · Your skin is red, warm, dry, or swollen  · You have a wound that does not heal      · You have numbness in your arms or legs  · You have trouble coping with your illness, or you feel anxious or depressed  · You have questions or concerns about your condition or care  CARE AGREEMENT:   You have the right to help plan your care  Learn about your health condition and how it may be treated  Discuss treatment options with your caregivers to decide what care you want to receive  You always have the right to refuse treatment  The above information is an  only  It is not intended as medical advice for individual conditions or treatments  Talk to your doctor, nurse or pharmacist before following any medical regimen to see if it is safe and effective for you  © 2017 [address] [employer] Information is for End User's use only and may not be sold, redistributed or otherwise used for commercial purposes  All illustrations and images included in CareNotes® are the copyrighted property of A D [employer] M [employer] Inc  or Juan Mccollum

## 2020-08-18 NOTE — PROGRESS NOTES
BMI Counseling: Body mass index is 36 93 kg/m²  The BMI is above normal  Nutrition recommendations include decreasing portion sizes and encouraging healthy choices of fruits and vegetables  Exercise recommendations include moderate physical activity 150 minutes/week  No pharmacotherapy was ordered  Assessment/Plan:  Problem List Items Addressed This Visit        Endocrine    Type 2 diabetes mellitus with diabetic polyneuropathy, with long-term current use of insulin (HCC) - Primary    Relevant Medications    insulin detemir (LEVEMIR) 100 units/mL subcutaneous injection    Other Relevant Orders    Hemoglobin A1C    LDL cholesterol, direct    Triglycerides    Thyroid nodule       Cardiovascular and Mediastinum    Essential hypertension    Coronary artery disease of native artery of native heart with stable angina pectoris ([facility] [address] )    Relevant Orders    CBC and differential    Comprehensive metabolic panel    LDL cholesterol, direct    Triglycerides       Musculoskeletal and Integument    Primary osteoarthritis involving multiple joints       Other    Mixed hyperlipidemia      Other Visit Diagnoses     Need for hepatitis C screening test        Screening for HIV (human immunodeficiency virus)        Relevant Orders    HIV 1/2 Antigen/Antibody (4th Generation) w Reflex [facility]    Thoracic spine pain        Relevant Medications    meloxicam (MOBIC) 7 5 mg tablet    Rhomboid muscle pain        Relevant Medications    meloxicam (MOBIC) 7 5 mg tablet           Diagnoses and all orders for this visit:    Type 2 diabetes mellitus with diabetic polyneuropathy, with long-term current use of insulin (HCC)  -     Hemoglobin A1C; Future  -     LDL cholesterol, direct; Future  -     Triglycerides;  Future  -     insulin detemir (LEVEMIR) 100 units/mL subcutaneous injection; 50 units sq Q AM and 55 units sq Q PM    Coronary artery disease of native artery of native heart with stable angina pectoris (HCC)  -     CBC and differential; Future  -     Comprehensive metabolic panel; Future  -     LDL cholesterol, direct; Future  -     Triglycerides; Future    Essential hypertension    Primary osteoarthritis involving multiple joints    Thyroid nodule    Mixed hyperlipidemia    Need for hepatitis C screening test    Screening for HIV (human immunodeficiency virus)  -     HIV 1/2 Antigen/Antibody (4th Generation) w Reflex [facility]; Future    Thoracic spine pain  -     meloxicam (MOBIC) 7 5 mg tablet; Take 1 tablet (7 5 mg total) by mouth daily    Rhomboid muscle pain  -     meloxicam (MOBIC) 7 5 mg tablet; Take 1 tablet (7 5 mg total) by mouth daily        No problem-specific Assessment & Plan notes found for this encounter  A/P: Doing well and will check labs  Will increase the PM insulin  Will try a lower dose of NSAID  Need to keep the pt active to prevent wt gain and keep sugars and BP good  Discussed BMI and will give information on diet and exercise  Continue current treatment and RTC one month for f/u labs,  DM, BP, edema, and pain        Subjective:      Patient ID: Valery Rowan is a 61 y o  female  WF RTC for f/u dm, htn, etc  Doing well and no new issues, but cards told pt to stop the mobic due to causing wt gain  Since stopping, her sciatica pain is worse    Remains active w/o difficulty and no falls  Sugars are up in the 200's in the AM and no low sugars events  Denies CP, SOB, palpitations, edema, orthopnea, or PND  DJD pain is manageable  Breast cancer is in remission  Due for labs  The following portions of the patient's history were reviewed and updated as appropriate:   She has a past medical history of Arthritis, Cancer ([facility] [address] ), Cardiac disease, CHF (congestive heart failure) ([facility] [address] ), Coronary artery disease, Diabetes mellitus ([facility] [address] ), Heartburn, Hypercholesteremia, Hyperlipidemia, Hypertension, and Neuropathy  ,  does not have any pertinent problems on file  ,   has a past surgical history that includes Breast surgery (Left); Knee surgery; Nose surgery; Mouth surgery; Foot surgery (Left); Tubal ligation; Ovarian cyst removal (Left); Cholecystectomy; pr shldr arthroscop,part acromioplas (Right, 5/16/2016); pr arthroscopy shoulder surgical biceps tenodesis (Right, 5/16/2016); Cardiac surgery; Tubal ligation; and Colonoscopy  ,  family history includes Esophageal cancer in her father; Leukemia in her father; Liver disease in her brother; Lung cancer in her family, father, and mother; Stomach cancer in her family; Thyroid disease in her mother  ,   reports that she quit smoking about 20 years ago  Her smoking use included cigarettes  She smoked 1 00 pack per day  She has never used smokeless tobacco  She reports previous alcohol use  She reports previous drug use  Drug: Marijuana  ,  is allergic to codeine; iodinated diagnostic agents; iodine; and penicillins     Current Outpatient Medications   Medication Sig Dispense Refill    amLODIPine (NORVASC) 5 mg tablet Take 1 tablet (5 mg total) by mouth daily 90 tablet 3    aspirin 81 MG tablet Take 81 mg by mouth daily        atorvastatin (LIPITOR) 40 mg tablet Take 1 tablet (40 mg total) by mouth daily with dinner 30 tablet 0    clopidogrel (PLAVIX) 75 mg tablet Take 1 tablet (75 mg total) by mouth daily 30 tablet 0    Dulaglutide (TRULICITY SC) Inject under the skin 1 5 ml once a week      furosemide (LASIX) 40 mg tablet Take 1 tablet (40 mg total) by mouth daily 90 tablet 3    gabapentin (NEURONTIN) 100 mg capsule 100 mg 2 (two) times a day       insulin detemir (LEVEMIR) 100 units/mL subcutaneous injection 50 units sq Q AM and 55 units sq Q PM 30 mL 1    losartan (COZAAR) 100 MG tablet Take 1 tablet (100 mg total) by mouth daily 90 tablet 3    metoprolol succinate (TOPROL-XL) 50 mg 24 hr tablet Take 1 tablet (50 mg total) by mouth daily 30 tablet 8    repaglinide (PRANDIN) 2 mg tablet Take 2 mg by mouth 3 (three) times a day before meals      Insulin Syringe-Needle U-100 31G X 15/64" 0 5 ML MISC by Does not apply route 2 (two) times a day 90 each 0    LATANOPROST OP Apply 1 drop to eye 1 drop in each eye      meloxicam (MOBIC) 7 5 mg tablet Take 1 tablet (7 5 mg total) by mouth daily 90 tablet 1    omeprazole (PriLOSEC) 20 mg delayed release capsule Take 20 mg by mouth daily as needed        No current facility-administered medications for this visit  Review of Systems   Constitutional: Negative for activity change, chills, diaphoresis, fatigue and fever  HENT: Negative  Eyes: Positive for visual disturbance  Negative for photophobia  Respiratory: Negative for cough, chest tightness, shortness of breath and wheezing  Cardiovascular: Negative for chest pain, palpitations and leg swelling  Gastrointestinal: Negative for abdominal pain, constipation, diarrhea, nausea and vomiting  Endocrine: Negative for cold intolerance and heat intolerance  Genitourinary: Negative for difficulty urinating, dysuria and frequency  Musculoskeletal: Negative for arthralgias, gait problem and myalgias  Neurological: Negative for dizziness, seizures, syncope, weakness, light-headedness and headaches  Psychiatric/Behavioral: Negative for confusion, dysphoric mood and sleep disturbance  The patient is not nervous/anxious  PHQ-9 Depression Screening    PHQ-9:    Frequency of the following problems over the past two weeks:             Objective:  Vitals:    08/18/20 1420 08/18/20 1421   BP: 160/78 128/80   BP Location: Left arm Right arm   Pulse: 68    Temp: (!) 97 2 °F (36 2 °C)    SpO2: 96%    Weight: 117 kg (257 lb 6 oz)    Height: 5' 10" (1 778 m)      Body mass index is 36 93 kg/m²  Physical Exam  Vitals signs and nursing note reviewed  Constitutional:       General: She is not in acute distress  Appearance: Normal appearance  HENT:      Head: Normocephalic and atraumatic        Mouth/Throat:      Mouth: Mucous membranes are moist    Eyes:      Extraocular Movements: Extraocular movements intact  Conjunctiva/sclera: Conjunctivae normal       Pupils: Pupils are equal, round, and reactive to light  Neck:      Musculoskeletal: Neck supple  Vascular: No carotid bruit  Cardiovascular:      Rate and Rhythm: Normal rate and regular rhythm  Heart sounds: Normal heart sounds  Pulmonary:      Effort: Pulmonary effort is normal  No respiratory distress  Breath sounds: Normal breath sounds  No wheezing or rales  Abdominal:      General: Bowel sounds are normal  There is no distension  Palpations: Abdomen is soft  Tenderness: There is no abdominal tenderness  Musculoskeletal:      Right lower leg: No edema  Left lower leg: No edema  Neurological:      General: No focal deficit present  Mental Status: She is alert and oriented to person, place, and time  Mental status is at baseline  Psychiatric:         Mood and Affect: Mood normal          Behavior: Behavior normal          Thought Content:  Thought content normal          Judgment: Judgment normal

## 2020-08-20 DIAGNOSIS — I21.4 NSTEMI (NON-ST ELEVATED MYOCARDIAL INFARCTION) (HCC): ICD-10-CM

## 2020-08-20 RX ORDER — ATORVASTATIN CALCIUM 40 MG/1
40 TABLET, FILM COATED ORAL
Qty: 30 TABLET | Refills: 11 | Status: SHIPPED | OUTPATIENT
Start: 2020-08-20 | End: 2021-07-22 | Stop reason: SDUPTHER

## 2020-08-20 RX ORDER — CLOPIDOGREL BISULFATE 75 MG/1
75 TABLET ORAL DAILY
Qty: 30 TABLET | Refills: 11 | Status: SHIPPED | OUTPATIENT
Start: 2020-08-20 | End: 2021-02-24

## 2020-09-04 ENCOUNTER — APPOINTMENT (OUTPATIENT)
Dept: RADIOLOGY | Facility: CLINIC | Age: 60
End: 2020-09-04
Payer: COMMERCIAL

## 2020-09-04 ENCOUNTER — OFFICE VISIT (OUTPATIENT)
Dept: URGENT CARE | Facility: CLINIC | Age: 60
End: 2020-09-04
Payer: COMMERCIAL

## 2020-09-04 VITALS
OXYGEN SATURATION: 97 % | RESPIRATION RATE: 18 BRPM | DIASTOLIC BLOOD PRESSURE: 83 MMHG | SYSTOLIC BLOOD PRESSURE: 180 MMHG | HEART RATE: 73 BPM | TEMPERATURE: 98.2 F

## 2020-09-04 DIAGNOSIS — J01.90 ACUTE NON-RECURRENT SINUSITIS, UNSPECIFIED LOCATION: ICD-10-CM

## 2020-09-04 DIAGNOSIS — R05.9 COUGH: Primary | ICD-10-CM

## 2020-09-04 DIAGNOSIS — R05.9 COUGH: ICD-10-CM

## 2020-09-04 PROCEDURE — 99213 OFFICE O/P EST LOW 20 MIN: CPT | Performed by: PHYSICIAN ASSISTANT

## 2020-09-04 PROCEDURE — 71046 X-RAY EXAM CHEST 2 VIEWS: CPT

## 2020-09-04 RX ORDER — DOXYCYCLINE 100 MG/1
100 TABLET ORAL 2 TIMES DAILY
Qty: 14 TABLET | Refills: 0 | Status: SHIPPED | OUTPATIENT
Start: 2020-09-04 | End: 2020-09-11

## 2020-09-04 NOTE — PATIENT INSTRUCTIONS
Call your PCP about increase in weight  If you develop shortness of breath, chest pain, difficulty breathing, fatigue/lethargy, feel weak, or notice significant swelling in the feet then please proceed to the ER  Sinusitis   WHAT YOU NEED TO KNOW:   Sinusitis is inflammation or infection of your sinuses  It is most often caused by a virus  Acute sinusitis may last up to 12 weeks  Chronic sinusitis lasts longer than 12 weeks  Recurrent sinusitis means you have 4 or more times in 1 year  DISCHARGE INSTRUCTIONS:   Return to the emergency department if:   · Your eye and eyelid are red, swollen, and painful  · You cannot open your eye  · You have vision changes, such as double vision  · Your eyeball bulges out or you cannot move your eye  · You are more sleepy than normal, or you notice changes in your ability to think, move, or talk  · You have a stiff neck, a fever, or a bad headache  · You have swelling of your forehead or scalp  Contact your healthcare provider if:   · Your symptoms do not improve after 3 days  · Your symptoms do not go away after 10 days  · You have nausea and are vomiting  · Your nose is bleeding  · You have questions or concerns about your condition or care  Medicines: Your symptoms may go away on their own  Your healthcare provider may recommend watchful waiting for up to 10 days before starting antibiotics  You may  need any of the following:  · Acetaminophen  decreases pain and fever  It is available without a doctor's order  Ask how much to take and how often to take it  Follow directions  Read the labels of all other medicines you are using to see if they also contain acetaminophen, or ask your doctor or pharmacist  Acetaminophen can cause liver damage if not taken correctly  Do not use more than 4 grams (4,000 milligrams) total of acetaminophen in one day  · NSAIDs , such as ibuprofen, help decrease swelling, pain, and fever   This medicine is available with or without a doctor's order  NSAIDs can cause stomach bleeding or kidney problems in certain people  If you take blood thinner medicine, always ask your healthcare provider if NSAIDs are safe for you  Always read the medicine label and follow directions  · Nasal steroid sprays  may help decrease inflammation in your nose and sinuses  · Decongestants  help reduce swelling and drain mucus in the nose and sinuses  They may help you breathe easier  · Antihistamines  help dry mucus in the nose and relieve sneezing  · Antibiotics  help treat or prevent a bacterial infection  · Take your medicine as directed  Contact your healthcare provider if you think your medicine is not helping or if you have side effects  Tell him or her if you are allergic to any medicine  Keep a list of the medicines, vitamins, and herbs you take  Include the amounts, and when and why you take them  Bring the list or the pill bottles to follow-up visits  Carry your medicine list with you in case of an emergency  Self-care:   · Rinse your sinuses  Use a sinus rinse device to rinse your nasal passages with a saline (salt water) solution or distilled water  Do not use tap water  This will help thin the mucus in your nose and rinse away pollen and dirt  It will also help reduce swelling so you can breathe normally  Ask your healthcare provider how often to do this  · Breathe in steam   Heat a bowl of water until you see steam  Lean over the bowl and make a tent over your head with a large towel  Breathe deeply for about 20 minutes  Be careful not to get too close to the steam or burn yourself  Do this 3 times a day  You can also breathe deeply when you take a hot shower  · Sleep with your head elevated  Place an extra pillow under your head before you go to sleep to help your sinuses drain  · Drink liquids as directed    Ask your healthcare provider how much liquid to drink each day and which liquids are best for you  Liquids will thin the mucus in your nose and help it drain  Avoid drinks that contain alcohol or caffeine  · Do not smoke, and avoid secondhand smoke  Nicotine and other chemicals in cigarettes and cigars can make your symptoms worse  Ask your healthcare provider for information if you currently smoke and need help to quit  E-cigarettes or smokeless tobacco still contain nicotine  Talk to your healthcare provider before you use these products  Prevent the spread of germs that cause sinusitis:  Wash your hands often with soap and water  Wash your hands after you use the bathroom, change a child's diaper, or sneeze  Wash your hands before you prepare or eat food  Follow up with your healthcare provider as directed: You may be referred to an ear, nose, and throat specialist  Write down your questions so you remember to ask them during your visits  © 2017 [employer]  Information is for End User's use only and may not be sold, redistributed or otherwise used for commercial purposes  All illustrations and images included in CareNotes® are the copyrighted property of [employer]  or [employer]  The above information is an  only  It is not intended as medical advice for individual conditions or treatments  Talk to your doctor, nurse or pharmacist before following any medical regimen to see if it is safe and effective for you
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
## 2020-10-09 DIAGNOSIS — I10 ESSENTIAL HYPERTENSION: ICD-10-CM

## 2020-10-09 RX ORDER — AMLODIPINE BESYLATE 5 MG/1
5 TABLET ORAL DAILY
Qty: 90 TABLET | Refills: 3 | Status: SHIPPED | OUTPATIENT
Start: 2020-10-09 | End: 2021-10-18 | Stop reason: SDUPTHER

## 2020-10-19 ENCOUNTER — TELEPHONE (OUTPATIENT)
Dept: INTERNAL MEDICINE CLINIC | Facility: CLINIC | Age: 60
End: 2020-10-19

## 2020-10-19 DIAGNOSIS — E11.42 TYPE 2 DIABETES MELLITUS WITH DIABETIC POLYNEUROPATHY, WITH LONG-TERM CURRENT USE OF INSULIN (HCC): ICD-10-CM

## 2020-10-19 DIAGNOSIS — Z79.4 TYPE 2 DIABETES MELLITUS WITH DIABETIC POLYNEUROPATHY, WITH LONG-TERM CURRENT USE OF INSULIN (HCC): ICD-10-CM

## 2020-10-21 ENCOUNTER — OFFICE VISIT (OUTPATIENT)
Dept: CARDIOLOGY CLINIC | Facility: CLINIC | Age: 60
End: 2020-10-21
Payer: COMMERCIAL

## 2020-10-21 VITALS
DIASTOLIC BLOOD PRESSURE: 78 MMHG | HEIGHT: 70 IN | OXYGEN SATURATION: 97 % | HEART RATE: 75 BPM | TEMPERATURE: 97.1 F | WEIGHT: 258 LBS | BODY MASS INDEX: 36.94 KG/M2 | SYSTOLIC BLOOD PRESSURE: 130 MMHG

## 2020-10-21 DIAGNOSIS — I25.118 CORONARY ARTERY DISEASE OF NATIVE ARTERY OF NATIVE HEART WITH STABLE ANGINA PECTORIS (HCC): Primary | ICD-10-CM

## 2020-10-21 DIAGNOSIS — E78.2 MIXED HYPERLIPIDEMIA: ICD-10-CM

## 2020-10-21 DIAGNOSIS — I10 ESSENTIAL HYPERTENSION: ICD-10-CM

## 2020-10-21 PROCEDURE — 1036F TOBACCO NON-USER: CPT | Performed by: INTERNAL MEDICINE

## 2020-10-21 PROCEDURE — 99214 OFFICE O/P EST MOD 30 MIN: CPT | Performed by: INTERNAL MEDICINE

## 2020-10-21 RX ORDER — RANOLAZINE 500 MG/1
500 TABLET, EXTENDED RELEASE ORAL 2 TIMES DAILY
Qty: 180 TABLET | Refills: 3 | Status: SHIPPED | OUTPATIENT
Start: 2020-10-21 | End: 2021-11-02 | Stop reason: SDUPTHER

## 2020-11-29 ENCOUNTER — OFFICE VISIT (OUTPATIENT)
Dept: URGENT CARE | Facility: CLINIC | Age: 60
End: 2020-11-29
Payer: COMMERCIAL

## 2020-11-29 VITALS
RESPIRATION RATE: 18 BRPM | HEART RATE: 90 BPM | OXYGEN SATURATION: 99 % | TEMPERATURE: 97.3 F | BODY MASS INDEX: 37.03 KG/M2 | HEIGHT: 69 IN | WEIGHT: 250 LBS

## 2020-11-29 DIAGNOSIS — K52.9 AGE (ACUTE GASTROENTERITIS): Primary | ICD-10-CM

## 2020-11-29 DIAGNOSIS — R52 BODY ACHES: ICD-10-CM

## 2020-11-29 PROCEDURE — U0003 INFECTIOUS AGENT DETECTION BY NUCLEIC ACID (DNA OR RNA); SEVERE ACUTE RESPIRATORY SYNDROME CORONAVIRUS 2 (SARS-COV-2) (CORONAVIRUS DISEASE [COVID-19]), AMPLIFIED PROBE TECHNIQUE, MAKING USE OF HIGH THROUGHPUT TECHNOLOGIES AS DESCRIBED BY CMS-2020-01-R: HCPCS | Performed by: PHYSICIAN ASSISTANT

## 2020-11-29 PROCEDURE — 99213 OFFICE O/P EST LOW 20 MIN: CPT | Performed by: PHYSICIAN ASSISTANT

## 2020-12-01 LAB — SARS-COV-2 RNA SPEC QL NAA+PROBE: NOT DETECTED

## 2020-12-10 ENCOUNTER — LAB (OUTPATIENT)
Dept: LAB | Facility: HOSPITAL | Age: 60
End: 2020-12-10
Payer: COMMERCIAL

## 2020-12-10 DIAGNOSIS — Z79.4 TYPE 2 DIABETES MELLITUS WITH DIABETIC POLYNEUROPATHY, WITH LONG-TERM CURRENT USE OF INSULIN (HCC): ICD-10-CM

## 2020-12-10 DIAGNOSIS — E11.42 TYPE 2 DIABETES MELLITUS WITH DIABETIC POLYNEUROPATHY, WITH LONG-TERM CURRENT USE OF INSULIN (HCC): ICD-10-CM

## 2020-12-10 DIAGNOSIS — Z11.4 SCREENING FOR HIV (HUMAN IMMUNODEFICIENCY VIRUS): ICD-10-CM

## 2020-12-10 DIAGNOSIS — I25.118 CORONARY ARTERY DISEASE OF NATIVE ARTERY OF NATIVE HEART WITH STABLE ANGINA PECTORIS (HCC): ICD-10-CM

## 2020-12-10 PROBLEM — H40.1130 PRIMARY OPEN ANGLE GLAUCOMA (POAG) OF BOTH EYES: Status: ACTIVE | Noted: 2017-12-15

## 2020-12-10 LAB
ALBUMIN SERPL BCP-MCNC: 4.3 G/DL (ref 3.5–5.7)
ALP SERPL-CCNC: 73 U/L (ref 55–165)
ALT SERPL W P-5'-P-CCNC: 22 U/L (ref 7–52)
ANION GAP SERPL CALCULATED.3IONS-SCNC: 8 MMOL/L (ref 4–13)
AST SERPL W P-5'-P-CCNC: 15 U/L (ref 13–39)
BASOPHILS # BLD AUTO: 0 THOUSANDS/ΜL (ref 0–0.1)
BASOPHILS NFR BLD AUTO: 1 % (ref 0–2)
BILIRUB SERPL-MCNC: 0.7 MG/DL (ref 0.2–1)
BUN SERPL-MCNC: 16 MG/DL (ref 7–25)
CALCIUM SERPL-MCNC: 9.7 MG/DL (ref 8.6–10.5)
CHLORIDE SERPL-SCNC: 98 MMOL/L (ref 98–107)
CO2 SERPL-SCNC: 32 MMOL/L (ref 21–31)
CREAT SERPL-MCNC: 0.69 MG/DL (ref 0.6–1.2)
EOSINOPHIL # BLD AUTO: 0.1 THOUSAND/ΜL (ref 0–0.61)
EOSINOPHIL NFR BLD AUTO: 2 % (ref 0–5)
ERYTHROCYTE [DISTWIDTH] IN BLOOD BY AUTOMATED COUNT: 13 % (ref 11.5–14.5)
EST. AVERAGE GLUCOSE BLD GHB EST-MCNC: 217 MG/DL
GFR SERPL CREATININE-BSD FRML MDRD: 95 ML/MIN/1.73SQ M
GLUCOSE P FAST SERPL-MCNC: 248 MG/DL (ref 65–99)
HBA1C MFR BLD: 9.2 %
HCT VFR BLD AUTO: 39.4 % (ref 42–47)
HGB BLD-MCNC: 13.7 G/DL (ref 12–16)
LDLC SERPL DIRECT ASSAY-MCNC: 92.6 MG/DL (ref 0–100)
LYMPHOCYTES # BLD AUTO: 1.7 THOUSANDS/ΜL (ref 0.6–4.47)
LYMPHOCYTES NFR BLD AUTO: 39 % (ref 21–51)
MCH RBC QN AUTO: 29.2 PG (ref 26–34)
MCHC RBC AUTO-ENTMCNC: 34.8 G/DL (ref 31–37)
MCV RBC AUTO: 84 FL (ref 81–99)
MONOCYTES # BLD AUTO: 0.5 THOUSAND/ΜL (ref 0.17–1.22)
MONOCYTES NFR BLD AUTO: 13 % (ref 2–12)
NEUTROPHILS # BLD AUTO: 1.9 THOUSANDS/ΜL (ref 1.4–6.5)
NEUTS SEG NFR BLD AUTO: 45 % (ref 42–75)
PLATELET # BLD AUTO: 205 THOUSANDS/UL (ref 149–390)
PMV BLD AUTO: 8.8 FL (ref 8.6–11.7)
POTASSIUM SERPL-SCNC: 4.2 MMOL/L (ref 3.5–5.5)
PROT SERPL-MCNC: 7.4 G/DL (ref 6.4–8.9)
RBC # BLD AUTO: 4.7 MILLION/UL (ref 3.9–5.2)
SODIUM SERPL-SCNC: 138 MMOL/L (ref 134–143)
TRIGL SERPL-MCNC: 305 MG/DL (ref 44–166)
WBC # BLD AUTO: 4.2 THOUSAND/UL (ref 4.8–10.8)

## 2020-12-10 PROCEDURE — 3046F HEMOGLOBIN A1C LEVEL >9.0%: CPT | Performed by: INTERNAL MEDICINE

## 2020-12-10 PROCEDURE — 85025 COMPLETE CBC W/AUTO DIFF WBC: CPT

## 2020-12-10 PROCEDURE — 84478 ASSAY OF TRIGLYCERIDES: CPT

## 2020-12-10 PROCEDURE — 87389 HIV-1 AG W/HIV-1&-2 AB AG IA: CPT

## 2020-12-10 PROCEDURE — 36415 COLL VENOUS BLD VENIPUNCTURE: CPT

## 2020-12-10 PROCEDURE — 83721 ASSAY OF BLOOD LIPOPROTEIN: CPT

## 2020-12-10 PROCEDURE — 80053 COMPREHEN METABOLIC PANEL: CPT

## 2020-12-10 PROCEDURE — 83036 HEMOGLOBIN GLYCOSYLATED A1C: CPT

## 2020-12-11 LAB — HIV 1+2 AB+HIV1 P24 AG SERPL QL IA: NORMAL

## 2020-12-15 ENCOUNTER — TELEMEDICINE (OUTPATIENT)
Dept: INTERNAL MEDICINE CLINIC | Facility: CLINIC | Age: 60
End: 2020-12-15
Payer: COMMERCIAL

## 2020-12-15 VITALS — HEART RATE: 78 BPM | SYSTOLIC BLOOD PRESSURE: 144 MMHG | DIASTOLIC BLOOD PRESSURE: 81 MMHG | TEMPERATURE: 97.9 F

## 2020-12-15 DIAGNOSIS — J06.9 UPPER RESPIRATORY TRACT INFECTION, UNSPECIFIED TYPE: ICD-10-CM

## 2020-12-15 DIAGNOSIS — Z20.822 CLOSE EXPOSURE TO COVID-19 VIRUS: ICD-10-CM

## 2020-12-15 DIAGNOSIS — Z79.4 TYPE 2 DIABETES MELLITUS WITH DIABETIC POLYNEUROPATHY, WITH LONG-TERM CURRENT USE OF INSULIN (HCC): ICD-10-CM

## 2020-12-15 DIAGNOSIS — E11.42 TYPE 2 DIABETES MELLITUS WITH DIABETIC POLYNEUROPATHY, WITH LONG-TERM CURRENT USE OF INSULIN (HCC): ICD-10-CM

## 2020-12-15 DIAGNOSIS — J06.9 UPPER RESPIRATORY TRACT INFECTION, UNSPECIFIED TYPE: Primary | ICD-10-CM

## 2020-12-15 PROCEDURE — 99214 OFFICE O/P EST MOD 30 MIN: CPT | Performed by: INTERNAL MEDICINE

## 2020-12-15 PROCEDURE — U0003 INFECTIOUS AGENT DETECTION BY NUCLEIC ACID (DNA OR RNA); SEVERE ACUTE RESPIRATORY SYNDROME CORONAVIRUS 2 (SARS-COV-2) (CORONAVIRUS DISEASE [COVID-19]), AMPLIFIED PROBE TECHNIQUE, MAKING USE OF HIGH THROUGHPUT TECHNOLOGIES AS DESCRIBED BY CMS-2020-01-R: HCPCS | Performed by: INTERNAL MEDICINE

## 2020-12-15 PROCEDURE — 3725F SCREEN DEPRESSION PERFORMED: CPT | Performed by: INTERNAL MEDICINE

## 2020-12-15 RX ORDER — DULAGLUTIDE 1.5 MG/.5ML
1.5 INJECTION, SOLUTION SUBCUTANEOUS WEEKLY
Qty: 12 PEN | Refills: 1 | Status: SHIPPED | OUTPATIENT
Start: 2020-12-15 | End: 2021-06-22 | Stop reason: SDUPTHER

## 2020-12-16 ENCOUNTER — TELEMEDICINE (OUTPATIENT)
Dept: INTERNAL MEDICINE CLINIC | Facility: CLINIC | Age: 60
End: 2020-12-16
Payer: COMMERCIAL

## 2020-12-16 VITALS
SYSTOLIC BLOOD PRESSURE: 135 MMHG | TEMPERATURE: 97.8 F | OXYGEN SATURATION: 97 % | DIASTOLIC BLOOD PRESSURE: 76 MMHG | HEART RATE: 78 BPM

## 2020-12-16 DIAGNOSIS — Z20.822 CLOSE EXPOSURE TO COVID-19 VIRUS: ICD-10-CM

## 2020-12-16 DIAGNOSIS — J06.9 UPPER RESPIRATORY TRACT INFECTION, UNSPECIFIED TYPE: Primary | ICD-10-CM

## 2020-12-16 PROCEDURE — 99213 OFFICE O/P EST LOW 20 MIN: CPT | Performed by: INTERNAL MEDICINE

## 2020-12-16 RX ORDER — BENZONATATE 200 MG/1
200 CAPSULE ORAL 3 TIMES DAILY PRN
Qty: 20 CAPSULE | Refills: 0 | Status: SHIPPED | OUTPATIENT
Start: 2020-12-16 | End: 2020-12-30

## 2020-12-16 RX ORDER — AZITHROMYCIN 250 MG/1
TABLET, FILM COATED ORAL
Qty: 6 TABLET | Refills: 0 | Status: SHIPPED | OUTPATIENT
Start: 2020-12-16 | End: 2020-12-21

## 2020-12-18 ENCOUNTER — TELEMEDICINE (OUTPATIENT)
Dept: INTERNAL MEDICINE CLINIC | Facility: CLINIC | Age: 60
End: 2020-12-18
Payer: COMMERCIAL

## 2020-12-18 VITALS
WEIGHT: 248 LBS | DIASTOLIC BLOOD PRESSURE: 71 MMHG | TEMPERATURE: 98.4 F | BODY MASS INDEX: 36.62 KG/M2 | OXYGEN SATURATION: 97 % | HEART RATE: 77 BPM | SYSTOLIC BLOOD PRESSURE: 148 MMHG

## 2020-12-18 DIAGNOSIS — J06.9 UPPER RESPIRATORY TRACT INFECTION, UNSPECIFIED TYPE: Primary | ICD-10-CM

## 2020-12-18 DIAGNOSIS — Z20.822 CLOSE EXPOSURE TO COVID-19 VIRUS: ICD-10-CM

## 2020-12-18 DIAGNOSIS — E11.42 TYPE 2 DIABETES MELLITUS WITH DIABETIC POLYNEUROPATHY, WITH LONG-TERM CURRENT USE OF INSULIN (HCC): ICD-10-CM

## 2020-12-18 DIAGNOSIS — Z79.4 TYPE 2 DIABETES MELLITUS WITH DIABETIC POLYNEUROPATHY, WITH LONG-TERM CURRENT USE OF INSULIN (HCC): ICD-10-CM

## 2020-12-18 LAB — SARS-COV-2 RNA SPEC QL NAA+PROBE: DETECTED

## 2020-12-18 PROCEDURE — 3077F SYST BP >= 140 MM HG: CPT | Performed by: INTERNAL MEDICINE

## 2020-12-18 PROCEDURE — 99214 OFFICE O/P EST MOD 30 MIN: CPT | Performed by: INTERNAL MEDICINE

## 2020-12-18 PROCEDURE — 3078F DIAST BP <80 MM HG: CPT | Performed by: INTERNAL MEDICINE

## 2020-12-18 PROCEDURE — 1036F TOBACCO NON-USER: CPT | Performed by: INTERNAL MEDICINE

## 2020-12-19 ENCOUNTER — TELEMEDICINE (OUTPATIENT)
Dept: INTERNAL MEDICINE CLINIC | Facility: CLINIC | Age: 60
End: 2020-12-19
Payer: COMMERCIAL

## 2020-12-19 DIAGNOSIS — E11.42 TYPE 2 DIABETES MELLITUS WITH DIABETIC POLYNEUROPATHY, WITH LONG-TERM CURRENT USE OF INSULIN (HCC): ICD-10-CM

## 2020-12-19 DIAGNOSIS — Z79.4 TYPE 2 DIABETES MELLITUS WITH DIABETIC POLYNEUROPATHY, WITH LONG-TERM CURRENT USE OF INSULIN (HCC): ICD-10-CM

## 2020-12-19 DIAGNOSIS — J06.9 UPPER RESPIRATORY TRACT INFECTION, UNSPECIFIED TYPE: ICD-10-CM

## 2020-12-19 DIAGNOSIS — U07.1 LAB TEST POSITIVE FOR DETECTION OF COVID-19 VIRUS: Primary | ICD-10-CM

## 2020-12-19 PROCEDURE — 99213 OFFICE O/P EST LOW 20 MIN: CPT | Performed by: INTERNAL MEDICINE

## 2020-12-20 ENCOUNTER — TELEMEDICINE (OUTPATIENT)
Dept: INTERNAL MEDICINE CLINIC | Facility: CLINIC | Age: 60
End: 2020-12-20
Payer: COMMERCIAL

## 2020-12-20 DIAGNOSIS — U07.1 LAB TEST POSITIVE FOR DETECTION OF COVID-19 VIRUS: Primary | ICD-10-CM

## 2020-12-20 DIAGNOSIS — J06.9 UPPER RESPIRATORY TRACT INFECTION, UNSPECIFIED TYPE: ICD-10-CM

## 2020-12-20 PROCEDURE — 99213 OFFICE O/P EST LOW 20 MIN: CPT | Performed by: INTERNAL MEDICINE

## 2020-12-20 PROCEDURE — 1036F TOBACCO NON-USER: CPT | Performed by: INTERNAL MEDICINE

## 2020-12-22 ENCOUNTER — TELEPHONE (OUTPATIENT)
Dept: CARDIOLOGY CLINIC | Facility: CLINIC | Age: 60
End: 2020-12-22

## 2020-12-30 ENCOUNTER — APPOINTMENT (OUTPATIENT)
Dept: LAB | Facility: CLINIC | Age: 60
End: 2020-12-30
Payer: COMMERCIAL

## 2020-12-30 ENCOUNTER — APPOINTMENT (OUTPATIENT)
Dept: RADIOLOGY | Facility: CLINIC | Age: 60
End: 2020-12-30
Payer: COMMERCIAL

## 2020-12-30 ENCOUNTER — TELEMEDICINE (OUTPATIENT)
Dept: INTERNAL MEDICINE CLINIC | Facility: CLINIC | Age: 60
End: 2020-12-30
Payer: COMMERCIAL

## 2020-12-30 VITALS
WEIGHT: 247 LBS | HEART RATE: 74 BPM | BODY MASS INDEX: 36.48 KG/M2 | SYSTOLIC BLOOD PRESSURE: 135 MMHG | OXYGEN SATURATION: 96 % | DIASTOLIC BLOOD PRESSURE: 77 MMHG

## 2020-12-30 DIAGNOSIS — I10 ESSENTIAL HYPERTENSION: ICD-10-CM

## 2020-12-30 DIAGNOSIS — I25.118 CORONARY ARTERY DISEASE OF NATIVE ARTERY OF NATIVE HEART WITH STABLE ANGINA PECTORIS (HCC): ICD-10-CM

## 2020-12-30 DIAGNOSIS — Z79.4 TYPE 2 DIABETES MELLITUS WITH DIABETIC POLYNEUROPATHY, WITH LONG-TERM CURRENT USE OF INSULIN (HCC): ICD-10-CM

## 2020-12-30 DIAGNOSIS — E11.42 TYPE 2 DIABETES MELLITUS WITH DIABETIC POLYNEUROPATHY, WITH LONG-TERM CURRENT USE OF INSULIN (HCC): ICD-10-CM

## 2020-12-30 DIAGNOSIS — R53.83 FATIGUE, UNSPECIFIED TYPE: ICD-10-CM

## 2020-12-30 DIAGNOSIS — R06.00 DOE (DYSPNEA ON EXERTION): ICD-10-CM

## 2020-12-30 DIAGNOSIS — Z12.31 ENCOUNTER FOR SCREENING MAMMOGRAM FOR MALIGNANT NEOPLASM OF BREAST: ICD-10-CM

## 2020-12-30 DIAGNOSIS — U07.1 LAB TEST POSITIVE FOR DETECTION OF COVID-19 VIRUS: ICD-10-CM

## 2020-12-30 DIAGNOSIS — E11.42 TYPE 2 DIABETES MELLITUS WITH DIABETIC POLYNEUROPATHY, WITH LONG-TERM CURRENT USE OF INSULIN (HCC): Primary | ICD-10-CM

## 2020-12-30 DIAGNOSIS — Z79.4 TYPE 2 DIABETES MELLITUS WITH DIABETIC POLYNEUROPATHY, WITH LONG-TERM CURRENT USE OF INSULIN (HCC): Primary | ICD-10-CM

## 2020-12-30 LAB
ALBUMIN SERPL BCP-MCNC: 4 G/DL (ref 3.5–5)
ALP SERPL-CCNC: 100 U/L (ref 46–116)
ALT SERPL W P-5'-P-CCNC: 39 U/L (ref 12–78)
ANION GAP SERPL CALCULATED.3IONS-SCNC: 4 MMOL/L (ref 4–13)
AST SERPL W P-5'-P-CCNC: 29 U/L (ref 5–45)
BASOPHILS # BLD AUTO: 0.05 THOUSANDS/ΜL (ref 0–0.1)
BASOPHILS NFR BLD AUTO: 1 % (ref 0–1)
BILIRUB SERPL-MCNC: 0.68 MG/DL (ref 0.2–1)
BUN SERPL-MCNC: 11 MG/DL (ref 5–25)
CALCIUM SERPL-MCNC: 10.1 MG/DL (ref 8.3–10.1)
CHLORIDE SERPL-SCNC: 102 MMOL/L (ref 100–108)
CO2 SERPL-SCNC: 33 MMOL/L (ref 21–32)
CREAT SERPL-MCNC: 0.71 MG/DL (ref 0.6–1.3)
D DIMER PPP FEU-MCNC: <0.27 UG/ML FEU
EOSINOPHIL # BLD AUTO: 0.11 THOUSAND/ΜL (ref 0–0.61)
EOSINOPHIL NFR BLD AUTO: 2 % (ref 0–6)
ERYTHROCYTE [DISTWIDTH] IN BLOOD BY AUTOMATED COUNT: 12.4 % (ref 11.6–15.1)
GFR SERPL CREATININE-BSD FRML MDRD: 93 ML/MIN/1.73SQ M
GLUCOSE SERPL-MCNC: 178 MG/DL (ref 65–140)
HCT VFR BLD AUTO: 41.7 % (ref 34.8–46.1)
HGB BLD-MCNC: 14 G/DL (ref 11.5–15.4)
IMM GRANULOCYTES # BLD AUTO: 0.02 THOUSAND/UL (ref 0–0.2)
IMM GRANULOCYTES NFR BLD AUTO: 0 % (ref 0–2)
LYMPHOCYTES # BLD AUTO: 2.28 THOUSANDS/ΜL (ref 0.6–4.47)
LYMPHOCYTES NFR BLD AUTO: 41 % (ref 14–44)
MCH RBC QN AUTO: 28.3 PG (ref 26.8–34.3)
MCHC RBC AUTO-ENTMCNC: 33.6 G/DL (ref 31.4–37.4)
MCV RBC AUTO: 84 FL (ref 82–98)
MONOCYTES # BLD AUTO: 0.47 THOUSAND/ΜL (ref 0.17–1.22)
MONOCYTES NFR BLD AUTO: 8 % (ref 4–12)
NEUTROPHILS # BLD AUTO: 2.69 THOUSANDS/ΜL (ref 1.85–7.62)
NEUTS SEG NFR BLD AUTO: 48 % (ref 43–75)
NRBC BLD AUTO-RTO: 0 /100 WBCS
NT-PROBNP SERPL-MCNC: 34 PG/ML
PLATELET # BLD AUTO: 272 THOUSANDS/UL (ref 149–390)
PMV BLD AUTO: 10.7 FL (ref 8.9–12.7)
POTASSIUM SERPL-SCNC: 4.2 MMOL/L (ref 3.5–5.3)
PROT SERPL-MCNC: 8.3 G/DL (ref 6.4–8.2)
RBC # BLD AUTO: 4.94 MILLION/UL (ref 3.81–5.12)
SODIUM SERPL-SCNC: 139 MMOL/L (ref 136–145)
WBC # BLD AUTO: 5.62 THOUSAND/UL (ref 4.31–10.16)

## 2020-12-30 PROCEDURE — 80053 COMPREHEN METABOLIC PANEL: CPT

## 2020-12-30 PROCEDURE — 83880 ASSAY OF NATRIURETIC PEPTIDE: CPT

## 2020-12-30 PROCEDURE — 71046 X-RAY EXAM CHEST 2 VIEWS: CPT

## 2020-12-30 PROCEDURE — 85379 FIBRIN DEGRADATION QUANT: CPT

## 2020-12-30 PROCEDURE — 36415 COLL VENOUS BLD VENIPUNCTURE: CPT

## 2020-12-30 PROCEDURE — 99214 OFFICE O/P EST MOD 30 MIN: CPT | Performed by: INTERNAL MEDICINE

## 2020-12-30 PROCEDURE — 85025 COMPLETE CBC W/AUTO DIFF WBC: CPT

## 2020-12-30 RX ORDER — INSULIN ASPART 100 [IU]/ML
INJECTION, SOLUTION INTRAVENOUS; SUBCUTANEOUS
Qty: 15 PEN | Refills: 3 | Status: SHIPPED | OUTPATIENT
Start: 2020-12-30 | End: 2020-12-31

## 2020-12-30 RX ORDER — GABAPENTIN 100 MG/1
100 CAPSULE ORAL 2 TIMES DAILY
Qty: 180 CAPSULE | Refills: 1 | Status: SHIPPED | OUTPATIENT
Start: 2020-12-30 | End: 2021-06-22

## 2020-12-30 RX ORDER — INSULIN ASPART 100 [IU]/ML
INJECTION, SOLUTION INTRAVENOUS; SUBCUTANEOUS
Qty: 3 PEN | Refills: 3 | Status: SHIPPED | OUTPATIENT
Start: 2020-12-30 | End: 2020-12-30

## 2020-12-31 ENCOUNTER — TELEPHONE (OUTPATIENT)
Dept: INTERNAL MEDICINE CLINIC | Facility: CLINIC | Age: 60
End: 2020-12-31

## 2020-12-31 RX ORDER — INSULIN ASPART 100 [IU]/ML
INJECTION, SOLUTION INTRAVENOUS; SUBCUTANEOUS
Qty: 3 PEN | Refills: 3 | Status: SHIPPED | OUTPATIENT
Start: 2020-12-31

## 2021-01-06 ENCOUNTER — OFFICE VISIT (OUTPATIENT)
Dept: INTERNAL MEDICINE CLINIC | Facility: CLINIC | Age: 61
End: 2021-01-06
Payer: COMMERCIAL

## 2021-01-06 VITALS
TEMPERATURE: 97.2 F | OXYGEN SATURATION: 98 % | BODY MASS INDEX: 37.36 KG/M2 | RESPIRATION RATE: 18 BRPM | DIASTOLIC BLOOD PRESSURE: 78 MMHG | SYSTOLIC BLOOD PRESSURE: 138 MMHG | HEART RATE: 86 BPM | WEIGHT: 253 LBS

## 2021-01-06 DIAGNOSIS — Z79.4 TYPE 2 DIABETES MELLITUS WITH DIABETIC POLYNEUROPATHY, WITH LONG-TERM CURRENT USE OF INSULIN (HCC): Primary | ICD-10-CM

## 2021-01-06 DIAGNOSIS — E11.42 TYPE 2 DIABETES MELLITUS WITH DIABETIC POLYNEUROPATHY, WITH LONG-TERM CURRENT USE OF INSULIN (HCC): Primary | ICD-10-CM

## 2021-01-06 DIAGNOSIS — E11.9 ENCOUNTER FOR DIABETIC FOOT EXAM (HCC): ICD-10-CM

## 2021-01-06 PROCEDURE — 3075F SYST BP GE 130 - 139MM HG: CPT | Performed by: INTERNAL MEDICINE

## 2021-01-06 PROCEDURE — 3078F DIAST BP <80 MM HG: CPT | Performed by: INTERNAL MEDICINE

## 2021-01-06 PROCEDURE — 1036F TOBACCO NON-USER: CPT | Performed by: INTERNAL MEDICINE

## 2021-01-06 PROCEDURE — 99213 OFFICE O/P EST LOW 20 MIN: CPT | Performed by: INTERNAL MEDICINE

## 2021-01-06 RX ORDER — FLASH GLUCOSE SCANNING READER
EACH MISCELLANEOUS
COMMUNITY
End: 2021-01-06 | Stop reason: SDUPTHER

## 2021-01-06 RX ORDER — FLASH GLUCOSE SENSOR
1 KIT MISCELLANEOUS
Qty: 8 EACH | Refills: 1 | Status: SHIPPED | OUTPATIENT
Start: 2021-01-06 | End: 2021-07-27

## 2021-01-06 RX ORDER — FLASH GLUCOSE SCANNING READER
1 EACH MISCELLANEOUS DAILY
Qty: 1 DEVICE | Refills: 0 | Status: SHIPPED | OUTPATIENT
Start: 2021-01-06 | End: 2021-02-10

## 2021-01-06 RX ORDER — FLASH GLUCOSE SENSOR
KIT MISCELLANEOUS
COMMUNITY
End: 2021-01-06 | Stop reason: SDUPTHER

## 2021-01-06 NOTE — PATIENT INSTRUCTIONS
Type 2 Diabetes in the Older Adult   WHAT YOU NEED TO KNOW:   The risk for type 2 diabetes increases as a person gets older  Type 2 diabetes means your pancreas does not make enough insulin, or your body does not use insulin well  Insulin helps move sugar out of the blood so it can be used for energy  Diabetes cannot be cured, but it can be managed  DISCHARGE INSTRUCTIONS:   Call or have someone close to you call your local emergency number (911 in the [facility]) for any of the following:   · You have any of the following signs of a stroke:      ? Numbness or drooping on one side of your face     ? Weakness in an arm or leg    ? Confusion or difficulty speaking    ? Dizziness, a severe headache, or vision loss    · You have any of the following signs of a heart attack:      ? Squeezing, pressure, or pain in your chest    ? You may  also have any of the following:     § Discomfort or pain in your back, neck, jaw, stomach, or arm    § Shortness of breath    § Nausea or vomiting    § Lightheadedness or a sudden cold sweat    Return to the emergency department if:   · Your blood sugar level is higher than your goal and does not come down with treatment  · You have signs of a high blood sugar level, such as blurred or double vision  · You have signs of a high ketone level, such as fruity, sweet smelling breath, or shallow breathing  · You have symptoms of a low blood sugar level, such as trouble thinking, sweating, or a pounding heartbeat  · Your blood sugar level is lower than normal and does not improve with treatment  Call your doctor or diabetes care team if:   · You are vomiting or have diarrhea  · You have an upset stomach and cannot eat the foods on your meal plan  · You feel weak or more tired than usual     · You feel dizzy, have headaches, or are easily irritated  · Your skin is red, warm, dry, or swollen      · You have a wound that does not heal     · You have numbness in your arms or legs     · You have trouble coping with diabetes, or you feel anxious or depressed  · You have problems with your memory  · You have changes in your vision  · You have questions or concerns about your condition or care  Manage diabetes and prevent problems:  Sometimes type 2 diabetes can be managed with changes in nutrition and physical activity  · Work with your diabetes care team to create plans to meet your needs  Your diabetes care team may include a physician, nurse practitioner, and physician assistant  It may also include a diabetes nurse educator, dietitian, and an exercise specialist  Family members, or others who are close to you, may also be part of the team  You and your team will make goals and plans to manage diabetes and other health problems  For example, the plan will include how to manage medicines you may take for diabetes and for other health conditions  The plans and goals will be specific to your needs and abilities  Your plan will change as your needs and abilities change  · Manage other health issues as directed  Health issues may include high blood pressure, high cholesterol levels, and heart problems  Health issues may also include depression  Together you and your care team can create a plan to manage any other health issues  · Try to be physically active for 30 to 60 minutes most days of the week  Physical activity, such as exercise, helps keep your blood sugar level steady and lowers your risk for heart disease  Physical activity can help improve your balance and strength and lower your risk for falls  Start slowly  Activity can be done in 10-minute intervals  ? Set a goal for 30 minutes of aerobic activity at least 5 times a week  Aerobic activity helps your heart stay strong  Aerobic activity includes walking, bicycling, dancing, swimming, and raking leaves  ? Set a goal for strength training 2 times a week    Strength training helps you keep the muscles you have and build new muscles  Strength training includes lifting weights, climbing stairs, and doing yoga or esa chi     ? Stay steady on your on your feet with balancing activities  These include walking backwards, standing on one foot, and walking heel to toe in a straight line  · Maintain a healthy weight  Ask your provider what a healthy weight is for you  A healthy weight can help you control diabetes and prevent heart disease  Ask your provider to help you create a weight loss plan if you are overweight  Weight loss of 10 to 15 pounds can help make a difference in managing diabetes  Together you and your care team can set manageable weight loss goals  · Know the risks if you choose to drink alcohol  Alcohol can cause your blood sugar levels to be low if you use insulin  Alcohol can cause high blood sugar levels and weight gain if you drink too much  Women 21 years or older and men 72 years or older should limit alcohol to 1 drink a day  Men 21 to 64 years should limit alcohol to 2 drinks a day  A drink of alcohol is 12 ounces of beer, 5 ounces of wine, or 1½ ounces of liquor  · Do not smoke  Nicotine and other chemicals in cigarettes can cause lung disease and other health problems  It can also cause blood vessel damage that makes diabetes more difficult to manage  Ask your healthcare provider for information if you currently smoke and need help to quit  Do not use e-cigarettes or smokeless tobacco in place of cigarettes or to help you quit  They still contain nicotine  Diabetes education:  Diabetes education will start right away  Members of your care team teach you, your family, and caregivers the following:  · How to check your blood sugar level: You will learn when to check your blood sugar level and what the level should be  You will learn what to do if your level is too high or too low  Write down the times of your checks and your levels   Take them to all follow-up appointments  · About diabetes medicine: You and your family members will be taught how to draw up and give insulin, if needed  You will learn how much insulin you need and what time to inject insulin  You will be taught when not to give insulin  Your team will also teach you how to dispose of needles and syringes  If you need oral diabetes medicine, you will be taught about side effects  You will also be taught when to take or not take the medicine  · About nutrition:  A dietitian will help you make a meal plan to keep your blood sugar level steady  You will learn how food affects your blood sugar levels  You will also learn to keep track of sugar and starchy foods (carbohydrates)  Do not skip meals  Your blood sugar level may drop too low if you have taken insulin and do not eat  · How to prevent complications:  Diabetes that is not well controlled can lead to health problems  Examples include foot sores, retinopathy (vision loss), and peripheral neuropathy (loss of feeling in your hands and feet)  Your team will help you know when to get regular checkups, such as vision checks  They will teach you how to watch for problems and when to get a problem checked  Other ways to manage diabetes:   · Check your feet every day for sores  Look at your whole foot, including the bottom, and between and under your toes  Check for wounds, corns, and calluses  Use a mirror to see the bottom of your feet  The skin on your feet may be shiny, tight, dry, or darker than normal  Your feet may also be cold and pale  Feel your feet by running your hands along the tops, bottoms, sides, and between your toes  Redness, swelling, and warmth are signs of blood flow problems that can lead to a foot ulcer  Do not try to remove corns or calluses yourself  · Wear medical alert identification  Wear medical alert jewelry or carry a card that says you have type 2 diabetes   Ask your healthcare provider where to get these items          · Ask about vaccines  You have a higher risk for serious illness if you get the flu, pneumonia, or hepatitis  Ask your healthcare provider if you should get a flu, pneumonia, shingles, or hepatitis B vaccine, and when to get the vaccine  · Get help from family and friends  You may need help checking your blood sugar level, giving insulin injections, or preparing your meals  You may also need help to check your feet for sores  Ask your family and friends to help you with these tasks  Talk to your care team if you need someone at home to help you  Follow up with your doctor or diabetes care team as directed: You will need to return to meet with different care team members  You may need tests to monitor for problems  Write down your questions so you remember to ask them during your visits  © Copyright [address] Information is for End User's use only and may not be sold, redistributed or otherwise used for commercial purposes  All illustrations and images included in CareNotes® are the copyrighted property of [employer]  or [facility] Janna Boyle  The above information is an  only  It is not intended as medical advice for individual conditions or treatments  Talk to your doctor, nurse or pharmacist before following any medical regimen to see if it is safe and effective for you

## 2021-01-06 NOTE — PROGRESS NOTES
BMI Counseling: There is no height or weight on file to calculate BMI  The BMI is above normal  Nutrition recommendations include decreasing portion sizes and encouraging healthy choices of fruits and vegetables  Exercise recommendations include moderate physical activity 150 minutes/week and vigorous physical activity 75 minutes/week  No pharmacotherapy was ordered  Assessment/Plan:  Problem List Items Addressed This Visit        Endocrine    Type 2 diabetes mellitus with diabetic polyneuropathy, with long-term current use of insulin ([facility]) - Primary    Relevant Medications    Continuous Blood Gluc  (FreeStyle Nelda 14 Day [place]) GENE    Continuous Blood Gluc Sensor (FreeStyle Nelda 14 Day Sensor) MISC    Empagliflozin 25 MG TABS    Other Relevant Orders    Ambulatory referral for colonoscopy      Other Visit Diagnoses     Encounter for diabetic foot exam ([place] [id number] )        Relevant Medications    Continuous Blood Gluc  (FreeStyle Nelda 14 Day [place]) GENE    Continuous Blood Gluc Sensor (FreeStyle Nelda 14 Day Sensor) MISC    Empagliflozin 25 MG TABS    Other Relevant Orders    Ambulatory referral for colonoscopy           Diagnoses and all orders for this visit:    Type 2 diabetes mellitus with diabetic polyneuropathy, with long-term current use of insulin ([facility])  -     Continuous Blood Gluc  (FreeStyle Nelda 14 Day [place]) GENE; Use 1 patch daily  -     Continuous Blood Gluc Sensor (FreeStyle Nelda 14 Day Sensor) MISC; Use 1 patch every 14 (fourteen) days  -     Ambulatory referral for colonoscopy; Future  -     Empagliflozin 25 MG TABS; Take 1 tablet (25 mg total) by mouth every morning    Encounter for diabetic foot exam ([place] [id number] )  -     Continuous Blood Gluc  (FreeStyle Nelda 14 Day [place]) GENE; Use 1 patch daily  -     Continuous Blood Gluc Sensor (FreeStyle Nelda 14 Day Sensor) MISC; Use 1 patch every 14 (fourteen) days  -     Ambulatory referral for colonoscopy;  Future  - Empagliflozin 25 MG TABS; Take 1 tablet (25 mg total) by mouth every morning    Other orders  -     Discontinue: Continuous Blood Gluc  (FreeStyle Nelda 14 Day [place]) GENE; Use  -     Discontinue: Continuous Blood Gluc Sensor (FreeStyle Nelda 14 Day Sensor) MISC; Use        No problem-specific Assessment & Plan notes found for this encounter  A/P: Doing better and tolerating the meds, but not at goal  Will add SGLT2  Discussed BMI and will give information on diet and exercise  Continue current treatment otherwise and may need further adjustment of her mealtime sliding scale  RTC one month for routine  Subjective:      Patient ID: Malik Ham is a 61 y o  female  WF RTC for f/u uncontrolled DM after adding mealtime bolus insulin therapy  Tolerating the meds and notes sugars are slightly better  Now around 200  No new c/o's  The following portions of the patient's history were reviewed and updated as appropriate:   She has a past medical history of Arthritis, Cancer ([facility] [address] ), Cardiac disease, CHF (congestive heart failure) ([facility] [address] ), Coronary artery disease, Diabetes mellitus ([facility] [address] ), Heartburn, Hypercholesteremia, Hyperlipidemia, Hypertension, and Neuropathy  ,  does not have any pertinent problems on file  ,   has a past surgical history that includes Breast surgery (Left); Knee surgery; Nose surgery; Mouth surgery; Foot surgery (Left); Tubal ligation; Ovarian cyst removal (Left); Cholecystectomy; pr shldr arthroscop,part acromioplas (Right, 5/16/2016); pr arthroscopy shoulder surgical biceps tenodesis (Right, 5/16/2016); Cardiac surgery; Tubal ligation; and Colonoscopy  ,  family history includes Esophageal cancer in her father; Leukemia in her father; Liver disease in her brother; Lung cancer in her family, father, and mother; Stomach cancer in her family; Thyroid disease in her mother  ,   reports that she quit smoking about 21 years ago  Her smoking use included cigarettes   She smoked 1 00 pack per day  She has never used smokeless tobacco  She reports previous alcohol use  She reports previous drug use  Drug: Marijuana  ,  is allergic to codeine; iodinated diagnostic agents; iodine; and penicillins     Current Outpatient Medications   Medication Sig Dispense Refill    amLODIPine (NORVASC) 5 mg tablet Take 1 tablet (5 mg total) by mouth daily 90 tablet 3    aspirin 81 MG tablet Take 81 mg by mouth daily   atorvastatin (LIPITOR) 40 mg tablet Take 1 tablet (40 mg total) by mouth daily with dinner 30 tablet 11    clopidogrel (PLAVIX) 75 mg tablet Take 1 tablet (75 mg total) by mouth daily 30 tablet 11    Continuous Blood Gluc  (FreeStyle Nelda 14 Day [place]) GENE Use 1 patch daily 1 Device 0    Continuous Blood Gluc Sensor (FreeStyle Nelda 14 Day Sensor) MISC Use 1 patch every 14 (fourteen) days 8 each 1    Dulaglutide (Trulicity) 1 5 JZ/4 2QP SOPN Inject 0 5 mL (1 5 mg total) under the skin once a week 1 5 ml once a week 12 pen 1    furosemide (LASIX) 40 mg tablet Take 1 tablet (40 mg total) by mouth daily 90 tablet 3    gabapentin (NEURONTIN) 100 mg capsule Take 1 capsule (100 mg total) by mouth 2 (two) times a day 180 capsule 1    insulin aspart (NovoLOG FlexPen) 100 UNIT/ML injection pen PER SLIDING SCALE GIVEN TO PT WITH AC AND HS INJECTIONS   3 pen 3    insulin detemir (LEVEMIR FLEXTOUCH) 100 Units/mL injection pen Inject under the skin 50 units in am and 60 units in pm      Insulin Syringe-Needle U-100 31G X 15/64" 0 5 ML MISC by Does not apply route 2 (two) times a day 90 each 0    losartan (COZAAR) 100 MG tablet Take 1 tablet (100 mg total) by mouth daily 90 tablet 3    metoprolol succinate (TOPROL-XL) 50 mg 24 hr tablet Take 1 tablet (50 mg total) by mouth daily 30 tablet 8    omeprazole (PriLOSEC) 20 mg delayed release capsule Take 20 mg by mouth daily as needed       ranolazine (RANEXA) 500 mg 12 hr tablet Take 1 tablet (500 mg total) by mouth 2 (two) times a day 180 tablet 3    Empagliflozin 25 MG TABS Take 1 tablet (25 mg total) by mouth every morning 90 tablet 1     No current facility-administered medications for this visit  Review of Systems   Constitutional: Negative for activity change, chills, diaphoresis, fatigue and fever  Respiratory: Negative for cough, chest tightness, shortness of breath and wheezing  Cardiovascular: Negative for chest pain, palpitations and leg swelling  Gastrointestinal: Negative for abdominal pain, constipation, diarrhea, nausea and vomiting  Genitourinary: Negative for difficulty urinating, dysuria and frequency  Musculoskeletal: Negative for arthralgias, gait problem and myalgias  Neurological: Negative for dizziness, seizures, syncope, weakness, light-headedness and headaches  Psychiatric/Behavioral: Negative for confusion, dysphoric mood and sleep disturbance  The patient is not nervous/anxious  PHQ-9 Depression Screening    PHQ-9:   Frequency of the following problems over the past two weeks:            Objective:  Vitals:    01/06/21 0802   BP: 138/78   BP Location: Right arm   Patient Position: Sitting   Cuff Size: Adult   Pulse: 86   Resp: 18   Temp: (!) 97 2 °F (36 2 °C)   TempSrc: Temporal   SpO2: 98%   Weight: 115 kg (253 lb)     Body mass index is 37 36 kg/m²  Physical Exam  Vitals signs and nursing note reviewed  Constitutional:       General: She is not in acute distress  Appearance: Normal appearance  She is not ill-appearing  HENT:      Head: Normocephalic and atraumatic  Mouth/Throat:      Mouth: Mucous membranes are moist    Eyes:      Extraocular Movements: Extraocular movements intact  Conjunctiva/sclera: Conjunctivae normal       Pupils: Pupils are equal, round, and reactive to light  Cardiovascular:      Rate and Rhythm: Normal rate and regular rhythm  Pulses: no weak pulses          Dorsalis pedis pulses are 1+ on the right side and 1+ on the left side  Posterior tibial pulses are 1+ on the right side and 1+ on the left side  Heart sounds: Normal heart sounds  Pulmonary:      Effort: Pulmonary effort is normal  No respiratory distress  Breath sounds: Normal breath sounds  No wheezing or rales  Feet:      Right foot:      Skin integrity: No ulcer, skin breakdown, erythema, warmth, callus or dry skin  Left foot:      Skin integrity: No ulcer, skin breakdown, erythema, warmth, callus or dry skin  Neurological:      General: No focal deficit present  Mental Status: She is alert and oriented to person, place, and time  Mental status is at baseline  Psychiatric:         Mood and Affect: Mood normal          Behavior: Behavior normal          Thought Content: Thought content normal          Judgment: Judgment normal          Patient's shoes and socks removed  Right Foot/Ankle   Right Foot Inspection  Skin Exam: skin normal and skin intact no dry skin, no warmth, no callus, no erythema, no maceration, no abnormal color, no pre-ulcer, no ulcer and no callus                          Toe Exam: ROM and strength within normal limitsno swelling, no tenderness, erythema and  no right toe deformity  Sensory       Monofilament testing: intact  Vascular  Capillary refills: < 3 seconds  The right DP pulse is 1+  The right PT pulse is 1+  Left Foot/Ankle  Left Foot Inspection  Skin Exam: skin normal and skin intactno dry skin, no warmth, no erythema, no maceration, normal color, no pre-ulcer, no ulcer and no callus                         Toe Exam: ROM and strength within normal limitsno swelling, no tenderness, no erythema and no left toe deformity                   Sensory       Monofilament: intact  Vascular  Capillary refills: < 3 seconds  The left DP pulse is 1+  The left PT pulse is 1+  Assign Risk Category:  No deformity present; No loss of protective sensation;  No weak pulses       Risk: 0

## 2021-01-13 DIAGNOSIS — I21.4 NSTEMI (NON-ST ELEVATED MYOCARDIAL INFARCTION) (HCC): ICD-10-CM

## 2021-01-13 RX ORDER — METOPROLOL SUCCINATE 50 MG/1
50 TABLET, EXTENDED RELEASE ORAL DAILY
Qty: 30 TABLET | Refills: 8 | Status: SHIPPED | OUTPATIENT
Start: 2021-01-13 | End: 2021-11-02 | Stop reason: SDUPTHER

## 2021-01-18 ENCOUNTER — TELEPHONE (OUTPATIENT)
Dept: GASTROENTEROLOGY | Facility: CLINIC | Age: 61
End: 2021-01-18

## 2021-02-10 ENCOUNTER — OFFICE VISIT (OUTPATIENT)
Dept: INTERNAL MEDICINE CLINIC | Facility: CLINIC | Age: 61
End: 2021-02-10
Payer: COMMERCIAL

## 2021-02-10 VITALS
HEART RATE: 78 BPM | TEMPERATURE: 96 F | HEIGHT: 70 IN | DIASTOLIC BLOOD PRESSURE: 70 MMHG | WEIGHT: 246.25 LBS | SYSTOLIC BLOOD PRESSURE: 128 MMHG | BODY MASS INDEX: 35.25 KG/M2 | RESPIRATION RATE: 18 BRPM

## 2021-02-10 DIAGNOSIS — Z79.4 TYPE 2 DIABETES MELLITUS WITH DIABETIC POLYNEUROPATHY, WITH LONG-TERM CURRENT USE OF INSULIN (HCC): Primary | ICD-10-CM

## 2021-02-10 DIAGNOSIS — E11.42 TYPE 2 DIABETES MELLITUS WITH DIABETIC POLYNEUROPATHY, WITH LONG-TERM CURRENT USE OF INSULIN (HCC): Primary | ICD-10-CM

## 2021-02-10 PROCEDURE — 99213 OFFICE O/P EST LOW 20 MIN: CPT | Performed by: INTERNAL MEDICINE

## 2021-02-10 RX ORDER — BLOOD-GLUCOSE,RECEIVER,CONT
EACH MISCELLANEOUS 4 TIMES DAILY
Qty: 1 DEVICE | Refills: 0 | Status: SHIPPED | OUTPATIENT
Start: 2021-02-10 | End: 2021-07-27

## 2021-02-10 NOTE — PATIENT INSTRUCTIONS
Type 2 Diabetes in the Older Adult   WHAT YOU NEED TO KNOW:   The risk for type 2 diabetes increases as a person gets older  Type 2 diabetes means your pancreas does not make enough insulin, or your body does not use insulin well  Insulin helps move sugar out of the blood so it can be used for energy  Diabetes cannot be cured, but it can be managed  DISCHARGE INSTRUCTIONS:   Call or have someone close to you call your local emergency number (911 in the [address] [facility]) for any of the following:   · You have any of the following signs of a stroke:      ? Numbness or drooping on one side of your face     ? Weakness in an arm or leg    ? Confusion or difficulty speaking    ? Dizziness, a severe headache, or vision loss    · You have any of the following signs of a heart attack:      ? Squeezing, pressure, or pain in your chest    ? You may  also have any of the following:     § Discomfort or pain in your back, neck, jaw, stomach, or arm    § Shortness of breath    § Nausea or vomiting    § Lightheadedness or a sudden cold sweat    Return to the emergency department if:   · Your blood sugar level is higher than your goal and does not come down with treatment  · You have signs of a high blood sugar level, such as blurred or double vision  · You have signs of a high ketone level, such as fruity, sweet smelling breath, or shallow breathing  · You have symptoms of a low blood sugar level, such as trouble thinking, sweating, or a pounding heartbeat  · Your blood sugar level is lower than normal and does not improve with treatment  Call your doctor or diabetes care team if:   · You are vomiting or have diarrhea  · You have an upset stomach and cannot eat the foods on your meal plan  · You feel weak or more tired than usual     · You feel dizzy, have headaches, or are easily irritated  · Your skin is red, warm, dry, or swollen      · You have a wound that does not heal     · You have numbness in your arms or legs     · You have trouble coping with diabetes, or you feel anxious or depressed  · You have problems with your memory  · You have changes in your vision  · You have questions or concerns about your condition or care  Manage diabetes and prevent problems:  Sometimes type 2 diabetes can be managed with changes in nutrition and physical activity  · Work with your diabetes care team to create plans to meet your needs  Your diabetes care team may include a physician, nurse practitioner, and physician assistant  It may also include a diabetes nurse educator, dietitian, and an exercise specialist  Family members, or others who are close to you, may also be part of the team  You and your team will make goals and plans to manage diabetes and other health problems  For example, the plan will include how to manage medicines you may take for diabetes and for other health conditions  The plans and goals will be specific to your needs and abilities  Your plan will change as your needs and abilities change  · Manage other health issues as directed  Health issues may include high blood pressure, high cholesterol levels, and heart problems  Health issues may also include depression  Together you and your care team can create a plan to manage any other health issues  · Try to be physically active for 30 to 60 minutes most days of the week  Physical activity, such as exercise, helps keep your blood sugar level steady and lowers your risk for heart disease  Physical activity can help improve your balance and strength and lower your risk for falls  Start slowly  Activity can be done in 10-minute intervals  ? Set a goal for 30 minutes of aerobic activity at least 5 times a week  Aerobic activity helps your heart stay strong  Aerobic activity includes walking, bicycling, dancing, swimming, and raking leaves  ? Set a goal for strength training 2 times a week    Strength training helps you keep the muscles you have and build new muscles  Strength training includes lifting weights, climbing stairs, and doing yoga or esa chi     ? Stay steady on your on your feet with balancing activities  These include walking backwards, standing on one foot, and walking heel to toe in a straight line  · Maintain a healthy weight  Ask your provider what a healthy weight is for you  A healthy weight can help you control diabetes and prevent heart disease  Ask your provider to help you create a weight loss plan if you are overweight  Weight loss of 10 to 15 pounds can help make a difference in managing diabetes  Together you and your care team can set manageable weight loss goals  · Know the risks if you choose to drink alcohol  Alcohol can cause your blood sugar levels to be low if you use insulin  Alcohol can cause high blood sugar levels and weight gain if you drink too much  Women 21 years or older and men 72 years or older should limit alcohol to 1 drink a day  Men 21 to 64 years should limit alcohol to 2 drinks a day  A drink of alcohol is 12 ounces of beer, 5 ounces of wine, or 1½ ounces of liquor  · Do not smoke  Nicotine and other chemicals in cigarettes can cause lung disease and other health problems  It can also cause blood vessel damage that makes diabetes more difficult to manage  Ask your healthcare provider for information if you currently smoke and need help to quit  Do not use e-cigarettes or smokeless tobacco in place of cigarettes or to help you quit  They still contain nicotine  Diabetes education:  Diabetes education will start right away  Members of your care team teach you, your family, and caregivers the following:  · How to check your blood sugar level: You will learn when to check your blood sugar level and what the level should be  You will learn what to do if your level is too high or too low  Write down the times of your checks and your levels   Take them to all follow-up appointments  · About diabetes medicine: You and your family members will be taught how to draw up and give insulin, if needed  You will learn how much insulin you need and what time to inject insulin  You will be taught when not to give insulin  Your team will also teach you how to dispose of needles and syringes  If you need oral diabetes medicine, you will be taught about side effects  You will also be taught when to take or not take the medicine  · About nutrition:  A dietitian will help you make a meal plan to keep your blood sugar level steady  You will learn how food affects your blood sugar levels  You will also learn to keep track of sugar and starchy foods (carbohydrates)  Do not skip meals  Your blood sugar level may drop too low if you have taken insulin and do not eat  · How to prevent complications:  Diabetes that is not well controlled can lead to health problems  Examples include foot sores, retinopathy (vision loss), and peripheral neuropathy (loss of feeling in your hands and feet)  Your team will help you know when to get regular checkups, such as vision checks  They will teach you how to watch for problems and when to get a problem checked  Other ways to manage diabetes:   · Check your feet every day for sores  Look at your whole foot, including the bottom, and between and under your toes  Check for wounds, corns, and calluses  Use a mirror to see the bottom of your feet  The skin on your feet may be shiny, tight, dry, or darker than normal  Your feet may also be cold and pale  Feel your feet by running your hands along the tops, bottoms, sides, and between your toes  Redness, swelling, and warmth are signs of blood flow problems that can lead to a foot ulcer  Do not try to remove corns or calluses yourself  · Wear medical alert identification  Wear medical alert jewelry or carry a card that says you have type 2 diabetes   Ask your healthcare provider where to get these items          · Ask about vaccines  You have a higher risk for serious illness if you get the flu, pneumonia, or hepatitis  Ask your healthcare provider if you should get a flu, pneumonia, shingles, or hepatitis B vaccine, and when to get the vaccine  · Get help from family and friends  You may need help checking your blood sugar level, giving insulin injections, or preparing your meals  You may also need help to check your feet for sores  Ask your family and friends to help you with these tasks  Talk to your care team if you need someone at home to help you  Follow up with your doctor or diabetes care team as directed: You will need to return to meet with different care team members  You may need tests to monitor for problems  Write down your questions so you remember to ask them during your visits  © Copyright [address] Information is for End User's use only and may not be sold, redistributed or otherwise used for commercial purposes  All illustrations and images included in CareNotes® are the copyrighted property of A D A M [employer]  or [facility] Janna Boyle  The above information is an  only  It is not intended as medical advice for individual conditions or treatments  Talk to your doctor, nurse or pharmacist before following any medical regimen to see if it is safe and effective for you

## 2021-02-10 NOTE — PROGRESS NOTES
Assessment/Plan:  Problem List Items Addressed This Visit        Endocrine    Type 2 diabetes mellitus with diabetic polyneuropathy, with long-term current use of insulin ([facility] ) - Primary           Diagnoses and all orders for this visit:    Type 2 diabetes mellitus with diabetic polyneuropathy, with long-term current use of insulin ([facility] )        No problem-specific Assessment & Plan notes found for this encounter  A/P: Doing better and tolerating the meds  Significant sugar reduction and could be better  Will hold on further adjustment at this time and continue current treatment and  RTC one month for routine  Most likely need further adjustment  Subjective:      Patient ID: Jacquelin Hopper is a 61 y o  female  WF RTC for f/u uncontrolled DM after SGLT2 and mealtime insulin started  Tolerating the meds  Reports doing great  Sugars down from mid 200's to 150  Feels much better  No new c/o's  The following portions of the patient's history were reviewed and updated as appropriate:   She has a past medical history of Arthritis, Cancer ([place] [address] ), Cardiac disease, CHF (congestive heart failure) ([place] [address] ), Coronary artery disease, Diabetes mellitus ([facility] ), Heartburn, Hypercholesteremia, Hyperlipidemia, Hypertension, and Neuropathy  ,  does not have any pertinent problems on file  ,   has a past surgical history that includes Breast surgery (Left); Knee surgery; Nose surgery; Mouth surgery; Foot surgery (Left); Tubal ligation; Ovarian cyst removal (Left); Cholecystectomy; pr shldr arthroscop,part acromioplas (Right, 5/16/2016); pr arthroscopy shoulder surgical biceps tenodesis (Right, 5/16/2016); Cardiac surgery; Tubal ligation; and Colonoscopy  ,  family history includes Esophageal cancer in her father; Leukemia in her father; Liver disease in her brother; Lung cancer in her family, father, and mother; Stomach cancer in her family; Thyroid disease in her mother  ,   reports that she quit smoking about 21 years ago  Her smoking use included cigarettes  She smoked 1 00 pack per day  She has never used smokeless tobacco  She reports previous alcohol use  She reports previous drug use  Drug: Marijuana  ,  is allergic to codeine; iodinated diagnostic agents; iodine; and penicillins     Current Outpatient Medications   Medication Sig Dispense Refill    amLODIPine (NORVASC) 5 mg tablet Take 1 tablet (5 mg total) by mouth daily 90 tablet 3    aspirin 81 MG tablet Take 81 mg by mouth daily   atorvastatin (LIPITOR) 40 mg tablet Take 1 tablet (40 mg total) by mouth daily with dinner 30 tablet 11    clopidogrel (PLAVIX) 75 mg tablet Take 1 tablet (75 mg total) by mouth daily 30 tablet 11    Dulaglutide (Trulicity) 1 5 OR/7 9XS SOPN Inject 0 5 mL (1 5 mg total) under the skin once a week 1 5 ml once a week 12 pen 1    Empagliflozin 25 MG TABS Take 1 tablet (25 mg total) by mouth every morning 90 tablet 1    furosemide (LASIX) 40 mg tablet Take 1 tablet (40 mg total) by mouth daily 90 tablet 3    gabapentin (NEURONTIN) 100 mg capsule Take 1 capsule (100 mg total) by mouth 2 (two) times a day 180 capsule 1    insulin aspart (NovoLOG FlexPen) 100 UNIT/ML injection pen PER SLIDING SCALE GIVEN TO PT WITH AC AND HS INJECTIONS   3 pen 3    insulin detemir (LEVEMIR FLEXTOUCH) 100 Units/mL injection pen Inject under the skin 50 units in am and 60 units in pm      losartan (COZAAR) 100 MG tablet Take 1 tablet (100 mg total) by mouth daily 90 tablet 3    metoprolol succinate (TOPROL-XL) 50 mg 24 hr tablet Take 1 tablet (50 mg total) by mouth daily 30 tablet 8    omeprazole (PriLOSEC) 20 mg delayed release capsule Take 20 mg by mouth daily as needed       ranolazine (RANEXA) 500 mg 12 hr tablet Take 1 tablet (500 mg total) by mouth 2 (two) times a day 180 tablet 3    Continuous Blood Gluc  (FreeStyle Nelda 14 Day Paulina) GENE Use 1 patch daily 1 Device 0    Continuous Blood Gluc Sensor (FreeStyle Nelda 14 Day Sensor) [facility] Use 1 patch every 14 (fourteen) days 8 each 1    Insulin Syringe-Needle U-100 31G X 15/64" 0 5 ML MISC by Does not apply route 2 (two) times a day 90 each 0     No current facility-administered medications for this visit  Review of Systems   Constitutional: Negative for activity change, chills, diaphoresis, fatigue and fever  Respiratory: Negative for cough, chest tightness, shortness of breath and wheezing  Cardiovascular: Negative for chest pain, palpitations and leg swelling  Gastrointestinal: Negative for abdominal pain, constipation, diarrhea, nausea and vomiting  Genitourinary: Negative for difficulty urinating, dysuria and frequency  Musculoskeletal: Negative for arthralgias, gait problem and myalgias  Neurological: Negative for dizziness, seizures, syncope, weakness, light-headedness and headaches  Psychiatric/Behavioral: Negative for confusion, dysphoric mood and sleep disturbance  The patient is not nervous/anxious  PHQ-9 Depression Screening    PHQ-9:   Frequency of the following problems over the past two weeks:      Little interest or pleasure in doing things: 0 - not at all  Feeling down, depressed, or hopeless: 0 - not at all  PHQ-2 Score: 0        Objective:  Vitals:    02/10/21 0843   BP: 128/70   Pulse: 78   Resp: 18   Temp: (!) 96 °F (35 6 °C)   Weight: 112 kg (246 lb 4 oz)   Height: 5' 10" (1 778 m)     Body mass index is 35 33 kg/m²  Physical Exam  Vitals signs and nursing note reviewed  Constitutional:       General: She is not in acute distress  Appearance: Normal appearance  She is not ill-appearing  HENT:      Head: Normocephalic and atraumatic  Mouth/Throat:      Mouth: Mucous membranes are moist    Eyes:      Extraocular Movements: Extraocular movements intact  Conjunctiva/sclera: Conjunctivae normal       Pupils: Pupils are equal, round, and reactive to light  Cardiovascular:      Rate and Rhythm: Normal rate and regular rhythm  Heart sounds: Normal heart sounds  Pulmonary:      Effort: Pulmonary effort is normal  No respiratory distress  Breath sounds: Normal breath sounds  No wheezing or rales  Neurological:      General: No focal deficit present  Mental Status: She is alert and oriented to person, place, and time  Mental status is at baseline  Psychiatric:         Mood and Affect: Mood normal          Behavior: Behavior normal          Thought Content:  Thought content normal          Judgment: Judgment normal

## 2021-02-24 ENCOUNTER — OFFICE VISIT (OUTPATIENT)
Dept: CARDIOLOGY CLINIC | Facility: CLINIC | Age: 61
End: 2021-02-24
Payer: COMMERCIAL

## 2021-02-24 VITALS
WEIGHT: 244 LBS | HEIGHT: 70 IN | SYSTOLIC BLOOD PRESSURE: 118 MMHG | HEART RATE: 68 BPM | BODY MASS INDEX: 34.93 KG/M2 | DIASTOLIC BLOOD PRESSURE: 78 MMHG

## 2021-02-24 DIAGNOSIS — I10 ESSENTIAL HYPERTENSION: ICD-10-CM

## 2021-02-24 DIAGNOSIS — I25.118 CORONARY ARTERY DISEASE OF NATIVE ARTERY OF NATIVE HEART WITH STABLE ANGINA PECTORIS (HCC): Primary | ICD-10-CM

## 2021-02-24 PROCEDURE — 99214 OFFICE O/P EST MOD 30 MIN: CPT | Performed by: INTERNAL MEDICINE

## 2021-02-24 PROCEDURE — 93000 ELECTROCARDIOGRAM COMPLETE: CPT | Performed by: INTERNAL MEDICINE
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
## 2021-03-10 ENCOUNTER — OFFICE VISIT (OUTPATIENT)
Dept: INTERNAL MEDICINE CLINIC | Facility: CLINIC | Age: 61
End: 2021-03-10
Payer: COMMERCIAL

## 2021-03-10 VITALS
SYSTOLIC BLOOD PRESSURE: 120 MMHG | WEIGHT: 245 LBS | TEMPERATURE: 98.9 F | OXYGEN SATURATION: 97 % | BODY MASS INDEX: 35.07 KG/M2 | DIASTOLIC BLOOD PRESSURE: 80 MMHG | HEIGHT: 70 IN | HEART RATE: 70 BPM

## 2021-03-10 DIAGNOSIS — Z13.31 NEGATIVE DEPRESSION SCREENING: ICD-10-CM

## 2021-03-10 DIAGNOSIS — Z12.31 ENCOUNTER FOR SCREENING MAMMOGRAM FOR MALIGNANT NEOPLASM OF BREAST: ICD-10-CM

## 2021-03-10 DIAGNOSIS — E78.2 MIXED HYPERLIPIDEMIA: ICD-10-CM

## 2021-03-10 DIAGNOSIS — E11.42 TYPE 2 DIABETES MELLITUS WITH DIABETIC POLYNEUROPATHY, WITH LONG-TERM CURRENT USE OF INSULIN (HCC): Primary | ICD-10-CM

## 2021-03-10 DIAGNOSIS — Z12.11 SCREEN FOR COLON CANCER: ICD-10-CM

## 2021-03-10 DIAGNOSIS — I25.118 CORONARY ARTERY DISEASE OF NATIVE ARTERY OF NATIVE HEART WITH STABLE ANGINA PECTORIS (HCC): ICD-10-CM

## 2021-03-10 DIAGNOSIS — Z12.12 ENCOUNTER FOR SCREENING FOR COLORECTAL MALIGNANT NEOPLASM: ICD-10-CM

## 2021-03-10 DIAGNOSIS — M15.9 PRIMARY OSTEOARTHRITIS INVOLVING MULTIPLE JOINTS: ICD-10-CM

## 2021-03-10 DIAGNOSIS — Z11.59 NEED FOR HEPATITIS C SCREENING TEST: ICD-10-CM

## 2021-03-10 DIAGNOSIS — Z23 ENCOUNTER FOR IMMUNIZATION: ICD-10-CM

## 2021-03-10 DIAGNOSIS — Z12.11 ENCOUNTER FOR SCREENING FOR COLORECTAL MALIGNANT NEOPLASM: ICD-10-CM

## 2021-03-10 DIAGNOSIS — I10 ESSENTIAL HYPERTENSION: ICD-10-CM

## 2021-03-10 DIAGNOSIS — Z79.4 TYPE 2 DIABETES MELLITUS WITH DIABETIC POLYNEUROPATHY, WITH LONG-TERM CURRENT USE OF INSULIN (HCC): Primary | ICD-10-CM

## 2021-03-10 LAB — SL AMB POCT HEMOGLOBIN AIC: 8.3 (ref ?–6.5)

## 2021-03-10 PROCEDURE — 83036 HEMOGLOBIN GLYCOSYLATED A1C: CPT | Performed by: INTERNAL MEDICINE

## 2021-03-10 PROCEDURE — 3725F SCREEN DEPRESSION PERFORMED: CPT | Performed by: INTERNAL MEDICINE

## 2021-03-10 PROCEDURE — 3074F SYST BP LT 130 MM HG: CPT | Performed by: INTERNAL MEDICINE

## 2021-03-10 PROCEDURE — 3008F BODY MASS INDEX DOCD: CPT | Performed by: INTERNAL MEDICINE

## 2021-03-10 PROCEDURE — 3052F HG A1C>EQUAL 8.0%<EQUAL 9.0%: CPT | Performed by: INTERNAL MEDICINE

## 2021-03-10 PROCEDURE — 1036F TOBACCO NON-USER: CPT | Performed by: INTERNAL MEDICINE

## 2021-03-10 PROCEDURE — 3079F DIAST BP 80-89 MM HG: CPT | Performed by: INTERNAL MEDICINE

## 2021-03-10 PROCEDURE — 99214 OFFICE O/P EST MOD 30 MIN: CPT | Performed by: INTERNAL MEDICINE

## 2021-03-10 NOTE — PROGRESS NOTES
Assessment/Plan:  Problem List Items Addressed This Visit        Endocrine    Type 2 diabetes mellitus with diabetic polyneuropathy, with long-term current use of insulin (HCC) - Primary    Relevant Medications    insulin detemir (LEVEMIR FLEXTOUCH) 100 Units/mL injection pen    Other Relevant Orders    Microalbumin / creatinine urine ratio    POCT hemoglobin A1c (Completed)       Cardiovascular and Mediastinum    Coronary artery disease of native artery of native heart with stable angina pectoris ([address] )    Essential hypertension       Musculoskeletal and Integument    Primary osteoarthritis involving multiple joints       Other    Mixed hyperlipidemia      Other Visit Diagnoses     Encounter for screening mammogram for malignant neoplasm of breast        Relevant Orders    Mammo screening bilateral w 3d & cad    Encounter for screening for colorectal malignant neoplasm        Relevant Orders    Cologuard    Negative depression screening        Screen for colon cancer        Need for hepatitis C screening test        Relevant Orders    Hepatitis C antibody           Diagnoses and all orders for this visit:    Type 2 diabetes mellitus with diabetic polyneuropathy, with long-term current use of insulin (HCC)  -     Microalbumin / creatinine urine ratio  -     POCT hemoglobin A1c  -     insulin detemir (LEVEMIR FLEXTOUCH) 100 Units/mL injection pen; 55 units in am and 65 units in pm    Encounter for screening mammogram for malignant neoplasm of breast  -     Mammo screening bilateral w 3d & cad; Future    Encounter for screening for colorectal malignant neoplasm  -     Cologuard; Future    Essential hypertension    Coronary artery disease of native artery of native heart with stable angina pectoris (HCC)    Primary osteoarthritis involving multiple joints    Mixed hyperlipidemia    Negative depression screening    Screen for colon cancer    Need for hepatitis C screening test  -     Hepatitis C antibody;  Future No problem-specific Assessment & Plan notes found for this encounter  A/P: Doing ok, but in office HgA1c was 8 3  Unable to take metformin  Has made significant progress  Will increase AM to 55 units and PM to 65 units on the basal  Pretty much have reached the max  Will consider increasing the PRN mealtime doses, but pt reports not being very high at mealtime  Appreciate cards input  Will order mammo and CRC  Continue current treatment otherwise  RTC three months for routine, but will in two weeks for sugars  Subjective:      Patient ID: Sony Dodson is a 61 y o  female  WF RTC for f/u dm, htn, etc  Doing well and no new issues  Remains active w/o difficulty and no falls  Sugars less than 150 and no low sugar events  Denies CP, SOB,palpitations, edema, orthopnea, or PND  Chronic pain is controlled  Vision no worse  Seen by cards and now off plavix  Due for labs, mammo, and CRC  The following portions of the patient's history were reviewed and updated as appropriate:   She has a past medical history of Arthritis, Cancer ([facility] [address] ), Cardiac disease, CHF (congestive heart failure) ([facility] [address] ), Coronary artery disease, Diabetes mellitus ([facility] [address] ), Heartburn, Hypercholesteremia, Hyperlipidemia, Hypertension, and Neuropathy  ,  does not have any pertinent problems on file  ,   has a past surgical history that includes Breast surgery (Left); Knee surgery; Nose surgery; Mouth surgery; Foot surgery (Left); Tubal ligation; Ovarian cyst removal (Left); Cholecystectomy; pr shldr arthroscop,part acromioplas (Right, 5/16/2016); pr arthroscopy shoulder surgical biceps tenodesis (Right, 5/16/2016); Cardiac surgery; Tubal ligation; and Colonoscopy  ,  family history includes Esophageal cancer in her father; Leukemia in her father; Liver disease in her brother; Lung cancer in her family, father, and mother; Stomach cancer in her family; Thyroid disease in her mother  ,   reports that she quit smoking about 21 years ago  Her smoking use included cigarettes  She smoked 1 00 pack per day  She has never used smokeless tobacco  She reports previous alcohol use  She reports previous drug use  Drug: Marijuana  ,  is allergic to codeine; iodinated diagnostic agents; iodine; and penicillins     Current Outpatient Medications   Medication Sig Dispense Refill    amLODIPine (NORVASC) 5 mg tablet Take 1 tablet (5 mg total) by mouth daily 90 tablet 3    aspirin 81 MG tablet Take 81 mg by mouth daily   atorvastatin (LIPITOR) 40 mg tablet Take 1 tablet (40 mg total) by mouth daily with dinner 30 tablet 11    Dulaglutide (Trulicity) 1 5 LX/4 4LQ SOPN Inject 0 5 mL (1 5 mg total) under the skin once a week 1 5 ml once a week 12 pen 1    furosemide (LASIX) 40 mg tablet Take 1 tablet (40 mg total) by mouth daily 90 tablet 3    gabapentin (NEURONTIN) 100 mg capsule Take 1 capsule (100 mg total) by mouth 2 (two) times a day 180 capsule 1    insulin aspart (NovoLOG FlexPen) 100 UNIT/ML injection pen PER SLIDING SCALE GIVEN TO PT WITH AC AND HS INJECTIONS   3 pen 3    insulin detemir (LEVEMIR FLEXTOUCH) 100 Units/mL injection pen 55 units in am and 65 units in pm 15 mL     losartan (COZAAR) 100 MG tablet Take 1 tablet (100 mg total) by mouth daily 90 tablet 3    metoprolol succinate (TOPROL-XL) 50 mg 24 hr tablet Take 1 tablet (50 mg total) by mouth daily 30 tablet 8    omeprazole (PriLOSEC) 20 mg delayed release capsule Take 20 mg by mouth daily as needed       ranolazine (RANEXA) 500 mg 12 hr tablet Take 1 tablet (500 mg total) by mouth 2 (two) times a day 180 tablet 3    Continuous Blood Gluc  (Dexcom G5 Mobile ) GENE Use 4 (four) times a day 1 Device 0    Continuous Blood Gluc Sensor (FreeStyle Nelda 14 Day Sensor) MISC Use 1 patch every 14 (fourteen) days 8 each 1    Empagliflozin 25 MG TABS Take 1 tablet (25 mg total) by mouth every morning 90 tablet 1    Insulin Syringe-Needle U-100 31G X 15/64" 0 5 ML MISC by Does not apply route 2 (two) times a day 90 each 0     No current facility-administered medications for this visit  Review of Systems   Constitutional: Negative for activity change, chills, diaphoresis, fatigue and fever  HENT: Negative  Eyes: Negative for discharge and itching  Respiratory: Negative for cough, chest tightness, shortness of breath and wheezing  Cardiovascular: Negative for chest pain, palpitations and leg swelling  Gastrointestinal: Negative for abdominal pain, constipation, diarrhea, nausea and vomiting  Endocrine: Negative for cold intolerance and heat intolerance  Genitourinary: Negative for difficulty urinating, dysuria and frequency  Musculoskeletal: Negative for arthralgias, gait problem and myalgias  Neurological: Negative for dizziness, seizures, syncope, weakness, light-headedness and headaches  Psychiatric/Behavioral: Negative for confusion, dysphoric mood and sleep disturbance  The patient is not nervous/anxious  PHQ-9 Depression Screening    PHQ-9:   Frequency of the following problems over the past two weeks:      Little interest or pleasure in doing things: 0 - not at all  Feeling down, depressed, or hopeless: 0 - not at all  PHQ-2 Score: 0        Objective:  Vitals:    03/10/21 0825   BP: 120/80   Pulse: 70   Temp: 98 9 °F (37 2 °C)   SpO2: 97%   Weight: 111 kg (245 lb)   Height: 5' 10" (1 778 m)     Body mass index is 35 15 kg/m²  Physical Exam  Vitals signs and nursing note reviewed  Constitutional:       General: She is not in acute distress  Appearance: Normal appearance  She is obese  She is not ill-appearing  HENT:      Head: Normocephalic and atraumatic  Mouth/Throat:      Mouth: Mucous membranes are moist    Eyes:      Extraocular Movements: Extraocular movements intact  Conjunctiva/sclera: Conjunctivae normal       Pupils: Pupils are equal, round, and reactive to light  Neck:      Musculoskeletal: Neck supple  Vascular: No carotid bruit  Cardiovascular:      Rate and Rhythm: Normal rate and regular rhythm  Heart sounds: Normal heart sounds  Pulmonary:      Effort: Pulmonary effort is normal  No respiratory distress  Breath sounds: Normal breath sounds  No wheezing or rales  Abdominal:      General: Bowel sounds are normal  There is no distension  Palpations: Abdomen is soft  Tenderness: There is no abdominal tenderness  Musculoskeletal:      Right lower leg: No edema  Left lower leg: No edema  Neurological:      General: No focal deficit present  Mental Status: She is alert and oriented to person, place, and time  Mental status is at baseline  Psychiatric:         Mood and Affect: Mood normal          Behavior: Behavior normal          Thought Content:  Thought content normal          Judgment: Judgment normal

## 2021-03-10 NOTE — PATIENT INSTRUCTIONS
Type 2 Diabetes in the Older Adult   WHAT YOU NEED TO KNOW:   The risk for type 2 diabetes increases as a person gets older  Type 2 diabetes means your pancreas does not make enough insulin, or your body does not use insulin well  Insulin helps move sugar out of the blood so it can be used for energy  Diabetes cannot be cured, but it can be managed  DISCHARGE INSTRUCTIONS:   Call or have someone close to you call your local emergency number (911 in the [address] [facility]) for any of the following:   · You have any of the following signs of a stroke:      ? Numbness or drooping on one side of your face     ? Weakness in an arm or leg    ? Confusion or difficulty speaking    ? Dizziness, a severe headache, or vision loss    · You have any of the following signs of a heart attack:      ? Squeezing, pressure, or pain in your chest    ? You may  also have any of the following:     § Discomfort or pain in your back, neck, jaw, stomach, or arm    § Shortness of breath    § Nausea or vomiting    § Lightheadedness or a sudden cold sweat    Return to the emergency department if:   · Your blood sugar level is higher than your goal and does not come down with treatment  · You have signs of a high blood sugar level, such as blurred or double vision  · You have signs of a high ketone level, such as fruity, sweet smelling breath, or shallow breathing  · You have symptoms of a low blood sugar level, such as trouble thinking, sweating, or a pounding heartbeat  · Your blood sugar level is lower than normal and does not improve with treatment  Call your doctor or diabetes care team if:   · You are vomiting or have diarrhea  · You have an upset stomach and cannot eat the foods on your meal plan  · You feel weak or more tired than usual     · You feel dizzy, have headaches, or are easily irritated  · Your skin is red, warm, dry, or swollen      · You have a wound that does not heal     · You have numbness in your arms or legs     · You have trouble coping with diabetes, or you feel anxious or depressed  · You have problems with your memory  · You have changes in your vision  · You have questions or concerns about your condition or care  Manage diabetes and prevent problems:  Sometimes type 2 diabetes can be managed with changes in nutrition and physical activity  · Work with your diabetes care team to create plans to meet your needs  Your diabetes care team may include a physician, nurse practitioner, and physician assistant  It may also include a diabetes nurse educator, dietitian, and an exercise specialist  Family members, or others who are close to you, may also be part of the team  You and your team will make goals and plans to manage diabetes and other health problems  For example, the plan will include how to manage medicines you may take for diabetes and for other health conditions  The plans and goals will be specific to your needs and abilities  Your plan will change as your needs and abilities change  · Manage other health issues as directed  Health issues may include high blood pressure, high cholesterol levels, and heart problems  Health issues may also include depression  Together you and your care team can create a plan to manage any other health issues  · Try to be physically active for 30 to 60 minutes most days of the week  Physical activity, such as exercise, helps keep your blood sugar level steady and lowers your risk for heart disease  Physical activity can help improve your balance and strength and lower your risk for falls  Start slowly  Activity can be done in 10-minute intervals  ? Set a goal for 30 minutes of aerobic activity at least 5 times a week  Aerobic activity helps your heart stay strong  Aerobic activity includes walking, bicycling, dancing, swimming, and raking leaves  ? Set a goal for strength training 2 times a week    Strength training helps you keep the muscles you have and build new muscles  Strength training includes lifting weights, climbing stairs, and doing yoga or esa chi     ? Stay steady on your on your feet with balancing activities  These include walking backwards, standing on one foot, and walking heel to toe in a straight line  · Maintain a healthy weight  Ask your provider what a healthy weight is for you  A healthy weight can help you control diabetes and prevent heart disease  Ask your provider to help you create a weight loss plan if you are overweight  Weight loss of 10 to 15 pounds can help make a difference in managing diabetes  Together you and your care team can set manageable weight loss goals  · Know the risks if you choose to drink alcohol  Alcohol can cause your blood sugar levels to be low if you use insulin  Alcohol can cause high blood sugar levels and weight gain if you drink too much  Women 21 years or older and men 72 years or older should limit alcohol to 1 drink a day  Men 21 to 64 years should limit alcohol to 2 drinks a day  A drink of alcohol is 12 ounces of beer, 5 ounces of wine, or 1½ ounces of liquor  · Do not smoke  Nicotine and other chemicals in cigarettes can cause lung disease and other health problems  It can also cause blood vessel damage that makes diabetes more difficult to manage  Ask your healthcare provider for information if you currently smoke and need help to quit  Do not use e-cigarettes or smokeless tobacco in place of cigarettes or to help you quit  They still contain nicotine  Diabetes education:  Diabetes education will start right away  Members of your care team teach you, your family, and caregivers the following:  · How to check your blood sugar level: You will learn when to check your blood sugar level and what the level should be  You will learn what to do if your level is too high or too low  Write down the times of your checks and your levels   Take them to all follow-up appointments  · About diabetes medicine: You and your family members will be taught how to draw up and give insulin, if needed  You will learn how much insulin you need and what time to inject insulin  You will be taught when not to give insulin  Your team will also teach you how to dispose of needles and syringes  If you need oral diabetes medicine, you will be taught about side effects  You will also be taught when to take or not take the medicine  · About nutrition:  A dietitian will help you make a meal plan to keep your blood sugar level steady  You will learn how food affects your blood sugar levels  You will also learn to keep track of sugar and starchy foods (carbohydrates)  Do not skip meals  Your blood sugar level may drop too low if you have taken insulin and do not eat  · How to prevent complications:  Diabetes that is not well controlled can lead to health problems  Examples include foot sores, retinopathy (vision loss), and peripheral neuropathy (loss of feeling in your hands and feet)  Your team will help you know when to get regular checkups, such as vision checks  They will teach you how to watch for problems and when to get a problem checked  Other ways to manage diabetes:   · Check your feet every day for sores  Look at your whole foot, including the bottom, and between and under your toes  Check for wounds, corns, and calluses  Use a mirror to see the bottom of your feet  The skin on your feet may be shiny, tight, dry, or darker than normal  Your feet may also be cold and pale  Feel your feet by running your hands along the tops, bottoms, sides, and between your toes  Redness, swelling, and warmth are signs of blood flow problems that can lead to a foot ulcer  Do not try to remove corns or calluses yourself  · Wear medical alert identification  Wear medical alert jewelry or carry a card that says you have type 2 diabetes   Ask your healthcare provider where to get these items          · Ask about vaccines  You have a higher risk for serious illness if you get the flu, pneumonia, or hepatitis  Ask your healthcare provider if you should get a flu, pneumonia, shingles, or hepatitis B vaccine, and when to get the vaccine  · Get help from family and friends  You may need help checking your blood sugar level, giving insulin injections, or preparing your meals  You may also need help to check your feet for sores  Ask your family and friends to help you with these tasks  Talk to your care team if you need someone at home to help you  Follow up with your doctor or diabetes care team as directed: You will need to return to meet with different care team members  You may need tests to monitor for problems  Write down your questions so you remember to ask them during your visits  © Copyright [address] Information is for End User's use only and may not be sold, redistributed or otherwise used for commercial purposes  All illustrations and images included in CareNotes® are the copyrighted property of A D A M [employer] Inc  or [facility] Janna Boyle  The above information is an  only  It is not intended as medical advice for individual conditions or treatments  Talk to your doctor, nurse or pharmacist before following any medical regimen to see if it is safe and effective for you

## 2021-03-24 ENCOUNTER — DOCUMENTATION (OUTPATIENT)
Dept: INTERNAL MEDICINE CLINIC | Facility: CLINIC | Age: 61
End: 2021-03-24

## 2021-03-24 ENCOUNTER — TELEPHONE (OUTPATIENT)
Dept: INTERNAL MEDICINE CLINIC | Facility: CLINIC | Age: 61
End: 2021-03-24

## 2021-03-24 NOTE — TELEPHONE ENCOUNTER
Spoke with patient  Readings:   127-am  132-am  122-am  130-am  132-am  All of these are fasting readings

## 2021-03-24 NOTE — TELEPHONE ENCOUNTER
Spoke with patient  Patient will call us back in two weeks with more readings-after increasing insulin

## 2021-03-24 NOTE — TELEPHONE ENCOUNTER
----- Message from Tawanda Pop DO sent at 3/10/2021  8:49 AM EST -----  Call  pt in two weeks and get sugar readings

## 2021-04-04 ENCOUNTER — HOSPITAL ENCOUNTER (EMERGENCY)
Facility: HOSPITAL | Age: 61
Discharge: HOME | End: 2021-04-04
Attending: EMERGENCY MEDICINE
Payer: COMMERCIAL

## 2021-04-04 ENCOUNTER — APPOINTMENT (EMERGENCY)
Dept: CT IMAGING | Facility: HOSPITAL | Age: 61
End: 2021-04-04
Payer: COMMERCIAL

## 2021-04-04 ENCOUNTER — APPOINTMENT (EMERGENCY)
Dept: RADIOLOGY | Facility: HOSPITAL | Age: 61
End: 2021-04-04
Payer: COMMERCIAL

## 2021-04-04 VITALS
DIASTOLIC BLOOD PRESSURE: 83 MMHG | RESPIRATION RATE: 18 BRPM | HEART RATE: 69 BPM | WEIGHT: 238.8 LBS | TEMPERATURE: 97.5 F | SYSTOLIC BLOOD PRESSURE: 175 MMHG | BODY MASS INDEX: 34.19 KG/M2 | OXYGEN SATURATION: 98 % | HEIGHT: 70 IN

## 2021-04-04 DIAGNOSIS — M25.469 SUPRAPATELLAR EFFUSION OF KNEE: Primary | ICD-10-CM

## 2021-04-04 DIAGNOSIS — M17.11 TRICOMPARTMENT OSTEOARTHRITIS OF RIGHT KNEE: ICD-10-CM

## 2021-04-04 PROCEDURE — 99284 EMERGENCY DEPT VISIT MOD MDM: CPT

## 2021-04-04 PROCEDURE — 99285 EMERGENCY DEPT VISIT HI MDM: CPT | Performed by: EMERGENCY MEDICINE

## 2021-04-04 PROCEDURE — 96372 THER/PROPH/DIAG INJ SC/IM: CPT

## 2021-04-04 PROCEDURE — 73700 CT LOWER EXTREMITY W/O DYE: CPT

## 2021-04-04 PROCEDURE — 73564 X-RAY EXAM KNEE 4 OR MORE: CPT

## 2021-04-04 RX ORDER — KETOROLAC TROMETHAMINE 30 MG/ML
30 INJECTION, SOLUTION INTRAMUSCULAR; INTRAVENOUS ONCE
Status: COMPLETED | OUTPATIENT
Start: 2021-04-04 | End: 2021-04-04

## 2021-04-04 RX ORDER — NAPROXEN 500 MG/1
500 TABLET ORAL 2 TIMES DAILY WITH MEALS
Qty: 10 TABLET | Refills: 0 | Status: SHIPPED | OUTPATIENT
Start: 2021-04-04 | End: 2021-07-27

## 2021-04-04 RX ADMIN — KETOROLAC TROMETHAMINE 30 MG: 30 INJECTION, SOLUTION INTRAMUSCULAR; INTRAVENOUS at 07:09

## 2021-04-04 NOTE — Clinical Note
Jw Cornelius was seen and treated in our emergency department on 4/4/2021  Diagnosis:     Emerald Ramirez    She may return on this date: 04/07/2021         If you have any questions or concerns, please don't hesitate to call        [address] [place] [address]    ______________________________           _______________          _______________  Hospital Representative                              Date                                Time
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
## 2021-04-05 DIAGNOSIS — E11.42 TYPE 2 DIABETES MELLITUS WITH DIABETIC POLYNEUROPATHY, WITH LONG-TERM CURRENT USE OF INSULIN (HCC): ICD-10-CM

## 2021-04-05 DIAGNOSIS — Z79.4 TYPE 2 DIABETES MELLITUS WITH DIABETIC POLYNEUROPATHY, WITH LONG-TERM CURRENT USE OF INSULIN (HCC): ICD-10-CM

## 2021-04-05 RX ORDER — INSULIN DETEMIR 100 [IU]/ML
INJECTION, SOLUTION SUBCUTANEOUS
Qty: 45 ML | Refills: 5 | Status: SHIPPED | OUTPATIENT
Start: 2021-04-05 | End: 2021-08-02 | Stop reason: SDUPTHER

## 2021-04-18 ENCOUNTER — APPOINTMENT (EMERGENCY)
Dept: CT IMAGING | Facility: HOSPITAL | Age: 61
End: 2021-04-18
Payer: COMMERCIAL

## 2021-04-18 ENCOUNTER — HOSPITAL ENCOUNTER (EMERGENCY)
Facility: HOSPITAL | Age: 61
Discharge: OTHER INSTITUTION | End: 2021-04-19
Attending: EMERGENCY MEDICINE | Admitting: EMERGENCY MEDICINE
Payer: COMMERCIAL

## 2021-04-18 ENCOUNTER — APPOINTMENT (EMERGENCY)
Dept: RADIOLOGY | Facility: HOSPITAL | Age: 61
End: 2021-04-18
Payer: COMMERCIAL

## 2021-04-18 DIAGNOSIS — T18.128A ESOPHAGEAL OBSTRUCTION DUE TO FOOD IMPACTION: Primary | ICD-10-CM

## 2021-04-18 DIAGNOSIS — K22.2 ESOPHAGEAL OBSTRUCTION DUE TO FOOD IMPACTION: Primary | ICD-10-CM

## 2021-04-18 LAB
ANION GAP SERPL CALCULATED.3IONS-SCNC: 11 MMOL/L (ref 4–13)
BASOPHILS # BLD AUTO: 0 THOUSANDS/ΜL (ref 0–0.1)
BASOPHILS NFR BLD AUTO: 1 % (ref 0–2)
BUN SERPL-MCNC: 19 MG/DL (ref 7–25)
CALCIUM SERPL-MCNC: 9.5 MG/DL (ref 8.6–10.5)
CHLORIDE SERPL-SCNC: 98 MMOL/L (ref 98–107)
CO2 SERPL-SCNC: 30 MMOL/L (ref 21–31)
CREAT SERPL-MCNC: 0.72 MG/DL (ref 0.6–1.2)
EOSINOPHIL # BLD AUTO: 0.1 THOUSAND/ΜL (ref 0–0.61)
EOSINOPHIL NFR BLD AUTO: 1 % (ref 0–5)
ERYTHROCYTE [DISTWIDTH] IN BLOOD BY AUTOMATED COUNT: 13.6 % (ref 11.5–14.5)
FLUAV RNA RESP QL NAA+PROBE: NEGATIVE
FLUBV RNA RESP QL NAA+PROBE: NEGATIVE
GFR SERPL CREATININE-BSD FRML MDRD: 91 ML/MIN/1.73SQ M
GLUCOSE SERPL-MCNC: 160 MG/DL (ref 65–99)
HCT VFR BLD AUTO: 38.2 % (ref 42–47)
HGB BLD-MCNC: 13.2 G/DL (ref 12–16)
LIPASE SERPL-CCNC: 55 U/L (ref 11–82)
LYMPHOCYTES # BLD AUTO: 1.8 THOUSANDS/ΜL (ref 0.6–4.47)
LYMPHOCYTES NFR BLD AUTO: 25 % (ref 21–51)
MAGNESIUM SERPL-MCNC: 2 MG/DL (ref 1.9–2.7)
MCH RBC QN AUTO: 29.1 PG (ref 26–34)
MCHC RBC AUTO-ENTMCNC: 34.5 G/DL (ref 31–37)
MCV RBC AUTO: 84 FL (ref 81–99)
MONOCYTES # BLD AUTO: 0.5 THOUSAND/ΜL (ref 0.17–1.22)
MONOCYTES NFR BLD AUTO: 6 % (ref 2–12)
NEUTROPHILS # BLD AUTO: 4.9 THOUSANDS/ΜL (ref 1.4–6.5)
NEUTS SEG NFR BLD AUTO: 67 % (ref 42–75)
PLATELET # BLD AUTO: 186 THOUSANDS/UL (ref 149–390)
PMV BLD AUTO: 9.4 FL (ref 8.6–11.7)
POTASSIUM SERPL-SCNC: 4 MMOL/L (ref 3.5–5.5)
RBC # BLD AUTO: 4.53 MILLION/UL (ref 3.9–5.2)
RSV RNA RESP QL NAA+PROBE: NEGATIVE
SARS-COV-2 RNA RESP QL NAA+PROBE: NEGATIVE
SODIUM SERPL-SCNC: 139 MMOL/L (ref 134–143)
TROPONIN I SERPL-MCNC: <0.03 NG/ML
WBC # BLD AUTO: 7.3 THOUSAND/UL (ref 4.8–10.8)

## 2021-04-18 PROCEDURE — 85025 COMPLETE CBC W/AUTO DIFF WBC: CPT | Performed by: EMERGENCY MEDICINE

## 2021-04-18 PROCEDURE — 96374 THER/PROPH/DIAG INJ IV PUSH: CPT

## 2021-04-18 PROCEDURE — 96375 TX/PRO/DX INJ NEW DRUG ADDON: CPT

## 2021-04-18 PROCEDURE — 93005 ELECTROCARDIOGRAM TRACING: CPT

## 2021-04-18 PROCEDURE — 80048 BASIC METABOLIC PNL TOTAL CA: CPT | Performed by: EMERGENCY MEDICINE

## 2021-04-18 PROCEDURE — 71045 X-RAY EXAM CHEST 1 VIEW: CPT

## 2021-04-18 PROCEDURE — 0241U HB NFCT DS VIR RESP RNA 4 TRGT: CPT | Performed by: EMERGENCY MEDICINE

## 2021-04-18 PROCEDURE — 99285 EMERGENCY DEPT VISIT HI MDM: CPT

## 2021-04-18 PROCEDURE — G1004 CDSM NDSC: HCPCS

## 2021-04-18 PROCEDURE — 83735 ASSAY OF MAGNESIUM: CPT | Performed by: EMERGENCY MEDICINE

## 2021-04-18 PROCEDURE — 99285 EMERGENCY DEPT VISIT HI MDM: CPT | Performed by: EMERGENCY MEDICINE

## 2021-04-18 PROCEDURE — 71250 CT THORAX DX C-: CPT

## 2021-04-18 PROCEDURE — 84484 ASSAY OF TROPONIN QUANT: CPT | Performed by: EMERGENCY MEDICINE

## 2021-04-18 PROCEDURE — 36415 COLL VENOUS BLD VENIPUNCTURE: CPT | Performed by: EMERGENCY MEDICINE

## 2021-04-18 PROCEDURE — 83690 ASSAY OF LIPASE: CPT | Performed by: EMERGENCY MEDICINE

## 2021-04-18 PROCEDURE — 74176 CT ABD & PELVIS W/O CONTRAST: CPT

## 2021-04-18 RX ORDER — ONDANSETRON 2 MG/ML
4 INJECTION INTRAMUSCULAR; INTRAVENOUS ONCE
Status: COMPLETED | OUTPATIENT
Start: 2021-04-18 | End: 2021-04-18

## 2021-04-18 RX ADMIN — GLUCAGON HYDROCHLORIDE 1 MG: KIT at 20:46

## 2021-04-18 RX ADMIN — ONDANSETRON 4 MG: 2 INJECTION INTRAMUSCULAR; INTRAVENOUS at 20:34

## 2021-04-18 NOTE — ED NOTES
Epigastric pain, pt states she can't swallow water as it comes back out       Timothy Menendez, RN  01/71/57 5785
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
## 2021-04-19 ENCOUNTER — HOSPITAL ENCOUNTER (OUTPATIENT)
Dept: GASTROENTEROLOGY | Facility: HOSPITAL | Age: 61
Setting detail: OUTPATIENT SURGERY
Discharge: HOME | End: 2021-04-19
Payer: COMMERCIAL

## 2021-04-19 ENCOUNTER — ANESTHESIA EVENT (EMERGENCY)
Dept: GASTROENTEROLOGY | Facility: HOSPITAL | Age: 61
End: 2021-04-19
Payer: COMMERCIAL

## 2021-04-19 ENCOUNTER — ANESTHESIA (EMERGENCY)
Dept: GASTROENTEROLOGY | Facility: HOSPITAL | Age: 61
End: 2021-04-19
Payer: COMMERCIAL

## 2021-04-19 ENCOUNTER — HOSPITAL ENCOUNTER (EMERGENCY)
Facility: HOSPITAL | Age: 61
Discharge: HOME | End: 2021-04-19
Attending: EMERGENCY MEDICINE
Payer: COMMERCIAL

## 2021-04-19 VITALS
OXYGEN SATURATION: 100 % | DIASTOLIC BLOOD PRESSURE: 78 MMHG | RESPIRATION RATE: 16 BRPM | SYSTOLIC BLOOD PRESSURE: 168 MMHG | HEART RATE: 60 BPM | TEMPERATURE: 97.8 F

## 2021-04-19 VITALS
HEIGHT: 70 IN | DIASTOLIC BLOOD PRESSURE: 67 MMHG | HEART RATE: 66 BPM | SYSTOLIC BLOOD PRESSURE: 136 MMHG | RESPIRATION RATE: 20 BRPM | WEIGHT: 237 LBS | TEMPERATURE: 97.7 F | OXYGEN SATURATION: 96 % | BODY MASS INDEX: 33.93 KG/M2

## 2021-04-19 VITALS
HEART RATE: 59 BPM | TEMPERATURE: 98.2 F | DIASTOLIC BLOOD PRESSURE: 70 MMHG | WEIGHT: 237 LBS | RESPIRATION RATE: 22 BRPM | BODY MASS INDEX: 33.93 KG/M2 | HEIGHT: 70 IN | SYSTOLIC BLOOD PRESSURE: 145 MMHG | OXYGEN SATURATION: 97 %

## 2021-04-19 DIAGNOSIS — T18.128A FOOD IMPACTION OF ESOPHAGUS, INITIAL ENCOUNTER: Primary | ICD-10-CM

## 2021-04-19 DIAGNOSIS — K21.00 GASTROESOPHAGEAL REFLUX DISEASE WITH ESOPHAGITIS WITHOUT HEMORRHAGE: ICD-10-CM

## 2021-04-19 DIAGNOSIS — T18.128A FOOD IMPACTION OF ESOPHAGUS, INITIAL ENCOUNTER: ICD-10-CM

## 2021-04-19 PROBLEM — Z95.5 S/P DRUG ELUTING CORONARY STENT PLACEMENT: Status: ACTIVE | Noted: 2021-04-19

## 2021-04-19 LAB
ATRIAL RATE: 76 BPM
GLUCOSE SERPL-MCNC: 104 MG/DL (ref 65–140)
GLUCOSE SERPL-MCNC: 109 MG/DL (ref 65–140)
P AXIS: 48 DEGREES
PR INTERVAL: 158 MS
QRS AXIS: -19 DEGREES
QRSD INTERVAL: 96 MS
QT INTERVAL: 430 MS
QTC INTERVAL: 483 MS
T WAVE AXIS: 64 DEGREES
VENTRICULAR RATE: 76 BPM

## 2021-04-19 PROCEDURE — 93010 ELECTROCARDIOGRAM REPORT: CPT | Performed by: INTERNAL MEDICINE

## 2021-04-19 PROCEDURE — 82948 REAGENT STRIP/BLOOD GLUCOSE: CPT

## 2021-04-19 PROCEDURE — 43235 EGD DIAGNOSTIC BRUSH WASH: CPT | Performed by: INTERNAL MEDICINE

## 2021-04-19 PROCEDURE — 99283 EMERGENCY DEPT VISIT LOW MDM: CPT | Performed by: NURSE PRACTITIONER

## 2021-04-19 PROCEDURE — 99284 EMERGENCY DEPT VISIT MOD MDM: CPT

## 2021-04-19 PROCEDURE — 96375 TX/PRO/DX INJ NEW DRUG ADDON: CPT

## 2021-04-19 PROCEDURE — 43450 DILATE ESOPHAGUS 1/MULT PASS: CPT | Performed by: INTERNAL MEDICINE

## 2021-04-19 PROCEDURE — 99285 EMERGENCY DEPT VISIT HI MDM: CPT | Performed by: PHYSICIAN ASSISTANT

## 2021-04-19 RX ORDER — OMEPRAZOLE 40 MG/1
40 CAPSULE, DELAYED RELEASE ORAL DAILY
Qty: 90 CAPSULE | Refills: 2 | Status: SHIPPED | OUTPATIENT
Start: 2021-04-19 | End: 2022-01-26 | Stop reason: SDUPTHER

## 2021-04-19 RX ORDER — PROPOFOL 10 MG/ML
INJECTION, EMULSION INTRAVENOUS AS NEEDED
Status: DISCONTINUED | OUTPATIENT
Start: 2021-04-19 | End: 2021-04-19

## 2021-04-19 RX ORDER — SODIUM CHLORIDE 9 MG/ML
125 INJECTION, SOLUTION INTRAVENOUS CONTINUOUS
Status: DISCONTINUED | OUTPATIENT
Start: 2021-04-19 | End: 2021-04-23 | Stop reason: HOSPADM

## 2021-04-19 RX ORDER — METOPROLOL TARTRATE 5 MG/5ML
5 INJECTION INTRAVENOUS ONCE
Status: COMPLETED | OUTPATIENT
Start: 2021-04-19 | End: 2021-04-19

## 2021-04-19 RX ADMIN — PROPOFOL 200 MG: 10 INJECTION, EMULSION INTRAVENOUS at 12:27

## 2021-04-19 RX ADMIN — METOPROLOL TARTRATE 5 MG: 5 INJECTION INTRAVENOUS at 01:44

## 2021-04-19 RX ADMIN — PROPOFOL 100 MG: 10 INJECTION, EMULSION INTRAVENOUS at 12:28

## 2021-04-19 RX ADMIN — SODIUM CHLORIDE 125 ML/HR: 0.9 INJECTION, SOLUTION INTRAVENOUS at 11:38

## 2021-04-19 NOTE — DISCHARGE INSTRUCTIONS
Upper Endoscopy   WHAT YOU NEED TO KNOW:   An upper endoscopy is also called an upper gastrointestinal (GI) endoscopy, or an esophagogastroduodenoscopy (EGD)  You may feel bloated, gassy, or have some abdominal discomfort after your procedure  Your throat may be sore for 24 to 36 hours  You may burp or pass gas from air that is still inside your body  DISCHARGE INSTRUCTIONS:   Call 911 if:   · You have sudden chest pain or trouble breathing  Seek care immediately if:   · You feel dizzy or faint  · You have trouble swallowing  · You have severe throat pain  · Your bowel movements are very dark or black  · Your abdomen is hard and firm and you have severe pain  · You vomit blood  Contact your healthcare provider if:   · You feel full or bloated and cannot burp or pass gas  · You have not had a bowel movement for 3 days after your procedure  · You have neck pain  · You have a fever or chills  · You have nausea or are vomiting  · You have a rash or hives  · You have questions or concerns about your endoscopy  Relieve a sore throat:  Suck on throat lozenges or crushed ice  Gargle with a small amount of warm salt water  Mix 1 teaspoon of salt and 1 cup of warm water to make salt water  Relieve gas and discomfort from bloating:  Lie on your right side with a heating pad on your abdomen  Take short walks to help pass gas  Eat small meals until bloating is relieved  Rest after your procedure:  Do not drive or make important decisions until the day after your procedure  Return to your normal activity as directed  You can usually return to work the day after your procedure  Follow up with your healthcare provider as directed:  Write down your questions so you remember to ask them during your visits  © Copyright [address] Information is for End User's use only and may not be sold, redistributed or otherwise used for commercial purposes   All illustrations and images included in CareNotes® are the copyrighted property of A D A M , Inc  or Robel Raines   The above information is an  only  It is not intended as medical advice for individual conditions or treatments  Talk to your doctor, nurse or pharmacist before following any medical regimen to see if it is safe and effective for you

## 2021-04-19 NOTE — ANESTHESIA PREPROCEDURE EVALUATION
Procedure:  EGD    Relevant Problems   CARDIO   (+) Coronary artery disease of native artery of native heart with stable angina pectoris ([address] )   (+) Essential hypertension   (+) Mixed hyperlipidemia   (+) NSTEMI (non-ST elevated myocardial infarction) (HCC)      ENDO   (+) Type 2 diabetes mellitus with diabetic polyneuropathy, with long-term current use of insulin (HCC)      MUSCULOSKELETAL   (+) Primary osteoarthritis involving multiple joints      NEURO/PSYCH   (+) History of breast cancer      Other   (+) Other specified glaucoma   (+) Primary open angle glaucoma (POAG) of both eyes        Physical Exam    Airway    Mallampati score: II  TM Distance: >3 FB  Neck ROM: full     Dental   upper dentures,     Cardiovascular  Rhythm: regular, Rate: normal, Cardiovascular exam normal    Pulmonary  Pulmonary exam normal Breath sounds clear to auscultation,     Other Findings        Anesthesia Plan  ASA Score- 3 Emergent    Anesthesia Type- IV sedation with anesthesia with ASA Monitors  Additional Monitors:   Airway Plan:     Comment: Not drooling or having trouble managing secretions presently  Dr Paulina Lee will look(with IV sedation) initially  GETA prn  Pt understands and agrees with plan          Plan Factors-    Chart reviewed  EKG reviewed  Existing labs reviewed  Patient summary reviewed  Patient is not a current smoker  Patient instructed to abstain from smoking on day of procedure  Patient did not smoke on day of surgery  Induction- intravenous  Postoperative Plan-     Informed Consent- Anesthetic plan and risks discussed with patient
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
## 2021-04-19 NOTE — EMTALA/ACUTE CARE TRANSFER
[facility]  [address] [address]  [phone]  Dept: [phone]      EMTALA TRANSFER CONSENT    NAME Breann Guzman                                         1960                              MRN [id number]    I have been informed of my rights regarding examination, treatment, and transfer   by Dr Larry Kuo,     Benefits:      Risks:        Consent for Transfer:  I acknowledge that my medical condition has been evaluated and explained to me by the emergency department physician or other qualified medical person and/or my attending physician, who has recommended that I be transferred to the service of    at    The above potential benefits of such transfer, the potential risks associated with such transfer, and the probable risks of not being transferred have been explained to me, and I fully understand them  The doctor has explained that, in my case, the benefits of transfer outweigh the risks  I agree to be transferred  I authorize the performance of emergency medical procedures and treatments upon me in both transit and upon arrival at the receiving facility  Additionally, I authorize the release of any and all medical records to the receiving facility and request they be transported with me, if possible  I understand that the safest mode of transportation during a medical emergency is an ambulance and that the Hospital advocates the use of this mode of transport  Risks of traveling to the receiving facility by car, including absence of medical control, life sustaining equipment, such as oxygen, and medical personnel has been explained to me and I fully understand them  (EDVIN CORRECT BOX BELOW)  [  ]  I consent to the stated transfer and to be transported by ambulance/helicopter    [  ]  I consent to the stated transfer, but refuse transportation by ambulance and accept full responsibility for my transportation by car   I understand the risks of non-ambulance transfers and I exonerate the Hospital and its staff from any deterioration in my condition that results from this refusal     X___________________________________________    DATE  21  TIME________  Signature of patient or legally responsible individual signing on patient behalf           RELATIONSHIP TO PATIENT_________________________          Provider Certification    NAME Jose Geiger                                         1960                              MRN [id number]    A medical screening exam was performed on the above named patient  Based on the examination:    Condition Necessitating Transfer The encounter diagnosis was Esophageal obstruction due to food impaction  Patient Condition:      Reason for Transfer:      Transfer Requirements: Facility     · Space available and qualified personnel available for treatment as acknowledged by    · Agreed to accept transfer and to provide appropriate medical treatment as acknowledged by          · Appropriate medical records of the examination and treatment of the patient are provided at the time of transfer   [address], [address] _______  · Transfer will be performed by qualified personnel from    and appropriate transfer equipment as required, including the use of necessary and appropriate life support measures      Provider Certification: I have examined the patient and explained the following risks and benefits of being transferred/refusing transfer to the patient/family:         Based on these reasonable risks and benefits to the patient and/or the unborn child(donis), and based upon the information available at the time of the patients examination, I certify that the medical benefits reasonably to be expected from the provision of appropriate medical treatments at another medical facility outweigh the increasing risks, if any, to the individuals medical condition, and in the case of labor to the unborn child, from effecting the transfer      X____________________________________________ DATE 04/19/21        TIME_______      ORIGINAL - SEND TO MEDICAL RECORDS   COPY - SEND WITH PATIENT DURING TRANSFER

## 2021-06-21 DIAGNOSIS — Z79.4 TYPE 2 DIABETES MELLITUS WITH DIABETIC POLYNEUROPATHY, WITH LONG-TERM CURRENT USE OF INSULIN (HCC): ICD-10-CM

## 2021-06-21 DIAGNOSIS — E11.9 ENCOUNTER FOR DIABETIC FOOT EXAM (HCC): ICD-10-CM

## 2021-06-21 DIAGNOSIS — E11.42 TYPE 2 DIABETES MELLITUS WITH DIABETIC POLYNEUROPATHY, WITH LONG-TERM CURRENT USE OF INSULIN (HCC): ICD-10-CM

## 2021-06-21 RX ORDER — EMPAGLIFLOZIN 25 MG/1
TABLET, FILM COATED ORAL
Qty: 90 TABLET | Refills: 1 | Status: SHIPPED | OUTPATIENT
Start: 2021-06-21 | End: 2021-07-07 | Stop reason: SDUPTHER

## 2021-06-22 ENCOUNTER — OFFICE VISIT (OUTPATIENT)
Dept: INTERNAL MEDICINE CLINIC | Facility: CLINIC | Age: 61
End: 2021-06-22
Payer: COMMERCIAL

## 2021-06-22 VITALS
WEIGHT: 235.13 LBS | DIASTOLIC BLOOD PRESSURE: 70 MMHG | BODY MASS INDEX: 33.74 KG/M2 | SYSTOLIC BLOOD PRESSURE: 130 MMHG | HEART RATE: 69 BPM | OXYGEN SATURATION: 96 % | TEMPERATURE: 96.2 F

## 2021-06-22 DIAGNOSIS — E11.42 TYPE 2 DIABETES MELLITUS WITH DIABETIC POLYNEUROPATHY, WITH LONG-TERM CURRENT USE OF INSULIN (HCC): Primary | ICD-10-CM

## 2021-06-22 DIAGNOSIS — E11.42 TYPE 2 DIABETES MELLITUS WITH DIABETIC POLYNEUROPATHY, WITH LONG-TERM CURRENT USE OF INSULIN (HCC): ICD-10-CM

## 2021-06-22 DIAGNOSIS — E78.2 MIXED HYPERLIPIDEMIA: ICD-10-CM

## 2021-06-22 DIAGNOSIS — Z79.4 TYPE 2 DIABETES MELLITUS WITH DIABETIC POLYNEUROPATHY, WITH LONG-TERM CURRENT USE OF INSULIN (HCC): ICD-10-CM

## 2021-06-22 DIAGNOSIS — G62.9 NEUROPATHY: ICD-10-CM

## 2021-06-22 DIAGNOSIS — M15.9 PRIMARY OSTEOARTHRITIS INVOLVING MULTIPLE JOINTS: ICD-10-CM

## 2021-06-22 DIAGNOSIS — I25.118 CORONARY ARTERY DISEASE OF NATIVE ARTERY OF NATIVE HEART WITH STABLE ANGINA PECTORIS (HCC): ICD-10-CM

## 2021-06-22 DIAGNOSIS — Z11.59 NEED FOR HEPATITIS C SCREENING TEST: ICD-10-CM

## 2021-06-22 DIAGNOSIS — Z79.4 TYPE 2 DIABETES MELLITUS WITH DIABETIC POLYNEUROPATHY, WITH LONG-TERM CURRENT USE OF INSULIN (HCC): Primary | ICD-10-CM

## 2021-06-22 DIAGNOSIS — I10 ESSENTIAL HYPERTENSION: ICD-10-CM

## 2021-06-22 LAB — SL AMB POCT HEMOGLOBIN AIC: 8 (ref ?–6.5)

## 2021-06-22 PROCEDURE — 3075F SYST BP GE 130 - 139MM HG: CPT | Performed by: INTERNAL MEDICINE

## 2021-06-22 PROCEDURE — 99214 OFFICE O/P EST MOD 30 MIN: CPT | Performed by: INTERNAL MEDICINE

## 2021-06-22 PROCEDURE — 1036F TOBACCO NON-USER: CPT | Performed by: INTERNAL MEDICINE

## 2021-06-22 PROCEDURE — 3078F DIAST BP <80 MM HG: CPT | Performed by: INTERNAL MEDICINE

## 2021-06-22 PROCEDURE — 3725F SCREEN DEPRESSION PERFORMED: CPT | Performed by: INTERNAL MEDICINE

## 2021-06-22 PROCEDURE — 83036 HEMOGLOBIN GLYCOSYLATED A1C: CPT | Performed by: INTERNAL MEDICINE

## 2021-06-22 PROCEDURE — 3052F HG A1C>EQUAL 8.0%<EQUAL 9.0%: CPT | Performed by: INTERNAL MEDICINE

## 2021-06-22 RX ORDER — DULAGLUTIDE 1.5 MG/.5ML
3 INJECTION, SOLUTION SUBCUTANEOUS WEEKLY
Qty: 12 ML | Refills: 1 | Status: SHIPPED | OUTPATIENT
Start: 2021-06-22 | End: 2021-10-27

## 2021-06-22 RX ORDER — GABAPENTIN 100 MG/1
CAPSULE ORAL
Qty: 180 CAPSULE | Refills: 1 | Status: SHIPPED | OUTPATIENT
Start: 2021-06-22 | End: 2021-10-27 | Stop reason: SDUPTHER

## 2021-06-22 NOTE — PATIENT INSTRUCTIONS
Type 2 Diabetes in the Older Adult   WHAT YOU NEED TO KNOW:   The risk for type 2 diabetes increases as a person gets older  Type 2 diabetes means your pancreas does not make enough insulin, or your body does not use insulin well  Insulin helps move sugar out of the blood so it can be used for energy  Diabetes cannot be cured, but it can be managed  DISCHARGE INSTRUCTIONS:   Call or have someone close to you call your local emergency number (911 in the [address] [facility]) for any of the following:   · You have any of the following signs of a stroke:      ? Numbness or drooping on one side of your face     ? Weakness in an arm or leg    ? Confusion or difficulty speaking    ? Dizziness, a severe headache, or vision loss    · You have any of the following signs of a heart attack:      ? Squeezing, pressure, or pain in your chest    ? You may  also have any of the following:     § Discomfort or pain in your back, neck, jaw, stomach, or arm    § Shortness of breath    § Nausea or vomiting    § Lightheadedness or a sudden cold sweat    Return to the emergency department if:   · Your blood sugar level is higher than your goal and does not come down with treatment  · You have signs of a high blood sugar level, such as blurred or double vision  · You have signs of a high ketone level, such as fruity, sweet smelling breath, or shallow breathing  · You have symptoms of a low blood sugar level, such as trouble thinking, sweating, or a pounding heartbeat  · Your blood sugar level is lower than normal and does not improve with treatment  Call your doctor or diabetes care team if:   · You are vomiting or have diarrhea  · You have an upset stomach and cannot eat the foods on your meal plan  · You feel weak or more tired than usual     · You feel dizzy, have headaches, or are easily irritated  · Your skin is red, warm, dry, or swollen      · You have a wound that does not heal     · You have numbness in your arms or legs     · You have trouble coping with diabetes, or you feel anxious or depressed  · You have problems with your memory  · You have changes in your vision  · You have questions or concerns about your condition or care  Manage diabetes and prevent problems:  Sometimes type 2 diabetes can be managed with changes in nutrition and physical activity  · Work with your diabetes care team to create plans to meet your needs  Your diabetes care team may include a physician, nurse practitioner, and physician assistant  It may also include a diabetes nurse educator, dietitian, and an exercise specialist  Family members, or others who are close to you, may also be part of the team  You and your team will make goals and plans to manage diabetes and other health problems  For example, the plan will include how to manage medicines you may take for diabetes and for other health conditions  The plans and goals will be specific to your needs and abilities  Your plan will change as your needs and abilities change  · Manage other health issues as directed  Health issues may include high blood pressure, high cholesterol levels, and heart problems  Health issues may also include depression  Together you and your care team can create a plan to manage any other health issues  · Try to be physically active for 30 to 60 minutes most days of the week  Physical activity, such as exercise, helps keep your blood sugar level steady and lowers your risk for heart disease  Physical activity can help improve your balance and strength and lower your risk for falls  Start slowly  Activity can be done in 10-minute intervals  ? Set a goal for 30 minutes of aerobic activity at least 5 times a week  Aerobic activity helps your heart stay strong  Aerobic activity includes walking, bicycling, dancing, swimming, and raking leaves  ? Set a goal for strength training 2 times a week    Strength training helps you keep the muscles you have and build new muscles  Strength training includes lifting weights, climbing stairs, and doing yoga or esa chi     ? Stay steady on your on your feet with balancing activities  These include walking backwards, standing on one foot, and walking heel to toe in a straight line  · Maintain a healthy weight  Ask your provider what a healthy weight is for you  A healthy weight can help you control diabetes and prevent heart disease  Ask your provider to help you create a weight loss plan if you are overweight  Weight loss of 10 to 15 pounds can help make a difference in managing diabetes  Together you and your care team can set manageable weight loss goals  · Know the risks if you choose to drink alcohol  Alcohol can cause your blood sugar levels to be low if you use insulin  Alcohol can cause high blood sugar levels and weight gain if you drink too much  Women 21 years or older and men 72 years or older should limit alcohol to 1 drink a day  Men 21 to 64 years should limit alcohol to 2 drinks a day  A drink of alcohol is 12 ounces of beer, 5 ounces of wine, or 1½ ounces of liquor  · Do not smoke  Nicotine and other chemicals in cigarettes can cause lung disease and other health problems  It can also cause blood vessel damage that makes diabetes more difficult to manage  Ask your healthcare provider for information if you currently smoke and need help to quit  Do not use e-cigarettes or smokeless tobacco in place of cigarettes or to help you quit  They still contain nicotine  Diabetes education:  Diabetes education will start right away  Members of your care team teach you, your family, and caregivers the following:  · How to check your blood sugar level: You will learn when to check your blood sugar level and what the level should be  You will learn what to do if your level is too high or too low  Write down the times of your checks and your levels   Take them to all follow-up appointments  · About diabetes medicine: You and your family members will be taught how to draw up and give insulin, if needed  You will learn how much insulin you need and what time to inject insulin  You will be taught when not to give insulin  Your team will also teach you how to dispose of needles and syringes  If you need oral diabetes medicine, you will be taught about side effects  You will also be taught when to take or not take the medicine  · About nutrition:  A dietitian will help you make a meal plan to keep your blood sugar level steady  You will learn how food affects your blood sugar levels  You will also learn to keep track of sugar and starchy foods (carbohydrates)  Do not skip meals  Your blood sugar level may drop too low if you have taken insulin and do not eat  · How to prevent complications:  Diabetes that is not well controlled can lead to health problems  Examples include foot sores, retinopathy (vision loss), and peripheral neuropathy (loss of feeling in your hands and feet)  Your team will help you know when to get regular checkups, such as vision checks  They will teach you how to watch for problems and when to get a problem checked  Other ways to manage diabetes:   · Check your feet every day for sores  Look at your whole foot, including the bottom, and between and under your toes  Check for wounds, corns, and calluses  Use a mirror to see the bottom of your feet  The skin on your feet may be shiny, tight, dry, or darker than normal  Your feet may also be cold and pale  Feel your feet by running your hands along the tops, bottoms, sides, and between your toes  Redness, swelling, and warmth are signs of blood flow problems that can lead to a foot ulcer  Do not try to remove corns or calluses yourself  · Wear medical alert identification  Wear medical alert jewelry or carry a card that says you have type 2 diabetes   Ask your healthcare provider where to get these items          · Ask about vaccines  You have a higher risk for serious illness if you get the flu, pneumonia, or hepatitis  Ask your healthcare provider if you should get a flu, pneumonia, shingles, or hepatitis B vaccine, and when to get the vaccine  · Get help from family and friends  You may need help checking your blood sugar level, giving insulin injections, or preparing your meals  You may also need help to check your feet for sores  Ask your family and friends to help you with these tasks  Talk to your care team if you need someone at home to help you  Follow up with your doctor or diabetes care team as directed: You will need to return to meet with different care team members  You may need tests to monitor for problems  Write down your questions so you remember to ask them during your visits  © Copyright [address] Information is for End User's use only and may not be sold, redistributed or otherwise used for commercial purposes  All illustrations and images included in CareNotes® are the copyrighted property of A D A M [employer] Inc  or [facility] Janna Boyle  The above information is an  only  It is not intended as medical advice for individual conditions or treatments  Talk to your doctor, nurse or pharmacist before following any medical regimen to see if it is safe and effective for you

## 2021-06-22 NOTE — PROGRESS NOTES
Assessment/Plan:  Problem List Items Addressed This Visit        Endocrine    Type 2 diabetes mellitus with diabetic polyneuropathy, with long-term current use of insulin (HCC) - Primary    Relevant Medications    Dulaglutide (Trulicity) 1 5 TD/0 8DC SOPN    Other Relevant Orders    Lipid Panel with Direct LDL reflex    Microalbumin / creatinine urine ratio    TSH, 3rd generation with Free T4 reflex    POCT hemoglobin A1c (Completed)       Cardiovascular and Mediastinum    Essential hypertension    Coronary artery disease of native artery of native heart with stable angina pectoris (HCC)    Relevant Orders    Lipid Panel with Direct LDL reflex    Hepatic function panel       Nervous and Auditory    Neuropathy       Musculoskeletal and Integument    Primary osteoarthritis involving multiple joints       Other    Mixed hyperlipidemia    Relevant Orders    Hepatic function panel      Other Visit Diagnoses     Need for hepatitis C screening test        Relevant Orders    Hepatitis C antibody           Diagnoses and all orders for this visit:    Type 2 diabetes mellitus with diabetic polyneuropathy, with long-term current use of insulin (HCC)  -     Lipid Panel with Direct LDL reflex; Future  -     Microalbumin / creatinine urine ratio  -     TSH, 3rd generation with Free T4 reflex; Future  -     POCT hemoglobin A1c  -     Dulaglutide (Trulicity) 1 5 QQ/8 9MQ SOPN; Inject 1 mL (3 mg total) under the skin once a week 1 5 ml once a week    Coronary artery disease of native artery of native heart with stable angina pectoris (HCC)  -     Lipid Panel with Direct LDL reflex; Future  -     Hepatic function panel; Future    Essential hypertension    Neuropathy    Primary osteoarthritis involving multiple joints    Mixed hyperlipidemia  -     Hepatic function panel; Future    Need for hepatitis C screening test  -     Hepatitis C antibody;  Future        No problem-specific Assessment & Plan notes found for this encounter  A/P: Doing ok and in office HgA1c was 8 0 and not at goal  ?if her machine needs to be calibrated  Will try to increase her trulicity to 3mg  Continue current treatment otherwise  RTC one month for f/u sugars  Subjective:      Patient ID: Brea Cao is a 64 y o  female  WF RTC for DM, CAD, etc  Doing well and no new issues  Remains active w/o difficulty and no falls  Sugars less than 140 and no low sugar events  Denies CP, SOB, palpitations, edema  Orthopnea or PND  Breast cancer in remission  Due for labs  The following portions of the patient's history were reviewed and updated as appropriate:   She has a past medical history of Arthritis, Cancer ([facility] [address] ), Cardiac disease, CHF (congestive heart failure) ([facility] [address] ), Coronary artery disease, Diabetes mellitus ([facility] ), Heartburn, Hypercholesteremia, Hyperlipidemia, Hypertension, and Neuropathy  ,  does not have any pertinent problems on file  ,   has a past surgical history that includes Breast surgery (Left); Knee surgery; Nose surgery; Mouth surgery; Foot surgery (Left); Tubal ligation; Ovarian cyst removal (Left); Cholecystectomy; pr shldr arthroscop,part acromioplas (Right, 5/16/2016); pr arthroscopy shoulder surgical biceps tenodesis (Right, 5/16/2016); Cardiac surgery; Tubal ligation; and Colonoscopy  ,  family history includes Esophageal cancer in her father; Leukemia in her father; Liver disease in her brother; Lung cancer in her family, father, and mother; Stomach cancer in her family; Thyroid disease in her mother  ,   reports that she quit smoking about 21 years ago  Her smoking use included cigarettes  She smoked 1 00 pack per day  She has never used smokeless tobacco  She reports previous alcohol use  She reports previous drug use ,  is allergic to codeine, iodinated diagnostic agents, iodine - food allergy, and penicillins     Current Outpatient Medications   Medication Sig Dispense Refill    amLODIPine (NORVASC) 5 mg tablet Take 1 tablet (5 mg total) by mouth daily 90 tablet 3    aspirin 81 MG tablet Take 81 mg by mouth daily   atorvastatin (LIPITOR) 40 mg tablet Take 1 tablet (40 mg total) by mouth daily with dinner 30 tablet 11    Continuous Blood Gluc  (Dexcom G5 Mobile ) GENE Use 4 (four) times a day 1 Device 0    Continuous Blood Gluc Sensor (FreeStyle Nelda 14 Day Sensor) MISC Use 1 patch every 14 (fourteen) days 8 each 1    Dulaglutide (Trulicity) 1 5 RH/0 9NA SOPN Inject 1 mL (3 mg total) under the skin once a week 1 5 ml once a week 12 mL 1    furosemide (LASIX) 40 mg tablet Take 1 tablet (40 mg total) by mouth daily 90 tablet 3    gabapentin (NEURONTIN) 100 mg capsule TAKE 1 CAPSULE BY MOUTH TWICE A  capsule 1    insulin aspart (NovoLOG FlexPen) 100 UNIT/ML injection pen PER SLIDING SCALE GIVEN TO PT WITH AC AND HS INJECTIONS  3 pen 3    insulin detemir (Levemir FlexTouch) 100 Units/mL injection pen 55 UNITS IN AM AND 70 UNITS IN PM 45 mL 5    Insulin Syringe-Needle U-100 31G X 15/64" 0 5 ML MISC by Does not apply route 2 (two) times a day 90 each 0    Jardiance 25 MG TABS TAKE 1 TABLET BY MOUTH EVERY DAY IN THE MORNING 90 tablet 1    losartan (COZAAR) 100 MG tablet Take 1 tablet (100 mg total) by mouth daily 90 tablet 3    metoprolol succinate (TOPROL-XL) 50 mg 24 hr tablet Take 1 tablet (50 mg total) by mouth daily 30 tablet 8    naproxen (NAPROSYN) 500 mg tablet Take 1 tablet (500 mg total) by mouth 2 (two) times a day with meals 10 tablet 0    omeprazole (PriLOSEC) 40 MG capsule Take 1 capsule (40 mg total) by mouth daily 90 capsule 2    ranolazine (RANEXA) 500 mg 12 hr tablet Take 1 tablet (500 mg total) by mouth 2 (two) times a day 180 tablet 3     No current facility-administered medications for this visit  Review of Systems   Constitutional: Negative for activity change, chills, diaphoresis, fatigue and fever  HENT: Negative  Eyes: Negative for visual disturbance  Respiratory: Negative for cough, chest tightness, shortness of breath and wheezing  Cardiovascular: Negative for chest pain, palpitations and leg swelling  Gastrointestinal: Negative for abdominal pain, constipation, diarrhea, nausea and vomiting  Endocrine: Negative for cold intolerance and heat intolerance  Genitourinary: Negative for difficulty urinating, dysuria and frequency  Musculoskeletal: Negative for arthralgias, gait problem and myalgias  Neurological: Negative for dizziness, seizures, syncope, weakness, light-headedness and headaches  Psychiatric/Behavioral: Negative for confusion, dysphoric mood and sleep disturbance  The patient is not nervous/anxious  PHQ-9 Depression Screening    PHQ-9:   Frequency of the following problems over the past two weeks:      Little interest or pleasure in doing things: 0 - not at all  Feeling down, depressed, or hopeless: 0 - not at all  PHQ-2 Score: 0        Objective:  Vitals:    06/22/21 1339   BP: 130/70   Pulse: 69   Temp: (!) 96 2 °F (35 7 °C)   SpO2: 96%   Weight: 107 kg (235 lb 2 oz)     Body mass index is 33 74 kg/m²  Physical Exam  Vitals and nursing note reviewed  Constitutional:       General: She is not in acute distress  Appearance: Normal appearance  She is not ill-appearing  HENT:      Head: Normocephalic and atraumatic  Mouth/Throat:      Mouth: Mucous membranes are moist    Eyes:      Extraocular Movements: Extraocular movements intact  Conjunctiva/sclera: Conjunctivae normal       Pupils: Pupils are equal, round, and reactive to light  Neck:      Vascular: No carotid bruit  Cardiovascular:      Rate and Rhythm: Normal rate and regular rhythm  Heart sounds: Normal heart sounds  Pulmonary:      Effort: Pulmonary effort is normal  No respiratory distress  Breath sounds: Normal breath sounds  No wheezing or rales  Abdominal:      General: Bowel sounds are normal  There is no distension  Palpations: Abdomen is soft  Tenderness: There is no abdominal tenderness  Musculoskeletal:      Cervical back: Neck supple  Right lower leg: No edema  Left lower leg: No edema  Neurological:      General: No focal deficit present  Mental Status: She is alert and oriented to person, place, and time  Mental status is at baseline  Psychiatric:         Mood and Affect: Mood normal          Behavior: Behavior normal          Thought Content:  Thought content normal          Judgment: Judgment normal

## 2021-06-23 DIAGNOSIS — E11.42 TYPE 2 DIABETES MELLITUS WITH DIABETIC POLYNEUROPATHY, WITH LONG-TERM CURRENT USE OF INSULIN (HCC): ICD-10-CM

## 2021-06-23 DIAGNOSIS — Z79.4 TYPE 2 DIABETES MELLITUS WITH DIABETIC POLYNEUROPATHY, WITH LONG-TERM CURRENT USE OF INSULIN (HCC): ICD-10-CM

## 2021-06-23 RX ORDER — DULAGLUTIDE 3 MG/.5ML
3 INJECTION, SOLUTION SUBCUTANEOUS WEEKLY
Qty: 6 ML | Refills: 1 | Status: SHIPPED | OUTPATIENT
Start: 2021-06-23 | End: 2022-01-26 | Stop reason: SDUPTHER

## 2021-07-02 ENCOUNTER — TELEPHONE (OUTPATIENT)
Dept: INTERNAL MEDICINE CLINIC | Facility: CLINIC | Age: 61
End: 2021-07-02

## 2021-07-02 NOTE — TELEPHONE ENCOUNTER
----- Message from Hanny Peck DO sent at 6/22/2021  2:10 PM EDT -----  Call pt in one week and see if she was able to get the new dose of trulicity

## 2021-07-07 DIAGNOSIS — Z79.4 TYPE 2 DIABETES MELLITUS WITH DIABETIC POLYNEUROPATHY, WITH LONG-TERM CURRENT USE OF INSULIN (HCC): ICD-10-CM

## 2021-07-07 DIAGNOSIS — E11.42 TYPE 2 DIABETES MELLITUS WITH DIABETIC POLYNEUROPATHY, WITH LONG-TERM CURRENT USE OF INSULIN (HCC): ICD-10-CM

## 2021-07-07 DIAGNOSIS — E11.9 ENCOUNTER FOR DIABETIC FOOT EXAM (HCC): ICD-10-CM

## 2021-07-07 RX ORDER — EMPAGLIFLOZIN 25 MG/1
25 TABLET, FILM COATED ORAL EVERY MORNING
Qty: 90 TABLET | Refills: 1 | Status: SHIPPED | OUTPATIENT
Start: 2021-07-07 | End: 2022-03-11

## 2021-07-07 NOTE — TELEPHONE ENCOUNTER
Patient did receive the medication  She just has not started it yet because she is going to finish up her older stuff first    And also she is going on vacation and does not want to start a new medication while being away but will start this medication soon

## 2021-07-22 ENCOUNTER — APPOINTMENT (OUTPATIENT)
Dept: LAB | Facility: HOSPITAL | Age: 61
End: 2021-07-22
Attending: INTERNAL MEDICINE
Payer: COMMERCIAL

## 2021-07-22 DIAGNOSIS — E11.42 TYPE 2 DIABETES MELLITUS WITH DIABETIC POLYNEUROPATHY, WITH LONG-TERM CURRENT USE OF INSULIN (HCC): ICD-10-CM

## 2021-07-22 DIAGNOSIS — Z11.59 NEED FOR HEPATITIS C SCREENING TEST: ICD-10-CM

## 2021-07-22 DIAGNOSIS — Z79.4 TYPE 2 DIABETES MELLITUS WITH DIABETIC POLYNEUROPATHY, WITH LONG-TERM CURRENT USE OF INSULIN (HCC): ICD-10-CM

## 2021-07-22 DIAGNOSIS — I25.118 CORONARY ARTERY DISEASE OF NATIVE ARTERY OF NATIVE HEART WITH STABLE ANGINA PECTORIS (HCC): ICD-10-CM

## 2021-07-22 DIAGNOSIS — I21.4 NSTEMI (NON-ST ELEVATED MYOCARDIAL INFARCTION) (HCC): ICD-10-CM

## 2021-07-22 DIAGNOSIS — E78.2 MIXED HYPERLIPIDEMIA: Primary | ICD-10-CM

## 2021-07-22 DIAGNOSIS — E78.2 MIXED HYPERLIPIDEMIA: ICD-10-CM

## 2021-07-22 LAB
ALBUMIN SERPL BCP-MCNC: 4.2 G/DL (ref 3.5–5.7)
ALP SERPL-CCNC: 64 U/L (ref 55–165)
ALT SERPL W P-5'-P-CCNC: 16 U/L (ref 7–52)
AST SERPL W P-5'-P-CCNC: 14 U/L (ref 13–39)
BILIRUB DIRECT SERPL-MCNC: 0.1 MG/DL (ref 0–0.2)
BILIRUB SERPL-MCNC: 0.7 MG/DL (ref 0.2–1)
CHOLEST SERPL-MCNC: 182 MG/DL (ref 0–200)
CREAT UR-MCNC: 114 MG/DL
HCV AB SER QL: NORMAL
HDLC SERPL-MCNC: 42 MG/DL
LDLC SERPL CALC-MCNC: 85 MG/DL (ref 0–100)
MICROALBUMIN UR-MCNC: 12.5 MG/L (ref 0–20)
MICROALBUMIN/CREAT 24H UR: 11 MG/G CREATININE (ref 0–30)
PROT SERPL-MCNC: 7 G/DL (ref 6.4–8.9)
TRIGL SERPL-MCNC: 277 MG/DL (ref 44–166)
TSH SERPL DL<=0.05 MIU/L-ACNC: 1.54 UIU/ML (ref 0.45–5.33)

## 2021-07-22 PROCEDURE — 3061F NEG MICROALBUMINURIA REV: CPT | Performed by: INTERNAL MEDICINE

## 2021-07-22 PROCEDURE — 84443 ASSAY THYROID STIM HORMONE: CPT

## 2021-07-22 PROCEDURE — 36415 COLL VENOUS BLD VENIPUNCTURE: CPT

## 2021-07-22 PROCEDURE — 82570 ASSAY OF URINE CREATININE: CPT | Performed by: INTERNAL MEDICINE

## 2021-07-22 PROCEDURE — 80061 LIPID PANEL: CPT

## 2021-07-22 PROCEDURE — 80076 HEPATIC FUNCTION PANEL: CPT

## 2021-07-22 PROCEDURE — 82043 UR ALBUMIN QUANTITATIVE: CPT | Performed by: INTERNAL MEDICINE

## 2021-07-22 PROCEDURE — 86803 HEPATITIS C AB TEST: CPT

## 2021-07-22 RX ORDER — ATORVASTATIN CALCIUM 80 MG/1
40 TABLET, FILM COATED ORAL
Qty: 90 TABLET | Refills: 1 | Status: SHIPPED | OUTPATIENT
Start: 2021-07-22 | End: 2021-07-27 | Stop reason: SDUPTHER

## 2021-07-22 RX ORDER — OMEGA-3-ACID ETHYL ESTERS 1 G/1
2 CAPSULE, LIQUID FILLED ORAL 2 TIMES DAILY
Qty: 180 CAPSULE | Refills: 0 | Status: SHIPPED | OUTPATIENT
Start: 2021-07-22 | End: 2021-11-10

## 2021-07-27 ENCOUNTER — OFFICE VISIT (OUTPATIENT)
Dept: INTERNAL MEDICINE CLINIC | Facility: CLINIC | Age: 61
End: 2021-07-27
Payer: COMMERCIAL

## 2021-07-27 VITALS
SYSTOLIC BLOOD PRESSURE: 128 MMHG | RESPIRATION RATE: 18 BRPM | BODY MASS INDEX: 33.64 KG/M2 | DIASTOLIC BLOOD PRESSURE: 70 MMHG | TEMPERATURE: 96.6 F | HEIGHT: 70 IN | OXYGEN SATURATION: 99 % | HEART RATE: 72 BPM | WEIGHT: 235 LBS

## 2021-07-27 DIAGNOSIS — E11.42 TYPE 2 DIABETES MELLITUS WITH DIABETIC POLYNEUROPATHY, WITH LONG-TERM CURRENT USE OF INSULIN (HCC): Primary | ICD-10-CM

## 2021-07-27 DIAGNOSIS — E78.2 MIXED HYPERLIPIDEMIA: ICD-10-CM

## 2021-07-27 DIAGNOSIS — I21.4 NSTEMI (NON-ST ELEVATED MYOCARDIAL INFARCTION) (HCC): ICD-10-CM

## 2021-07-27 DIAGNOSIS — Z79.4 TYPE 2 DIABETES MELLITUS WITH DIABETIC POLYNEUROPATHY, WITH LONG-TERM CURRENT USE OF INSULIN (HCC): Primary | ICD-10-CM

## 2021-07-27 PROCEDURE — 3074F SYST BP LT 130 MM HG: CPT | Performed by: INTERNAL MEDICINE

## 2021-07-27 PROCEDURE — 1036F TOBACCO NON-USER: CPT | Performed by: INTERNAL MEDICINE

## 2021-07-27 PROCEDURE — 99213 OFFICE O/P EST LOW 20 MIN: CPT | Performed by: INTERNAL MEDICINE

## 2021-07-27 PROCEDURE — 3008F BODY MASS INDEX DOCD: CPT | Performed by: INTERNAL MEDICINE

## 2021-07-27 PROCEDURE — 3078F DIAST BP <80 MM HG: CPT | Performed by: INTERNAL MEDICINE

## 2021-07-27 RX ORDER — ATORVASTATIN CALCIUM 80 MG/1
80 TABLET, FILM COATED ORAL
Qty: 90 TABLET | Refills: 1
Start: 2021-07-27 | End: 2022-01-05 | Stop reason: SDUPTHER

## 2021-07-27 NOTE — PATIENT INSTRUCTIONS
Type 2 Diabetes in the Older Adult   WHAT YOU NEED TO KNOW:   The risk for type 2 diabetes increases as a person gets older  Type 2 diabetes means your pancreas does not make enough insulin, or your body does not use insulin well  Insulin helps move sugar out of the blood so it can be used for energy  Diabetes cannot be cured, but it can be managed  DISCHARGE INSTRUCTIONS:   Call or have someone close to you call your local emergency number (911 in the [address] [facility]) for any of the following:   · You have any of the following signs of a stroke:      ? Numbness or drooping on one side of your face     ? Weakness in an arm or leg    ? Confusion or difficulty speaking    ? Dizziness, a severe headache, or vision loss    · You have any of the following signs of a heart attack:      ? Squeezing, pressure, or pain in your chest    ? You may  also have any of the following:     § Discomfort or pain in your back, neck, jaw, stomach, or arm    § Shortness of breath    § Nausea or vomiting    § Lightheadedness or a sudden cold sweat    Return to the emergency department if:   · Your blood sugar level is higher than your goal and does not come down with treatment  · You have signs of a high blood sugar level, such as blurred or double vision  · You have signs of a high ketone level, such as fruity, sweet smelling breath, or shallow breathing  · You have symptoms of a low blood sugar level, such as trouble thinking, sweating, or a pounding heartbeat  · Your blood sugar level is lower than normal and does not improve with treatment  Call your doctor or diabetes care team if:   · You are vomiting or have diarrhea  · You have an upset stomach and cannot eat the foods on your meal plan  · You feel weak or more tired than usual     · You feel dizzy, have headaches, or are easily irritated  · Your skin is red, warm, dry, or swollen      · You have a wound that does not heal     · You have numbness in your arms or legs     · You have trouble coping with diabetes, or you feel anxious or depressed  · You have problems with your memory  · You have changes in your vision  · You have questions or concerns about your condition or care  Diabetes education:  Diabetes education will start right away, if the diagnosis is new  You may need diabetes education at a later time to refresh your memory  Members of your care team can help you, your family, and caregivers with a plan for the following:  · How to check your blood sugar level: You will learn when to check your blood sugar level and what the level should be  You will learn what to do if your level is too high or too low  Write down the times of your checks and your levels  Take them to all follow-up appointments  · About diabetes medicine:  Oral diabetes medicine may be given to help control your blood sugar levels  Your healthcare provider will teach you how and when to take your diabetes medicine  You will also be taught when not to take the medicine  You will also be taught about side effects oral diabetes medicine can cause  · If you need insulin:  Insulin may be added if oral diabetes medicine becomes less effective over time  You and your family members will be taught how to draw up and give insulin, if needed  You will learn how much insulin you need and what time to inject insulin  You will be taught when not to give insulin  You will also be taught what to do if your blood sugar level drops too low  This may happen if you take insulin and do not eat the right amount of carbohydrates  Your team will also teach you how to dispose of needles and syringes  · About nutrition:  A dietitian will help you make a meal plan to keep your blood sugar level steady  You will learn why protein in your diet is important  You will learn how food affects your blood sugar levels  You will also learn to keep track of sugar and starchy foods (carbohydrates)   Do not skip meals  Your blood sugar level may drop too low if you have taken insulin and do not eat  · How to prevent complications:  Diabetes that is not well controlled can lead to health problems  Examples include foot sores, retinopathy (vision loss), and peripheral neuropathy (loss of feeling in your hands and feet)  Also, risk for dementia can increase when blood sugar levels that are too high or too low for long periods of time  Your team will help you know when to get regular checkups, such as vision checks  They will teach you how to watch for problems and when to get a problem checked  Manage diabetes and prevent problems:  Sometimes type 2 diabetes can be managed with changes in nutrition and physical activity  · Work with your diabetes care team to create plans to meet your needs  Your diabetes care team may include a physician, nurse practitioner, and physician assistant  It may also include a diabetes nurse educator, dietitian, and an exercise specialist  Family members, or others who are close to you, may also be part of the team  You and your team will make goals and plans to manage diabetes and other health problems  For example, the plan will include how to manage medicines you may take for diabetes and for other health conditions  The plans and goals will be specific to your needs and abilities  Your plan will change as your needs and abilities change  · Manage other health issues as directed  Health issues may include high blood pressure, high cholesterol levels, and heart problems  Health issues may also include depression  Together you and your care team can create a plan to manage any other health issues  · Try to be physically active for 30 to 60 minutes most days of the week  Physical activity, such as exercise, helps keep your blood sugar level steady and lowers your risk for heart disease  Physical activity can help improve your balance and strength and lower your risk for falls  Start slowly  Activity can be done in 10-minute intervals  ? Set a goal for 30 minutes of aerobic activity at least 5 times a week  Aerobic activity helps your heart stay strong  Aerobic activity includes walking, bicycling, dancing, swimming, and raking leaves  ? Set a goal for strength training 2 times a week  Strength training helps you keep the muscles you have and build new muscles  Strength training includes lifting weights, climbing stairs, and doing yoga or esa chi          ? Stay steady on your on your feet with balancing activities  These include walking backwards, standing on one foot, and walking heel to toe in a straight line  · Maintain a healthy weight  Ask your provider what a healthy weight is for you  A healthy weight can help you control diabetes and prevent heart disease  Ask your provider to help you create a weight loss plan if you are overweight  Weight loss of 10 to 15 pounds can help make a difference in managing diabetes  Together you and your care team can set manageable weight loss goals  · Know the risks if you choose to drink alcohol  Alcohol can cause your blood sugar levels to be low if you use insulin  Alcohol can cause high blood sugar levels and weight gain if you drink too much  Women 21 years or older and men 72 years or older should limit alcohol to 1 drink a day  Men 21 to 64 years should limit alcohol to 2 drinks a day  A drink of alcohol is 12 ounces of beer, 5 ounces of wine, or 1½ ounces of liquor  · Do not smoke  Nicotine and other chemicals in cigarettes can cause lung disease and other health problems  It can also cause blood vessel damage that makes diabetes more difficult to manage  Ask your healthcare provider for information if you currently smoke and need help to quit  Do not use e-cigarettes or smokeless tobacco in place of cigarettes or to help you quit  They still contain nicotine      Other ways to manage diabetes:   · Check your feet every day for sores  Look at your whole foot, including the bottom, and between and under your toes  Check for wounds, corns, and calluses  Use a mirror to see the bottom of your feet  The skin on your feet may be shiny, tight, dry, or darker than normal  Your feet may also be cold and pale  Feel your feet by running your hands along the tops, bottoms, sides, and between your toes  Redness, swelling, and warmth are signs of blood flow problems that can lead to a foot ulcer  Do not try to remove corns or calluses yourself  · Wear medical alert identification  Wear medical alert jewelry or carry a card that says you have type 2 diabetes  Ask your healthcare provider where to get these items  · Ask about vaccines  You have a higher risk for serious illness if you get the flu, pneumonia, or hepatitis  Ask your healthcare provider if you should get a flu, pneumonia, shingles, or hepatitis B vaccine, and when to get the vaccine  · Get help from family and friends  You may need help checking your blood sugar level, giving insulin injections, or preparing your meals  You may also need help to check your feet for sores  Ask your family and friends to help you with these tasks  Talk to your care team if you need someone at home to help you  Follow up with your doctor or diabetes care team as directed: You will need to return to meet with different care team members  You may need tests to monitor for problems  Write down your questions so you remember to ask them during your visits  Talk to your care team or doctor if you cannot afford your medicines  © Copyright [employer] 2021 Information is for End User's use only and may not be sold, redistributed or otherwise used for commercial purposes  All illustrations and images included in CareNotes® are the copyrighted property of [employer]  or [facility] Janna Raines   The above information is an  only   It is not intended as medical advice for individual conditions or treatments  Talk to your doctor, nurse or pharmacist before following any medical regimen to see if it is safe and effective for you

## 2021-07-27 NOTE — PROGRESS NOTES
Assessment/Plan:  Problem List Items Addressed This Visit        Endocrine    Type 2 diabetes mellitus with diabetic polyneuropathy, with long-term current use of insulin ([facility] [address] ) - Primary       Cardiovascular and Mediastinum    NSTEMI (non-ST elevated myocardial infarction) (HCC)    Relevant Medications    atorvastatin (LIPITOR) 80 mg tablet       Other    Mixed hyperlipidemia    Relevant Medications    atorvastatin (LIPITOR) 80 mg tablet           Diagnoses and all orders for this visit:    Type 2 diabetes mellitus with diabetic polyneuropathy, with long-term current use of insulin (HCC)    Mixed hyperlipidemia    NSTEMI (non-ST elevated myocardial infarction) (HCC)  -     atorvastatin (LIPITOR) 80 mg tablet; Take 1 tablet (80 mg total) by mouth daily with dinner        No problem-specific Assessment & Plan notes found for this encounter  A/P: Doing well and sugars appear to be improving  Will still like to pt increase her trulicity, but if looses weight and remains with increase activity, may need to back down  Discussed the need to remain hydrate and have a consistent schedule with meals and snacks  Pt to increase her statin to 80mg q day  RTC three months for routine  Subjective:      Patient ID: Akil Billingsley is a 64 y o  female  Diabetic [facility] RTC for f/u uncontrolled sugars after increasing her trulicity  Pt did not start the new dose as she wanted to use up her old script  Reports sugars are improving and only had to use the mealtime insulin based on a scale once  No low sugars  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  No new c/o's  The following portions of the patient's history were reviewed and updated as appropriate:   She has a past medical history of Arthritis, Cancer ([facility] [address] ), Cardiac disease, CHF (congestive heart failure) ([facility] [address] ), Coronary artery disease, Diabetes mellitus ([facility] [address] ), Heartburn, Hypercholesteremia, Hyperlipidemia, Hypertension, and Neuropathy  ,  does not have any pertinent problems on file  ,   has a past surgical history that includes Breast surgery (Left); Knee surgery; Nose surgery; Mouth surgery; Foot surgery (Left); Tubal ligation; Ovarian cyst removal (Left); Cholecystectomy; pr shldr arthroscop,part acromioplas (Right, 5/16/2016); pr arthroscopy shoulder surgical biceps tenodesis (Right, 5/16/2016); Cardiac surgery; Tubal ligation; and Colonoscopy  ,  family history includes Esophageal cancer in her father; Leukemia in her father; Liver disease in her brother; Lung cancer in her family, father, and mother; Stomach cancer in her family; Thyroid disease in her mother  ,   reports that she quit smoking about 21 years ago  Her smoking use included cigarettes  She smoked 1 00 pack per day  She has never used smokeless tobacco  She reports previous alcohol use  She reports previous drug use ,  is allergic to codeine, iodinated diagnostic agents, iodine - food allergy, and penicillins     Current Outpatient Medications   Medication Sig Dispense Refill    amLODIPine (NORVASC) 5 mg tablet Take 1 tablet (5 mg total) by mouth daily 90 tablet 3    aspirin 81 MG tablet Take 81 mg by mouth daily   atorvastatin (LIPITOR) 80 mg tablet Take 1 tablet (80 mg total) by mouth daily with dinner 90 tablet 1    Dulaglutide (Trulicity) 1 5 KY/0 5SK SOPN Inject 1 mL (3 mg total) under the skin once a week 1 5 ml once a week 12 mL 1    Dulaglutide (Trulicity) 3 MAG/2 1NL SOPN Inject 0 5 mL (3 mg total) under the skin once a week 6 mL 1    Empagliflozin (Jardiance) 25 MG TABS Take 1 tablet (25 mg total) by mouth every morning 90 tablet 1    furosemide (LASIX) 40 mg tablet Take 1 tablet (40 mg total) by mouth daily 90 tablet 3    gabapentin (NEURONTIN) 100 mg capsule TAKE 1 CAPSULE BY MOUTH TWICE A  capsule 1    insulin aspart (NovoLOG FlexPen) 100 UNIT/ML injection pen PER SLIDING SCALE GIVEN TO PT WITH AC AND HS INJECTIONS   3 pen 3    insulin detemir (Levemir FlexTouch) 100 Units/mL injection pen 55 UNITS IN AM AND 70 UNITS IN PM 45 mL 5    Insulin Syringe-Needle U-100 31G X 15/64" 0 5 ML MISC by Does not apply route 2 (two) times a day 90 each 0    losartan (COZAAR) 100 MG tablet Take 1 tablet (100 mg total) by mouth daily 90 tablet 3    metoprolol succinate (TOPROL-XL) 50 mg 24 hr tablet Take 1 tablet (50 mg total) by mouth daily 30 tablet 8    omega-3-acid ethyl esters (LOVAZA) 1 g capsule Take 2 capsules (2 g total) by mouth 2 (two) times a day 180 capsule 0    omeprazole (PriLOSEC) 40 MG capsule Take 1 capsule (40 mg total) by mouth daily 90 capsule 2    ranolazine (RANEXA) 500 mg 12 hr tablet Take 1 tablet (500 mg total) by mouth 2 (two) times a day 180 tablet 3     No current facility-administered medications for this visit  Review of Systems   Constitutional: Negative for activity change, chills, diaphoresis, fatigue and fever  HENT: Negative  Respiratory: Negative for cough, chest tightness, shortness of breath and wheezing  Cardiovascular: Negative for chest pain, palpitations and leg swelling  Gastrointestinal: Negative for abdominal pain, constipation, diarrhea, nausea and vomiting  Genitourinary: Negative for difficulty urinating, dysuria and frequency  Musculoskeletal: Negative for arthralgias, gait problem and myalgias  Neurological: Negative for dizziness, seizures, syncope, weakness, light-headedness and headaches  Psychiatric/Behavioral: Negative for confusion, dysphoric mood and sleep disturbance  The patient is not nervous/anxious          PHQ-9 Depression Screening    PHQ-9:   Frequency of the following problems over the past two weeks:            Objective:  Vitals:    07/27/21 1349   BP: 128/70   BP Location: Right arm   Patient Position: Sitting   Cuff Size: Large   Pulse: 72   Resp: 18   Temp: (!) 96 6 °F (35 9 °C)   TempSrc: Temporal   SpO2: 99%   Weight: 107 kg (235 lb)   Height: 5' 10" (1 778 m)     Body mass index is 33 72 kg/m²  Physical Exam  Vitals and nursing note reviewed  Constitutional:       Appearance: Normal appearance  She is obese  HENT:      Head: Normocephalic and atraumatic  Mouth/Throat:      Mouth: Mucous membranes are moist    Eyes:      Extraocular Movements: Extraocular movements intact  Conjunctiva/sclera: Conjunctivae normal       Pupils: Pupils are equal, round, and reactive to light  Neck:      Vascular: No carotid bruit  Cardiovascular:      Rate and Rhythm: Normal rate and regular rhythm  Heart sounds: Normal heart sounds  Pulmonary:      Effort: Pulmonary effort is normal  No respiratory distress  Breath sounds: Normal breath sounds  No wheezing or rales  Musculoskeletal:      Cervical back: Neck supple  Neurological:      General: No focal deficit present  Mental Status: She is alert and oriented to person, place, and time  Mental status is at baseline  Psychiatric:         Mood and Affect: Mood normal          Behavior: Behavior normal          Thought Content:  Thought content normal          Judgment: Judgment normal

## 2021-07-30 DIAGNOSIS — R06.02 SHORTNESS OF BREATH: ICD-10-CM

## 2021-07-30 RX ORDER — FUROSEMIDE 40 MG/1
40 TABLET ORAL DAILY
Qty: 90 TABLET | Refills: 3 | Status: SHIPPED | OUTPATIENT
Start: 2021-07-30 | End: 2021-10-27 | Stop reason: SDUPTHER

## 2021-08-02 DIAGNOSIS — E11.9 DIABETES MELLITUS, TYPE 2 (HCC): ICD-10-CM

## 2021-08-02 DIAGNOSIS — E11.42 TYPE 2 DIABETES MELLITUS WITH DIABETIC POLYNEUROPATHY, WITH LONG-TERM CURRENT USE OF INSULIN (HCC): ICD-10-CM

## 2021-08-02 DIAGNOSIS — Z79.4 TYPE 2 DIABETES MELLITUS WITH DIABETIC POLYNEUROPATHY, WITH LONG-TERM CURRENT USE OF INSULIN (HCC): ICD-10-CM

## 2021-08-02 RX ORDER — INSULIN DETEMIR 100 [IU]/ML
INJECTION, SOLUTION SUBCUTANEOUS
Qty: 45 ML | Refills: 5 | Status: SHIPPED | OUTPATIENT
Start: 2021-08-02

## 2021-08-06 DIAGNOSIS — I10 ESSENTIAL HYPERTENSION: ICD-10-CM

## 2021-08-06 PROCEDURE — 4010F ACE/ARB THERAPY RXD/TAKEN: CPT | Performed by: INTERNAL MEDICINE

## 2021-08-06 RX ORDER — LOSARTAN POTASSIUM 100 MG/1
100 TABLET ORAL DAILY
Qty: 90 TABLET | Refills: 3 | Status: SHIPPED | OUTPATIENT
Start: 2021-08-06

## 2021-09-01 ENCOUNTER — OFFICE VISIT (OUTPATIENT)
Dept: CARDIOLOGY CLINIC | Facility: CLINIC | Age: 61
End: 2021-09-01
Payer: COMMERCIAL

## 2021-09-01 ENCOUNTER — PROCEDURE VISIT (OUTPATIENT)
Dept: CARDIOLOGY CLINIC | Facility: CLINIC | Age: 61
End: 2021-09-01

## 2021-09-01 VITALS
HEART RATE: 66 BPM | DIASTOLIC BLOOD PRESSURE: 70 MMHG | OXYGEN SATURATION: 97 % | BODY MASS INDEX: 33.93 KG/M2 | HEIGHT: 70 IN | WEIGHT: 237 LBS | SYSTOLIC BLOOD PRESSURE: 116 MMHG

## 2021-09-01 DIAGNOSIS — I25.118 CORONARY ARTERY DISEASE OF NATIVE ARTERY OF NATIVE HEART WITH STABLE ANGINA PECTORIS (HCC): Primary | ICD-10-CM

## 2021-09-01 DIAGNOSIS — R00.2 PALPITATION: Primary | ICD-10-CM

## 2021-09-01 DIAGNOSIS — I10 ESSENTIAL HYPERTENSION: ICD-10-CM

## 2021-09-01 DIAGNOSIS — R00.2 PALPITATIONS: ICD-10-CM

## 2021-09-01 PROCEDURE — 3074F SYST BP LT 130 MM HG: CPT | Performed by: INTERNAL MEDICINE

## 2021-09-01 PROCEDURE — 99214 OFFICE O/P EST MOD 30 MIN: CPT | Performed by: INTERNAL MEDICINE

## 2021-09-01 PROCEDURE — 3008F BODY MASS INDEX DOCD: CPT | Performed by: INTERNAL MEDICINE

## 2021-09-01 PROCEDURE — RECHECK: Performed by: INTERNAL MEDICINE

## 2021-09-01 PROCEDURE — 3078F DIAST BP <80 MM HG: CPT | Performed by: INTERNAL MEDICINE
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
## 2021-09-09 ENCOUNTER — HOSPITAL ENCOUNTER (OUTPATIENT)
Dept: NON INVASIVE DIAGNOSTICS | Facility: HOSPITAL | Age: 61
End: 2021-09-09
Payer: COMMERCIAL

## 2021-09-09 ENCOUNTER — HOSPITAL ENCOUNTER (OUTPATIENT)
Dept: NON INVASIVE DIAGNOSTICS | Facility: HOSPITAL | Age: 61
Discharge: HOME | End: 2021-09-09
Payer: COMMERCIAL

## 2021-09-09 DIAGNOSIS — R00.2 PALPITATIONS: ICD-10-CM

## 2021-09-09 PROCEDURE — 93225 XTRNL ECG REC<48 HRS REC: CPT

## 2021-09-09 PROCEDURE — 93227 XTRNL ECG REC<48 HR R&I: CPT | Performed by: INTERNAL MEDICINE

## 2021-09-09 PROCEDURE — 93226 XTRNL ECG REC<48 HR SCAN A/R: CPT

## 2021-09-10 ENCOUNTER — TELEPHONE (OUTPATIENT)
Dept: CARDIOLOGY CLINIC | Facility: CLINIC | Age: 61
End: 2021-09-10

## 2021-10-18 DIAGNOSIS — I10 ESSENTIAL HYPERTENSION: ICD-10-CM

## 2021-10-18 RX ORDER — AMLODIPINE BESYLATE 5 MG/1
5 TABLET ORAL DAILY
Qty: 90 TABLET | Refills: 3 | Status: SHIPPED | OUTPATIENT
Start: 2021-10-18 | End: 2021-10-27 | Stop reason: SDUPTHER

## 2021-10-27 ENCOUNTER — OFFICE VISIT (OUTPATIENT)
Dept: INTERNAL MEDICINE CLINIC | Facility: CLINIC | Age: 61
End: 2021-10-27
Payer: COMMERCIAL

## 2021-10-27 ENCOUNTER — APPOINTMENT (OUTPATIENT)
Dept: RADIOLOGY | Facility: CLINIC | Age: 61
End: 2021-10-27
Payer: COMMERCIAL

## 2021-10-27 VITALS
BODY MASS INDEX: 34.27 KG/M2 | HEART RATE: 74 BPM | RESPIRATION RATE: 15 BRPM | OXYGEN SATURATION: 98 % | DIASTOLIC BLOOD PRESSURE: 80 MMHG | HEIGHT: 70 IN | WEIGHT: 239.4 LBS | TEMPERATURE: 98.4 F | SYSTOLIC BLOOD PRESSURE: 144 MMHG

## 2021-10-27 DIAGNOSIS — E78.2 MIXED HYPERLIPIDEMIA: ICD-10-CM

## 2021-10-27 DIAGNOSIS — Z79.4 TYPE 2 DIABETES MELLITUS WITH DIABETIC POLYNEUROPATHY, WITH LONG-TERM CURRENT USE OF INSULIN (HCC): Primary | ICD-10-CM

## 2021-10-27 DIAGNOSIS — R06.02 SHORTNESS OF BREATH: ICD-10-CM

## 2021-10-27 DIAGNOSIS — M25.551 RIGHT HIP PAIN: ICD-10-CM

## 2021-10-27 DIAGNOSIS — I10 ESSENTIAL HYPERTENSION: ICD-10-CM

## 2021-10-27 DIAGNOSIS — I25.118 CORONARY ARTERY DISEASE OF NATIVE ARTERY OF NATIVE HEART WITH STABLE ANGINA PECTORIS (HCC): ICD-10-CM

## 2021-10-27 DIAGNOSIS — E11.42 TYPE 2 DIABETES MELLITUS WITH DIABETIC POLYNEUROPATHY, WITH LONG-TERM CURRENT USE OF INSULIN (HCC): Primary | ICD-10-CM

## 2021-10-27 DIAGNOSIS — Z23 ENCOUNTER FOR VACCINATION: ICD-10-CM

## 2021-10-27 DIAGNOSIS — M15.9 PRIMARY OSTEOARTHRITIS INVOLVING MULTIPLE JOINTS: ICD-10-CM

## 2021-10-27 LAB — SL AMB POCT HEMOGLOBIN AIC: 7.4 (ref ?–6.5)

## 2021-10-27 PROCEDURE — 3051F HG A1C>EQUAL 7.0%<8.0%: CPT | Performed by: INTERNAL MEDICINE

## 2021-10-27 PROCEDURE — 73502 X-RAY EXAM HIP UNI 2-3 VIEWS: CPT

## 2021-10-27 PROCEDURE — 83036 HEMOGLOBIN GLYCOSYLATED A1C: CPT | Performed by: INTERNAL MEDICINE

## 2021-10-27 PROCEDURE — 90471 IMMUNIZATION ADMIN: CPT | Performed by: INTERNAL MEDICINE

## 2021-10-27 PROCEDURE — 90682 RIV4 VACC RECOMBINANT DNA IM: CPT | Performed by: INTERNAL MEDICINE

## 2021-10-27 PROCEDURE — 99214 OFFICE O/P EST MOD 30 MIN: CPT | Performed by: INTERNAL MEDICINE

## 2021-10-27 RX ORDER — GABAPENTIN 300 MG/1
300 CAPSULE ORAL 3 TIMES DAILY
Qty: 270 CAPSULE | Refills: 1 | Status: SHIPPED | OUTPATIENT
Start: 2021-10-27

## 2021-10-27 RX ORDER — AMLODIPINE BESYLATE 10 MG/1
10 TABLET ORAL DAILY
Qty: 90 TABLET | Refills: 1 | Status: SHIPPED | OUTPATIENT
Start: 2021-10-27 | End: 2022-04-08

## 2021-10-27 RX ORDER — FUROSEMIDE 20 MG/1
40 TABLET ORAL 2 TIMES DAILY
Qty: 180 TABLET | Refills: 0 | Status: SHIPPED | OUTPATIENT
Start: 2021-10-27 | End: 2021-11-17 | Stop reason: SDUPTHER

## 2021-11-02 DIAGNOSIS — I25.118 CORONARY ARTERY DISEASE OF NATIVE ARTERY OF NATIVE HEART WITH STABLE ANGINA PECTORIS (HCC): ICD-10-CM

## 2021-11-02 DIAGNOSIS — I21.4 NSTEMI (NON-ST ELEVATED MYOCARDIAL INFARCTION) (HCC): ICD-10-CM

## 2021-11-02 RX ORDER — METOPROLOL SUCCINATE 50 MG/1
50 TABLET, EXTENDED RELEASE ORAL DAILY
Qty: 30 TABLET | Refills: 8 | Status: SHIPPED | OUTPATIENT
Start: 2021-11-02 | End: 2021-11-17 | Stop reason: SDUPTHER

## 2021-11-02 RX ORDER — RANOLAZINE 500 MG/1
500 TABLET, EXTENDED RELEASE ORAL 2 TIMES DAILY
Qty: 180 TABLET | Refills: 3 | Status: SHIPPED | OUTPATIENT
Start: 2021-11-02

## 2021-11-07 ENCOUNTER — APPOINTMENT (EMERGENCY)
Dept: RADIOLOGY | Facility: HOSPITAL | Age: 61
End: 2021-11-07
Payer: COMMERCIAL

## 2021-11-07 ENCOUNTER — HOSPITAL ENCOUNTER (OUTPATIENT)
Facility: HOSPITAL | Age: 61
Setting detail: OBSERVATION
Discharge: HOME | End: 2021-11-09
Attending: EMERGENCY MEDICINE | Admitting: INTERNAL MEDICINE
Payer: COMMERCIAL

## 2021-11-07 DIAGNOSIS — R07.9 CHEST PAIN: ICD-10-CM

## 2021-11-07 DIAGNOSIS — Z86.79 HISTORY OF CORONARY ARTERY DISEASE: ICD-10-CM

## 2021-11-07 DIAGNOSIS — Z95.5 S/P DRUG ELUTING CORONARY STENT PLACEMENT: ICD-10-CM

## 2021-11-07 DIAGNOSIS — M25.512 LEFT SHOULDER PAIN: ICD-10-CM

## 2021-11-07 DIAGNOSIS — M54.2 NECK PAIN ON LEFT SIDE: Primary | ICD-10-CM

## 2021-11-07 LAB
ALBUMIN SERPL BCP-MCNC: 3.9 G/DL (ref 3.5–5)
ALP SERPL-CCNC: 89 U/L (ref 46–116)
ALT SERPL W P-5'-P-CCNC: 27 U/L (ref 12–78)
ANION GAP SERPL CALCULATED.3IONS-SCNC: 5 MMOL/L (ref 4–13)
AST SERPL W P-5'-P-CCNC: 23 U/L (ref 5–45)
ATRIAL RATE: 67 BPM
ATRIAL RATE: 69 BPM
BASOPHILS # BLD AUTO: 0.04 THOUSANDS/ΜL (ref 0–0.1)
BASOPHILS NFR BLD AUTO: 1 % (ref 0–1)
BILIRUB SERPL-MCNC: 0.53 MG/DL (ref 0.2–1)
BUN SERPL-MCNC: 18 MG/DL (ref 5–25)
CALCIUM SERPL-MCNC: 9.3 MG/DL (ref 8.3–10.1)
CHLORIDE SERPL-SCNC: 102 MMOL/L (ref 100–108)
CHOLEST SERPL-MCNC: 169 MG/DL (ref 50–200)
CO2 SERPL-SCNC: 28 MMOL/L (ref 21–32)
CREAT SERPL-MCNC: 0.66 MG/DL (ref 0.6–1.3)
EOSINOPHIL # BLD AUTO: 0.09 THOUSAND/ΜL (ref 0–0.61)
EOSINOPHIL NFR BLD AUTO: 2 % (ref 0–6)
ERYTHROCYTE [DISTWIDTH] IN BLOOD BY AUTOMATED COUNT: 12.6 % (ref 11.6–15.1)
GFR SERPL CREATININE-BSD FRML MDRD: 96 ML/MIN/1.73SQ M
GLUCOSE SERPL-MCNC: 154 MG/DL (ref 65–140)
GLUCOSE SERPL-MCNC: 170 MG/DL (ref 65–140)
GLUCOSE SERPL-MCNC: 199 MG/DL (ref 65–140)
HCT VFR BLD AUTO: 38.4 % (ref 34.8–46.1)
HDLC SERPL-MCNC: 45 MG/DL
HGB BLD-MCNC: 13.1 G/DL (ref 11.5–15.4)
IMM GRANULOCYTES # BLD AUTO: 0.03 THOUSAND/UL (ref 0–0.2)
IMM GRANULOCYTES NFR BLD AUTO: 1 % (ref 0–2)
LDLC SERPL CALC-MCNC: 62 MG/DL (ref 0–100)
LYMPHOCYTES # BLD AUTO: 2.2 THOUSANDS/ΜL (ref 0.6–4.47)
LYMPHOCYTES NFR BLD AUTO: 37 % (ref 14–44)
MCH RBC QN AUTO: 29.2 PG (ref 26.8–34.3)
MCHC RBC AUTO-ENTMCNC: 34.1 G/DL (ref 31.4–37.4)
MCV RBC AUTO: 86 FL (ref 82–98)
MONOCYTES # BLD AUTO: 0.45 THOUSAND/ΜL (ref 0.17–1.22)
MONOCYTES NFR BLD AUTO: 8 % (ref 4–12)
NEUTROPHILS # BLD AUTO: 3.07 THOUSANDS/ΜL (ref 1.85–7.62)
NEUTS SEG NFR BLD AUTO: 51 % (ref 43–75)
NRBC BLD AUTO-RTO: 0 /100 WBCS
P AXIS: 36 DEGREES
P AXIS: 51 DEGREES
PLATELET # BLD AUTO: 194 THOUSANDS/UL (ref 149–390)
PMV BLD AUTO: 11.8 FL (ref 8.9–12.7)
POTASSIUM SERPL-SCNC: 3.9 MMOL/L (ref 3.5–5.3)
PR INTERVAL: 150 MS
PR INTERVAL: 150 MS
PROT SERPL-MCNC: 7.8 G/DL (ref 6.4–8.2)
QRS AXIS: 13 DEGREES
QRS AXIS: 13 DEGREES
QRSD INTERVAL: 88 MS
QRSD INTERVAL: 92 MS
QT INTERVAL: 424 MS
QT INTERVAL: 432 MS
QTC INTERVAL: 448 MS
QTC INTERVAL: 462 MS
RBC # BLD AUTO: 4.49 MILLION/UL (ref 3.81–5.12)
SODIUM SERPL-SCNC: 135 MMOL/L (ref 136–145)
T WAVE AXIS: 59 DEGREES
T WAVE AXIS: 61 DEGREES
TRIGL SERPL-MCNC: 310 MG/DL
TROPONIN I SERPL-MCNC: <0.02 NG/ML
VENTRICULAR RATE: 67 BPM
VENTRICULAR RATE: 69 BPM
WBC # BLD AUTO: 5.88 THOUSAND/UL (ref 4.31–10.16)

## 2021-11-07 PROCEDURE — 93005 ELECTROCARDIOGRAM TRACING: CPT

## 2021-11-07 PROCEDURE — 99285 EMERGENCY DEPT VISIT HI MDM: CPT

## 2021-11-07 PROCEDURE — 36415 COLL VENOUS BLD VENIPUNCTURE: CPT | Performed by: STUDENT IN AN ORGANIZED HEALTH CARE EDUCATION/TRAINING PROGRAM

## 2021-11-07 PROCEDURE — 80061 LIPID PANEL: CPT | Performed by: INTERNAL MEDICINE

## 2021-11-07 PROCEDURE — 80053 COMPREHEN METABOLIC PANEL: CPT | Performed by: STUDENT IN AN ORGANIZED HEALTH CARE EDUCATION/TRAINING PROGRAM

## 2021-11-07 PROCEDURE — 71045 X-RAY EXAM CHEST 1 VIEW: CPT

## 2021-11-07 PROCEDURE — 82948 REAGENT STRIP/BLOOD GLUCOSE: CPT

## 2021-11-07 PROCEDURE — 93308 TTE F-UP OR LMTD: CPT | Performed by: EMERGENCY MEDICINE

## 2021-11-07 PROCEDURE — 84443 ASSAY THYROID STIM HORMONE: CPT | Performed by: INTERNAL MEDICINE

## 2021-11-07 PROCEDURE — 84484 ASSAY OF TROPONIN QUANT: CPT | Performed by: STUDENT IN AN ORGANIZED HEALTH CARE EDUCATION/TRAINING PROGRAM

## 2021-11-07 PROCEDURE — 99285 EMERGENCY DEPT VISIT HI MDM: CPT | Performed by: EMERGENCY MEDICINE

## 2021-11-07 PROCEDURE — 93010 ELECTROCARDIOGRAM REPORT: CPT | Performed by: INTERNAL MEDICINE

## 2021-11-07 PROCEDURE — 85025 COMPLETE CBC W/AUTO DIFF WBC: CPT | Performed by: STUDENT IN AN ORGANIZED HEALTH CARE EDUCATION/TRAINING PROGRAM

## 2021-11-07 PROCEDURE — 99220 PR INITIAL OBSERVATION CARE/DAY 70 MINUTES: CPT | Performed by: INTERNAL MEDICINE

## 2021-11-07 RX ORDER — RANOLAZINE 500 MG/1
500 TABLET, EXTENDED RELEASE ORAL 2 TIMES DAILY
Status: DISCONTINUED | OUTPATIENT
Start: 2021-11-08 | End: 2021-11-07

## 2021-11-07 RX ORDER — ASPIRIN 81 MG/1
81 TABLET, CHEWABLE ORAL DAILY
Status: DISCONTINUED | OUTPATIENT
Start: 2021-11-08 | End: 2021-11-09 | Stop reason: HOSPADM

## 2021-11-07 RX ORDER — ACETAMINOPHEN 325 MG/1
650 TABLET ORAL EVERY 6 HOURS PRN
Status: DISCONTINUED | OUTPATIENT
Start: 2021-11-07 | End: 2021-11-09 | Stop reason: HOSPADM

## 2021-11-07 RX ORDER — RANOLAZINE 500 MG/1
500 TABLET, EXTENDED RELEASE ORAL 2 TIMES DAILY
Status: DISCONTINUED | OUTPATIENT
Start: 2021-11-07 | End: 2021-11-09 | Stop reason: HOSPADM

## 2021-11-07 RX ORDER — ATORVASTATIN CALCIUM 80 MG/1
80 TABLET, FILM COATED ORAL
Status: DISCONTINUED | OUTPATIENT
Start: 2021-11-07 | End: 2021-11-09 | Stop reason: HOSPADM

## 2021-11-07 RX ORDER — FUROSEMIDE 40 MG/1
40 TABLET ORAL 2 TIMES DAILY
Status: DISCONTINUED | OUTPATIENT
Start: 2021-11-08 | End: 2021-11-09 | Stop reason: HOSPADM

## 2021-11-07 RX ORDER — MORPHINE SULFATE 4 MG/ML
4 INJECTION, SOLUTION INTRAMUSCULAR; INTRAVENOUS ONCE
Status: DISCONTINUED | OUTPATIENT
Start: 2021-11-07 | End: 2021-11-07

## 2021-11-07 RX ORDER — SODIUM CHLORIDE 9 MG/ML
3 INJECTION INTRAVENOUS
Status: DISCONTINUED | OUTPATIENT
Start: 2021-11-07 | End: 2021-11-09 | Stop reason: HOSPADM

## 2021-11-07 RX ORDER — AMLODIPINE BESYLATE 10 MG/1
10 TABLET ORAL DAILY
Status: DISCONTINUED | OUTPATIENT
Start: 2021-11-08 | End: 2021-11-09 | Stop reason: HOSPADM

## 2021-11-07 RX ORDER — METOPROLOL SUCCINATE 50 MG/1
50 TABLET, EXTENDED RELEASE ORAL DAILY
Status: DISCONTINUED | OUTPATIENT
Start: 2021-11-07 | End: 2021-11-09 | Stop reason: HOSPADM

## 2021-11-07 RX ORDER — LOSARTAN POTASSIUM 50 MG/1
100 TABLET ORAL DAILY
Status: DISCONTINUED | OUTPATIENT
Start: 2021-11-08 | End: 2021-11-09 | Stop reason: HOSPADM

## 2021-11-07 RX ORDER — ONDANSETRON 4 MG/1
4 TABLET, ORALLY DISINTEGRATING ORAL ONCE
Status: COMPLETED | OUTPATIENT
Start: 2021-11-07 | End: 2021-11-07

## 2021-11-07 RX ORDER — METOPROLOL SUCCINATE 50 MG/1
50 TABLET, EXTENDED RELEASE ORAL DAILY
Status: DISCONTINUED | OUTPATIENT
Start: 2021-11-08 | End: 2021-11-07

## 2021-11-07 RX ORDER — GABAPENTIN 300 MG/1
300 CAPSULE ORAL 3 TIMES DAILY
Status: DISCONTINUED | OUTPATIENT
Start: 2021-11-07 | End: 2021-11-09 | Stop reason: HOSPADM

## 2021-11-07 RX ORDER — MORPHINE SULFATE 4 MG/ML
4 INJECTION, SOLUTION INTRAMUSCULAR; INTRAVENOUS EVERY 4 HOURS PRN
Status: DISCONTINUED | OUTPATIENT
Start: 2021-11-07 | End: 2021-11-07

## 2021-11-07 RX ORDER — ATORVASTATIN CALCIUM 20 MG/1
80 TABLET, FILM COATED ORAL
Status: DISCONTINUED | OUTPATIENT
Start: 2021-11-08 | End: 2021-11-07

## 2021-11-07 RX ORDER — DOCUSATE SODIUM 100 MG/1
100 CAPSULE, LIQUID FILLED ORAL 2 TIMES DAILY
Status: DISCONTINUED | OUTPATIENT
Start: 2021-11-08 | End: 2021-11-09 | Stop reason: HOSPADM

## 2021-11-07 RX ORDER — ACETAMINOPHEN 325 MG/1
975 TABLET ORAL ONCE
Status: COMPLETED | OUTPATIENT
Start: 2021-11-07 | End: 2021-11-07

## 2021-11-07 RX ORDER — ACETAMINOPHEN 325 MG/1
650 TABLET ORAL EVERY 6 HOURS PRN
Status: DISCONTINUED | OUTPATIENT
Start: 2021-11-07 | End: 2021-11-07 | Stop reason: SDUPTHER

## 2021-11-07 RX ORDER — NITROGLYCERIN 0.4 MG/1
0.4 TABLET SUBLINGUAL ONCE
Status: COMPLETED | OUTPATIENT
Start: 2021-11-07 | End: 2021-11-07

## 2021-11-07 RX ORDER — PANTOPRAZOLE SODIUM 40 MG/1
40 TABLET, DELAYED RELEASE ORAL
Status: DISCONTINUED | OUTPATIENT
Start: 2021-11-08 | End: 2021-11-09 | Stop reason: HOSPADM

## 2021-11-07 RX ORDER — GABAPENTIN 300 MG/1
300 CAPSULE ORAL 3 TIMES DAILY
Status: DISCONTINUED | OUTPATIENT
Start: 2021-11-07 | End: 2021-11-07

## 2021-11-07 RX ORDER — ONDANSETRON 2 MG/ML
4 INJECTION INTRAMUSCULAR; INTRAVENOUS EVERY 6 HOURS PRN
Status: DISCONTINUED | OUTPATIENT
Start: 2021-11-07 | End: 2021-11-09 | Stop reason: HOSPADM

## 2021-11-07 RX ADMIN — NITROGLYCERIN 0.4 MG: 0.4 TABLET SUBLINGUAL at 17:31

## 2021-11-07 RX ADMIN — INSULIN LISPRO 2 UNITS: 100 INJECTION, SOLUTION INTRAVENOUS; SUBCUTANEOUS at 21:35

## 2021-11-07 RX ADMIN — ACETAMINOPHEN 975 MG: 325 TABLET, FILM COATED ORAL at 17:30

## 2021-11-07 RX ADMIN — ONDANSETRON 4 MG: 4 TABLET, ORALLY DISINTEGRATING ORAL at 19:47

## 2021-11-08 LAB
ALBUMIN SERPL BCP-MCNC: 3.3 G/DL (ref 3.5–5)
ALP SERPL-CCNC: 78 U/L (ref 46–116)
ALT SERPL W P-5'-P-CCNC: 24 U/L (ref 12–78)
ANION GAP SERPL CALCULATED.3IONS-SCNC: 6 MMOL/L (ref 4–13)
AST SERPL W P-5'-P-CCNC: 17 U/L (ref 5–45)
ATRIAL RATE: 64 BPM
BILIRUB SERPL-MCNC: 0.6 MG/DL (ref 0.2–1)
BUN SERPL-MCNC: 16 MG/DL (ref 5–25)
CALCIUM ALBUM COR SERPL-MCNC: 9.9 MG/DL (ref 8.3–10.1)
CALCIUM SERPL-MCNC: 9.3 MG/DL (ref 8.3–10.1)
CHLORIDE SERPL-SCNC: 104 MMOL/L (ref 100–108)
CO2 SERPL-SCNC: 29 MMOL/L (ref 21–32)
CREAT SERPL-MCNC: 0.62 MG/DL (ref 0.6–1.3)
ERYTHROCYTE [DISTWIDTH] IN BLOOD BY AUTOMATED COUNT: 12.6 % (ref 11.6–15.1)
GFR SERPL CREATININE-BSD FRML MDRD: 98 ML/MIN/1.73SQ M
GLUCOSE SERPL-MCNC: 121 MG/DL (ref 65–140)
GLUCOSE SERPL-MCNC: 130 MG/DL (ref 65–140)
GLUCOSE SERPL-MCNC: 137 MG/DL (ref 65–140)
GLUCOSE SERPL-MCNC: 144 MG/DL (ref 65–140)
GLUCOSE SERPL-MCNC: 169 MG/DL (ref 65–140)
HCT VFR BLD AUTO: 35.3 % (ref 34.8–46.1)
HGB BLD-MCNC: 12.2 G/DL (ref 11.5–15.4)
MAGNESIUM SERPL-MCNC: 2.3 MG/DL (ref 1.6–2.6)
MCH RBC QN AUTO: 29.3 PG (ref 26.8–34.3)
MCHC RBC AUTO-ENTMCNC: 34.6 G/DL (ref 31.4–37.4)
MCV RBC AUTO: 85 FL (ref 82–98)
P AXIS: 61 DEGREES
PHOSPHATE SERPL-MCNC: 3.9 MG/DL (ref 2.3–4.1)
PLATELET # BLD AUTO: 164 THOUSANDS/UL (ref 149–390)
PMV BLD AUTO: 11.3 FL (ref 8.9–12.7)
POTASSIUM SERPL-SCNC: 3.9 MMOL/L (ref 3.5–5.3)
PR INTERVAL: 136 MS
PROT SERPL-MCNC: 6.7 G/DL (ref 6.4–8.2)
QRS AXIS: 21 DEGREES
QRSD INTERVAL: 92 MS
QT INTERVAL: 426 MS
QTC INTERVAL: 439 MS
RBC # BLD AUTO: 4.16 MILLION/UL (ref 3.81–5.12)
SODIUM SERPL-SCNC: 139 MMOL/L (ref 136–145)
T WAVE AXIS: 58 DEGREES
TSH SERPL DL<=0.05 MIU/L-ACNC: 1.66 UIU/ML (ref 0.36–3.74)
VENTRICULAR RATE: 64 BPM
WBC # BLD AUTO: 5.23 THOUSAND/UL (ref 4.31–10.16)

## 2021-11-08 PROCEDURE — 36415 COLL VENOUS BLD VENIPUNCTURE: CPT | Performed by: INTERNAL MEDICINE

## 2021-11-08 PROCEDURE — 83735 ASSAY OF MAGNESIUM: CPT | Performed by: INTERNAL MEDICINE

## 2021-11-08 PROCEDURE — 82948 REAGENT STRIP/BLOOD GLUCOSE: CPT

## 2021-11-08 PROCEDURE — 93010 ELECTROCARDIOGRAM REPORT: CPT | Performed by: INTERNAL MEDICINE

## 2021-11-08 PROCEDURE — 84100 ASSAY OF PHOSPHORUS: CPT | Performed by: INTERNAL MEDICINE

## 2021-11-08 PROCEDURE — 80053 COMPREHEN METABOLIC PANEL: CPT | Performed by: INTERNAL MEDICINE

## 2021-11-08 PROCEDURE — 85027 COMPLETE CBC AUTOMATED: CPT | Performed by: INTERNAL MEDICINE

## 2021-11-08 PROCEDURE — 99215 OFFICE O/P EST HI 40 MIN: CPT | Performed by: INTERNAL MEDICINE

## 2021-11-08 PROCEDURE — 99225 PR SBSQ OBSERVATION CARE/DAY 25 MINUTES: CPT | Performed by: FAMILY MEDICINE

## 2021-11-08 RX ADMIN — RANOLAZINE 500 MG: 500 TABLET, FILM COATED, EXTENDED RELEASE ORAL at 22:05

## 2021-11-08 RX ADMIN — ASPIRIN 81 MG CHEWABLE TABLET 81 MG: 81 TABLET CHEWABLE at 08:38

## 2021-11-08 RX ADMIN — PANTOPRAZOLE SODIUM 40 MG: 40 TABLET, DELAYED RELEASE ORAL at 05:58

## 2021-11-08 RX ADMIN — RANOLAZINE 500 MG: 500 TABLET, FILM COATED, EXTENDED RELEASE ORAL at 08:38

## 2021-11-08 RX ADMIN — GABAPENTIN 300 MG: 300 CAPSULE ORAL at 22:05

## 2021-11-08 RX ADMIN — GABAPENTIN 300 MG: 300 CAPSULE ORAL at 08:38

## 2021-11-08 RX ADMIN — LOSARTAN POTASSIUM 100 MG: 50 TABLET, FILM COATED ORAL at 08:38

## 2021-11-08 RX ADMIN — METOPROLOL SUCCINATE 50 MG: 50 TABLET, EXTENDED RELEASE ORAL at 08:38

## 2021-11-08 RX ADMIN — INSULIN LISPRO 1 UNITS: 100 INJECTION, SOLUTION INTRAVENOUS; SUBCUTANEOUS at 22:06

## 2021-11-08 RX ADMIN — FUROSEMIDE 40 MG: 40 TABLET ORAL at 08:35

## 2021-11-08 RX ADMIN — GABAPENTIN 300 MG: 300 CAPSULE ORAL at 17:04

## 2021-11-08 RX ADMIN — DOCUSATE SODIUM 100 MG: 100 CAPSULE ORAL at 17:03

## 2021-11-08 RX ADMIN — FUROSEMIDE 40 MG: 40 TABLET ORAL at 17:03

## 2021-11-08 RX ADMIN — AMLODIPINE BESYLATE 10 MG: 10 TABLET ORAL at 08:38

## 2021-11-08 RX ADMIN — INSULIN DETEMIR 50 UNITS: 100 INJECTION, SOLUTION SUBCUTANEOUS at 22:05

## 2021-11-08 RX ADMIN — INSULIN DETEMIR 50 UNITS: 100 INJECTION, SOLUTION SUBCUTANEOUS at 04:10

## 2021-11-08 RX ADMIN — ATORVASTATIN CALCIUM 80 MG: 80 TABLET, FILM COATED ORAL at 17:03

## 2021-11-09 ENCOUNTER — APPOINTMENT (OUTPATIENT)
Dept: RADIOLOGY | Facility: HOSPITAL | Age: 61
End: 2021-11-09
Payer: COMMERCIAL

## 2021-11-09 ENCOUNTER — APPOINTMENT (OUTPATIENT)
Dept: NON INVASIVE DIAGNOSTICS | Facility: HOSPITAL | Age: 61
End: 2021-11-09
Payer: COMMERCIAL

## 2021-11-09 DIAGNOSIS — E78.2 MIXED HYPERLIPIDEMIA: ICD-10-CM

## 2021-11-09 LAB
BASELINE ST DEPRESSION: 0 MM
CHEST PAIN STATEMENT: NORMAL
D DIMER PPP FEU-MCNC: <0.27 UG/ML FEU
GLUCOSE SERPL-MCNC: 143 MG/DL (ref 65–140)
GLUCOSE SERPL-MCNC: 274 MG/DL (ref 65–140)
MAX DIASTOLIC BP: 70 MMHG
MAX HEART RATE: 139 BPM
MAX HR PERCENT: 87 %
MAX PREDICTED HEART RATE: 159 BPM
MAX. SYSTOLIC BP: 164 MMHG
PROTOCOL NAME: NORMAL
RATE PRESSURE PRODUCT: NORMAL
REASON FOR TERMINATION: NORMAL
SL CV REST NUCLEAR ISOTOPE DOSE: 11 MCI
SL CV STRESS NUCLEAR ISOTOPE DOSE: 31.5 MCI
SL CV STRESS RECOVERY BP: NORMAL MMHG
SL CV STRESS RECOVERY HR: 77 BPM
SL CV STRESS RECOVERY O2 SAT: 99 %
STRESS ANGINA INDEX: 0
STRESS BASELINE BP: NORMAL MMHG
STRESS BASELINE HR: 74 BPM
STRESS DUKE TREADMILL SCORE: 7
STRESS O2 SAT REST: 98 %
STRESS PEAK HR: 139 BPM
STRESS PERCENT HR: 87 %
STRESS POST ESTIMATED WORKLOAD: 8.5 METS
STRESS POST EXERCISE DUR MIN: 6 MIN
STRESS POST EXERCISE DUR SEC: 31 SEC
STRESS POST O2 SAT PEAK: 97 %
STRESS POST PEAK BP: 164 MMHG
STRESS ST DEPRESSION: 0 MM
STRESS TARGET HR: 139 BPM
STRESS/REST PERFUSION RATIO: 0.98
TARGET HR FORMULA: NORMAL
TEST INDICATION: NORMAL
TIME IN EXERCISE PHASE: NORMAL

## 2021-11-09 PROCEDURE — 85379 FIBRIN DEGRADATION QUANT: CPT | Performed by: FAMILY MEDICINE

## 2021-11-09 PROCEDURE — 99214 OFFICE O/P EST MOD 30 MIN: CPT | Performed by: INTERNAL MEDICINE

## 2021-11-09 PROCEDURE — 93018 CV STRESS TEST I&R ONLY: CPT | Performed by: INTERNAL MEDICINE

## 2021-11-09 PROCEDURE — 93016 CV STRESS TEST SUPVJ ONLY: CPT | Performed by: INTERNAL MEDICINE

## 2021-11-09 PROCEDURE — 82948 REAGENT STRIP/BLOOD GLUCOSE: CPT

## 2021-11-09 PROCEDURE — 99217 PR OBSERVATION CARE DISCHARGE MANAGEMENT: CPT | Performed by: INTERNAL MEDICINE

## 2021-11-09 PROCEDURE — A9502 TC99M TETROFOSMIN: HCPCS

## 2021-11-09 PROCEDURE — 78452 HT MUSCLE IMAGE SPECT MULT: CPT

## 2021-11-09 PROCEDURE — 93017 CV STRESS TEST TRACING ONLY: CPT

## 2021-11-09 PROCEDURE — 78452 HT MUSCLE IMAGE SPECT MULT: CPT | Performed by: INTERNAL MEDICINE

## 2021-11-09 PROCEDURE — G1004 CDSM NDSC: HCPCS

## 2021-11-09 RX ORDER — AMINOPHYLLINE DIHYDRATE 25 MG/ML
INJECTION, SOLUTION INTRAVENOUS
Status: DISCONTINUED
Start: 2021-11-09 | End: 2021-11-09 | Stop reason: WASHOUT

## 2021-11-09 RX ADMIN — LOSARTAN POTASSIUM 100 MG: 50 TABLET, FILM COATED ORAL at 09:43

## 2021-11-09 RX ADMIN — FUROSEMIDE 40 MG: 40 TABLET ORAL at 09:44

## 2021-11-09 RX ADMIN — METOPROLOL SUCCINATE 50 MG: 50 TABLET, EXTENDED RELEASE ORAL at 09:44

## 2021-11-09 RX ADMIN — INSULIN LISPRO 4 UNITS: 100 INJECTION, SOLUTION INTRAVENOUS; SUBCUTANEOUS at 11:51

## 2021-11-09 RX ADMIN — ASPIRIN 81 MG CHEWABLE TABLET 81 MG: 81 TABLET CHEWABLE at 09:43

## 2021-11-09 RX ADMIN — DOCUSATE SODIUM 100 MG: 100 CAPSULE ORAL at 09:43

## 2021-11-09 RX ADMIN — GABAPENTIN 300 MG: 300 CAPSULE ORAL at 09:44

## 2021-11-09 RX ADMIN — AMLODIPINE BESYLATE 10 MG: 10 TABLET ORAL at 09:44

## 2021-11-09 RX ADMIN — PANTOPRAZOLE SODIUM 40 MG: 40 TABLET, DELAYED RELEASE ORAL at 06:03

## 2021-11-09 RX ADMIN — RANOLAZINE 500 MG: 500 TABLET, FILM COATED, EXTENDED RELEASE ORAL at 09:43

## 2021-11-09 RX ADMIN — INSULIN DETEMIR 50 UNITS: 100 INJECTION, SOLUTION SUBCUTANEOUS at 09:53

## 2021-11-10 ENCOUNTER — TRANSITIONAL CARE MANAGEMENT (OUTPATIENT)
Dept: INTERNAL MEDICINE CLINIC | Facility: CLINIC | Age: 61
End: 2021-11-10

## 2021-11-10 RX ORDER — OMEGA-3-ACID ETHYL ESTERS 1 G/1
CAPSULE, LIQUID FILLED ORAL
Qty: 180 CAPSULE | Refills: 0 | Status: SHIPPED | OUTPATIENT
Start: 2021-11-10 | End: 2021-12-06

## 2021-11-11 VITALS
TEMPERATURE: 98.2 F | BODY MASS INDEX: 33.79 KG/M2 | RESPIRATION RATE: 19 BRPM | HEART RATE: 70 BPM | OXYGEN SATURATION: 97 % | DIASTOLIC BLOOD PRESSURE: 71 MMHG | WEIGHT: 236 LBS | SYSTOLIC BLOOD PRESSURE: 130 MMHG | HEIGHT: 70 IN

## 2021-11-17 ENCOUNTER — OFFICE VISIT (OUTPATIENT)
Dept: INTERNAL MEDICINE CLINIC | Facility: CLINIC | Age: 61
End: 2021-11-17
Payer: COMMERCIAL

## 2021-11-17 VITALS
WEIGHT: 243.25 LBS | OXYGEN SATURATION: 93 % | HEIGHT: 70 IN | BODY MASS INDEX: 34.83 KG/M2 | HEART RATE: 79 BPM | TEMPERATURE: 97.9 F | DIASTOLIC BLOOD PRESSURE: 80 MMHG | SYSTOLIC BLOOD PRESSURE: 142 MMHG

## 2021-11-17 DIAGNOSIS — I21.4 NSTEMI (NON-ST ELEVATED MYOCARDIAL INFARCTION) (HCC): ICD-10-CM

## 2021-11-17 DIAGNOSIS — E11.42 TYPE 2 DIABETES MELLITUS WITH DIABETIC POLYNEUROPATHY, WITH LONG-TERM CURRENT USE OF INSULIN (HCC): ICD-10-CM

## 2021-11-17 DIAGNOSIS — R06.02 SHORTNESS OF BREATH: ICD-10-CM

## 2021-11-17 DIAGNOSIS — Z79.4 TYPE 2 DIABETES MELLITUS WITH DIABETIC POLYNEUROPATHY, WITH LONG-TERM CURRENT USE OF INSULIN (HCC): ICD-10-CM

## 2021-11-17 DIAGNOSIS — I10 ESSENTIAL HYPERTENSION: ICD-10-CM

## 2021-11-17 DIAGNOSIS — Z95.5 S/P DRUG ELUTING CORONARY STENT PLACEMENT: ICD-10-CM

## 2021-11-17 DIAGNOSIS — I25.118 CORONARY ARTERY DISEASE OF NATIVE ARTERY OF NATIVE HEART WITH STABLE ANGINA PECTORIS (HCC): ICD-10-CM

## 2021-11-17 DIAGNOSIS — R07.89 ATYPICAL CHEST PAIN: Primary | ICD-10-CM

## 2021-11-17 PROBLEM — R07.9 CHEST PAIN: Status: RESOLVED | Noted: 2021-11-07 | Resolved: 2021-11-17

## 2021-11-17 PROCEDURE — 99495 TRANSJ CARE MGMT MOD F2F 14D: CPT | Performed by: INTERNAL MEDICINE

## 2021-11-17 PROCEDURE — 1111F DSCHRG MED/CURRENT MED MERGE: CPT | Performed by: INTERNAL MEDICINE

## 2021-11-17 RX ORDER — METOPROLOL SUCCINATE 100 MG/1
100 TABLET, EXTENDED RELEASE ORAL DAILY
Qty: 90 TABLET | Refills: 1 | Status: SHIPPED | OUTPATIENT
Start: 2021-11-17 | End: 2022-07-14 | Stop reason: SDUPTHER

## 2021-11-17 RX ORDER — FUROSEMIDE 40 MG/1
40 TABLET ORAL DAILY
Qty: 90 TABLET | Refills: 1 | Status: SHIPPED | OUTPATIENT
Start: 2021-11-17

## 2021-11-17 RX ORDER — DOCUSATE SODIUM 100 MG/1
100 CAPSULE, LIQUID FILLED ORAL 2 TIMES DAILY
COMMUNITY

## 2021-12-06 DIAGNOSIS — E78.2 MIXED HYPERLIPIDEMIA: ICD-10-CM

## 2021-12-06 RX ORDER — OMEGA-3-ACID ETHYL ESTERS 1 G/1
CAPSULE, LIQUID FILLED ORAL
Qty: 180 CAPSULE | Refills: 0 | Status: SHIPPED | OUTPATIENT
Start: 2021-12-06 | End: 2022-01-04

## 2021-12-09 ENCOUNTER — TELEPHONE (OUTPATIENT)
Dept: CARDIOLOGY CLINIC | Facility: CLINIC | Age: 61
End: 2021-12-09

## 2022-01-01 DIAGNOSIS — E78.2 MIXED HYPERLIPIDEMIA: ICD-10-CM

## 2022-01-04 RX ORDER — OMEGA-3-ACID ETHYL ESTERS 1 G/1
CAPSULE, LIQUID FILLED ORAL
Qty: 180 CAPSULE | Refills: 0 | Status: SHIPPED | OUTPATIENT
Start: 2022-01-04

## 2022-01-05 ENCOUNTER — OFFICE VISIT (OUTPATIENT)
Dept: INTERNAL MEDICINE CLINIC | Facility: CLINIC | Age: 62
End: 2022-01-05
Payer: COMMERCIAL

## 2022-01-05 ENCOUNTER — APPOINTMENT (OUTPATIENT)
Dept: LAB | Facility: CLINIC | Age: 62
End: 2022-01-05
Payer: COMMERCIAL

## 2022-01-05 VITALS
OXYGEN SATURATION: 97 % | DIASTOLIC BLOOD PRESSURE: 80 MMHG | HEART RATE: 101 BPM | TEMPERATURE: 98.4 F | HEIGHT: 70 IN | WEIGHT: 238.13 LBS | SYSTOLIC BLOOD PRESSURE: 124 MMHG | BODY MASS INDEX: 34.09 KG/M2

## 2022-01-05 DIAGNOSIS — M15.9 PRIMARY OSTEOARTHRITIS INVOLVING MULTIPLE JOINTS: ICD-10-CM

## 2022-01-05 DIAGNOSIS — E28.39 MENOPAUSE OVARIAN FAILURE: ICD-10-CM

## 2022-01-05 DIAGNOSIS — E78.2 MIXED HYPERLIPIDEMIA: ICD-10-CM

## 2022-01-05 DIAGNOSIS — Z23 ENCOUNTER FOR VACCINATION: ICD-10-CM

## 2022-01-05 DIAGNOSIS — I25.118 CORONARY ARTERY DISEASE OF NATIVE ARTERY OF NATIVE HEART WITH STABLE ANGINA PECTORIS (HCC): ICD-10-CM

## 2022-01-05 DIAGNOSIS — I10 ESSENTIAL HYPERTENSION: ICD-10-CM

## 2022-01-05 DIAGNOSIS — E11.42 TYPE 2 DIABETES MELLITUS WITH DIABETIC POLYNEUROPATHY, WITH LONG-TERM CURRENT USE OF INSULIN (HCC): ICD-10-CM

## 2022-01-05 DIAGNOSIS — Z79.4 TYPE 2 DIABETES MELLITUS WITH DIABETIC POLYNEUROPATHY, WITH LONG-TERM CURRENT USE OF INSULIN (HCC): ICD-10-CM

## 2022-01-05 DIAGNOSIS — Z79.4 TYPE 2 DIABETES MELLITUS WITH DIABETIC POLYNEUROPATHY, WITH LONG-TERM CURRENT USE OF INSULIN (HCC): Primary | ICD-10-CM

## 2022-01-05 DIAGNOSIS — E11.42 TYPE 2 DIABETES MELLITUS WITH DIABETIC POLYNEUROPATHY, WITH LONG-TERM CURRENT USE OF INSULIN (HCC): Primary | ICD-10-CM

## 2022-01-05 DIAGNOSIS — I21.4 NSTEMI (NON-ST ELEVATED MYOCARDIAL INFARCTION) (HCC): ICD-10-CM

## 2022-01-05 DIAGNOSIS — E11.9 ENCOUNTER FOR DIABETIC FOOT EXAM (HCC): ICD-10-CM

## 2022-01-05 PROCEDURE — 1036F TOBACCO NON-USER: CPT | Performed by: INTERNAL MEDICINE

## 2022-01-05 PROCEDURE — 3074F SYST BP LT 130 MM HG: CPT | Performed by: INTERNAL MEDICINE

## 2022-01-05 PROCEDURE — 99214 OFFICE O/P EST MOD 30 MIN: CPT | Performed by: INTERNAL MEDICINE

## 2022-01-05 PROCEDURE — 36415 COLL VENOUS BLD VENIPUNCTURE: CPT

## 2022-01-05 PROCEDURE — 83036 HEMOGLOBIN GLYCOSYLATED A1C: CPT

## 2022-01-05 PROCEDURE — 3008F BODY MASS INDEX DOCD: CPT | Performed by: INTERNAL MEDICINE

## 2022-01-05 PROCEDURE — 3079F DIAST BP 80-89 MM HG: CPT | Performed by: INTERNAL MEDICINE

## 2022-01-05 RX ORDER — ATORVASTATIN CALCIUM 80 MG/1
80 TABLET, FILM COATED ORAL
Qty: 90 TABLET | Refills: 1
Start: 2022-01-05 | End: 2022-01-11 | Stop reason: SDUPTHER

## 2022-01-05 NOTE — PROGRESS NOTES
BMI Counseling: Body mass index is 34 17 kg/m²  The BMI is above normal  Nutrition recommendations include decreasing portion sizes and encouraging healthy choices of fruits and vegetables  Exercise recommendations include moderate physical activity 150 minutes/week  No pharmacotherapy was ordered  Rationale for BMI follow-up plan is due to patient being overweight or obese  Assessment/Plan:  Problem List Items Addressed This Visit        Endocrine    Type 2 diabetes mellitus with diabetic polyneuropathy, with long-term current use of insulin ([place] [address] ) - Primary    Relevant Orders    Hemoglobin A1C (Completed)       Cardiovascular and Mediastinum    NSTEMI (non-ST elevated myocardial infarction) ([place] [address] )    Essential hypertension    Coronary artery disease of native artery of native heart with stable angina pectoris (HCC)       Musculoskeletal and Integument    Primary osteoarthritis involving multiple joints       Other    Mixed hyperlipidemia      Other Visit Diagnoses     Encounter for vaccination        Menopause ovarian failure        Relevant Orders    DXA bone density spine hip and pelvis    Encounter for diabetic foot exam ([place] [address] )               Diagnoses and all orders for this visit:    Type 2 diabetes mellitus with diabetic polyneuropathy, with long-term current use of insulin ([place] [address] )  -     Cancel: POCT hemoglobin A1c  -     Hemoglobin A1C; Future    Coronary artery disease of native artery of native heart with stable angina pectoris ([place] [address] )    Essential hypertension    Primary osteoarthritis involving multiple joints    Mixed hyperlipidemia    Encounter for vaccination    Menopause ovarian failure  -     DXA bone density spine hip and pelvis; Future    NSTEMI (non-ST elevated myocardial infarction) ([place] [address] )  -     Discontinue: atorvastatin (LIPITOR) 80 mg tablet;  Take 1 tablet (80 mg total) by mouth daily with dinner    Encounter for diabetic foot exam ([place] [address] )        No problem-specific Assessment & Plan notes found for this encounter  A/P: Doing ok and labs up to date except for HgA1c  HgA1c was not done in the office due to machine malfunction  Order dexa  Discussed BMI and will give information on diet and exercise  Discussed vaccines already had her flu vaccine    Continue current treatment and RTC three months for routine  Subjective:      Patient ID: Guillermina Gibbs is a 58 y o  female  WF RTC for f/u dm, htn, etc  Doing well and no new issues  Remains active w/o difficulty and no falls  Sugars less than 140 and no low sugar events  Denies CP, SOB, palpitations, edema, orthopnea or PND  Chronic pain is manageable  Breast cancer is in remission  Due for labs, vaccines, and dexa  The following portions of the patient's history were reviewed and updated as appropriate:   She has a past medical history of Arthritis, Cancer ([facility] ), Cardiac disease, CHF (congestive heart failure) ([facility] ), Coronary artery disease, [facility] Heartburn, Hypercholesteremia, Hyperlipidemia, Hypertension, and Neuropathy  ,  does not have any pertinent problems on file  ,   has a past surgical history that includes Breast surgery (Left); Knee surgery; Nose surgery; Mouth surgery; Foot surgery (Left); Tubal ligation; Ovarian cyst removal (Left); Cholecystectomy; pr shldr arthroscop,part acromioplas (Right, 5/16/2016); pr arthroscopy shoulder surgical biceps tenodesis (Right, 5/16/2016); Cardiac surgery; Tubal ligation; and Colonoscopy  ,  family history includes Esophageal cancer in her father; Leukemia in her father; Liver disease in her brother; Lung cancer in her family, father, and mother; Stomach cancer in her family; Thyroid disease in her mother  ,   reports that she quit smoking about 22 years ago  Her smoking use included cigarettes  She smoked 1 00 pack per day  She has never used smokeless tobacco  She reports previous alcohol use   She reports previous drug use ,  is allergic to codeine, iodinated diagnostic agents, iodine - food allergy, and penicillins     Current Outpatient Medications   Medication Sig Dispense Refill    amLODIPine (NORVASC) 10 mg tablet Take 1 tablet (10 mg total) by mouth daily 90 tablet 1    aspirin 81 MG tablet Take 81 mg by mouth daily   docusate sodium (COLACE) 100 mg capsule Take 100 mg by mouth 2 (two) times a day      furosemide (LASIX) 40 mg tablet Take 1 tablet (40 mg total) by mouth daily 90 tablet 1    gabapentin (NEURONTIN) 300 mg capsule Take 1 capsule (300 mg total) by mouth 3 (three) times a day 270 capsule 1    insulin aspart (NovoLOG FlexPen) 100 UNIT/ML injection pen PER SLIDING SCALE GIVEN TO PT WITH AC AND HS INJECTIONS  3 pen 3    insulin detemir (Levemir FlexTouch) 100 Units/mL injection pen 55 UNITS IN AM AND 70 UNITS IN PM 45 mL 5    Insulin Syringe-Needle U-100 31G X 15/64" 0 5 ML MISC Use 2 (two) times a day 90 each 5    losartan (COZAAR) 100 MG tablet Take 1 tablet (100 mg total) by mouth daily 90 tablet 3    metoprolol succinate (TOPROL-XL) 100 mg 24 hr tablet Take 1 tablet (100 mg total) by mouth daily 90 tablet 1    omega-3-acid ethyl esters (LOVAZA) 1 g capsule Take 2 capsules by mouth twice daily 180 capsule 0    ranolazine (RANEXA) 500 mg 12 hr tablet Take 1 tablet (500 mg total) by mouth 2 (two) times a day 180 tablet 3    TURMERIC PO Take by mouth      atorvastatin (LIPITOR) 80 mg tablet Take 1 tablet (80 mg total) by mouth daily with dinner 90 tablet 1    Dulaglutide (Trulicity) 3 WJ/8 0EK SOPN Inject 0 5 mL (3 mg total) under the skin once a week 6 mL 1    Jardiance 25 MG TABS TAKE 1 TABLET BY MOUTH EVERY DAY IN THE MORNING 90 tablet 1    omeprazole (PriLOSEC) 40 MG capsule Take 1 capsule (40 mg total) by mouth daily 90 capsule 2     No current facility-administered medications for this visit  Review of Systems   Constitutional: Negative for activity change, chills, diaphoresis, fatigue and fever  HENT: Negative      Eyes: Negative for visual disturbance  Respiratory: Negative for cough, chest tightness, shortness of breath and wheezing  Cardiovascular: Negative for chest pain, palpitations and leg swelling  Gastrointestinal: Negative for abdominal pain, constipation, diarrhea, nausea and vomiting  Endocrine: Negative for cold intolerance and heat intolerance  Genitourinary: Negative for difficulty urinating, dysuria and frequency  Musculoskeletal: Negative for arthralgias, gait problem and myalgias  Neurological: Negative for dizziness, seizures, syncope, weakness, light-headedness and headaches  Psychiatric/Behavioral: Negative for confusion, dysphoric mood and sleep disturbance  The patient is not nervous/anxious  PHQ-2/9 Depression Screening          Objective:  Vitals:    01/05/22 0802   BP: 124/80   Pulse: 101   Temp: 98 4 °F (36 9 °C)   SpO2: 97%   Weight: 108 kg (238 lb 2 oz)   Height: 5' 10" (1 778 m)     Body mass index is 34 17 kg/m²  Physical Exam  Vitals and nursing note reviewed  Constitutional:       General: She is not in acute distress  Appearance: Normal appearance  She is not ill-appearing  HENT:      Head: Normocephalic and atraumatic  Mouth/Throat:      Mouth: Mucous membranes are moist    Eyes:      Extraocular Movements: Extraocular movements intact  Conjunctiva/sclera: Conjunctivae normal       Pupils: Pupils are equal, round, and reactive to light  Neck:      Vascular: No carotid bruit  Cardiovascular:      Rate and Rhythm: Normal rate and regular rhythm  Pulses: no weak pulses          Dorsalis pedis pulses are 1+ on the right side and 1+ on the left side  Posterior tibial pulses are 1+ on the right side and 1+ on the left side  Heart sounds: Normal heart sounds  Pulmonary:      Effort: Pulmonary effort is normal  No respiratory distress  Breath sounds: Normal breath sounds  No wheezing or rales     Abdominal:      General: Bowel sounds are normal  There is no distension  Palpations: Abdomen is soft  Tenderness: There is no abdominal tenderness  Musculoskeletal:      Cervical back: Neck supple  Right lower leg: No edema  Left lower leg: No edema  Feet:      Right foot:      Skin integrity: No ulcer, skin breakdown, erythema, warmth, callus or dry skin  Left foot:      Skin integrity: No ulcer, skin breakdown, erythema, warmth, callus or dry skin  Neurological:      General: No focal deficit present  Mental Status: She is alert and oriented to person, place, and time  Mental status is at baseline  Psychiatric:         Mood and Affect: Mood normal          Behavior: Behavior normal          Thought Content: Thought content normal          Judgment: Judgment normal        Patient's shoes and socks removed  Right Foot/Ankle   Right Foot Inspection  Skin Exam: skin normal and skin intact  No dry skin, no warmth, no callus, no erythema, no maceration, no abnormal color, no pre-ulcer, no ulcer and no callus  Toe Exam: No swelling, no tenderness, erythema and  no right toe deformity    Sensory   Monofilament testing: absent    Vascular  Capillary refills: < 3 seconds  The right DP pulse is 1+  The right PT pulse is 1+  Left Foot/Ankle  Left Foot Inspection  Skin Exam: skin normal and skin intact  No dry skin, no warmth, no erythema, no maceration, normal color, no pre-ulcer, no ulcer and no callus  Toe Exam: ROM and strength within normal limits  No swelling, no tenderness, no erythema and no left toe deformity  Sensory   Monofilament testing: absent    Vascular  Capillary refills: < 3 seconds  The left DP pulse is 1+  The left PT pulse is 1+       Assign Risk Category  No deformity present  Loss of protective sensation  No weak pulses  Risk: 2

## 2022-01-05 NOTE — PATIENT INSTRUCTIONS
Foot Care for People with Diabetes   AMBULATORY CARE:   What you need to know about foot care:   · Foot care helps protect your feet and prevent foot ulcers or sores  Long-term high blood sugar levels can damage the blood vessels and nerves in your legs and feet  This damage makes it hard to feel pressure, pain, temperature, and touch  You may not be able to feel a cut or sore, or shoes that are too tight  Foot care is needed to prevent serious problems, such as an infection or amputation  · Diabetes may cause your toes to become crooked or curved under  These changes may affect the way you walk and can lead to increased pressure on your foot  The pressure can decrease blood flow to your feet  Lack of blood flow increases your risk for a foot ulcer  Do not ignore small problems, such as dry skin or small wounds  These can become life-threatening over time without proper care  Call your care team provider if:   · Your feet become numb, weak, or hard to move  · You have pus draining from a sore on your foot  · You have a wound on your foot that gets bigger, deeper, or does not heal      · You see blisters, cuts, scratches, calluses, or sores on your foot  · You have a fever, and your feet become red, warm, and swollen  · Your toenails become thick, curled, or yellow  · You find it hard to check your feet because your vision is poor  · You have questions or concerns about your condition or care  How to care for your feet:   · Check your feet each day  Look at your whole foot, including the bottom, and between and under your toes  Check for wounds, corns, and calluses  Use a mirror to see the bottom of your feet  The skin on your feet may be shiny, tight, or darker than normal  Your feet may also be cold and pale  Feel your feet by running your hands along the tops, bottoms, sides, and between your toes   Redness, swelling, and warmth are signs of blood flow problems that can lead to a foot ulcer  Do not try to remove corns or calluses yourself  · Wash your feet each day with soap and warm water  Do not use hot water, because this can injure your foot  Dry your feet gently with a towel after you wash them  Dry between and under your toes  · Apply lotion or a moisturizer on your dry feet  Ask your care team provider what lotions are best to use  Do not put lotion or moisturizer between your toes  Moisture between your toes could lead to skin breakdown  · Cut your toenails correctly  File or cut your toenails straight across  Use a soft brush to clean around your toenails  If your toenails are very thick, you may need to have a care team provider or specialist cut them  · Protect your feet  Do not walk barefoot or wear your shoes without socks  Check your shoes for rocks or other objects that can hurt your feet  Wear cotton socks to help keep your feet dry  Wear socks without toe seams, or wear them with the seams inside out  Change your socks each day  Do not wear socks that are dirty or damp  · Wear shoes that fit well  Wear shoes that do not rub against any area of your feet  Your shoes should be ½ to ¾ inch (1 to 2 centimeters) longer than your feet  Your shoes should also have extra space around the widest part of your feet  Walking or athletic shoes with laces or straps that adjust are best  Ask your care team provider for help to choose shoes that fit you best  Ask him or her if you need to wear an insert, orthotic, or bandage on your feet  · Go to your follow-up visits  Your care team provider will do a foot exam at least once a year  You may need a foot exam more often if you have nerve damage, foot deformities, or ulcers  He will check for nerve damage and how well you can feel your feet  He will check your shoes to see if they fit well  · Do not smoke  Smoking can damage your blood vessels and put you at increased risk for foot ulcers   Ask your care team provider for information if you currently smoke and need help to quit  E-cigarettes or smokeless tobacco still contain nicotine  Talk to your care team provider before you use these products  Follow up with your diabetes care team provider or foot specialist as directed: You will need to have your feet checked at least once a year  You may need a foot exam more often if you have nerve damage, foot deformities, or ulcers  Write down your questions so you remember to ask them during your visits  © Copyright [employer] 2021 Information is for End User's use only and may not be sold, redistributed or otherwise used for commercial purposes  All illustrations and images included in CareNotes® are the copyrighted property of A D A M , Inc  or [facility] Janna Raines   The above information is an  only  It is not intended as medical advice for individual conditions or treatments  Talk to your doctor, nurse or pharmacist before following any medical regimen to see if it is safe and effective for you

## 2022-01-07 LAB
EST. AVERAGE GLUCOSE BLD GHB EST-MCNC: 177 MG/DL
HBA1C MFR BLD: 7.8 %

## 2022-01-07 PROCEDURE — 3051F HG A1C>EQUAL 7.0%<8.0%: CPT | Performed by: INTERNAL MEDICINE

## 2022-01-11 DIAGNOSIS — I21.4 NSTEMI (NON-ST ELEVATED MYOCARDIAL INFARCTION) (HCC): ICD-10-CM

## 2022-01-11 RX ORDER — ATORVASTATIN CALCIUM 80 MG/1
80 TABLET, FILM COATED ORAL
Qty: 90 TABLET | Refills: 1 | Status: SHIPPED | OUTPATIENT
Start: 2022-01-11

## 2022-01-21 ENCOUNTER — TELEPHONE (OUTPATIENT)
Dept: INTERNAL MEDICINE CLINIC | Facility: CLINIC | Age: 62
End: 2022-01-21

## 2022-01-21 DIAGNOSIS — J06.9 UPPER RESPIRATORY TRACT INFECTION, UNSPECIFIED TYPE: Primary | ICD-10-CM

## 2022-01-21 PROCEDURE — 87636 SARSCOV2 & INF A&B AMP PRB: CPT | Performed by: INTERNAL MEDICINE

## 2022-01-21 NOTE — TELEPHONE ENCOUNTER
PT called and states she woke up this morning not feeling well  She is experiencing chest congestion, shortness of breath, sinus congestion, cough, body aches  Pt is vaccinated with booster and had covid in the past  She would like to know if you think she should get tested  Please advise

## 2022-01-22 LAB
FLUAV RNA RESP QL NAA+PROBE: NEGATIVE
FLUBV RNA RESP QL NAA+PROBE: NEGATIVE
SARS-COV-2 RNA RESP QL NAA+PROBE: NEGATIVE

## 2022-01-26 ENCOUNTER — TELEPHONE (OUTPATIENT)
Dept: INTERNAL MEDICINE CLINIC | Facility: CLINIC | Age: 62
End: 2022-01-26

## 2022-01-26 DIAGNOSIS — E11.42 TYPE 2 DIABETES MELLITUS WITH DIABETIC POLYNEUROPATHY, WITH LONG-TERM CURRENT USE OF INSULIN (HCC): ICD-10-CM

## 2022-01-26 DIAGNOSIS — Z79.4 TYPE 2 DIABETES MELLITUS WITH DIABETIC POLYNEUROPATHY, WITH LONG-TERM CURRENT USE OF INSULIN (HCC): ICD-10-CM

## 2022-01-26 DIAGNOSIS — K21.00 GASTROESOPHAGEAL REFLUX DISEASE WITH ESOPHAGITIS WITHOUT HEMORRHAGE: ICD-10-CM

## 2022-01-26 RX ORDER — OMEPRAZOLE 40 MG/1
40 CAPSULE, DELAYED RELEASE ORAL DAILY
Qty: 90 CAPSULE | Refills: 2 | Status: SHIPPED | OUTPATIENT
Start: 2022-01-26

## 2022-01-26 RX ORDER — DULAGLUTIDE 3 MG/.5ML
3 INJECTION, SOLUTION SUBCUTANEOUS WEEKLY
Qty: 6 ML | Refills: 1 | Status: SHIPPED | OUTPATIENT
Start: 2022-01-26

## 2022-03-11 DIAGNOSIS — E11.42 TYPE 2 DIABETES MELLITUS WITH DIABETIC POLYNEUROPATHY, WITH LONG-TERM CURRENT USE OF INSULIN (HCC): ICD-10-CM

## 2022-03-11 DIAGNOSIS — E11.9 ENCOUNTER FOR DIABETIC FOOT EXAM (HCC): ICD-10-CM

## 2022-03-11 DIAGNOSIS — Z79.4 TYPE 2 DIABETES MELLITUS WITH DIABETIC POLYNEUROPATHY, WITH LONG-TERM CURRENT USE OF INSULIN (HCC): ICD-10-CM

## 2022-03-11 RX ORDER — EMPAGLIFLOZIN 25 MG/1
TABLET, FILM COATED ORAL
Qty: 90 TABLET | Refills: 1 | Status: SHIPPED | OUTPATIENT
Start: 2022-03-11

## 2022-04-07 DIAGNOSIS — I10 ESSENTIAL HYPERTENSION: ICD-10-CM

## 2022-04-08 RX ORDER — AMLODIPINE BESYLATE 10 MG/1
TABLET ORAL
Qty: 90 TABLET | Refills: 0 | Status: SHIPPED | OUTPATIENT
Start: 2022-04-08 | End: 2022-05-26 | Stop reason: SDUPTHER

## 2022-04-12 ENCOUNTER — TELEPHONE (OUTPATIENT)
Dept: INTERNAL MEDICINE CLINIC | Facility: CLINIC | Age: 62
End: 2022-04-12

## 2022-04-12 NOTE — TELEPHONE ENCOUNTER
Pt called she left me a message that she been feeling dizzy and lightlessness she believes its from the amlodipine so she asked if she can not take it till her follow up appt with on the 21st

## 2022-05-26 DIAGNOSIS — I10 ESSENTIAL HYPERTENSION: ICD-10-CM

## 2022-05-26 RX ORDER — AMLODIPINE BESYLATE 5 MG/1
5 TABLET ORAL DAILY
Qty: 90 TABLET | Refills: 1 | Status: SHIPPED | OUTPATIENT
Start: 2022-05-26

## 2022-05-26 NOTE — TELEPHONE ENCOUNTER
Pt called she said she been only taking amlopine 5 mg instead of 10mg, she asked if she can have a new script for the 5's she said the 10's make her dizzy, she said her BP been around 125/78 w/HR 87

## 2022-05-29 ENCOUNTER — OFFICE VISIT (OUTPATIENT)
Dept: URGENT CARE | Facility: CLINIC | Age: 62
End: 2022-05-29
Payer: COMMERCIAL

## 2022-05-29 VITALS
SYSTOLIC BLOOD PRESSURE: 141 MMHG | WEIGHT: 234 LBS | TEMPERATURE: 98 F | BODY MASS INDEX: 33.58 KG/M2 | HEART RATE: 73 BPM | DIASTOLIC BLOOD PRESSURE: 62 MMHG | RESPIRATION RATE: 20 BRPM | OXYGEN SATURATION: 98 %

## 2022-05-29 DIAGNOSIS — J01.90 ACUTE SINUSITIS, RECURRENCE NOT SPECIFIED, UNSPECIFIED LOCATION: Primary | ICD-10-CM

## 2022-05-29 DIAGNOSIS — H10.9 CONJUNCTIVITIS OF RIGHT EYE, UNSPECIFIED CONJUNCTIVITIS TYPE: ICD-10-CM

## 2022-05-29 LAB
SARS-COV-2 AG UPPER RESP QL IA: NEGATIVE
VALID CONTROL: NORMAL

## 2022-05-29 PROCEDURE — 99213 OFFICE O/P EST LOW 20 MIN: CPT | Performed by: PHYSICIAN ASSISTANT

## 2022-05-29 PROCEDURE — 87636 SARSCOV2 & INF A&B AMP PRB: CPT | Performed by: PHYSICIAN ASSISTANT

## 2022-05-29 RX ORDER — TOBRAMYCIN 3 MG/ML
2 SOLUTION/ DROPS OPHTHALMIC 4 TIMES DAILY
Qty: 2 ML | Refills: 0 | Status: SHIPPED | OUTPATIENT
Start: 2022-05-29 | End: 2022-06-03

## 2022-05-29 RX ORDER — AZITHROMYCIN 500 MG/1
TABLET, FILM COATED ORAL
Qty: 5 TABLET | Refills: 0 | Status: SHIPPED | OUTPATIENT
Start: 2022-05-29 | End: 2022-06-02
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
## 2022-05-29 NOTE — PATIENT INSTRUCTIONS
Quarantine until test results are back   Vitamin D3 2000 IU daily  Vitamin C 1g every 12 hours  Multivitamin daily  Fluids and rest  Flonase 2 sprays each nostril once daily  Zyrtec   Over the counter cold medication as needed such as mucinex  Ibuprofen and/or tylenol for fever, body aches  Follow up with PCP upon confirmation of a positive covid test   Proceed to  ER if symptoms worsen  Stay home except to get medical care    People who are mildly ill with COVID-19 are able to isolate at home during their illness  You should restrict activities outside your home, except for getting medical care  Do not go to work, school, or public areas  Avoid using public transportation, ride-sharing, or taxis  Separate yourself from other people     As much as possible, you should stay in a specific room and away from other people in your home  Also, you should use a separate bathroom, if available  Call ahead before visiting your doctor    If you have a medical appointment, call the healthcare provider and tell them that you have or may have COVID-19  This will help the healthcare providers office take steps to keep other people from getting infected or exposed  Wear a facemask    You should wear a facemask when you are around other people (e g , sharing a room or vehicle) or pets and before you enter a healthcare providers office  If you are not able to wear a facemask (for example, because it causes trouble breathing), then people who live with you should not stay in the same room with you, or they should wear a facemask if they enter your room  Monitor your symptoms    Seek prompt medical attention if your illness is worsening (e g , difficulty breathing)  Before seeking care, call your healthcare provider and tell them that you have, or are being evaluated for, COVID-19  Put on a facemask before you enter the facility   These steps will help the healthcare providers office to keep other people in the office or waiting room from getting infected or exposed  Ask your healthcare provider to call the local or state health department  Persons who are placed under active monitoring or facilitated self-monitoring should follow instructions provided by their local health department or occupational health professionals, as appropriate  If you have a medical emergency and need to call 911, notify the dispatch personnel that you have, or are being evaluated for COVID-19  If possible, put on a facemask before emergency medical services arrive  Please follow Quarantine instructions provided in your work or school note

## 2022-05-29 NOTE — LETTER
[place] [id number]  Dept: [phone]    May 29, 2022    Patient: Lolis Carrillo  YOB: 1960    Lolis Carrillo was seen and evaluated at our [facility]  Please note if Covid and Flu tests are negative, they may return to work when fever free for 24 hours without the use of a fever reducing agent  If Covid or Flu test is positive, they may return to work on 6/1/22, as this is 5 days from the onset of symptoms  Upon return, they must then adhere to strict masking for an additional 5 days      Sincerely,    Vale Taylor PA-C

## 2022-07-14 DIAGNOSIS — I21.4 NSTEMI (NON-ST ELEVATED MYOCARDIAL INFARCTION) (HCC): ICD-10-CM

## 2022-07-14 RX ORDER — METOPROLOL SUCCINATE 100 MG/1
100 TABLET, EXTENDED RELEASE ORAL DAILY
Qty: 90 TABLET | Refills: 1 | Status: SHIPPED | OUTPATIENT
Start: 2022-07-14

## 2022-08-12 ENCOUNTER — OFFICE VISIT (OUTPATIENT)
Dept: INTERNAL MEDICINE CLINIC | Facility: CLINIC | Age: 62
End: 2022-08-12
Payer: COMMERCIAL

## 2022-08-12 VITALS
SYSTOLIC BLOOD PRESSURE: 138 MMHG | HEART RATE: 72 BPM | WEIGHT: 239 LBS | BODY MASS INDEX: 34.22 KG/M2 | DIASTOLIC BLOOD PRESSURE: 76 MMHG | TEMPERATURE: 97.8 F | OXYGEN SATURATION: 98 % | HEIGHT: 70 IN

## 2022-08-12 DIAGNOSIS — E11.9 ENCOUNTER FOR DIABETIC FOOT EXAM (HCC): ICD-10-CM

## 2022-08-12 DIAGNOSIS — Z12.31 ENCOUNTER FOR SCREENING MAMMOGRAM FOR MALIGNANT NEOPLASM OF BREAST: ICD-10-CM

## 2022-08-12 DIAGNOSIS — M15.9 PRIMARY OSTEOARTHRITIS INVOLVING MULTIPLE JOINTS: ICD-10-CM

## 2022-08-12 DIAGNOSIS — E04.1 THYROID NODULE: ICD-10-CM

## 2022-08-12 DIAGNOSIS — M79.604 BILATERAL LEG PAIN: ICD-10-CM

## 2022-08-12 DIAGNOSIS — Z79.4 TYPE 2 DIABETES MELLITUS WITH DIABETIC POLYNEUROPATHY, WITH LONG-TERM CURRENT USE OF INSULIN (HCC): Primary | ICD-10-CM

## 2022-08-12 DIAGNOSIS — Z85.3 HISTORY OF BREAST CANCER: ICD-10-CM

## 2022-08-12 DIAGNOSIS — H40.1130 PRIMARY OPEN ANGLE GLAUCOMA OF BOTH EYES, UNSPECIFIED GLAUCOMA STAGE: ICD-10-CM

## 2022-08-12 DIAGNOSIS — E78.2 MIXED HYPERLIPIDEMIA: ICD-10-CM

## 2022-08-12 DIAGNOSIS — I10 ESSENTIAL HYPERTENSION: ICD-10-CM

## 2022-08-12 DIAGNOSIS — E11.42 TYPE 2 DIABETES MELLITUS WITH DIABETIC POLYNEUROPATHY, WITH LONG-TERM CURRENT USE OF INSULIN (HCC): Primary | ICD-10-CM

## 2022-08-12 DIAGNOSIS — M79.605 BILATERAL LEG PAIN: ICD-10-CM

## 2022-08-12 DIAGNOSIS — G62.9 NEUROPATHY: ICD-10-CM

## 2022-08-12 DIAGNOSIS — Z13.31 NEGATIVE DEPRESSION SCREENING: ICD-10-CM

## 2022-08-12 DIAGNOSIS — Z12.11 SCREEN FOR COLON CANCER: ICD-10-CM

## 2022-08-12 DIAGNOSIS — E28.39 MENOPAUSE OVARIAN FAILURE: ICD-10-CM

## 2022-08-12 DIAGNOSIS — I25.118 CORONARY ARTERY DISEASE OF NATIVE ARTERY OF NATIVE HEART WITH STABLE ANGINA PECTORIS (HCC): ICD-10-CM

## 2022-08-12 DIAGNOSIS — Z78.0 POSTMENOPAUSAL STATUS (AGE-RELATED) (NATURAL): ICD-10-CM

## 2022-08-12 DIAGNOSIS — Z23 ENCOUNTER FOR VACCINATION: ICD-10-CM

## 2022-08-12 DIAGNOSIS — K21.00 GASTROESOPHAGEAL REFLUX DISEASE WITH ESOPHAGITIS WITHOUT HEMORRHAGE: ICD-10-CM

## 2022-08-12 LAB — SL AMB POCT HEMOGLOBIN AIC: 8.4 (ref ?–6.5)

## 2022-08-12 PROCEDURE — 3725F SCREEN DEPRESSION PERFORMED: CPT | Performed by: INTERNAL MEDICINE

## 2022-08-12 PROCEDURE — 83036 HEMOGLOBIN GLYCOSYLATED A1C: CPT | Performed by: INTERNAL MEDICINE

## 2022-08-12 PROCEDURE — 4010F ACE/ARB THERAPY RXD/TAKEN: CPT | Performed by: INTERNAL MEDICINE

## 2022-08-12 PROCEDURE — 99214 OFFICE O/P EST MOD 30 MIN: CPT | Performed by: INTERNAL MEDICINE

## 2022-08-12 PROCEDURE — 3052F HG A1C>EQUAL 8.0%<EQUAL 9.0%: CPT | Performed by: INTERNAL MEDICINE

## 2022-08-12 PROCEDURE — 3078F DIAST BP <80 MM HG: CPT | Performed by: INTERNAL MEDICINE

## 2022-08-12 PROCEDURE — 3075F SYST BP GE 130 - 139MM HG: CPT | Performed by: INTERNAL MEDICINE

## 2022-08-12 RX ORDER — GABAPENTIN 400 MG/1
400 CAPSULE ORAL 3 TIMES DAILY
Qty: 270 CAPSULE | Refills: 1 | Status: SHIPPED | OUTPATIENT
Start: 2022-08-12

## 2022-08-12 RX ORDER — INSULIN DETEMIR 100 [IU]/ML
INJECTION, SOLUTION SUBCUTANEOUS
Qty: 45 ML | Refills: 5 | Status: SHIPPED | OUTPATIENT
Start: 2022-08-12

## 2022-08-12 RX ORDER — OMEPRAZOLE 40 MG/1
40 CAPSULE, DELAYED RELEASE ORAL DAILY
Qty: 90 CAPSULE | Refills: 2 | Status: SHIPPED | OUTPATIENT
Start: 2022-08-12

## 2022-08-12 RX ORDER — LOSARTAN POTASSIUM 100 MG/1
100 TABLET ORAL DAILY
Qty: 90 TABLET | Refills: 3 | Status: SHIPPED | OUTPATIENT
Start: 2022-08-12

## 2022-08-12 NOTE — PROGRESS NOTES
Assessment/Plan:  Problem List Items Addressed This Visit        Endocrine    Type 2 diabetes mellitus with diabetic polyneuropathy, with long-term current use of insulin (HCC) - Primary    Relevant Medications    gabapentin (NEURONTIN) 400 mg capsule    insulin detemir (Levemir FlexTouch) 100 Units/mL injection pen    Other Relevant Orders    POCT hemoglobin A1c (Completed)    VAS lower limb arterial duplex, complete bilateral    Thyroid nodule       Cardiovascular and Mediastinum    Essential hypertension    Relevant Medications    losartan (COZAAR) 100 MG tablet    Other Relevant Orders    VAS lower limb arterial duplex, complete bilateral    Coronary artery disease of native artery of native heart with stable angina pectoris (HCC)    Relevant Orders    VAS lower limb arterial duplex, complete bilateral       Nervous and Auditory    Neuropathy       Musculoskeletal and Integument    Primary osteoarthritis involving multiple joints       Other    Primary open angle glaucoma (POAG) of both eyes    Mixed hyperlipidemia    Relevant Orders    VAS lower limb arterial duplex, complete bilateral    History of breast cancer      Other Visit Diagnoses     Screen for colon cancer        Relevant Orders    Cologuard    Encounter for vaccination        Encounter for screening mammogram for malignant neoplasm of breast        Relevant Orders    Mammo screening bilateral w 3d & cad    Mammo screening bilateral w 3d & cad    Gastroesophageal reflux disease with esophagitis without hemorrhage        Relevant Medications    omeprazole (PriLOSEC) 40 MG capsule    Postmenopausal status (age-related) (natural)        Relevant Orders    DXA bone density spine hip and pelvis    Menopause ovarian failure        Negative depression screening        Encounter for diabetic foot exam ([facility] [place] [address] )        Bilateral leg pain        Relevant Orders    VAS lower limb arterial duplex, complete bilateral           Diagnoses and all orders for this visit:    Type 2 diabetes mellitus with diabetic polyneuropathy, with long-term current use of insulin (HCC)  -     POCT hemoglobin A1c  -     gabapentin (NEURONTIN) 400 mg capsule; Take 1 capsule (400 mg total) by mouth 3 (three) times a day  -     insulin detemir (Levemir FlexTouch) 100 Units/mL injection pen; 60UNITS IN AM AND 75 UNITS IN PM  -     VAS lower limb arterial duplex, complete bilateral; Future    Thyroid nodule    Essential hypertension  -     losartan (COZAAR) 100 MG tablet; Take 1 tablet (100 mg total) by mouth daily  -     VAS lower limb arterial duplex, complete bilateral; Future    Coronary artery disease of native artery of native heart with stable angina pectoris (HCC)  -     VAS lower limb arterial duplex, complete bilateral; Future    Neuropathy    Primary osteoarthritis involving multiple joints    Primary open angle glaucoma of both eyes, unspecified glaucoma stage    Mixed hyperlipidemia  -     VAS lower limb arterial duplex, complete bilateral; Future    History of breast cancer    Screen for colon cancer  -     Cologuard    Encounter for vaccination    Encounter for screening mammogram for malignant neoplasm of breast  -     Mammo screening bilateral w 3d & cad; Future  -     Mammo screening bilateral w 3d & cad; Future    Gastroesophageal reflux disease with esophagitis without hemorrhage  -     omeprazole (PriLOSEC) 40 MG capsule; Take 1 capsule (40 mg total) by mouth daily    Postmenopausal status (age-related) (natural)  -     DXA bone density spine hip and pelvis; Future    Menopause ovarian failure    Negative depression screening    Encounter for diabetic foot exam ([facility] [address] )    Bilateral leg pain  -     VAS lower limb arterial duplex, complete bilateral; Future      No problem-specific Assessment & Plan notes found for this encounter  A/P: Doing ok and will check labs  In office HgA1c was 8 4  Will increase her basal doses  Will increase her eliz for the neuropathy  Consider SNRI  ??claudication  Will check an arterial study  Has several risks, including CAD  Discussed vaccines defers  Order mammo, dexa, and CRC  Continue current treatment and RTC one month for f/u labs, neuropathy, ? PAD, and DM  Liyah Grant Will need a pneumonia vaccine as well  Subjective:      Patient ID: Guillermina Gibbs is a 58 y o  female  WM RTC for f/u dm, htn, etc  Doing well and no new issues,but neuropathy pain is worse  New job and does a lot more walking and now notes a new different bilat calf pain with walking and improves with rest    Remains active w/o difficulty and no falls  Sugars less than 175 and no low sugar events  Denies CP, SOB, palpitations, edema, orthopnea or PND  Chronic pain is manageable  Breast cancer is in remission  Due for labs, CRC, mammo, and vaccines  The following portions of the patient's history were reviewed and updated as appropriate:   She has a past medical history of Arthritis, Cancer ([facility] [address] ), Cardiac disease, CHF (congestive heart failure) ([facility] [address] ), Coronary artery disease, Diabetes mellitus ([facility] [address] ), Heartburn, Hypercholesteremia, Hyperlipidemia, Hypertension, and Neuropathy  ,  does not have any pertinent problems on file  ,   has a past surgical history that includes Breast surgery (Left); Knee surgery; Nose surgery; Mouth surgery; Foot surgery (Left); Tubal ligation; Ovarian cyst removal (Left); Cholecystectomy; pr shldr arthroscop,part acromioplas (Right, 5/16/2016); pr arthroscopy shoulder surgical biceps tenodesis (Right, 5/16/2016); Cardiac surgery; Tubal ligation; and Colonoscopy  ,  family history includes Esophageal cancer in her father; Leukemia in her father; Liver disease in her brother; Lung cancer in her family, father, and mother; Stomach cancer in her family; Thyroid disease in her mother  ,   reports that she quit smoking about 22 years ago  Her smoking use included cigarettes  She smoked 1 00 pack per day   She has never used smokeless tobacco  She reports previous alcohol use  She reports previous drug use ,  is allergic to codeine, iodinated diagnostic agents, iodine - food allergy, and penicillins     Current Outpatient Medications   Medication Sig Dispense Refill    amLODIPine (NORVASC) 5 mg tablet Take 1 tablet (5 mg total) by mouth daily 90 tablet 1    aspirin 81 MG tablet Take 81 mg by mouth daily   atorvastatin (LIPITOR) 80 mg tablet Take 1 tablet (80 mg total) by mouth daily with dinner 90 tablet 1    docusate sodium (COLACE) 100 mg capsule Take 100 mg by mouth 2 (two) times a day      Dulaglutide (Trulicity) 3 TD/9 2DZ SOPN Inject 0 5 mL (3 mg total) under the skin once a week 6 mL 1    furosemide (LASIX) 40 mg tablet Take 1 tablet (40 mg total) by mouth daily 90 tablet 1    gabapentin (NEURONTIN) 400 mg capsule Take 1 capsule (400 mg total) by mouth 3 (three) times a day 270 capsule 1    insulin aspart (NovoLOG FlexPen) 100 UNIT/ML injection pen PER SLIDING SCALE GIVEN TO PT WITH AC AND HS INJECTIONS  3 pen 3    insulin detemir (Levemir FlexTouch) 100 Units/mL injection pen 60UNITS IN AM AND 75 UNITS IN PM 45 mL 5    Jardiance 25 MG TABS TAKE 1 TABLET BY MOUTH EVERY DAY IN THE MORNING 90 tablet 1    losartan (COZAAR) 100 MG tablet Take 1 tablet (100 mg total) by mouth daily 90 tablet 3    metoprolol succinate (TOPROL-XL) 100 mg 24 hr tablet Take 1 tablet (100 mg total) by mouth daily 90 tablet 1    omega-3-acid ethyl esters (LOVAZA) 1 g capsule Take 2 capsules by mouth twice daily 180 capsule 0    omeprazole (PriLOSEC) 40 MG capsule Take 1 capsule (40 mg total) by mouth daily 90 capsule 2    ranolazine (RANEXA) 500 mg 12 hr tablet Take 1 tablet (500 mg total) by mouth 2 (two) times a day 180 tablet 3    TURMERIC PO Take by mouth      Insulin Syringe-Needle U-100 31G X 15/64" 0 5 ML MISC Use 2 (two) times a day 90 each 5     No current facility-administered medications for this visit         Review of Systems   Constitutional: Negative for activity change, chills, diaphoresis, fatigue and fever  HENT: Negative  Eyes: Negative for visual disturbance  Respiratory: Negative for cough, chest tightness, shortness of breath and wheezing  Cardiovascular: Negative for chest pain, palpitations and leg swelling  Gastrointestinal: Negative for abdominal pain, constipation, diarrhea, nausea and vomiting  Endocrine: Negative for cold intolerance and heat intolerance  Genitourinary: Negative for difficulty urinating, dysuria and frequency  Musculoskeletal: Negative for arthralgias, gait problem and myalgias  Neurological: Negative for dizziness, seizures, syncope, weakness, light-headedness and headaches  Psychiatric/Behavioral: Negative for confusion, dysphoric mood and sleep disturbance  The patient is not nervous/anxious  PHQ-2/9 Depression Screening    Little interest or pleasure in doing things: 0 - not at all  Feeling down, depressed, or hopeless: 0 - not at all  PHQ-2 Score: 0  PHQ-2 Interpretation: Negative depression screen        Objective:  Vitals:    08/12/22 1255   BP: 138/76   Pulse: 72   Temp: 97 8 °F (36 6 °C)   SpO2: 98%   Weight: 108 kg (239 lb)   Height: 5' 10" (1 778 m)     Body mass index is 34 29 kg/m²  Physical Exam  Constitutional:       General: She is not in acute distress  Appearance: Normal appearance  She is not ill-appearing  HENT:      Head: Normocephalic and atraumatic  Mouth/Throat:      Mouth: Mucous membranes are moist    Eyes:      Extraocular Movements: Extraocular movements intact  Conjunctiva/sclera: Conjunctivae normal       Pupils: Pupils are equal, round, and reactive to light  Neck:      Vascular: No carotid bruit  Cardiovascular:      Rate and Rhythm: Normal rate and regular rhythm  Pulses: no weak pulses          Dorsalis pedis pulses are 1+ on the right side and 1+ on the left side  Posterior tibial pulses are 1+ on the right side and 1+ on the left side  Heart sounds: Normal heart sounds  Pulmonary:      Effort: Pulmonary effort is normal  No respiratory distress  Breath sounds: Normal breath sounds  No wheezing or rales  Abdominal:      General: Bowel sounds are normal  There is no distension  Palpations: Abdomen is soft  Tenderness: There is no abdominal tenderness  Musculoskeletal:      Cervical back: Neck supple  Right lower leg: No edema  Left lower leg: No edema  Feet:      Right foot:      Skin integrity: No ulcer, skin breakdown, erythema, warmth, callus or dry skin  Left foot:      Skin integrity: No ulcer, skin breakdown, erythema, warmth, callus or dry skin  Neurological:      General: No focal deficit present  Mental Status: She is alert and oriented to person, place, and time  Mental status is at baseline  Psychiatric:         Mood and Affect: Mood normal          Behavior: Behavior normal          Thought Content: Thought content normal          Judgment: Judgment normal        Patient's shoes and socks removed  Right Foot/Ankle   Right Foot Inspection  Skin Exam: skin normal and skin intact  No dry skin, no warmth, no callus, no erythema, no maceration, no abnormal color, no pre-ulcer, no ulcer and no callus  Toe Exam: ROM and strength within normal limits  No swelling, no tenderness, erythema and  no right toe deformity    Sensory   Monofilament testing: diminished    Vascular  Capillary refills: < 3 seconds  The right DP pulse is 1+  The right PT pulse is 1+  Left Foot/Ankle  Left Foot Inspection  Skin Exam: skin normal and skin intact  No dry skin, no warmth, no erythema, no maceration, normal color, no pre-ulcer, no ulcer and no callus  Toe Exam: ROM and strength within normal limits  No swelling, no tenderness, no erythema and no left toe deformity  Sensory   Monofilament testing: diminished    Vascular  Capillary refills: < 3 seconds  The left DP pulse is 1+   The left PT pulse is 1+       Assign Risk Category  No deformity present  Loss of protective sensation  No weak pulses  Risk: 1

## 2022-08-12 NOTE — PATIENT INSTRUCTIONS

## 2022-08-29 DIAGNOSIS — R06.02 SHORTNESS OF BREATH: ICD-10-CM

## 2022-08-29 RX ORDER — FUROSEMIDE 40 MG/1
40 TABLET ORAL DAILY
Qty: 90 TABLET | Refills: 1 | Status: SHIPPED | OUTPATIENT
Start: 2022-08-29

## 2022-09-06 DIAGNOSIS — I21.4 NSTEMI (NON-ST ELEVATED MYOCARDIAL INFARCTION) (HCC): ICD-10-CM

## 2022-09-06 RX ORDER — ATORVASTATIN CALCIUM 80 MG/1
TABLET, FILM COATED ORAL
Qty: 30 TABLET | Refills: 0 | Status: SHIPPED | OUTPATIENT
Start: 2022-09-06

## 2022-09-09 ENCOUNTER — HOSPITAL ENCOUNTER (OUTPATIENT)
Dept: NON INVASIVE DIAGNOSTICS | Facility: HOSPITAL | Age: 62
Discharge: HOME | End: 2022-09-09
Attending: INTERNAL MEDICINE
Payer: COMMERCIAL

## 2022-09-09 DIAGNOSIS — E78.2 MIXED HYPERLIPIDEMIA: ICD-10-CM

## 2022-09-09 DIAGNOSIS — E11.42 TYPE 2 DIABETES MELLITUS WITH DIABETIC POLYNEUROPATHY, WITH LONG-TERM CURRENT USE OF INSULIN (HCC): ICD-10-CM

## 2022-09-09 DIAGNOSIS — M79.604 BILATERAL LEG PAIN: ICD-10-CM

## 2022-09-09 DIAGNOSIS — M79.605 BILATERAL LEG PAIN: ICD-10-CM

## 2022-09-09 DIAGNOSIS — I25.118 CORONARY ARTERY DISEASE OF NATIVE ARTERY OF NATIVE HEART WITH STABLE ANGINA PECTORIS (HCC): ICD-10-CM

## 2022-09-09 DIAGNOSIS — Z79.4 TYPE 2 DIABETES MELLITUS WITH DIABETIC POLYNEUROPATHY, WITH LONG-TERM CURRENT USE OF INSULIN (HCC): ICD-10-CM

## 2022-09-09 DIAGNOSIS — I10 ESSENTIAL HYPERTENSION: ICD-10-CM

## 2022-09-09 PROCEDURE — 93923 UPR/LXTR ART STDY 3+ LVLS: CPT

## 2022-09-09 PROCEDURE — 93925 LOWER EXTREMITY STUDY: CPT

## 2022-09-10 PROCEDURE — 93922 UPR/L XTREMITY ART 2 LEVELS: CPT | Performed by: SURGERY

## 2022-09-10 PROCEDURE — 93925 LOWER EXTREMITY STUDY: CPT | Performed by: SURGERY

## 2022-09-15 ENCOUNTER — OFFICE VISIT (OUTPATIENT)
Dept: INTERNAL MEDICINE CLINIC | Facility: CLINIC | Age: 62
End: 2022-09-15
Payer: COMMERCIAL

## 2022-09-15 VITALS
DIASTOLIC BLOOD PRESSURE: 60 MMHG | SYSTOLIC BLOOD PRESSURE: 118 MMHG | HEART RATE: 68 BPM | TEMPERATURE: 97.7 F | HEIGHT: 70 IN | OXYGEN SATURATION: 98 % | WEIGHT: 237.6 LBS | BODY MASS INDEX: 34.01 KG/M2

## 2022-09-15 DIAGNOSIS — Z23 ENCOUNTER FOR VACCINATION: ICD-10-CM

## 2022-09-15 DIAGNOSIS — E11.9 ENCOUNTER FOR DIABETIC FOOT EXAM (HCC): ICD-10-CM

## 2022-09-15 DIAGNOSIS — Z79.4 TYPE 2 DIABETES MELLITUS WITH DIABETIC POLYNEUROPATHY, WITH LONG-TERM CURRENT USE OF INSULIN (HCC): ICD-10-CM

## 2022-09-15 DIAGNOSIS — G62.9 NEUROPATHY: ICD-10-CM

## 2022-09-15 DIAGNOSIS — M79.604 BILATERAL LEG PAIN: ICD-10-CM

## 2022-09-15 DIAGNOSIS — E11.42 TYPE 2 DIABETES MELLITUS WITH DIABETIC POLYNEUROPATHY, WITH LONG-TERM CURRENT USE OF INSULIN (HCC): ICD-10-CM

## 2022-09-15 DIAGNOSIS — E11.42 TYPE 2 DIABETES MELLITUS WITH DIABETIC POLYNEUROPATHY, WITH LONG-TERM CURRENT USE OF INSULIN (HCC): Primary | ICD-10-CM

## 2022-09-15 DIAGNOSIS — M79.605 BILATERAL LEG PAIN: ICD-10-CM

## 2022-09-15 DIAGNOSIS — Z79.4 TYPE 2 DIABETES MELLITUS WITH DIABETIC POLYNEUROPATHY, WITH LONG-TERM CURRENT USE OF INSULIN (HCC): Primary | ICD-10-CM

## 2022-09-15 DIAGNOSIS — Z13.5 SCREENING FOR DIABETIC RETINOPATHY: ICD-10-CM

## 2022-09-15 PROCEDURE — 90682 RIV4 VACC RECOMBINANT DNA IM: CPT | Performed by: INTERNAL MEDICINE

## 2022-09-15 PROCEDURE — 3074F SYST BP LT 130 MM HG: CPT | Performed by: INTERNAL MEDICINE

## 2022-09-15 PROCEDURE — 99213 OFFICE O/P EST LOW 20 MIN: CPT | Performed by: INTERNAL MEDICINE

## 2022-09-15 PROCEDURE — 90471 IMMUNIZATION ADMIN: CPT | Performed by: INTERNAL MEDICINE

## 2022-09-15 PROCEDURE — 3078F DIAST BP <80 MM HG: CPT | Performed by: INTERNAL MEDICINE

## 2022-09-15 RX ORDER — DULOXETIN HYDROCHLORIDE 60 MG/1
60 CAPSULE, DELAYED RELEASE ORAL DAILY
Qty: 90 CAPSULE | Refills: 3 | Status: SHIPPED | OUTPATIENT
Start: 2022-09-15

## 2022-09-15 RX ORDER — EMPAGLIFLOZIN 25 MG/1
TABLET, FILM COATED ORAL
Qty: 90 TABLET | Refills: 1 | Status: SHIPPED | OUTPATIENT
Start: 2022-09-15

## 2022-09-15 NOTE — PROGRESS NOTES
Assessment/Plan:  Problem List Items Addressed This Visit        Endocrine    Type 2 diabetes mellitus with diabetic polyneuropathy, with long-term current use of insulin ([facility]) - Primary       Nervous and Auditory    Neuropathy    Relevant Medications    DULoxetine (CYMBALTA) 60 mg delayed release capsule      Other Visit Diagnoses     Screening for diabetic retinopathy        Relevant Orders    Ambulatory Referral to Ophthalmology    Encounter for vaccination        Bilateral leg pain        Relevant Medications    DULoxetine (CYMBALTA) 60 mg delayed release capsule           Diagnoses and all orders for this visit:    Type 2 diabetes mellitus with diabetic polyneuropathy, with long-term current use of insulin ([facility] [address] )    Screening for diabetic retinopathy  -     Ambulatory Referral to Ophthalmology; Future    Neuropathy  -     DULoxetine (CYMBALTA) 60 mg delayed release capsule; Take 1 capsule (60 mg total) by mouth daily    Encounter for vaccination    Bilateral leg pain  -     DULoxetine (CYMBALTA) 60 mg delayed release capsule; Take 1 capsule (60 mg total) by mouth daily      No problem-specific Assessment & Plan notes found for this encounter  A/P: Discussed labs and is tolerating the meds changed since last visit  Sugars are better and will continue current treatment  Will add SNRI for the non neuropathic leg pain, neuropathy, and any DEANNA/MDD  Will up date her flu vaccine and defers Td and pneumonia  Continue current treatment and RTC two months for routine  Subjective:      Patient ID: Helena Bowles is a 58 y o  female  WF RTC for f/u labs and bilat leg pain and neuropathy  Eliz and levemir increased  Doing a little better  Neuropathic pain better and tolerating the increase in eliz  Still with pain in the legs as well  Walks over 70K steps at work a day  Reports sugars are down to 125 and no low sugars  Due for vaccines         The following portions of the patient's history were reviewed and updated as appropriate:   She has a past medical history of Arthritis, Cancer ([facility] [address] ), Cardiac disease, CHF (congestive heart failure) ([facility] [address] ), Coronary artery disease, [facility] Heartburn, Hypercholesteremia, Hyperlipidemia, Hypertension, and Neuropathy  ,  does not have any pertinent problems on file  ,   has a past surgical history that includes Breast surgery (Left); Knee surgery; Nose surgery; Mouth surgery; Foot surgery (Left); Tubal ligation; Ovarian cyst removal (Left); Cholecystectomy; pr shldr arthroscop,part acromioplas (Right, 5/16/2016); pr arthroscopy shoulder surgical biceps tenodesis (Right, 5/16/2016); Cardiac surgery; Tubal ligation; and Colonoscopy  ,  family history includes Esophageal cancer in her father; Leukemia in her father; Liver disease in her brother; Lung cancer in her family, father, and mother; Stomach cancer in her family; Thyroid disease in her mother  ,   reports that she quit smoking about 22 years ago  Her smoking use included cigarettes  She smoked 1 00 pack per day  She has never used smokeless tobacco  She reports previous alcohol use  She reports previous drug use ,  is allergic to codeine, iodinated diagnostic agents, iodine - food allergy, and penicillins     Current Outpatient Medications   Medication Sig Dispense Refill    amLODIPine (NORVASC) 5 mg tablet Take 1 tablet (5 mg total) by mouth daily 90 tablet 1    aspirin 81 MG tablet Take 81 mg by mouth daily        atorvastatin (LIPITOR) 80 mg tablet TAKE 1 TABLET BY MOUTH ONCE DAILY WITH SUPPER 30 tablet 0    docusate sodium (COLACE) 100 mg capsule Take 100 mg by mouth 2 (two) times a day      Dulaglutide (Trulicity) 3 JL/7 8XA SOPN Inject 0 5 mL (3 mg total) under the skin once a week 6 mL 1    DULoxetine (CYMBALTA) 60 mg delayed release capsule Take 1 capsule (60 mg total) by mouth daily 90 capsule 3    furosemide (LASIX) 40 mg tablet Take 1 tablet (40 mg total) by mouth daily 90 tablet 1    gabapentin (NEURONTIN) 400 mg capsule Take 1 capsule (400 mg total) by mouth 3 (three) times a day 270 capsule 1    insulin aspart (NovoLOG FlexPen) 100 UNIT/ML injection pen PER SLIDING SCALE GIVEN TO PT WITH AC AND HS INJECTIONS  3 pen 3    insulin detemir (Levemir FlexTouch) 100 Units/mL injection pen 60UNITS IN AM AND 75 UNITS IN PM 45 mL 5    Insulin Syringe-Needle U-100 31G X 15/64" 0 5 ML MISC Use 2 (two) times a day 90 each 5    Jardiance 25 MG TABS TAKE 1 TABLET BY MOUTH EVERY DAY IN THE MORNING 90 tablet 1    losartan (COZAAR) 100 MG tablet Take 1 tablet (100 mg total) by mouth daily 90 tablet 3    metoprolol succinate (TOPROL-XL) 100 mg 24 hr tablet Take 1 tablet (100 mg total) by mouth daily 90 tablet 1    omega-3-acid ethyl esters (LOVAZA) 1 g capsule Take 2 capsules by mouth twice daily 180 capsule 0    omeprazole (PriLOSEC) 40 MG capsule Take 1 capsule (40 mg total) by mouth daily 90 capsule 2    ranolazine (RANEXA) 500 mg 12 hr tablet Take 1 tablet (500 mg total) by mouth 2 (two) times a day 180 tablet 3    TURMERIC PO Take by mouth (Patient not taking: Reported on 9/15/2022)       No current facility-administered medications for this visit  Review of Systems   Constitutional: Negative for activity change, chills, diaphoresis, fatigue and fever  Respiratory: Negative for cough, chest tightness, shortness of breath and wheezing  Cardiovascular: Negative for chest pain, palpitations and leg swelling  Gastrointestinal: Negative for abdominal pain, constipation, diarrhea, nausea and vomiting  Genitourinary: Negative for difficulty urinating, dysuria and frequency  Musculoskeletal: Positive for arthralgias and myalgias  Negative for gait problem and joint swelling  Neurological: Negative for weakness, light-headedness and headaches  Psychiatric/Behavioral: Negative for confusion  The patient is not nervous/anxious          PHQ-2/9 Depression Screening          Objective:  Vitals: 09/15/22 1254   BP: 118/60   Pulse: 68   Temp: 97 7 °F (36 5 °C)   TempSrc: Tympanic   SpO2: 98%   Weight: 108 kg (237 lb 9 6 oz)   Height: 5' 10" (1 778 m)     Body mass index is 34 09 kg/m²  Physical Exam  Vitals and nursing note reviewed  Constitutional:       General: She is not in acute distress  Appearance: Normal appearance  She is not ill-appearing  HENT:      Head: Normocephalic and atraumatic  Mouth/Throat:      Mouth: Mucous membranes are moist    Eyes:      Extraocular Movements: Extraocular movements intact  Conjunctiva/sclera: Conjunctivae normal       Pupils: Pupils are equal, round, and reactive to light  Cardiovascular:      Rate and Rhythm: Normal rate and regular rhythm  Heart sounds: Normal heart sounds  Pulmonary:      Effort: Pulmonary effort is normal  No respiratory distress  Breath sounds: Normal breath sounds  No wheezing or rales  Musculoskeletal:         General: Tenderness present  No swelling or deformity  Normal range of motion  Right lower leg: No edema  Left lower leg: No edema  Neurological:      General: No focal deficit present  Mental Status: She is alert and oriented to person, place, and time  Mental status is at baseline  Psychiatric:         Mood and Affect: Mood normal          Behavior: Behavior normal          Thought Content:  Thought content normal          Judgment: Judgment normal

## 2022-09-21 ENCOUNTER — HOSPITAL ENCOUNTER (OUTPATIENT)
Dept: MAMMOGRAPHY | Facility: HOSPITAL | Age: 62
Discharge: HOME | End: 2022-09-21
Attending: INTERNAL MEDICINE
Payer: COMMERCIAL

## 2022-09-21 ENCOUNTER — HOSPITAL ENCOUNTER (OUTPATIENT)
Dept: BONE DENSITY | Facility: HOSPITAL | Age: 62
Discharge: HOME | End: 2022-09-21
Attending: INTERNAL MEDICINE
Payer: COMMERCIAL

## 2022-09-21 VITALS — WEIGHT: 237 LBS | BODY MASS INDEX: 33.93 KG/M2 | HEIGHT: 70 IN

## 2022-09-21 DIAGNOSIS — Z12.31 ENCOUNTER FOR SCREENING MAMMOGRAM FOR MALIGNANT NEOPLASM OF BREAST: ICD-10-CM

## 2022-09-21 DIAGNOSIS — Z78.0 POSTMENOPAUSAL STATUS (AGE-RELATED) (NATURAL): ICD-10-CM

## 2022-09-21 PROCEDURE — 77063 BREAST TOMOSYNTHESIS BI: CPT

## 2022-09-21 PROCEDURE — 77067 SCR MAMMO BI INCL CAD: CPT

## 2022-09-21 PROCEDURE — 77080 DXA BONE DENSITY AXIAL: CPT

## 2022-10-06 ENCOUNTER — HOSPITAL ENCOUNTER (EMERGENCY)
Facility: HOSPITAL | Age: 62
Discharge: HOME | End: 2022-10-06
Attending: FAMILY MEDICINE
Payer: COMMERCIAL

## 2022-10-06 ENCOUNTER — APPOINTMENT (EMERGENCY)
Dept: CT IMAGING | Facility: HOSPITAL | Age: 62
End: 2022-10-06
Payer: COMMERCIAL

## 2022-10-06 VITALS
DIASTOLIC BLOOD PRESSURE: 77 MMHG | BODY MASS INDEX: 33.93 KG/M2 | HEIGHT: 70 IN | HEART RATE: 63 BPM | SYSTOLIC BLOOD PRESSURE: 185 MMHG | WEIGHT: 237 LBS | RESPIRATION RATE: 18 BRPM | OXYGEN SATURATION: 96 %

## 2022-10-06 DIAGNOSIS — M54.9 BACK PAIN: ICD-10-CM

## 2022-10-06 DIAGNOSIS — M51.26 LUMBAR DISC HERNIATION: Primary | ICD-10-CM

## 2022-10-06 LAB
ANION GAP SERPL CALCULATED.3IONS-SCNC: 6 MMOL/L (ref 4–13)
BASOPHILS # BLD AUTO: 0.04 THOUSANDS/ΜL (ref 0–0.1)
BASOPHILS NFR BLD AUTO: 1 % (ref 0–1)
BILIRUB UR QL STRIP: NEGATIVE
BUN SERPL-MCNC: 16 MG/DL (ref 5–25)
CALCIUM SERPL-MCNC: 9.5 MG/DL (ref 8.4–10.2)
CHLORIDE SERPL-SCNC: 99 MMOL/L (ref 96–108)
CLARITY UR: CLEAR
CO2 SERPL-SCNC: 32 MMOL/L (ref 21–32)
COLOR UR: YELLOW
CREAT SERPL-MCNC: 0.58 MG/DL (ref 0.6–1.3)
EOSINOPHIL # BLD AUTO: 0.1 THOUSAND/ΜL (ref 0–0.61)
EOSINOPHIL NFR BLD AUTO: 2 % (ref 0–6)
ERYTHROCYTE [DISTWIDTH] IN BLOOD BY AUTOMATED COUNT: 11.9 % (ref 11.6–15.1)
FLUAV RNA RESP QL NAA+PROBE: NEGATIVE
FLUBV RNA RESP QL NAA+PROBE: NEGATIVE
GFR SERPL CREATININE-BSD FRML MDRD: 99 ML/MIN/1.73SQ M
GLUCOSE SERPL-MCNC: 180 MG/DL (ref 65–140)
GLUCOSE UR STRIP-MCNC: ABNORMAL MG/DL
HCT VFR BLD AUTO: 39.2 % (ref 34.8–46.1)
HGB BLD-MCNC: 13.1 G/DL (ref 11.5–15.4)
HGB UR QL STRIP.AUTO: NEGATIVE
IMM GRANULOCYTES # BLD AUTO: 0.01 THOUSAND/UL (ref 0–0.2)
IMM GRANULOCYTES NFR BLD AUTO: 0 % (ref 0–2)
KETONES UR STRIP-MCNC: NEGATIVE MG/DL
LEUKOCYTE ESTERASE UR QL STRIP: NEGATIVE
LYMPHOCYTES # BLD AUTO: 1.37 THOUSANDS/ΜL (ref 0.6–4.47)
LYMPHOCYTES NFR BLD AUTO: 30 % (ref 14–44)
MCH RBC QN AUTO: 29.3 PG (ref 26.8–34.3)
MCHC RBC AUTO-ENTMCNC: 33.4 G/DL (ref 31.4–37.4)
MCV RBC AUTO: 88 FL (ref 82–98)
MONOCYTES # BLD AUTO: 0.35 THOUSAND/ΜL (ref 0.17–1.22)
MONOCYTES NFR BLD AUTO: 8 % (ref 4–12)
NEUTROPHILS # BLD AUTO: 2.66 THOUSANDS/ΜL (ref 1.85–7.62)
NEUTS SEG NFR BLD AUTO: 59 % (ref 43–75)
NITRITE UR QL STRIP: NEGATIVE
NRBC BLD AUTO-RTO: 0 /100 WBCS
PH UR STRIP.AUTO: 6.5 [PH]
PLATELET # BLD AUTO: 170 THOUSANDS/UL (ref 149–390)
PMV BLD AUTO: 10.9 FL (ref 8.9–12.7)
POTASSIUM SERPL-SCNC: 4 MMOL/L (ref 3.5–5.3)
PROT UR STRIP-MCNC: NEGATIVE MG/DL
RBC # BLD AUTO: 4.47 MILLION/UL (ref 3.81–5.12)
RSV RNA RESP QL NAA+PROBE: NEGATIVE
SARS-COV-2 RNA RESP QL NAA+PROBE: NEGATIVE
SODIUM SERPL-SCNC: 137 MMOL/L (ref 135–147)
SP GR UR STRIP.AUTO: 1.01 (ref 1–1.03)
UROBILINOGEN UR QL STRIP.AUTO: 0.2 E.U./DL
WBC # BLD AUTO: 4.53 THOUSAND/UL (ref 4.31–10.16)

## 2022-10-06 PROCEDURE — 85025 COMPLETE CBC W/AUTO DIFF WBC: CPT | Performed by: FAMILY MEDICINE

## 2022-10-06 PROCEDURE — G1004 CDSM NDSC: HCPCS

## 2022-10-06 PROCEDURE — 36415 COLL VENOUS BLD VENIPUNCTURE: CPT | Performed by: FAMILY MEDICINE

## 2022-10-06 PROCEDURE — 81003 URINALYSIS AUTO W/O SCOPE: CPT | Performed by: FAMILY MEDICINE

## 2022-10-06 PROCEDURE — 96374 THER/PROPH/DIAG INJ IV PUSH: CPT

## 2022-10-06 PROCEDURE — 96361 HYDRATE IV INFUSION ADD-ON: CPT

## 2022-10-06 PROCEDURE — 96375 TX/PRO/DX INJ NEW DRUG ADDON: CPT

## 2022-10-06 PROCEDURE — 72131 CT LUMBAR SPINE W/O DYE: CPT

## 2022-10-06 PROCEDURE — 99284 EMERGENCY DEPT VISIT MOD MDM: CPT | Performed by: FAMILY MEDICINE

## 2022-10-06 PROCEDURE — 80048 BASIC METABOLIC PNL TOTAL CA: CPT | Performed by: FAMILY MEDICINE

## 2022-10-06 PROCEDURE — 0241U HB NFCT DS VIR RESP RNA 4 TRGT: CPT | Performed by: FAMILY MEDICINE

## 2022-10-06 PROCEDURE — 99284 EMERGENCY DEPT VISIT MOD MDM: CPT

## 2022-10-06 RX ORDER — MELOXICAM 15 MG/1
15 TABLET ORAL DAILY
Qty: 6 TABLET | Refills: 0 | Status: SHIPPED | OUTPATIENT
Start: 2022-10-06 | End: 2022-10-12

## 2022-10-06 RX ORDER — METHOCARBAMOL 500 MG/1
500 TABLET, FILM COATED ORAL ONCE
Status: COMPLETED | OUTPATIENT
Start: 2022-10-06 | End: 2022-10-06

## 2022-10-06 RX ORDER — KETOROLAC TROMETHAMINE 30 MG/ML
30 INJECTION, SOLUTION INTRAMUSCULAR; INTRAVENOUS ONCE
Status: COMPLETED | OUTPATIENT
Start: 2022-10-06 | End: 2022-10-06

## 2022-10-06 RX ORDER — DEXAMETHASONE SODIUM PHOSPHATE 4 MG/ML
10 INJECTION, SOLUTION INTRA-ARTICULAR; INTRALESIONAL; INTRAMUSCULAR; INTRAVENOUS; SOFT TISSUE ONCE
Status: COMPLETED | OUTPATIENT
Start: 2022-10-06 | End: 2022-10-06

## 2022-10-06 RX ORDER — LIDOCAINE 50 MG/G
1 PATCH TOPICAL ONCE
Status: DISCONTINUED | OUTPATIENT
Start: 2022-10-06 | End: 2022-10-06 | Stop reason: HOSPADM

## 2022-10-06 RX ORDER — METHYLPREDNISOLONE 4 MG/1
TABLET ORAL
Qty: 21 TABLET | Refills: 0 | Status: SHIPPED | OUTPATIENT
Start: 2022-10-06

## 2022-10-06 RX ADMIN — LIDOCAINE 1 PATCH: 50 PATCH TOPICAL at 12:36

## 2022-10-06 RX ADMIN — DEXAMETHASONE SODIUM PHOSPHATE 10 MG: 4 INJECTION, SOLUTION INTRAMUSCULAR; INTRAVENOUS at 11:22

## 2022-10-06 RX ADMIN — METHOCARBAMOL 500 MG: 500 TABLET ORAL at 11:22

## 2022-10-06 RX ADMIN — SODIUM CHLORIDE 1000 ML: 0.9 INJECTION, SOLUTION INTRAVENOUS at 11:27

## 2022-10-06 RX ADMIN — KETOROLAC TROMETHAMINE 30 MG: 30 INJECTION, SOLUTION INTRAMUSCULAR at 11:22
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
## 2022-10-10 ENCOUNTER — OFFICE VISIT (OUTPATIENT)
Dept: INTERNAL MEDICINE CLINIC | Facility: CLINIC | Age: 62
End: 2022-10-10
Payer: COMMERCIAL

## 2022-10-10 VITALS
WEIGHT: 231.13 LBS | OXYGEN SATURATION: 99 % | HEIGHT: 70 IN | DIASTOLIC BLOOD PRESSURE: 66 MMHG | TEMPERATURE: 97.6 F | BODY MASS INDEX: 33.09 KG/M2 | HEART RATE: 78 BPM | SYSTOLIC BLOOD PRESSURE: 126 MMHG

## 2022-10-10 DIAGNOSIS — M51.26 HNP (HERNIATED NUCLEUS PULPOSUS), LUMBAR: ICD-10-CM

## 2022-10-10 DIAGNOSIS — M54.42 ACUTE MIDLINE LOW BACK PAIN WITH BILATERAL SCIATICA: ICD-10-CM

## 2022-10-10 DIAGNOSIS — R73.09 ELEVATED GLUCOSE: ICD-10-CM

## 2022-10-10 DIAGNOSIS — Z79.4 TYPE 2 DIABETES MELLITUS WITH DIABETIC POLYNEUROPATHY, WITH LONG-TERM CURRENT USE OF INSULIN (HCC): ICD-10-CM

## 2022-10-10 DIAGNOSIS — E11.42 TYPE 2 DIABETES MELLITUS WITH DIABETIC POLYNEUROPATHY, WITH LONG-TERM CURRENT USE OF INSULIN (HCC): ICD-10-CM

## 2022-10-10 DIAGNOSIS — M54.42 CHRONIC MIDLINE LOW BACK PAIN WITH BILATERAL SCIATICA: ICD-10-CM

## 2022-10-10 DIAGNOSIS — M48.062 SPINAL STENOSIS OF LUMBAR REGION WITH NEUROGENIC CLAUDICATION: Primary | ICD-10-CM

## 2022-10-10 DIAGNOSIS — G62.9 NEUROPATHY: ICD-10-CM

## 2022-10-10 DIAGNOSIS — S90.421A BLISTER OF GREAT TOE OF RIGHT FOOT, INITIAL ENCOUNTER: ICD-10-CM

## 2022-10-10 DIAGNOSIS — M54.16 LUMBAR RADICULOPATHY: ICD-10-CM

## 2022-10-10 DIAGNOSIS — R53.82 CHRONIC FATIGUE: ICD-10-CM

## 2022-10-10 DIAGNOSIS — M54.41 ACUTE MIDLINE LOW BACK PAIN WITH BILATERAL SCIATICA: ICD-10-CM

## 2022-10-10 DIAGNOSIS — G89.29 CHRONIC MIDLINE LOW BACK PAIN WITH BILATERAL SCIATICA: ICD-10-CM

## 2022-10-10 DIAGNOSIS — M54.41 CHRONIC MIDLINE LOW BACK PAIN WITH BILATERAL SCIATICA: ICD-10-CM

## 2022-10-10 PROCEDURE — 99213 OFFICE O/P EST LOW 20 MIN: CPT | Performed by: INTERNAL MEDICINE

## 2022-10-10 RX ORDER — METHOCARBAMOL 500 MG/1
500 TABLET, FILM COATED ORAL 4 TIMES DAILY
Qty: 120 TABLET | Refills: 0 | Status: SHIPPED | OUTPATIENT
Start: 2022-10-10

## 2022-10-10 RX ORDER — MELOXICAM 15 MG/1
15 TABLET ORAL DAILY
Qty: 30 TABLET | Refills: 1 | Status: SHIPPED | OUTPATIENT
Start: 2022-10-10

## 2022-10-10 NOTE — LETTER
October 10, 2022     Patient: Helena Bowles  YOB: 1960  Date of Visit: 10/10/2022      To Whom it May Concern:    Helena Bowles is under my professional care  Franklin Dickson was seen in my office on 10/10/2022  [place] may return to school on 10/11/22 without any restrictions       If you have any questions or concerns, please don't hesitate to call           Sincerely,          Tristian Cooper,         CC: No Recipients

## 2022-10-10 NOTE — PROGRESS NOTES
Assessment/Plan:  Problem List Items Addressed This Visit        Endocrine    Type 2 diabetes mellitus with diabetic polyneuropathy, with long-term current use of insulin (HCC)       Nervous and Auditory    Neuropathy    Lumbar radiculopathy       Musculoskeletal and Integument    HNP (herniated nucleus pulposus), lumbar      Other Visit Diagnoses     Spinal stenosis of lumbar region with neurogenic claudication    -  Primary    Relevant Medications    meloxicam (MOBIC) 15 mg tablet    methocarbamol (ROBAXIN) 500 mg tablet    Other Relevant Orders    Ambulatory referral to Physical Therapy    Chronic fatigue        Chronic midline low back pain with bilateral sciatica        Elevated glucose        Acute midline low back pain with bilateral sciatica        Blister of great toe of right foot, initial encounter               Diagnoses and all orders for this visit:    Spinal stenosis of lumbar region with neurogenic claudication  -     Ambulatory referral to Physical Therapy; Future  -     meloxicam (MOBIC) 15 mg tablet; Take 1 tablet (15 mg total) by mouth daily  -     methocarbamol (ROBAXIN) 500 mg tablet; Take 1 tablet (500 mg total) by mouth 4 (four) times a day    Chronic fatigue    Chronic midline low back pain with bilateral sciatica    HNP (herniated nucleus pulposus), lumbar    Elevated glucose    Type 2 diabetes mellitus with diabetic polyneuropathy, with long-term current use of insulin (HCC)    Lumbar radiculopathy    Neuropathy    Acute midline low back pain with bilateral sciatica    Blister of great toe of right foot, initial encounter      No problem-specific Assessment & Plan notes found for this encounter  A/P: Stable  Continues with acute on chronic LBP with bilat sciatica and also neuropathy  Discussed imaging  Was told to see neurosx by the ER  However, doubt pt is surgical candidate at this point in time  Will start NSAID"s and muscle relaxants  Will refer to PT   Discussed pain management and defers  Already on eliz and SNRI  Work on the wt  Would like pt to RTW with restrictions in regards to lifting, but reports she needs to work  She feels she can work w/o lifting  Pt ok try  Worried about the toe lesion given her diabetic state  Pt to see her DPM  RTC three weeks for f/u LBP and lesion  Subjective:      Patient ID: Brea Cao is a 58 y o  female  Diabetic WF with past neuropathy and chronic LBP, presents for ER f/u  Pt presented due to not feeling well overall with fatigue and acute on chronic LBP  Pt reportedly had been working more hours as a   No known trauma  Denies any known covid exposures  W/u in the ER was negative except for some HNP of the L spine and elevated sugars on her labs  Treated conservatively with steroids or NSIAD's and D/c'd to home  Recent labs showed worsening diabetic control  Reports doing about the same after lifting groceries yesterday  No saddle anesthesia or change in bowel or bladder habits  Continues with bilat LE pain  Also, notes several weeks of a brown non tender spot on the right great toe  The following portions of the patient's history were reviewed and updated as appropriate:   She has a past medical history of Arthritis, Breast cancer ([facility] [address] ), Cancer ([facility] [address] ), Cardiac disease, CHF (congestive heart failure) ([facility] [address] ), Coronary artery disease, Diabetes mellitus ([facility] [address] ), Heartburn, radiation therapy, Hypercholesteremia, Hyperlipidemia, Hypertension, and Neuropathy  ,  does not have any pertinent problems on file  ,   has a past surgical history that includes Breast surgery (Left); Knee surgery; Nose surgery; Mouth surgery; Foot surgery (Left); Tubal ligation; Ovarian cyst removal (Left); Cholecystectomy; pr shldr arthroscop,part acromioplas (Right, 05/16/2016); pr arthroscopy shoulder surgical biceps tenodesis (Right, 05/16/2016); Cardiac surgery; Tubal ligation; Colonoscopy; and Breast lumpectomy (Left)  ,  family history includes Breast cancer in her paternal aunt; Esophageal cancer in her father; Leukemia in her father; Liver disease in her brother; Lung cancer in her family, father, and mother; No Known Problems in her maternal aunt, maternal aunt, maternal aunt, maternal aunt, maternal grandmother, paternal aunt, paternal aunt, paternal aunt, paternal aunt, paternal aunt, paternal grandmother, sister, sister, and sister; Stomach cancer in her family; Thyroid disease in her mother  ,   reports that she quit smoking about 22 years ago  Her smoking use included cigarettes  She smoked 1 00 pack per day  She has never used smokeless tobacco  She reports previous alcohol use  She reports previous drug use ,  is allergic to codeine, iodinated diagnostic agents, iodine - food allergy, and penicillins     Current Outpatient Medications   Medication Sig Dispense Refill   • amLODIPine (NORVASC) 5 mg tablet Take 1 tablet (5 mg total) by mouth daily 90 tablet 1   • aspirin 81 MG tablet Take 81 mg by mouth daily  • atorvastatin (LIPITOR) 80 mg tablet TAKE 1 TABLET BY MOUTH ONCE DAILY WITH SUPPER 30 tablet 0   • docusate sodium (COLACE) 100 mg capsule Take 100 mg by mouth 2 (two) times a day     • Dulaglutide (Trulicity) 3 BB/8 9MJ SOPN Inject 0 5 mL (3 mg total) under the skin once a week 6 mL 1   • DULoxetine (CYMBALTA) 60 mg delayed release capsule Take 1 capsule (60 mg total) by mouth daily 90 capsule 3   • furosemide (LASIX) 40 mg tablet Take 1 tablet (40 mg total) by mouth daily 90 tablet 1   • gabapentin (NEURONTIN) 400 mg capsule Take 1 capsule (400 mg total) by mouth 3 (three) times a day 270 capsule 1   • insulin aspart (NovoLOG FlexPen) 100 UNIT/ML injection pen PER SLIDING SCALE GIVEN TO PT WITH AC AND HS INJECTIONS   3 pen 3   • insulin detemir (Levemir FlexTouch) 100 Units/mL injection pen 60UNITS IN AM AND 75 UNITS IN PM 45 mL 5   • Insulin Syringe-Needle U-100 31G X 15/64" 0 5 ML MISC Use 2 (two) times a day 90 each 5   • Jardiance 25 MG TABS TAKE 1 TABLET BY MOUTH EVERY DAY IN THE MORNING 90 tablet 1   • meloxicam (Mobic) 15 mg tablet Take 1 tablet (15 mg total) by mouth daily for 6 days 6 tablet 0   • meloxicam (MOBIC) 15 mg tablet Take 1 tablet (15 mg total) by mouth daily 30 tablet 1   • methocarbamol (ROBAXIN) 500 mg tablet Take 1 tablet (500 mg total) by mouth 4 (four) times a day 120 tablet 0   • methylPREDNISolone 4 MG tablet therapy pack Use as directed on package 21 tablet 0   • metoprolol succinate (TOPROL-XL) 100 mg 24 hr tablet Take 1 tablet (100 mg total) by mouth daily 90 tablet 1   • omega-3-acid ethyl esters (LOVAZA) 1 g capsule Take 2 capsules by mouth twice daily 180 capsule 0   • omeprazole (PriLOSEC) 40 MG capsule Take 1 capsule (40 mg total) by mouth daily 90 capsule 2   • ranolazine (RANEXA) 500 mg 12 hr tablet Take 1 tablet (500 mg total) by mouth 2 (two) times a day 180 tablet 3   • losartan (COZAAR) 100 MG tablet Take 1 tablet (100 mg total) by mouth daily 90 tablet 3   • TURMERIC PO Take by mouth (Patient not taking: No sig reported)       No current facility-administered medications for this visit  Review of Systems   Constitutional: Positive for activity change and fatigue  Negative for chills, diaphoresis and fever  Respiratory: Negative for cough, chest tightness, shortness of breath and wheezing  Cardiovascular: Negative for chest pain, palpitations and leg swelling  Gastrointestinal: Negative for abdominal pain, constipation, diarrhea, nausea and vomiting  Genitourinary: Negative for difficulty urinating, dysuria and frequency  Musculoskeletal: Positive for back pain  Negative for arthralgias, gait problem and myalgias  Skin: Positive for wound  Neurological: Positive for numbness  Negative for weakness, light-headedness and headaches  Psychiatric/Behavioral: Negative for confusion  The patient is not nervous/anxious          PHQ-2/9 Depression Screening          Objective:  Vitals: 10/10/22 0900   BP: 126/66   BP Location: Left arm   Patient Position: Sitting   Pulse: 78   Temp: 97 6 °F (36 4 °C)   SpO2: 99%   Weight: 105 kg (231 lb 2 oz)   Height: 5' 10" (1 778 m)     Body mass index is 33 16 kg/m²  Physical Exam  Vitals and nursing note reviewed  Constitutional:       General: She is not in acute distress  Appearance: Normal appearance  She is not ill-appearing  HENT:      Head: Normocephalic and atraumatic  Mouth/Throat:      Mouth: Mucous membranes are moist    Eyes:      Extraocular Movements: Extraocular movements intact  Conjunctiva/sclera: Conjunctivae normal       Pupils: Pupils are equal, round, and reactive to light  Neck:      Vascular: No carotid bruit  Cardiovascular:      Rate and Rhythm: Normal rate and regular rhythm  Heart sounds: Normal heart sounds  Pulmonary:      Effort: Pulmonary effort is normal  No respiratory distress  Breath sounds: Normal breath sounds  No wheezing or rales  Abdominal:      General: Bowel sounds are normal  There is no distension  Palpations: Abdomen is soft  Tenderness: There is no abdominal tenderness  Musculoskeletal:      Cervical back: Neck supple  Right lower leg: No edema  Left lower leg: No edema  Comments: LS Spine w/o gross deformities, increase temp, erythema, swelling, or lesions  Tenderness midline L2-L5  ROM wnl  Spasms noted  LE strength 5/5 with tone/ROM WNL  DTR 2/4  No gait abnormalities(toe/heel walking)  Skin:     Findings: Lesion present  Bruising: Right lateral great toe with a hard, brown non tender lesion  Neurological:      General: No focal deficit present  Mental Status: She is alert and oriented to person, place, and time  Mental status is at baseline  Psychiatric:         Mood and Affect: Mood normal          Behavior: Behavior normal          Thought Content:  Thought content normal          Judgment: Judgment normal

## 2022-10-10 NOTE — PATIENT INSTRUCTIONS
Decision Aid for Herniated Disc in the Lower Back   AMBULATORY CARE:   What you need to know about making treatment decisions for your herniated disc:  You can work with your healthcare provider to choose a treatment plan that works best for you  Your treatment choices include nonsurgical options and surgery  Your healthcare provider may recommend nonsurgical treatments first  Learn about the benefits and risks of nonsurgical treatment and surgery so you can make an informed choice  What you need to know about a herniated disc:  Aging increases your risk for a herniated disc because your discs weaken and shrink as you get older  Discs are natural, spongy cushions between the vertebrae (bones) in your spine  The bulging disc may press on your nerves or spinal cord  This pressure causes pain in your lower back, buttocks, groin, or legs  It can also cause numbness or weakness in one leg  How a herniated disc is treated, and the benefits of treatment:   Nonsurgical treatment  helps many people feel better within 6 weeks  Over a period of a year, it has been found to work as well as surgery in improving symptoms  Physical therapy  exercises can help strengthen your lower back and abdominal muscles  Medicines , such as NSAIDs, help decrease swelling and pain  An example of an NSAID is ibuprofen  Muscle relaxers decrease pain and muscle spasms  Epidural steroid injections  help reduce swelling and pain for about 2 to 4 weeks  They may be recommended if there has been no pain relief after 6 weeks of treatment with physical therapy and NSAIDs  Surgery  can be done to fix your herniated disc if other treatments have failed  Surgery can be done to remove the herniated disc that is putting pressure on your spinal nerve  Surgery usually provides faster pain relief than nonsurgical treatment for most people      Risks of treatment:  Even with treatment and recovery, there is a chance that your symptoms may return or that you may develop another herniated disc  Nonsurgical treatment  takes more time to provide pain relief than surgery  Medicines must be taken in limited doses  NSAIDs can cause stomach bleeding or kidney problems in certain people  Steroid injections are usually limited to about 4 each year  This is because the medicine can weaken the bones in your spine and your muscles  Steroids can also increase your risk for other infections, cause changes in your mood, and increase blood sugar levels  Surgery  increases your risk for infection at the incision site and deep in the disc space  The protective layer of your spinal cord may tear or leak  This causes cerebrospinal fluid (CSF) to leak  You may have to lie flat for up to 7 days while the tear or leak heals  You may need disc surgery again  You may need surgery to remove your discs and fuse your vertebrae together  This surgery would limit movement in your back  Questions to ask your healthcare provider to help you make decisions about treatment:   Am I a good candidate for steroid injections? How will I know if signs and symptoms are becoming severe enough to need surgery? Am I a good candidate for surgery? How long is recovery from surgery? © Copyright [employer] 2022 Information is for End User's use only and may not be sold, redistributed or otherwise used for commercial purposes  All illustrations and images included in CareNotes® are the copyrighted property of [employer]  or [employer]  The above information is an  only  It is not intended as medical advice for individual conditions or treatments  Talk to your doctor, nurse or pharmacist before following any medical regimen to see if it is safe and effective for you

## 2022-10-20 ENCOUNTER — EVALUATION (OUTPATIENT)
Dept: PHYSICAL THERAPY | Facility: CLINIC | Age: 62
End: 2022-10-20
Payer: COMMERCIAL

## 2022-10-20 DIAGNOSIS — G89.29 ACUTE EXACERBATION OF CHRONIC LOW BACK PAIN: Primary | ICD-10-CM

## 2022-10-20 DIAGNOSIS — R29.898 WEAKNESS OF BOTH LEGS: ICD-10-CM

## 2022-10-20 DIAGNOSIS — M54.50 ACUTE EXACERBATION OF CHRONIC LOW BACK PAIN: Primary | ICD-10-CM

## 2022-10-20 PROCEDURE — 97110 THERAPEUTIC EXERCISES: CPT | Performed by: PHYSICAL MEDICINE & REHABILITATION

## 2022-10-20 PROCEDURE — 97530 THERAPEUTIC ACTIVITIES: CPT | Performed by: PHYSICAL MEDICINE & REHABILITATION

## 2022-10-20 PROCEDURE — 97161 PT EVAL LOW COMPLEX 20 MIN: CPT | Performed by: PHYSICAL MEDICINE & REHABILITATION

## 2022-10-20 NOTE — PROGRESS NOTES
PT Evaluation     Today's date: 10/20/2022  Patient name: Marta Fernandez  : 1960  MRN: [id number]  Referring provider: Ashely Monte DO  Dx:   Encounter Diagnosis     ICD-10-CM    1  Acute exacerbation of chronic low back pain  M54 50     G89 29    2  Weakness of both legs  R29 898                   Assessment  Assessment details: Marta Fernandez is a 58 y o  female presenting to outpatient physical therapy with noted impairments including pain, impaired soft tissue mobility, reduced range of motion, reduced strength and mm endurance, reduced postural awareness, altered motor control of the lumbopelvic region, and reduced activity tolerance  Signs and symptoms at present are consistent with referring diagnosis of acute on chronic LBP with strength/endurance and mobility deficits  NO red flag signs/sx noted at this time; will cont to monitor  Due to noted impairments, the patient's present functional limitations include difficulty with ADLs with increased need for assistance, reliance on medication and/or modalities for pain relief, reduced tolerance for functional mobility and activity, and difficulty completing home and work responsibilities  Patient to benefit from skilled outpatient physical therapy 2x/week for 8-10 weeks in order to reduce pain, maximize pain free range of motion, increase strength and stability, and improve functional mobility/functional activity in order to maximize return to prior level of function with reduced limitations  Home exercise program was provided and all questions answered to patient's level of satisfaction   Thank you for your referral       Impairments: abnormal coordination, abnormal muscle firing, abnormal muscle tone, abnormal or restricted ROM, abnormal movement, activity intolerance, impaired physical strength, lacks appropriate home exercise program, pain with function and poor posture     Symptom irritability: moderateUnderstanding of Dx/Px/POC: good   Prognosis: fair    Goals  STGs to be achieved in 4-6 weeks:    1  Pt will report reduced LBP pain levels "at worst" by at least 2 points (0-10 scale) in order to allow improved tolerance for functional mobility and reduced reliance on medication or modalities for pain relief  2  Pt will demonstrate improved AROM of L/S flexion by at least 5-10* in order to reduce pt difficulty with functional mobility and transfers  3  Pt will demonstrate improved proximal hip and core strength B by at least 1/2-1 MMT in order to improve lumbo-pelvic stability to reduce pain and improve function  4  Pt will report overall improved tolerance for sustained mobility/activity by at least 25-50% since [facility] with overall reduced pain levels and reduced fear avoidance  LTGs to be achieved in 8-12 weeks:    1  Pt will be independent with HEP, demonstrating proper technique with exercises and understanding of self progression of program without need for cueing or assistance  2  Pt will report minimized pain levels with at least 50% reduction in pain since [facility]  3  Pt will demonstrate WFL AROM and strength of B hips/LEs  4  FOTO will reflect score at discharge that is greater than or equal to predicted level           Plan  Patient would benefit from: skilled physical therapy  Planned modality interventions: cryotherapy and thermotherapy: hydrocollator packs  Planned therapy interventions: abdominal trunk stabilization, motor coordination training, neuromuscular re-education, patient education, self care, strengthening, stretching, therapeutic activities, therapeutic exercise, functional ROM exercises and home exercise program  Frequency: 2x week  Duration in visits: 12  Duration in weeks: 6  Plan of Care beginning date: 10/20/2022  Plan of Care expiration date: 12/1/2022  Treatment plan discussed with: patient        Subjective Evaluation    History of Present Illness  Mechanism of injury: Taken from recent PCP note: "A/P: Stable  Continues with acute on chronic LBP with bilat sciatica and also neuropathy  Discussed imaging  Was told to see neurosx by the ER  However, doubt pt is surgical candidate at this point in time  Will start NSAID"s and muscle relaxants  Will refer to PT  Discussed pain management and defers  Already on eliz and SNRI  Work on the wt  Would like pt to RTW with restrictions in regards to lifting, but reports she needs to work  She feels she can work w/o lifting  Pt ok try  Worried about the toe lesion given her diabetic state  Pt to see her DPM  RTC three weeks for f/u LBP and lesion  Diabetic WF with past neuropathy and chronic LBP, presents for ER f/u  Pt presented due to not feeling well overall with fatigue and acute on chronic LBP  Pt reportedly had been working more hours as a   No known trauma  Denies any known covid exposures  W/u in the ER was negative except for some HNP of the L spine and elevated sugars on her labs  Treated conservatively with steroids or NSIAD's and D/c'd to home  Recent labs showed worsening diabetic control  Reports doing about the same after lifting groceries yesterday  No saddle anesthesia or change in bowel or bladder habits  Continues with bilat LE pain  Also, notes several weeks of a brown non tender spot on the right great toe  "    PT IE 10/20: Waxing/waning periods of LBP over the years  Notes most pain related to extended hours and lifting and being on her feet as a  at work for many years  Saw the chiropractor from time to time  No additional tx  Notes this elevated episode of LBP began about 2 weeks ago; no known triggers per pt  No known AYDEN but notes she was working more leading up to this time  Thought she had a "kidney" issue  Went to the ER  Dx with herniated disc L/S; given steroids and anti inflammatory; referred to PCP and neuro surgery   F/u with PCP; said neuro surgery not needed at this time per pt; no longer taking medications for pain per pt; referred to OPPT at this time  RTD ~3 months  Pt notes present LBP is intermittent; 1* located L L/S; radiates from center of spine to the L side only; no sx into the LEs  Does note her "legs hurt all the the time" due to "neuopathy"  Notes chronic n/t and pain in her feet and lower legs; numbness B feet  Denies any progressive LE weakness  Notes chronic B knee pain that limits her as well  Notes she has been having "a lot of hip pain" B for the past 3 months; feels like it is "in the hip" joints  Notes she does walk a lot; Walks 7-11 mi/day at work which exacerbates her LE and L/S pain/sx  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, mini push ups, slrs etc which helps  Sitting long periods increases the pain and stiffness in her back and LEs  No night pain, unexplained weight loss, no recent trauma, no consitutional sx, no change in bowel/bladder function, and no saddle anesthesia  Quality of life: fair    Pain  Current pain ratin  At best pain ratin  At worst pain ratin  Location: L L/S   Quality: dull ache  Relieving factors: heat and rest  Aggravating factors: standing, walking, sitting and lifting  Progression: no change (good days/bad days)    Social Support  Lives with: spouse    Employment status: working ()  Hand dominance: right      Diagnostic Tests  CT scan: abnormal  Treatments  Previous treatment: medication  Current treatment: physical therapy  Patient Goals  Patient goals for therapy: decreased pain and increased strength          Objective     Concurrent Complaints  Positive for history of cancer  Negative for night pain, disturbed sleep, bladder dysfunction, bowel dysfunction, saddle (S4) numbness and history of trauma    Postural Observations  Seated posture: fair  Standing posture: fair  Correction of posture: has no consistent effect    Additional Postural Observation Details  Seated:   Forward head/rounded shoulders  Increased thoracic kyphosis   B scapular depression and protraction with mild winging   Increased PPT    Standing:  Reduced lumbar lordosis       Neurological Testing     Sensation     Lumbar   Left   Intact: light touch  Diminished: light touch    Right   Intact: light touch  Diminished: light touch    Reflexes   Left   Patellar (L4): normal (2+)  Achilles (S1): normal (2+)  Clonus sign: negative    Right   Patellar (L4): normal (2+)  Achilles (S1): normal (2+)  Clonus sign: negative    Additional Neurological Details  R/L peripheral neuropathy  Numbness B feet  No n/t in LEs or change in sensation otherwise  Will cont to monitor       Active Range of Motion     Lumbar   Flexion: Active lumbar flexion: tight HS B   Restriction level: minimal  Extension: Active lumbar extension: Local pain L L/S   with pain Restriction level: moderate  Left lateral flexion: Active left lumbar lateral flexion: Pain L L/S locally      with pain Restriction level: moderate  Right lateral flexion: Active right lumbar lateral flexion: stretch L L/S; pressure R L/S   Restriction level: minimal  Left rotation:  Restriction level: moderate  Right rotation:  Restriction level: moderate    Additional Active Range of Motion Details  Local discomfort with end range motion only  No radicular pain/sx  Repeated lumbar extension in standing= increasing ROM into extension and reduced L L/S pain  Will cont to assess     Mild -moderate L hip hypomobility with IR > ER- no pain/sx   AROM R/L hip grossly WFL  Chronic limited R /L knee flexion ROM with pain per pt      Strength/Myotome Testing     Left Hip   Planes of Motion   Flexion: 4- (R LBP)  Abduction: 4  Adduction: 4+    Right Hip   Planes of Motion   Flexion: 4-  Abduction: 4  Adduction: 4+    Left Knee   Flexion: 4  Extension: 4    Right Knee   Flexion: 4-  Extension: 4    Left Ankle/Foot   Dorsiflexion: 4+  Plantar flexion: 4+    Right Ankle/Foot   Dorsiflexion: 4+  Plantar flexion: 4+    Additional Strength Details  R LBP with resisted L hip flexion only  No other pain/sx with MMT screening R/L LE seated   Will cont to assess     Muscle Activation   Patient able to activate left transverse abdominals and right transverse abdominals  Additional Muscle Activation Details  Poor core activation/stabilization with bed mobility/transfers  Core weakness noted with supine to sit transfer; utilizes rolling to side with push/pulling from UEs to come to sitting with difficulty   Will cont to assess       Tests     Lumbar     Left   Positive slump test    Negative crossed SLR and passive SLR       Right   Negative crossed SLR, passive SLR and slump test      Additional Tests Details  + L LBP with end range L slump test; no radicular pain/sx   - L FADIR     + Mild - moderate tightness B HS and hip flexor/quads  + Mild tightness B piriformis mms       Will cont to assess       Ambulation     Quality of Movement During Gait     Additional Quality of Movement During Gait Details  Slight increased ESPERANZA  Reduced knee flexion with swing B  Mild reduced trunk rotation and arm swing B  Will cont to assess    Transfers sit to stand- mildly slow to rise from chair, stiffness in knees and back B       Flowsheet Rows    Flowsheet Row Most Recent Value   PT/OT G-Codes    Current Score 63   Projected Score 75             Precautions: DM, HTN, high cholesterol, CHF, CAD, Hx CA , neuropathy      Re-eval Date: 12/1    Date 10/20/2022        Visit Count 1       FOTO 10/20/2022        Pain In See IE       Pain Out See IE           Manuals 10/20/2022                                        Neuro Re-Ed        TA training    TA with BKFO        TA with alt heel slides        Dead bugs        Pallof press                                 Ther Ex        Nustep      HS stretch       4x20"ea       Piriformis stretch 4x20"ea       LTR      SKTC  5x10-15"ea      5x10-15"ea       Repeated L/S extension 10x        SLRs    bridges        [facility]     Leg press Knee extension      HS curls                 Ther Activity 10' total  pt education regarding pathophysiology/pathoanatomy of present pain/sx and condition, role of PT in improving pain/sx and function, pt education regarding activity modification to avoid exacerbation of sx and delayed recovery                         Gait Training                        Modalities prn                             10/20/2022  - HEP was issued and reviewed this date for above noted exercises  Pt demonstrated understanding without incident and without questions/concerns  Will continue to update upcoming

## 2022-10-25 ENCOUNTER — APPOINTMENT (OUTPATIENT)
Dept: PHYSICAL THERAPY | Facility: CLINIC | Age: 62
End: 2022-10-25

## 2022-10-25 DIAGNOSIS — G89.29 ACUTE EXACERBATION OF CHRONIC LOW BACK PAIN: Primary | ICD-10-CM

## 2022-10-25 DIAGNOSIS — R29.898 WEAKNESS OF BOTH LEGS: ICD-10-CM

## 2022-10-25 DIAGNOSIS — M54.50 ACUTE EXACERBATION OF CHRONIC LOW BACK PAIN: Primary | ICD-10-CM

## 2022-10-26 ENCOUNTER — APPOINTMENT (OUTPATIENT)
Dept: PHYSICAL THERAPY | Facility: CLINIC | Age: 62
End: 2022-10-26

## 2022-10-27 NOTE — PROGRESS NOTES
Daily Note     Today's date: 10/28/2022  Patient name: Tin Singer  : 1960  MRN: [id number]  Referring provider: Oral Agustin DO  Dx:   Encounter Diagnosis     ICD-10-CM    1  Acute exacerbation of chronic low back pain  M54 50     G89 29    2  Weakness of both legs  R29 898                   Subjective: I fell on Monday and landed on both my knees  Knees are sore today   I dont have any LBP today      Objective: See treatment diary below      Assessment: Tolerated treatment fairly well denied increase LBP, but indicated increase pain left knee with heel slides activity  Modified BKFO to pt mark, limited knee flex  Demon limited LTR ROM, pt encourage to perform to mark   Noted bruising medial L knee and swelling/tenderness medial R knee from recent fall  Patient provided verbal/tactile cues prn for proper form and technique with exercises completed this date  Patient would benefit from continued PT      Plan: Continue per plan of care        Precautions: DM, HTN, high cholesterol, CHF, CAD, Hx CA , neuropathy      Re-eval Date:     Date 10/20/2022  10/28      Visit Count 1 2      FOTO 10/20/2022        Pain In See IE BL knee soreness 5/10      Pain Out See IE           Manuals 10/20/2022                                        Neuro Re-Ed        TA training    TA with BKFO  Supine  15x 5"    15x      TA with alt heel slides  5x pt trial with co's left knee pain        Dead bugs        Pallof press                                 Ther Ex        Nustep      HS stretch       4x20"ea L2 10 min      3x30" to mark ea      Piriformis stretch 4x20"ea 3x30" to mark ea      LTR      SKTC  5x10-15"ea      5x10-15"ea 5x15"ea      5x 15"ea      Repeated L/S extension 10x  10x       SLRs    bridges  10x BL    10x 5" w/ AH      Clamshells     Leg press         Knee extension      HS curls                 Ther Activity 10' total  pt education regarding pathophysiology/pathoanatomy of present pain/sx and condition, role of PT in improving pain/sx and function, pt education regarding activity modification to avoid exacerbation of sx and delayed recovery                         Gait Training                        Modalities prn                             10/20/2022  - HEP was issued and reviewed this date for above noted exercises  Pt demonstrated understanding without incident and without questions/concerns  Will continue to update upcoming

## 2022-10-28 ENCOUNTER — OFFICE VISIT (OUTPATIENT)
Dept: PHYSICAL THERAPY | Facility: CLINIC | Age: 62
End: 2022-10-28
Payer: COMMERCIAL

## 2022-10-28 DIAGNOSIS — M54.50 ACUTE EXACERBATION OF CHRONIC LOW BACK PAIN: Primary | ICD-10-CM

## 2022-10-28 DIAGNOSIS — R29.898 WEAKNESS OF BOTH LEGS: ICD-10-CM

## 2022-10-28 DIAGNOSIS — G89.29 ACUTE EXACERBATION OF CHRONIC LOW BACK PAIN: Primary | ICD-10-CM

## 2022-10-28 PROCEDURE — 97110 THERAPEUTIC EXERCISES: CPT

## 2022-10-31 ENCOUNTER — APPOINTMENT (OUTPATIENT)
Dept: PHYSICAL THERAPY | Facility: CLINIC | Age: 62
End: 2022-10-31

## 2022-10-31 DIAGNOSIS — I21.4 NSTEMI (NON-ST ELEVATED MYOCARDIAL INFARCTION) (HCC): ICD-10-CM

## 2022-10-31 RX ORDER — ATORVASTATIN CALCIUM 80 MG/1
TABLET, FILM COATED ORAL
Qty: 30 TABLET | Refills: 0 | Status: SHIPPED | OUTPATIENT
Start: 2022-10-31

## 2022-11-25 ENCOUNTER — TELEPHONE (OUTPATIENT)
Dept: INTERNAL MEDICINE CLINIC | Facility: CLINIC | Age: 62
End: 2022-11-25

## 2022-11-25 DIAGNOSIS — B37.31 VULVOVAGINAL CANDIDIASIS: Primary | ICD-10-CM

## 2022-11-25 RX ORDER — FLUCONAZOLE 150 MG/1
TABLET ORAL
Qty: 2 TABLET | Refills: 0 | Status: SHIPPED | OUTPATIENT
Start: 2022-11-25 | End: 2022-11-25

## 2022-11-25 NOTE — TELEPHONE ENCOUNTER
Pt is requesting diflucan, she started about a week ago with itching, no discharge  She did try the 1 day cream otc but it provided no relief

## 2022-12-08 ENCOUNTER — OFFICE VISIT (OUTPATIENT)
Dept: URGENT CARE | Facility: CLINIC | Age: 62
End: 2022-12-08

## 2022-12-08 VITALS
RESPIRATION RATE: 18 BRPM | BODY MASS INDEX: 33.15 KG/M2 | OXYGEN SATURATION: 97 % | WEIGHT: 231 LBS | HEART RATE: 77 BPM | TEMPERATURE: 97.8 F

## 2022-12-08 DIAGNOSIS — R11.10 VOMITING AND DIARRHEA: Primary | ICD-10-CM

## 2022-12-08 DIAGNOSIS — R19.7 VOMITING AND DIARRHEA: Primary | ICD-10-CM

## 2022-12-08 RX ORDER — ONDANSETRON 4 MG/1
4 TABLET, FILM COATED ORAL EVERY 8 HOURS PRN
Qty: 20 TABLET | Refills: 0 | Status: SHIPPED | OUTPATIENT
Start: 2022-12-08
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
## 2022-12-08 NOTE — LETTER
December 8, 2022     Patient: Samara Griffin   YOB: 1960   Date of Visit: 12/8/2022       To Whom It May Concern: It is my medical opinion that Samara Griffin should not return to work until receipt of a negative Covid/Influenza test result  If you have any questions or concerns, please don't hesitate to call           Sincerely,        Brayan Trejo PA-C    CC: No Recipients

## 2022-12-20 ENCOUNTER — OFFICE VISIT (OUTPATIENT)
Dept: INTERNAL MEDICINE CLINIC | Facility: CLINIC | Age: 62
End: 2022-12-20

## 2022-12-20 VITALS
TEMPERATURE: 97.9 F | WEIGHT: 237.13 LBS | BODY MASS INDEX: 33.95 KG/M2 | DIASTOLIC BLOOD PRESSURE: 90 MMHG | SYSTOLIC BLOOD PRESSURE: 148 MMHG | HEIGHT: 70 IN | RESPIRATION RATE: 18 BRPM | HEART RATE: 72 BPM

## 2022-12-20 DIAGNOSIS — I25.118 CORONARY ARTERY DISEASE OF NATIVE ARTERY OF NATIVE HEART WITH STABLE ANGINA PECTORIS (HCC): ICD-10-CM

## 2022-12-20 DIAGNOSIS — I10 ESSENTIAL HYPERTENSION: ICD-10-CM

## 2022-12-20 DIAGNOSIS — E11.42 TYPE 2 DIABETES MELLITUS WITH DIABETIC POLYNEUROPATHY, WITH LONG-TERM CURRENT USE OF INSULIN (HCC): Primary | ICD-10-CM

## 2022-12-20 DIAGNOSIS — Z23 ENCOUNTER FOR VACCINATION: ICD-10-CM

## 2022-12-20 DIAGNOSIS — I21.4 NSTEMI (NON-ST ELEVATED MYOCARDIAL INFARCTION) (HCC): ICD-10-CM

## 2022-12-20 DIAGNOSIS — Z12.11 SCREEN FOR COLON CANCER: ICD-10-CM

## 2022-12-20 DIAGNOSIS — Z63.4 BEREAVEMENT: ICD-10-CM

## 2022-12-20 DIAGNOSIS — Z79.4 TYPE 2 DIABETES MELLITUS WITH DIABETIC POLYNEUROPATHY, WITH LONG-TERM CURRENT USE OF INSULIN (HCC): Primary | ICD-10-CM

## 2022-12-20 DIAGNOSIS — M15.9 PRIMARY OSTEOARTHRITIS INVOLVING MULTIPLE JOINTS: ICD-10-CM

## 2022-12-20 DIAGNOSIS — E78.2 MIXED HYPERLIPIDEMIA: ICD-10-CM

## 2022-12-20 DIAGNOSIS — E04.1 THYROID NODULE: ICD-10-CM

## 2022-12-20 LAB — SL AMB POCT HEMOGLOBIN AIC: 8.8 (ref ?–6.5)

## 2022-12-20 RX ORDER — METOPROLOL SUCCINATE 100 MG/1
100 TABLET, EXTENDED RELEASE ORAL DAILY
Qty: 90 TABLET | Refills: 1 | Status: SHIPPED | OUTPATIENT
Start: 2022-12-20

## 2022-12-20 RX ORDER — ATORVASTATIN CALCIUM 80 MG/1
80 TABLET, FILM COATED ORAL DAILY
Qty: 90 TABLET | Refills: 1 | Status: SHIPPED | OUTPATIENT
Start: 2022-12-20

## 2022-12-20 RX ORDER — DULAGLUTIDE 3 MG/.5ML
3 INJECTION, SOLUTION SUBCUTANEOUS WEEKLY
Qty: 6 ML | Refills: 1 | Status: SHIPPED | OUTPATIENT
Start: 2022-12-20

## 2022-12-20 RX ORDER — RANOLAZINE 500 MG/1
500 TABLET, EXTENDED RELEASE ORAL 2 TIMES DAILY
Qty: 180 TABLET | Refills: 3 | Status: SHIPPED | OUTPATIENT
Start: 2022-12-20

## 2022-12-20 RX ORDER — AMLODIPINE BESYLATE 5 MG/1
5 TABLET ORAL DAILY
Qty: 90 TABLET | Refills: 1 | Status: SHIPPED | OUTPATIENT
Start: 2022-12-20

## 2022-12-20 SDOH — SOCIAL STABILITY - SOCIAL INSECURITY: DISSAPEARANCE AND DEATH OF FAMILY MEMBER: Z63.4

## 2022-12-20 NOTE — PATIENT INSTRUCTIONS

## 2022-12-20 NOTE — PROGRESS NOTES
Assessment/Plan:  Problem List Items Addressed This Visit        Endocrine    Type 2 diabetes mellitus with diabetic polyneuropathy, with long-term current use of insulin (HCC) - Primary    Relevant Medications    Dulaglutide (Trulicity) 3 EO/4 1HY SOPN    Other Relevant Orders    CBC and differential    Comprehensive metabolic panel    Lipid Panel with Direct LDL reflex    Microalbumin / creatinine urine ratio    Thyroid nodule    Relevant Medications    metoprolol succinate (TOPROL-XL) 100 mg 24 hr tablet    Other Relevant Orders    TSH, 3rd generation with Free T4 reflex       Cardiovascular and Mediastinum    NSTEMI (non-ST elevated myocardial infarction) (HCC)    Relevant Medications    amLODIPine (NORVASC) 5 mg tablet    atorvastatin (LIPITOR) 80 mg tablet    metoprolol succinate (TOPROL-XL) 100 mg 24 hr tablet    ranolazine (RANEXA) 500 mg 12 hr tablet    Essential hypertension    Relevant Medications    amLODIPine (NORVASC) 5 mg tablet    metoprolol succinate (TOPROL-XL) 100 mg 24 hr tablet    Other Relevant Orders    CBC and differential    Comprehensive metabolic panel    Lipid Panel with Direct LDL reflex    Microalbumin / creatinine urine ratio    POCT hemoglobin A1c (Completed)    Coronary artery disease of native artery of native heart with stable angina pectoris (HCC)    Relevant Medications    amLODIPine (NORVASC) 5 mg tablet    metoprolol succinate (TOPROL-XL) 100 mg 24 hr tablet    ranolazine (RANEXA) 500 mg 12 hr tablet       Musculoskeletal and Integument    Primary osteoarthritis involving multiple joints       Other    Mixed hyperlipidemia    Relevant Medications    atorvastatin (LIPITOR) 80 mg tablet   Other Visit Diagnoses     Encounter for vaccination        Screen for colon cancer        Relevant Orders    Cologuard    Bereavement               Diagnoses and all orders for this visit:    Type 2 diabetes mellitus with diabetic polyneuropathy, with long-term current use of insulin ([facility] )  - CBC and differential; Future  -     Comprehensive metabolic panel; Future  -     Lipid Panel with Direct LDL reflex; Future  -     Microalbumin / creatinine urine ratio  -     Dulaglutide (Trulicity) 3 OM/2 1CF SOPN; Inject 0 5 mL (3 mg total) under the skin once a week    Essential hypertension  -     CBC and differential; Future  -     Comprehensive metabolic panel; Future  -     Lipid Panel with Direct LDL reflex; Future  -     Microalbumin / creatinine urine ratio  -     amLODIPine (NORVASC) 5 mg tablet; Take 1 tablet (5 mg total) by mouth daily  -     POCT hemoglobin A1c    NSTEMI (non-ST elevated myocardial infarction) (HCC)  -     atorvastatin (LIPITOR) 80 mg tablet; Take 1 tablet (80 mg total) by mouth daily  -     metoprolol succinate (TOPROL-XL) 100 mg 24 hr tablet; Take 1 tablet (100 mg total) by mouth daily    Coronary artery disease of native artery of native heart with stable angina pectoris (HCC)  -     ranolazine (RANEXA) 500 mg 12 hr tablet; Take 1 tablet (500 mg total) by mouth 2 (two) times a day    Thyroid nodule  -     TSH, 3rd generation with Free T4 reflex; Future    Primary osteoarthritis involving multiple joints    Mixed hyperlipidemia    Encounter for vaccination    Screen for colon cancer  -     Cologuard    Bereavement      No problem-specific Assessment & Plan notes found for this encounter  A/P: Doing ok and will check labs  In office HgA1c was worse at 8 8  BP is up as well  Reports missing doses of meds, not watching her diet, etc due to financial issues, work , loss of her mother, etc  Discussed med change and defers  Wants to get back on he meds and diet    Discussed vaccines and already had her flu vaccine  Will order CRC  Continue current treatment and RTC six weeks for f/u sugars, bp, labs, and grieving  Subjective:      Patient ID: Kathya Schaefer is a 58 y o  female      WF RTC for f/u DM, HTN, etc  Doing ok and no new issues,but is grieving due to loosing her mother recently    Remains active w/o difficulty and no falls  Sugars less than 140 and no low sugar events  Denies CP, SOB, palpitations, edema, orthopnea, or PND  Chronic pain is manageable  Vision no worse  Due for labs and vaccines and CRC  The following portions of the patient's history were reviewed and updated as appropriate:   She has a past medical history of Arthritis, Breast cancer ([facility] ), Cancer ([facility] ), Cardiac disease, CHF (congestive heart failure) ([facility] ), Coronary artery disease, Diabetes mellitus ([facility] ), Heartburn, radiation therapy, Hypercholesteremia, Hyperlipidemia, Hypertension, and Neuropathy  ,  does not have any pertinent problems on file  ,   has a past surgical history that includes Breast surgery (Left); Knee surgery; Nose surgery; Mouth surgery; Foot surgery (Left); Tubal ligation; Ovarian cyst removal (Left); Cholecystectomy; pr surgical arthroscopy riley w/coracoacrm ligm rls (Right, 05/16/2016); pr surgical arthroscopy shoulder biceps tenodesis (Right, 05/16/2016); Cardiac surgery; Tubal ligation; Colonoscopy; and Breast lumpectomy (Left)  ,  family history includes Breast cancer in her paternal aunt; Esophageal cancer in her father; Leukemia in her father; Liver disease in her brother; Lung cancer in her family, father, and mother; No Known Problems in her maternal aunt, maternal aunt, maternal aunt, maternal aunt, maternal grandmother, paternal aunt, paternal aunt, paternal aunt, paternal aunt, paternal aunt, paternal grandmother, sister, sister, and sister; Stomach cancer in her family; Thyroid disease in her mother  ,   reports that she quit smoking about 22 years ago  Her smoking use included cigarettes  She smoked an average of 1 pack per day  She has never used smokeless tobacco  She reports that she does not currently use alcohol  She reports that she does not currently use drugs  ,  is allergic to codeine, iodinated diagnostic agents, iodine - food allergy, and penicillins     Current Outpatient Medications   Medication Sig Dispense Refill   • amLODIPine (NORVASC) 5 mg tablet Take 1 tablet (5 mg total) by mouth daily 90 tablet 1   • aspirin 81 MG tablet Take 81 mg by mouth daily  • atorvastatin (LIPITOR) 80 mg tablet Take 1 tablet (80 mg total) by mouth daily 90 tablet 1   • docusate sodium (COLACE) 100 mg capsule Take 100 mg by mouth 2 (two) times a day     • Dulaglutide (Trulicity) 3 YQ/9 6VH SOPN Inject 0 5 mL (3 mg total) under the skin once a week 6 mL 1   • furosemide (LASIX) 40 mg tablet Take 1 tablet (40 mg total) by mouth daily 90 tablet 1   • gabapentin (NEURONTIN) 400 mg capsule Take 1 capsule (400 mg total) by mouth 3 (three) times a day 270 capsule 1   • insulin aspart (NovoLOG FlexPen) 100 UNIT/ML injection pen PER SLIDING SCALE GIVEN TO PT WITH AC AND HS INJECTIONS  3 pen 3   • insulin detemir (Levemir FlexTouch) 100 Units/mL injection pen 60UNITS IN AM AND 75 UNITS IN PM 45 mL 5   • Jardiance 25 MG TABS TAKE 1 TABLET BY MOUTH EVERY DAY IN THE MORNING 90 tablet 1   • losartan (COZAAR) 100 MG tablet Take 1 tablet (100 mg total) by mouth daily 90 tablet 3   • metoprolol succinate (TOPROL-XL) 100 mg 24 hr tablet Take 1 tablet (100 mg total) by mouth daily 90 tablet 1   • omega-3-acid ethyl esters (LOVAZA) 1 g capsule Take 2 capsules by mouth twice daily 180 capsule 0   • omeprazole (PriLOSEC) 40 MG capsule Take 1 capsule (40 mg total) by mouth daily 90 capsule 2   • ranolazine (RANEXA) 500 mg 12 hr tablet Take 1 tablet (500 mg total) by mouth 2 (two) times a day 180 tablet 3   • TURMERIC PO Take by mouth     • Insulin Syringe-Needle U-100 31G X 15/64" 0 5 ML MISC Use 2 (two) times a day 90 each 5     No current facility-administered medications for this visit  Review of Systems   Constitutional: Negative for activity change, chills, diaphoresis, fatigue and fever  HENT: Negative  Eyes: Negative for visual disturbance     Respiratory: Negative for cough, chest tightness, shortness of breath and wheezing  Cardiovascular: Negative for chest pain, palpitations and leg swelling  Gastrointestinal: Negative for abdominal pain, constipation, diarrhea, nausea and vomiting  Endocrine: Negative for cold intolerance and heat intolerance  Genitourinary: Negative for difficulty urinating, dysuria and frequency  Musculoskeletal: Negative for arthralgias, gait problem and myalgias  Neurological: Negative for dizziness, seizures, syncope, weakness, light-headedness and headaches  Psychiatric/Behavioral: Positive for dysphoric mood  Negative for confusion and sleep disturbance  The patient is not nervous/anxious  PHQ-2/9 Depression Screening          Objective:  Vitals:    12/20/22 1252   BP: 148/90   Pulse: 72   Resp: 18   Temp: 97 9 °F (36 6 °C)   Weight: 108 kg (237 lb 2 oz)   Height: 5' 10" (1 778 m)     Body mass index is 34 02 kg/m²  Physical Exam  Vitals and nursing note reviewed  Constitutional:       General: She is not in acute distress  Appearance: Normal appearance  She is not ill-appearing  HENT:      Head: Normocephalic and atraumatic  Mouth/Throat:      Mouth: Mucous membranes are moist    Eyes:      Extraocular Movements: Extraocular movements intact  Conjunctiva/sclera: Conjunctivae normal       Pupils: Pupils are equal, round, and reactive to light  Neck:      Vascular: No carotid bruit  Cardiovascular:      Rate and Rhythm: Normal rate and regular rhythm  Heart sounds: Normal heart sounds  Pulmonary:      Effort: Pulmonary effort is normal  No respiratory distress  Breath sounds: Normal breath sounds  No wheezing, rhonchi or rales  Abdominal:      General: Bowel sounds are normal  There is no distension  Palpations: Abdomen is soft  Tenderness: There is no abdominal tenderness  Musculoskeletal:      Cervical back: Neck supple  Right lower leg: No edema  Left lower leg: No edema     Neurological: General: No focal deficit present  Mental Status: She is alert and oriented to person, place, and time  Mental status is at baseline  Psychiatric:         Mood and Affect: Mood normal          Behavior: Behavior normal          Thought Content:  Thought content normal          Judgment: Judgment normal

## 2023-02-15 LAB
LEFT EYE DIABETIC RETINOPATHY: NORMAL
RIGHT EYE DIABETIC RETINOPATHY: NORMAL
SEVERITY (EYE EXAM): NORMAL

## 2023-03-01 ENCOUNTER — OFFICE VISIT (OUTPATIENT)
Dept: INTERNAL MEDICINE CLINIC | Facility: CLINIC | Age: 63
End: 2023-03-01

## 2023-03-01 VITALS
OXYGEN SATURATION: 98 % | SYSTOLIC BLOOD PRESSURE: 130 MMHG | HEART RATE: 71 BPM | WEIGHT: 235 LBS | BODY MASS INDEX: 33.64 KG/M2 | HEIGHT: 70 IN | TEMPERATURE: 95.5 F | DIASTOLIC BLOOD PRESSURE: 82 MMHG

## 2023-03-01 DIAGNOSIS — Z79.4 TYPE 2 DIABETES MELLITUS WITH DIABETIC POLYNEUROPATHY, WITH LONG-TERM CURRENT USE OF INSULIN (HCC): Primary | ICD-10-CM

## 2023-03-01 DIAGNOSIS — Z63.4 BEREAVEMENT: ICD-10-CM

## 2023-03-01 DIAGNOSIS — I50.32 CHRONIC DIASTOLIC (CONGESTIVE) HEART FAILURE (HCC): ICD-10-CM

## 2023-03-01 DIAGNOSIS — E11.42 TYPE 2 DIABETES MELLITUS WITH DIABETIC POLYNEUROPATHY, WITH LONG-TERM CURRENT USE OF INSULIN (HCC): Primary | ICD-10-CM

## 2023-03-01 DIAGNOSIS — I10 ESSENTIAL HYPERTENSION: ICD-10-CM

## 2023-03-01 RX ORDER — DULAGLUTIDE 3 MG/.5ML
4.5 INJECTION, SOLUTION SUBCUTANEOUS WEEKLY
Qty: 6 ML | Refills: 1 | Status: SHIPPED | OUTPATIENT
Start: 2023-03-01 | End: 2023-03-09

## 2023-03-01 SDOH — SOCIAL STABILITY - SOCIAL INSECURITY: DISSAPEARANCE AND DEATH OF FAMILY MEMBER: Z63.4

## 2023-03-01 NOTE — PROGRESS NOTES
BMI Counseling: There is no height or weight on file to calculate BMI  The BMI is above normal  Nutrition recommendations include decreasing portion sizes and encouraging healthy choices of fruits and vegetables  Exercise recommendations include moderate physical activity 150 minutes/week  No pharmacotherapy was ordered  Rationale for BMI follow-up plan is due to patient being overweight or obese  Assessment/Plan:  Problem List Items Addressed This Visit        Endocrine    Type 2 diabetes mellitus with diabetic polyneuropathy, with long-term current use of insulin (HCC) - Primary    Relevant Medications    dulaglutide (Trulicity) 3 EM/7 0DX injection       Cardiovascular and Mediastinum    Essential hypertension    Chronic diastolic (congestive) heart failure ([address] )   Other Visit Diagnoses     Bereavement               Diagnoses and all orders for this visit:    Type 2 diabetes mellitus with diabetic polyneuropathy, with long-term current use of insulin (HCC)  -     dulaglutide (Trulicity) 3 DC/5 2UY injection; Inject 0 75 mL (4 5 mg total) under the skin once a week    Essential hypertension    Bereavement    Chronic diastolic (congestive) heart failure (HCC)      No problem-specific Assessment & Plan notes found for this encounter  A/P: Stable, but sugars still up  Will max out the Trulicity  If still elevated, consider switching levemir to treshiba or toujeo  BP is better  Recommend pt get her labs done  RTC four weeks for routine  Subjective:      Patient ID: Cindy Siddiqui is a 58 y o  female  WF RTC for several issues  First uncontrolled DM and HTN  Deferred med change due to missing a lot of dose and grieving the loss of her mother and wanting to get more compliant with her meds  Next, labs were ordered, but appears she has not gotten them done yet  No new issues  Sugars still up and around 250, but no low sugar events  Reports being compliant with meds          The following portions of the patient's history were reviewed and updated as appropriate:   She has a past medical history of Arthritis, [facility] ), Cancer ([facility] ), Cardiac disease, CHF (congestive heart failure) ([facility] ), Coronary artery disease, Diabetes mellitus ([facility] ), Heartburn, radiation therapy, Hypercholesteremia, Hyperlipidemia, Hypertension, and Neuropathy  ,  does not have any pertinent problems on file  ,   has a past surgical history that includes Breast surgery (Left); Knee surgery; Nose surgery; Mouth surgery; Foot surgery (Left); Tubal ligation; Ovarian cyst removal (Left); Cholecystectomy; pr surgical arthroscopy riley w/coracoacrm ligm rls (Right, 05/16/2016); pr surgical arthroscopy shoulder biceps tenodesis (Right, 05/16/2016); Cardiac surgery; Tubal ligation; Colonoscopy; and Breast lumpectomy (Left)  ,  family history includes Breast cancer in her paternal aunt; Esophageal cancer in her father; Leukemia in her father; Liver disease in her brother; Lung cancer in her family, father, and mother; No Known Problems in her maternal aunt, maternal aunt, maternal aunt, maternal aunt, maternal grandmother, paternal aunt, paternal aunt, paternal aunt, paternal aunt, paternal aunt, paternal grandmother, sister, sister, and sister; Stomach cancer in her family; Thyroid disease in her mother  ,   reports that she quit smoking about 23 years ago  Her smoking use included cigarettes  She smoked an average of 1 pack per day  She has never used smokeless tobacco  She reports that she does not currently use alcohol  She reports that she does not currently use drugs  ,  is allergic to codeine, iodinated contrast media, iodine - food allergy, and penicillins     Current Outpatient Medications   Medication Sig Dispense Refill   • amLODIPine (NORVASC) 5 mg tablet Take 1 tablet (5 mg total) by mouth daily 90 tablet 1   • aspirin 81 MG tablet Take 81 mg by mouth daily       • atorvastatin (LIPITOR) 80 mg tablet Take 1 tablet (80 mg total) by mouth daily 90 tablet 1   • docusate sodium (COLACE) 100 mg capsule Take 100 mg by mouth 2 (two) times a day     • dulaglutide (Trulicity) 3 KA/7 4GR injection Inject 0 75 mL (4 5 mg total) under the skin once a week 6 mL 1   • furosemide (LASIX) 40 mg tablet Take 1 tablet (40 mg total) by mouth daily 90 tablet 1   • gabapentin (NEURONTIN) 400 mg capsule Take 1 capsule (400 mg total) by mouth 3 (three) times a day 270 capsule 1   • insulin aspart (NovoLOG FlexPen) 100 UNIT/ML injection pen PER SLIDING SCALE GIVEN TO PT WITH AC AND HS INJECTIONS  3 pen 3   • insulin detemir (Levemir FlexTouch) 100 Units/mL injection pen 60UNITS IN AM AND 75 UNITS IN PM 45 mL 5   • Insulin Syringe-Needle U-100 31G X 15/64" 0 5 ML MISC Use 2 (two) times a day 90 each 5   • Jardiance 25 MG TABS TAKE 1 TABLET BY MOUTH EVERY DAY IN THE MORNING 90 tablet 1   • losartan (COZAAR) 100 MG tablet Take 1 tablet (100 mg total) by mouth daily 90 tablet 3   • metoprolol succinate (TOPROL-XL) 100 mg 24 hr tablet Take 1 tablet (100 mg total) by mouth daily 90 tablet 1   • omega-3-acid ethyl esters (LOVAZA) 1 g capsule Take 2 capsules by mouth twice daily 180 capsule 0   • omeprazole (PriLOSEC) 40 MG capsule Take 1 capsule (40 mg total) by mouth daily 90 capsule 2   • ranolazine (RANEXA) 500 mg 12 hr tablet Take 1 tablet (500 mg total) by mouth 2 (two) times a day 180 tablet 3   • TURMERIC PO Take by mouth       No current facility-administered medications for this visit  Review of Systems   Constitutional: Negative for activity change, chills, diaphoresis, fatigue and fever  Respiratory: Negative for cough, chest tightness, shortness of breath and wheezing  Cardiovascular: Negative for chest pain, palpitations and leg swelling  Gastrointestinal: Negative for abdominal pain, constipation, diarrhea, nausea and vomiting  Genitourinary: Negative for difficulty urinating, dysuria and frequency     Musculoskeletal: Negative for arthralgias, gait problem and myalgias  Neurological: Negative for dizziness, seizures, syncope, weakness, light-headedness and headaches  Psychiatric/Behavioral: Negative for confusion, dysphoric mood and sleep disturbance  The patient is not nervous/anxious  PHQ-2/9 Depression Screening          Objective:  Vitals:    03/01/23 0816   BP: 130/82   BP Location: Left arm   Patient Position: Sitting   Cuff Size: Standard   Pulse: 71   Temp: (!) 95 5 °F (35 3 °C)   SpO2: 98%   Weight: 107 kg (235 lb)   Height: 5' 10" (1 778 m)     Body mass index is 33 72 kg/m²  Physical Exam  Vitals and nursing note reviewed  Constitutional:       General: She is not in acute distress  Appearance: Normal appearance  She is not ill-appearing  HENT:      Head: Normocephalic and atraumatic  Mouth/Throat:      Mouth: Mucous membranes are moist    Eyes:      Extraocular Movements: Extraocular movements intact  Conjunctiva/sclera: Conjunctivae normal       Pupils: Pupils are equal, round, and reactive to light  Cardiovascular:      Rate and Rhythm: Normal rate and regular rhythm  Heart sounds: Normal heart sounds  No murmur heard  Pulmonary:      Effort: Pulmonary effort is normal  No respiratory distress  Breath sounds: Normal breath sounds  No wheezing, rhonchi or rales  Abdominal:      General: Bowel sounds are normal  There is no distension  Palpations: Abdomen is soft  Tenderness: There is no abdominal tenderness  Neurological:      General: No focal deficit present  Mental Status: She is alert and oriented to person, place, and time  Mental status is at baseline  Psychiatric:         Mood and Affect: Mood normal          Behavior: Behavior normal          Thought Content:  Thought content normal          Judgment: Judgment normal

## 2023-03-07 ENCOUNTER — TELEPHONE (OUTPATIENT)
Dept: INTERNAL MEDICINE CLINIC | Facility: CLINIC | Age: 63
End: 2023-03-07

## 2023-03-07 NOTE — TELEPHONE ENCOUNTER
Patient called and stated that she went to go get her trulicity and it is for 3 mg and that when she was in office talked about upping her dose         Please advise   Thank you

## 2023-03-09 ENCOUNTER — TELEPHONE (OUTPATIENT)
Dept: INTERNAL MEDICINE CLINIC | Facility: CLINIC | Age: 63
End: 2023-03-09

## 2023-03-09 DIAGNOSIS — Z79.4 TYPE 2 DIABETES MELLITUS WITH DIABETIC POLYNEUROPATHY, WITH LONG-TERM CURRENT USE OF INSULIN (HCC): Primary | ICD-10-CM

## 2023-03-09 DIAGNOSIS — E11.42 TYPE 2 DIABETES MELLITUS WITH DIABETIC POLYNEUROPATHY, WITH LONG-TERM CURRENT USE OF INSULIN (HCC): Primary | ICD-10-CM

## 2023-03-09 NOTE — TELEPHONE ENCOUNTER
To be continued  Hi, this is [facility] DR SWETA LASSITER, [id number], calling about a script for Kirstin Martinez date of birth [birth year]  It's for trulicity  She's got a script for 3 milligrams per   5 once a week here, but she says she believes her dose was to be increased to the 4 5 dose  I don't have a prescription that says that  So if she is to be on that dose, I'll need a script sent our way  Then again, our number is [phone]  Thank you

## 2023-03-30 ENCOUNTER — TELEPHONE (OUTPATIENT)
Dept: ADMINISTRATIVE | Facility: OTHER | Age: 63
End: 2023-03-30

## 2023-03-30 NOTE — TELEPHONE ENCOUNTER
03/30/23 3:13 PM    The patient was called and reminded about the open Cologuard order  Instructed to call the ordering provider's office if there are any questions about completing the CRC screen  Thank you    Mery DANIELS [facility]

## 2023-04-02 DIAGNOSIS — E11.9 ENCOUNTER FOR DIABETIC FOOT EXAM (HCC): ICD-10-CM

## 2023-04-02 DIAGNOSIS — E11.42 TYPE 2 DIABETES MELLITUS WITH DIABETIC POLYNEUROPATHY, WITH LONG-TERM CURRENT USE OF INSULIN (HCC): ICD-10-CM

## 2023-04-02 DIAGNOSIS — Z79.4 TYPE 2 DIABETES MELLITUS WITH DIABETIC POLYNEUROPATHY, WITH LONG-TERM CURRENT USE OF INSULIN (HCC): ICD-10-CM

## 2023-04-02 RX ORDER — EMPAGLIFLOZIN 25 MG/1
TABLET, FILM COATED ORAL
Qty: 90 TABLET | Refills: 1 | Status: SHIPPED | OUTPATIENT
Start: 2023-04-02

## 2023-04-05 ENCOUNTER — TELEPHONE (OUTPATIENT)
Dept: INTERNAL MEDICINE CLINIC | Facility: CLINIC | Age: 63
End: 2023-04-05

## 2023-04-05 DIAGNOSIS — R06.02 SHORTNESS OF BREATH: ICD-10-CM

## 2023-04-05 RX ORDER — FUROSEMIDE 40 MG/1
TABLET ORAL
Qty: 90 TABLET | Refills: 0 | Status: SHIPPED | OUTPATIENT
Start: 2023-04-05

## 2023-04-29 ENCOUNTER — HOSPITAL ENCOUNTER (OUTPATIENT)
Facility: HOSPITAL | Age: 63
Setting detail: OBSERVATION
Discharge: HOME | End: 2023-04-30
Attending: EMERGENCY MEDICINE | Admitting: INTERNAL MEDICINE

## 2023-04-29 ENCOUNTER — APPOINTMENT (EMERGENCY)
Dept: RADIOLOGY | Facility: HOSPITAL | Age: 63
End: 2023-04-29

## 2023-04-29 DIAGNOSIS — I50.32 CHRONIC DIASTOLIC (CONGESTIVE) HEART FAILURE (HCC): ICD-10-CM

## 2023-04-29 DIAGNOSIS — I25.10 CORONARY ARTERY DISEASE: ICD-10-CM

## 2023-04-29 DIAGNOSIS — R07.9 CHEST PAIN: Primary | ICD-10-CM

## 2023-04-29 LAB
2HR DELTA HS TROPONIN: 0 NG/L
ALBUMIN SERPL BCP-MCNC: 4.6 G/DL (ref 3.5–5)
ALP SERPL-CCNC: 79 U/L (ref 34–104)
ALT SERPL W P-5'-P-CCNC: 19 U/L (ref 7–52)
ANION GAP SERPL CALCULATED.3IONS-SCNC: 9 MMOL/L (ref 4–13)
APTT PPP: 24 SECONDS (ref 23–37)
AST SERPL W P-5'-P-CCNC: 16 U/L (ref 13–39)
ATRIAL RATE: 64 BPM
ATRIAL RATE: 67 BPM
ATRIAL RATE: 69 BPM
BASOPHILS # BLD AUTO: 0.03 THOUSANDS/ÂΜL (ref 0–0.1)
BASOPHILS NFR BLD AUTO: 1 % (ref 0–1)
BILIRUB SERPL-MCNC: 0.66 MG/DL (ref 0.2–1)
BUN SERPL-MCNC: 16 MG/DL (ref 5–25)
CALCIUM SERPL-MCNC: 9.5 MG/DL (ref 8.4–10.2)
CARDIAC TROPONIN I PNL SERPL HS: 4 NG/L
CARDIAC TROPONIN I PNL SERPL HS: 4 NG/L
CHLORIDE SERPL-SCNC: 101 MMOL/L (ref 96–108)
CO2 SERPL-SCNC: 27 MMOL/L (ref 21–32)
CREAT SERPL-MCNC: 0.52 MG/DL (ref 0.6–1.3)
EOSINOPHIL # BLD AUTO: 0.1 THOUSAND/ÂΜL (ref 0–0.61)
EOSINOPHIL NFR BLD AUTO: 2 % (ref 0–6)
ERYTHROCYTE [DISTWIDTH] IN BLOOD BY AUTOMATED COUNT: 12.4 % (ref 11.6–15.1)
GFR SERPL CREATININE-BSD FRML MDRD: 102 ML/MIN/1.73SQ M
GLUCOSE SERPL-MCNC: 105 MG/DL (ref 65–140)
GLUCOSE SERPL-MCNC: 142 MG/DL (ref 65–140)
GLUCOSE SERPL-MCNC: 225 MG/DL (ref 65–140)
HCT VFR BLD AUTO: 38.2 % (ref 34.8–46.1)
HGB BLD-MCNC: 13 G/DL (ref 11.5–15.4)
IMM GRANULOCYTES # BLD AUTO: 0.01 THOUSAND/UL (ref 0–0.2)
IMM GRANULOCYTES NFR BLD AUTO: 0 % (ref 0–2)
INR PPP: 0.95 (ref 0.84–1.19)
LYMPHOCYTES # BLD AUTO: 1.56 THOUSANDS/ÂΜL (ref 0.6–4.47)
LYMPHOCYTES NFR BLD AUTO: 29 % (ref 14–44)
MCH RBC QN AUTO: 29.5 PG (ref 26.8–34.3)
MCHC RBC AUTO-ENTMCNC: 34 G/DL (ref 31.4–37.4)
MCV RBC AUTO: 87 FL (ref 82–98)
MONOCYTES # BLD AUTO: 0.41 THOUSAND/ÂΜL (ref 0.17–1.22)
MONOCYTES NFR BLD AUTO: 8 % (ref 4–12)
NEUTROPHILS # BLD AUTO: 3.21 THOUSANDS/ÂΜL (ref 1.85–7.62)
NEUTS SEG NFR BLD AUTO: 60 % (ref 43–75)
NRBC BLD AUTO-RTO: 0 /100 WBCS
P AXIS: 47 DEGREES
P AXIS: 47 DEGREES
P AXIS: 56 DEGREES
PLATELET # BLD AUTO: 179 THOUSANDS/UL (ref 149–390)
PMV BLD AUTO: 10.8 FL (ref 8.9–12.7)
POTASSIUM SERPL-SCNC: 3.7 MMOL/L (ref 3.5–5.3)
PR INTERVAL: 134 MS
PR INTERVAL: 138 MS
PR INTERVAL: 138 MS
PROT SERPL-MCNC: 7.3 G/DL (ref 6.4–8.4)
PROTHROMBIN TIME: 12.7 SECONDS (ref 11.6–14.5)
QRS AXIS: 15 DEGREES
QRS AXIS: 3 DEGREES
QRS AXIS: 39 DEGREES
QRSD INTERVAL: 94 MS
QRSD INTERVAL: 94 MS
QRSD INTERVAL: 96 MS
QT INTERVAL: 412 MS
QT INTERVAL: 432 MS
QT INTERVAL: 452 MS
QTC INTERVAL: 435 MS
QTC INTERVAL: 462 MS
QTC INTERVAL: 466 MS
RBC # BLD AUTO: 4.4 MILLION/UL (ref 3.81–5.12)
SODIUM SERPL-SCNC: 137 MMOL/L (ref 135–147)
T WAVE AXIS: 47 DEGREES
T WAVE AXIS: 52 DEGREES
T WAVE AXIS: 59 DEGREES
VENTRICULAR RATE: 64 BPM
VENTRICULAR RATE: 67 BPM
VENTRICULAR RATE: 69 BPM
WBC # BLD AUTO: 5.32 THOUSAND/UL (ref 4.31–10.16)

## 2023-04-29 RX ORDER — INSULIN LISPRO 100 [IU]/ML
1-5 INJECTION, SOLUTION INTRAVENOUS; SUBCUTANEOUS
Status: DISCONTINUED | OUTPATIENT
Start: 2023-04-29 | End: 2023-04-30 | Stop reason: HOSPADM

## 2023-04-29 RX ORDER — GABAPENTIN 400 MG/1
400 CAPSULE ORAL 3 TIMES DAILY
Status: DISCONTINUED | OUTPATIENT
Start: 2023-04-29 | End: 2023-04-30 | Stop reason: HOSPADM

## 2023-04-29 RX ORDER — ONDANSETRON 2 MG/ML
4 INJECTION INTRAMUSCULAR; INTRAVENOUS EVERY 6 HOURS PRN
Status: DISCONTINUED | OUTPATIENT
Start: 2023-04-29 | End: 2023-04-30 | Stop reason: HOSPADM

## 2023-04-29 RX ORDER — ATORVASTATIN CALCIUM 80 MG/1
80 TABLET, FILM COATED ORAL DAILY
Status: DISCONTINUED | OUTPATIENT
Start: 2023-04-29 | End: 2023-04-30 | Stop reason: HOSPADM

## 2023-04-29 RX ORDER — ASPIRIN 325 MG
325 TABLET ORAL ONCE
Status: COMPLETED | OUTPATIENT
Start: 2023-04-29 | End: 2023-04-29

## 2023-04-29 RX ORDER — ACETAMINOPHEN 325 MG/1
650 TABLET ORAL ONCE
Status: COMPLETED | OUTPATIENT
Start: 2023-04-29 | End: 2023-04-29

## 2023-04-29 RX ORDER — METOCLOPRAMIDE HYDROCHLORIDE 5 MG/ML
10 INJECTION INTRAMUSCULAR; INTRAVENOUS ONCE
Status: DISCONTINUED | OUTPATIENT
Start: 2023-04-29 | End: 2023-04-29

## 2023-04-29 RX ORDER — INSULIN LISPRO 100 [IU]/ML
1-6 INJECTION, SOLUTION INTRAVENOUS; SUBCUTANEOUS
Status: DISCONTINUED | OUTPATIENT
Start: 2023-04-29 | End: 2023-04-30 | Stop reason: HOSPADM

## 2023-04-29 RX ORDER — ACETAMINOPHEN 325 MG/1
650 TABLET ORAL EVERY 6 HOURS PRN
Status: DISCONTINUED | OUTPATIENT
Start: 2023-04-29 | End: 2023-04-30 | Stop reason: HOSPADM

## 2023-04-29 RX ORDER — ASPIRIN 81 MG/1
81 TABLET ORAL DAILY
Status: DISCONTINUED | OUTPATIENT
Start: 2023-04-29 | End: 2023-04-30 | Stop reason: HOSPADM

## 2023-04-29 RX ORDER — RANOLAZINE 500 MG/1
500 TABLET, EXTENDED RELEASE ORAL 2 TIMES DAILY
Status: DISCONTINUED | OUTPATIENT
Start: 2023-04-29 | End: 2023-04-30 | Stop reason: HOSPADM

## 2023-04-29 RX ORDER — LOSARTAN POTASSIUM 50 MG/1
100 TABLET ORAL DAILY
Status: DISCONTINUED | OUTPATIENT
Start: 2023-04-29 | End: 2023-04-30 | Stop reason: HOSPADM

## 2023-04-29 RX ORDER — MAGNESIUM SULFATE HEPTAHYDRATE 40 MG/ML
2 INJECTION, SOLUTION INTRAVENOUS ONCE
Status: DISCONTINUED | OUTPATIENT
Start: 2023-04-29 | End: 2023-04-29

## 2023-04-29 RX ORDER — DIPHENHYDRAMINE HYDROCHLORIDE 50 MG/ML
25 INJECTION INTRAMUSCULAR; INTRAVENOUS ONCE
Status: DISCONTINUED | OUTPATIENT
Start: 2023-04-29 | End: 2023-04-29

## 2023-04-29 RX ORDER — DOCUSATE SODIUM 100 MG/1
100 CAPSULE, LIQUID FILLED ORAL 2 TIMES DAILY
Status: DISCONTINUED | OUTPATIENT
Start: 2023-04-29 | End: 2023-04-30 | Stop reason: HOSPADM

## 2023-04-29 RX ORDER — FUROSEMIDE 40 MG/1
40 TABLET ORAL DAILY
Status: DISCONTINUED | OUTPATIENT
Start: 2023-04-29 | End: 2023-04-30 | Stop reason: HOSPADM

## 2023-04-29 RX ORDER — AMLODIPINE BESYLATE 5 MG/1
5 TABLET ORAL DAILY
Status: DISCONTINUED | OUTPATIENT
Start: 2023-04-29 | End: 2023-04-30 | Stop reason: HOSPADM

## 2023-04-29 RX ORDER — ENOXAPARIN SODIUM 100 MG/ML
40 INJECTION SUBCUTANEOUS DAILY
Status: DISCONTINUED | OUTPATIENT
Start: 2023-04-29 | End: 2023-04-30 | Stop reason: HOSPADM

## 2023-04-29 RX ORDER — KETOROLAC TROMETHAMINE 30 MG/ML
30 INJECTION, SOLUTION INTRAMUSCULAR; INTRAVENOUS ONCE
Status: DISCONTINUED | OUTPATIENT
Start: 2023-04-29 | End: 2023-04-29

## 2023-04-29 RX ORDER — PANTOPRAZOLE SODIUM 40 MG/1
40 TABLET, DELAYED RELEASE ORAL
Status: DISCONTINUED | OUTPATIENT
Start: 2023-04-30 | End: 2023-04-30 | Stop reason: HOSPADM

## 2023-04-29 RX ORDER — METOPROLOL SUCCINATE 50 MG/1
100 TABLET, EXTENDED RELEASE ORAL DAILY
Status: DISCONTINUED | OUTPATIENT
Start: 2023-04-29 | End: 2023-04-30 | Stop reason: HOSPADM

## 2023-04-29 RX ADMIN — ACETAMINOPHEN 650 MG: 325 TABLET ORAL at 10:35

## 2023-04-29 RX ADMIN — GABAPENTIN 400 MG: 400 CAPSULE ORAL at 21:22

## 2023-04-29 RX ADMIN — ASPIRIN 325 MG: 325 TABLET ORAL at 11:38

## 2023-04-29 RX ADMIN — DOCUSATE SODIUM 100 MG: 100 CAPSULE, LIQUID FILLED ORAL at 17:18

## 2023-04-29 RX ADMIN — RANOLAZINE 500 MG: 500 TABLET, FILM COATED, EXTENDED RELEASE ORAL at 17:18

## 2023-04-29 RX ADMIN — METOPROLOL SUCCINATE 100 MG: 50 TABLET, EXTENDED RELEASE ORAL at 16:04

## 2023-04-29 RX ADMIN — GABAPENTIN 400 MG: 400 CAPSULE ORAL at 16:04

## 2023-04-29 RX ADMIN — INSULIN DETEMIR 50 UNITS: 100 INJECTION, SOLUTION SUBCUTANEOUS at 21:21

## 2023-04-29 RX ADMIN — ATORVASTATIN CALCIUM 80 MG: 80 TABLET, FILM COATED ORAL at 16:04

## 2023-04-29 NOTE — ASSESSMENT & PLAN NOTE
· Pain started upon waking up this morning  · History of CAD with stent 5 years ago  · Will trend trops, currently normal  · Monitor on tele  · Cardio consult  · Continue ASA, toprol, ranexa

## 2023-04-29 NOTE — LETTER
[address] [place] [address]  Dept: [phone]    April 30, 2023     Patient: Prateek Deluna   YOB: 1960   Date of Visit: 4/29/2023       To Whom it May Concern:    Prateek Deluna is under my professional care  She was seen in the hospital from 4/29/2023 to 04/30/23  She may return to work on Thursday, May 4, 2023  without limitations  If you have any questions or concerns, please don't hesitate to call           Sincerely,          Cuca Rainey PA-C

## 2023-04-29 NOTE — PLAN OF CARE
Problem: Potential for Falls  Goal: Patient will remain free of falls  Description: INTERVENTIONS:  - Educate patient/family on patient safety including physical limitations  - Instruct patient to call for assistance with activity   - Consult OT/PT to assist with strengthening/mobility   - Keep Call bell within reach  - Keep bed low and locked with side rails adjusted as appropriate  - Keep care items and personal belongings within reach  - Initiate and maintain comfort rounds  - Make Fall Risk Sign visible to staff  - Apply yellow socks and bracelet for high fall risk patients  - Consider moving patient to room near nurses station  Outcome: Progressing     Problem: CARDIOVASCULAR - ADULT  Goal: Maintains optimal cardiac output and hemodynamic stability  Description: INTERVENTIONS:  - Monitor I/O, vital signs and rhythm  - Monitor for S/S and trends of decreased cardiac output  - Administer and titrate ordered vasoactive medications to optimize hemodynamic stability  - Assess quality of pulses, skin color and temperature  - Assess for signs of decreased coronary artery perfusion  - Instruct patient to report change in severity of symptoms  Outcome: Progressing  Goal: Absence of cardiac dysrhythmias or at baseline rhythm  Description: INTERVENTIONS:  - Continuous cardiac monitoring, vital signs, obtain 12 lead EKG if ordered  - Administer antiarrhythmic and heart rate control medications as ordered  - Monitor electrolytes and administer replacement therapy as ordered  Outcome: Progressing     Problem: PAIN - ADULT  Goal: Verbalizes/displays adequate comfort level or baseline comfort level  Description: Interventions:  - Encourage patient to monitor pain and request assistance  - Assess pain using appropriate pain scale  - Administer analgesics based on type and severity of pain and evaluate response  - Implement non-pharmacological measures as appropriate and evaluate response  - Consider cultural and social influences on pain and pain management  - Notify physician/advanced practitioner if interventions unsuccessful or patient reports new pain  Outcome: Progressing     Problem: SAFETY ADULT  Goal: Patient will remain free of falls  Description: INTERVENTIONS:  - Educate patient/family on patient safety including physical limitations  - Instruct patient to call for assistance with activity   - Consult OT/PT to assist with strengthening/mobility   - Keep Call bell within reach  - Keep bed low and locked with side rails adjusted as appropriate  - Keep care items and personal belongings within reach  - Initiate and maintain comfort rounds  - Make Fall Risk Sign visible to staff  - Apply yellow socks and bracelet for high fall risk patients  - Consider moving patient to room near nurses station  Outcome: Progressing
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
## 2023-04-29 NOTE — ASSESSMENT & PLAN NOTE
Lab Results   Component Value Date    HGBA1C 8 8 (A) 12/20/2022       No results for input(s): POCGLU in the last 72 hours      Blood Sugar Average: Last 72 hrs:  · conitnue levemir  · Insulin sliding scale

## 2023-04-29 NOTE — ASSESSMENT & PLAN NOTE
Wt Readings from Last 3 Encounters:   04/29/23 108 kg (238 lb 8 6 oz)   03/01/23 107 kg (235 lb)   12/20/22 108 kg (237 lb 2 oz)   · Currently stable    No signs of exacerbation  · Continue Norvasc, Toprol, Ranexa, Lasix

## 2023-04-30 VITALS
WEIGHT: 237.22 LBS | OXYGEN SATURATION: 93 % | HEART RATE: 63 BPM | BODY MASS INDEX: 33.96 KG/M2 | TEMPERATURE: 98 F | HEIGHT: 70 IN | SYSTOLIC BLOOD PRESSURE: 140 MMHG | DIASTOLIC BLOOD PRESSURE: 72 MMHG | RESPIRATION RATE: 17 BRPM

## 2023-04-30 DIAGNOSIS — R07.9 CHEST PAIN: Primary | ICD-10-CM

## 2023-04-30 LAB
GLUCOSE SERPL-MCNC: 121 MG/DL (ref 65–140)
GLUCOSE SERPL-MCNC: 184 MG/DL (ref 65–140)

## 2023-04-30 RX ADMIN — FUROSEMIDE 40 MG: 40 TABLET ORAL at 08:43

## 2023-04-30 RX ADMIN — ATORVASTATIN CALCIUM 80 MG: 80 TABLET, FILM COATED ORAL at 08:42

## 2023-04-30 RX ADMIN — PANTOPRAZOLE SODIUM 40 MG: 40 TABLET, DELAYED RELEASE ORAL at 05:23

## 2023-04-30 RX ADMIN — ASPIRIN 81 MG: 81 TABLET, COATED ORAL at 08:43

## 2023-04-30 RX ADMIN — GABAPENTIN 400 MG: 400 CAPSULE ORAL at 08:43

## 2023-04-30 RX ADMIN — RANOLAZINE 500 MG: 500 TABLET, FILM COATED, EXTENDED RELEASE ORAL at 08:43

## 2023-04-30 RX ADMIN — INSULIN DETEMIR 50 UNITS: 100 INJECTION, SOLUTION SUBCUTANEOUS at 08:39

## 2023-04-30 RX ADMIN — AMLODIPINE BESYLATE 5 MG: 5 TABLET ORAL at 08:42

## 2023-04-30 RX ADMIN — LOSARTAN POTASSIUM 100 MG: 50 TABLET, FILM COATED ORAL at 08:42

## 2023-04-30 NOTE — ASSESSMENT & PLAN NOTE
Pain upon awakening    Lasting hours    Worse with laying flat and deep inspiration    Associated with SOB  HS TN negative  EKG Normal Sinus Rhythm   Hx of Stent LAD June, 2018  Koko Sargent for discharge  Will check OP Echo to be sure there is no pericardial involvement   Patient to follow up with primary cardiologist: Dr Juan Luis Juan

## 2023-04-30 NOTE — UTILIZATION REVIEW
Initial Clinical Review    Admission: Date/Time/Statement:   Admission Orders (From admission, onward)     Ordered        04/29/23 1316  Place in Observation  Once                      Orders Placed This Encounter   Procedures    Place in Observation     Standing Status:   Standing     Number of Occurrences:   1     Order Specific Question:   Level of Care     Answer:   Med Surg [16]     ED Arrival Information     Expected   -    Arrival   4/29/2023 09:24    Acuity   Emergent            Means of arrival   Walk-In    Escorted by   Self    Service   Hospitalist    Admission type   Emergency            Arrival complaint   rapid heart rate chest pain           Chief Complaint   Patient presents with    Palpitations     Pt reports palpitations, pain under L breast straight though to back, L eye twitching, and pressure on both temples       Initial Presentation: 61 y o  female to ED from home admitted observation d/t chest pain  Chest pain  Assessment & Plan   Pain started upon waking up this morning   History of CAD with stent 5 years ago   Will trend trops, currently normal   Monitor on tele   Cardio consult   Continue ASA, toprol, ranexa     Chronic diastolic (congestive) heart failure (HCC)  Assessment & Plan      Wt Readings from Last 3 Encounters:   04/29/23 108 kg (238 lb 8 6 oz)   03/01/23 107 kg (235 lb)   12/20/22 108 kg (237 lb 2 oz)    Currently stable  No signs of exacerbation   Continue Norvasc, Toprol, Ranexa, Lasix     Essential hypertension  Assessment & Plan  BP currently stable    Continue home medications     Neuropathy  Assessment & Plan  Continue gabapentin     Type 2 diabetes mellitus with diabetic polyneuropathy, with long-term current use of insulin (HCC)  Assessment & Plan        Lab Results   Component Value Date     HGBA1C 8 8 (A) 12/20/2022         No results for input(s): POCGLU in the last 72 hours      Blood Sugar Average: Last 72 hrs:   conitnue levemir   Insulin sliding scale             ED Triage Vitals   Temperature Pulse Respirations Blood Pressure SpO2   04/29/23 0933 04/29/23 0933 04/29/23 0933 04/29/23 0933 04/29/23 0933   98 5 °F (36 9 °C) 69 20 163/74 97 %      Temp Source Heart Rate Source Patient Position - Orthostatic VS BP Location FiO2 (%)   04/29/23 0933 04/29/23 0933 04/29/23 0933 04/29/23 0933 --   Tympanic Monitor Lying Left arm       Pain Score       04/29/23 1021       4          Wt Readings from Last 1 Encounters:   04/30/23 108 kg (237 lb 3 4 oz)     Additional Vital Signs:   04/30/23 0700 98 °F (36 7 °C) 63 17 140/72 89 93 % None (Room air) Lying   04/30/23 03:04:39 98 4 °F (36 9 °C) 73 18 113/74 87 96 % -- --   04/29/23 22:50:34 97 °F (36 1 °C) Abnormal  64 18 133/72 92 95 % None (Room air) Sitting   04/29/23 1941 -- -- -- -- -- -- None (Room air) --   04/29/23 19:00:42 98 °F (36 7 °C) 68 20 128/73 91 94 % -- --   04/29/23 16:02:53 -- 61 -- 137/65 89 99 % -- --   04/29/23 15:07:06 97 8 °F (36 6 °C) 64 17 123/72 89 97 % None (Room air) Sitting   04/29/23 13:55:17 98 9 °F (37 2 °C) 72 16 150/72 98 97 % None (Room air) Sitting   04/29/23 1330 -- 66 18 143/64 92 96 % None (Room air) Sitting   04/29/23 1300 -- 69 15 137/63 91 95 % -- --   04/29/23 1200 -- 66 15 143/64 92 95 % None (Room air) Sitting   04/29/23 1030 -- 68 16 137/63 90 97 % None (Room air) Lying   04/29/23 0933 98 5 °F (36 9 °C) 69 20 163/74 107 97 % None (Room air) Lying       Pertinent Labs/Diagnostic Test Results:     4/29 EKG:  Normal sinus rhythm  Normal ECG  No significant change since prior tracing     Confirmed by Lisa Jerome [phone]) on 4/29/2023 4:34:08 PM    4/29 EKG:  Date/Time: 4/29/2023 11:51 AM   Performed by: Kae Palumbo PA-C   Authorized by: Kae Palumbo PA-C     Indications / Diagnosis:  Cp   ECG reviewed by me, the ED Provider: yes     Patient location:  ED   Previous ECG:     Previous ECG:  Compared to current     Comparison ECG info:  Compared to ecg from 2 hours prior     Similarity:  No change   Interpretation:     Interpretation: normal     Rate:     ECG rate:  64     ECG rate assessment: normal     Rhythm:     Rhythm: sinus rhythm     Ectopy:     Ectopy: none     QRS:     QRS axis:  Normal     QRS intervals:  Normal   Conduction:     Conduction: normal     ST segments:     ST segments:  Normal   T waves:     T waves: normal      4/29 EKG:  Date/Time: 4/29/2023 11:51 AM   Performed by: Zahra Clancy PA-C   Authorized by: Zahra Clancy PA-C     Indications / Diagnosis:  Cp   ECG reviewed by me, the ED Provider: yes     Patient location:  ED   Interpretation:     Interpretation: normal     Rate:     ECG rate:  67     ECG rate assessment: normal     Rhythm:     Rhythm: sinus rhythm     Ectopy:     Ectopy: none     QRS:     QRS axis:  Normal     QRS intervals:  Normal   Conduction:     Conduction: normal     ST segments:     ST segments:  Normal   T waves:     T waves: normal     4/29 EKG:  Normal sinus rhythm  Normal ECG  When compared with ECG of 29-APR-2023 09:35, (unconfirmed)  No significant change was found  Confirmed by Harrison Hilario ([id number]) on 4/29/2023 4:33:40 PM    4/29 EKG:  Normal sinus rhythm  Normal ECG  When compared with ECG of 07-NOV-2021 23:17,  No significant change was found  Confirmed by Harrison Hilario ([id number]) on 4/29/2023 4:24:13 PM      XR chest 1 view portable    (Results Pending)         Results from last 7 days   Lab Units 04/29/23  0946   WBC Thousand/uL 5 32   HEMOGLOBIN g/dL 13 0   HEMATOCRIT % 38 2   PLATELETS Thousands/uL 179   NEUTROS ABS Thousands/µL 3 21         Results from last 7 days   Lab Units 04/29/23  0946   SODIUM mmol/L 137   POTASSIUM mmol/L 3 7   CHLORIDE mmol/L 101   CO2 mmol/L 27   ANION GAP mmol/L 9   BUN mg/dL 16   CREATININE mg/dL 0 52*   EGFR ml/min/1 73sq m 102   CALCIUM mg/dL 9 5     Results from last 7 days   Lab Units 04/29/23  0946   AST U/L 16   ALT U/L 19   ALK PHOS U/L 79 TOTAL PROTEIN g/dL 7 3   ALBUMIN g/dL 4 6   TOTAL BILIRUBIN mg/dL 0 66     Results from last 7 days   Lab Units 04/30/23  0710 04/29/23  2111 04/29/23  1547   POC GLUCOSE mg/dl 121 142* 105     Results from last 7 days   Lab Units 04/29/23  0946   GLUCOSE RANDOM mg/dL 225*       Results from last 7 days   Lab Units 04/29/23  1138 04/29/23  0946   HS TNI 0HR ng/L  --  4   HS TNI 2HR ng/L 4  --    HSTNI D2 ng/L 0  --          Results from last 7 days   Lab Units 04/29/23  0946   PROTIME seconds 12 7   INR  0 95   PTT seconds 24       ED Treatment:   Medication Administration from 04/29/2023 0924 to 04/29/2023 1341       Date/Time Order Dose Route Action Action by Comments                                                 04/29/2023 1035 EDT acetaminophen (TYLENOL) tablet 650 mg 650 mg Oral Given       04/29/2023 1138 EDT aspirin tablet 325 mg 325 mg Oral Given          Past Medical History:   Diagnosis Date    Arthritis     Breast cancer ([facility] )     left    Cancer ([facility] )     L breast    Cardiac disease     [email or web address]     Coronary artery disease     Diabetes mellitus ([email or web address]     Heartburn     Hx of radiation therapy     Hypercholesteremia     Hyperlipidemia     Hypertension     Neuropathy     bilateral feet     Present on Admission:   Neuropathy   Chronic diastolic (congestive) heart failure (HCC)   Essential hypertension      Admitting Diagnosis: Palpitations [R00 2]  Coronary artery disease [I25 10]  Chest pain [M16 3]  Chronic diastolic (congestive) heart failure (HCC) [I50 32]  Age/Sex: 61 y o  female  Admission Orders:  Scheduled Medications:  amLODIPine, 5 mg, Oral, Daily  aspirin, 81 mg, Oral, Daily  atorvastatin, 80 mg, Oral, Daily  docusate sodium, 100 mg, Oral, BID  enoxaparin, 40 mg, Subcutaneous, Daily  furosemide, 40 mg, Oral, Daily  gabapentin, 400 mg, Oral, TID  insulin detemir, 50 Units, Subcutaneous, Q12H TEJAL  insulin lispro, 1-5 Units, Subcutaneous, HS  insulin lispro, 1-6 Units, Subcutaneous, TID AC  losartan, 100 mg, Oral, Daily  metoprolol succinate, 100 mg, Oral, Daily  pantoprazole, 40 mg, Oral, Early Morning  ranolazine, 500 mg, Oral, BID      PRN Meds:  acetaminophen, 650 mg, Oral, Q6H PRN  ondansetron, 4 mg, Intravenous, Q6H PRN    I&O  DAILY WEIGHTS  OOB  TELE      IP CONSULT TO CARDIOLOGY    Network Utilization Review Department  ATTENTION: Please call with any questions or concerns to [phone] and carefully listen to the prompts so that you are directed to the right person  All voicemails are confidential   Lova Muss all requests for admission clinical reviews, approved or denied determinations and any other requests to dedicated fax number below belonging to the campus where the patient is receiving treatment   List of dedicated fax numbers for the Facilities:  [address] DENIALS (Administrative/Medical Necessity) [phone]   [address] (Maternity/NICU/Pediatrics) [phone]   [address] [place] [address]  [phone]   [address] [facility] [place] [address] [facility] [address] [place] [address] [facility] [phone]

## 2023-04-30 NOTE — ASSESSMENT & PLAN NOTE
S/p LAD stent June 2018  Continue medical management  On metoprolol 100 mg daily amlodipine 5 mg daily atorvastatin 80 mg daily aspirin 81 mg daily and losartan 100 mg daily    We will make arrangements to follow-up with primary cardiologist Dr Phillips Barley

## 2023-04-30 NOTE — DISCHARGE SUMMARY
[address]  Discharge- Maame Padron 1960, 61 y o  female MRN: [id number]  Unit/Bed#: -01 Encounter: [id number]  Primary Care Provider: Torsten Alvarado DO   Date and time admitted to hospital: 4/29/2023  9:29 AM    * Chest pain  Assessment & Plan  · Pain started upon waking up this morning  · History of CAD with stent 5 years ago  · Troponin 4 -> 4  · Cardio consultation appreciated- recommend outpatient echocardiogram  Patient cleared for discharge home  · Continue ASA, toprol, ranexa  · Outpatient follow-up with PCP    Chronic diastolic (congestive) heart failure (HCC)  Assessment & Plan  Wt Readings from Last 3 Encounters:   04/30/23 108 kg (237 lb 3 4 oz)   03/01/23 107 kg (235 lb)   12/20/22 108 kg (237 lb 2 oz)     · Currently stable  No signs of exacerbation  · Continue Norvasc, Toprol, Ranexa, Lasix    Essential hypertension  Assessment & Plan  · BP currently stable     · Continue home medications    Neuropathy  Assessment & Plan  · Continue gabapentin    Type 2 diabetes mellitus with diabetic polyneuropathy, with long-term current use of insulin [facility])  Assessment & Plan  Lab Results   Component Value Date    HGBA1C 8 8 (A) 12/20/2022       Recent Labs     04/29/23  1547 04/29/23  2111 04/30/23  0710   POCGLU 105 142* 121       Blood Sugar Average: Last 72 hrs:  · (P) 547 [id number]   · Continue home regimen      Medical Problems     Resolved Problems  Date Reviewed: 4/30/2023   None       Discharging Physician / Practitioner: Fatuma Santizo PA-C  PCP: Torsten Alvarado DO  Admission Date:   Admission Orders (From admission, onward)     Ordered        04/29/23 1316  Place in Observation  Once                      Discharge Date: 04/30/23    Consultations During Hospital Stay:  · Cardiology    Procedures Performed:   · none    Significant Findings / Test Results:   XR chest 1 view portable    Result Date: 4/30/2023  · Impression: No acute cardiopulmonary "disease  Findings are stable Workstation performed: [id number]     · Troponin 4 -> 4    Incidental Findings:   · none       Test Results Pending at Discharge (will require follow up):   · none     Outpatient Tests Requested:  · Echocardiogram- cardiology office to call patient to schedule    Complications:  none    Reason for Admission: chest pain    Hospital Course:   Gustavo Allan is a 61 y o  female patient who originally presented to the hospital on 4/29/2023 due to chest pain  Please see H&P by Dr Seb Ortiz dated 4/29/2023 for complete details of presentation  Troponin negative at 4  EKG without ischemic findings  Chest x-ray negative for acute findings  Chest pain resolved  Cardiology was consulted and cleared patient for discharge home  Cardiology office to schedule outpatient echocardiogram  Outpatient follow-up with PCP  Please see above list of diagnoses and related plan for additional information  Condition at Discharge: good    Discharge Day Visit / Exam:   Subjective:    Vitals: Blood Pressure: 140/72 (04/30/23 0700)  Pulse: 63 (04/30/23 0700)  Temperature: 98 °F (36 7 °C) (04/30/23 0700)  Temp Source: Oral (04/30/23 0700)  Respirations: 17 (04/30/23 0700)  Height: 5' 10\" (177 8 cm) (04/29/23 1355)  Weight - Scale: 108 kg (237 lb 3 4 oz) (04/30/23 0600)  SpO2: 93 % (04/30/23 0700)  Exam:   Physical Exam  Vitals and nursing note reviewed  Constitutional:       Appearance: Normal appearance  HENT:      Head: Normocephalic and atraumatic  Cardiovascular:      Rate and Rhythm: Normal rate and regular rhythm  Pulses: Normal pulses  Heart sounds: Normal heart sounds  Pulmonary:      Effort: Pulmonary effort is normal       Breath sounds: Normal breath sounds  Abdominal:      Palpations: Abdomen is soft  Tenderness: There is no abdominal tenderness  Neurological:      General: No focal deficit present        Mental Status: She is alert and oriented to person, place, " and time  Psychiatric:         Mood and Affect: Mood normal          Behavior: Behavior normal          Thought Content: Thought content normal           Discussion with Family: Patient declined call to   Discharge instructions/Information to patient and family:   See after visit summary for information provided to patient and family  Provisions for Follow-Up Care:  See after visit summary for information related to follow-up care and any pertinent home health orders  Disposition:   Home    Planned Readmission: no     Discharge Statement:  I spent 25 minutes discharging the patient  This time was spent on the day of discharge  I had direct contact with the patient on the day of discharge  Greater than 50% of the total time was spent examining patient, answering all patient questions, arranging and discussing plan of care with patient as well as directly providing post-discharge instructions  Additional time then spent on discharge activities  Discharge Medications:  See after visit summary for reconciled discharge medications provided to patient and/or family        **Please Note: This note may have been constructed using a voice recognition system**

## 2023-04-30 NOTE — ASSESSMENT & PLAN NOTE
· Pain started upon waking up this morning  · History of CAD with stent 5 years ago  · Troponin 4 -> 4  · Cardio consultation appreciated- recommend outpatient echocardiogram  Patient cleared for discharge home    · Continue ASA, toprol, ranexa  · Outpatient follow-up with PCP

## 2023-04-30 NOTE — ASSESSMENT & PLAN NOTE
Lab Results   Component Value Date    HGBA1C 8 8 (A) 12/20/2022       Recent Labs     04/29/23  1547 04/29/23  2111 04/30/23  0710   POCGLU 105 142* 121       Blood Sugar Average: Last 72 hrs:  · (P) 067 [id number]   · Continue home regimen

## 2023-04-30 NOTE — ASSESSMENT & PLAN NOTE
Wt Readings from Last 3 Encounters:   04/30/23 108 kg (237 lb 3 4 oz)   03/01/23 107 kg (235 lb)   12/20/22 108 kg (237 lb 2 oz)   Volume stable   Continue OP medical regimen   On metoprolol 100 mg daily, losartan 100 mg daily and furosemide 40 mg daily
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
## 2023-04-30 NOTE — ASSESSMENT & PLAN NOTE
Controlled on current medical regimen  Continue amlodipine 5 mg daily losartan 100 mg daily metoprolol 100 mg daily and furosemide 40 mg daily

## 2023-04-30 NOTE — ASSESSMENT & PLAN NOTE
Wt Readings from Last 3 Encounters:   04/30/23 108 kg (237 lb 3 4 oz)   03/01/23 107 kg (235 lb)   12/20/22 108 kg (237 lb 2 oz)     · Currently stable    No signs of exacerbation  · Continue Norvasc, Toprol, Ranexa, Lasix

## 2023-05-01 ENCOUNTER — TRANSITIONAL CARE MANAGEMENT (OUTPATIENT)
Dept: INTERNAL MEDICINE CLINIC | Facility: CLINIC | Age: 63
End: 2023-05-01

## 2023-05-02 NOTE — PROGRESS NOTES
I called and spoke to beata, she said she will call us after she has her echo done which is scheduled for 5-17-23  She did not want to schedule a ANNA MARIE appt right now

## 2023-05-17 ENCOUNTER — HOSPITAL ENCOUNTER (OUTPATIENT)
Dept: NON INVASIVE DIAGNOSTICS | Facility: CLINIC | Age: 63
Discharge: HOME | End: 2023-05-17

## 2023-05-17 VITALS
WEIGHT: 237 LBS | BODY MASS INDEX: 33.93 KG/M2 | HEART RATE: 80 BPM | DIASTOLIC BLOOD PRESSURE: 80 MMHG | HEIGHT: 70 IN | SYSTOLIC BLOOD PRESSURE: 133 MMHG

## 2023-05-17 DIAGNOSIS — R07.9 CHEST PAIN: ICD-10-CM

## 2023-05-17 LAB
AORTIC ROOT: 3.1 CM
APICAL FOUR CHAMBER EJECTION FRACTION: 55 %
ASCENDING AORTA: 3.4 CM
AV LVOT MEAN GRADIENT: 5 MMHG
AV LVOT PEAK GRADIENT: 8 MMHG
DOP CALC LVOT PEAK VEL VTI: 31.63 CM
DOP CALC LVOT PEAK VEL: 1.42 M/S
E WAVE DECELERATION TIME: 302 MS
FRACTIONAL SHORTENING: 36 (ref 28–44)
INTERVENTRICULAR SEPTUM IN DIASTOLE (PARASTERNAL SHORT AXIS VIEW): 1.1 CM
INTERVENTRICULAR SEPTUM: 1.1 CM (ref 0.6–1.1)
LAAS-AP2: 22.3 CM2
LAAS-AP4: 16.3 CM2
LEFT ATRIUM SIZE: 3.9 CM
LEFT INTERNAL DIMENSION IN SYSTOLE: 2.7 CM (ref 2.1–4)
LEFT VENTRICULAR INTERNAL DIMENSION IN DIASTOLE: 4.2 CM (ref 3.5–6)
LEFT VENTRICULAR POSTERIOR WALL IN END DIASTOLE: 1.2 CM
LEFT VENTRICULAR STROKE VOLUME: 51 ML
LVSV (TEICH): 51 ML
MV E'TISSUE VEL-SEP: 7 CM/S
MV PEAK A VEL: 0.85 M/S
MV PEAK E VEL: 73 CM/S
MV STENOSIS PRESSURE HALF TIME: 88 MS
MV VALVE AREA P 1/2 METHOD: 2.5
RIGHT ATRIUM AREA SYSTOLE A4C: 14.4 CM2
RIGHT VENTRICLE ID DIMENSION: 2.5 CM
SL CV LEFT ATRIUM LENGTH A2C: 5.9 CM
SL CV PED ECHO LEFT VENTRICLE DIASTOLIC VOLUME (MOD BIPLANE) 2D: 78 ML
SL CV PED ECHO LEFT VENTRICLE SYSTOLIC VOLUME (MOD BIPLANE) 2D: 28 ML
TRICUSPID ANNULAR PLANE SYSTOLIC EXCURSION: 2 CM

## 2023-06-15 DIAGNOSIS — Z79.4 TYPE 2 DIABETES MELLITUS WITH DIABETIC POLYNEUROPATHY, WITH LONG-TERM CURRENT USE OF INSULIN (HCC): ICD-10-CM

## 2023-06-15 DIAGNOSIS — E11.42 TYPE 2 DIABETES MELLITUS WITH DIABETIC POLYNEUROPATHY, WITH LONG-TERM CURRENT USE OF INSULIN (HCC): ICD-10-CM

## 2023-06-15 RX ORDER — DULAGLUTIDE 4.5 MG/.5ML
INJECTION, SOLUTION SUBCUTANEOUS
Qty: 4 ML | Refills: 0 | Status: SHIPPED | OUTPATIENT
Start: 2023-06-15

## 2023-06-23 ENCOUNTER — TELEPHONE (OUTPATIENT)
Dept: INTERNAL MEDICINE CLINIC | Facility: CLINIC | Age: 63
End: 2023-06-23

## 2023-07-03 DIAGNOSIS — I21.4 NSTEMI (NON-ST ELEVATED MYOCARDIAL INFARCTION) (HCC): ICD-10-CM

## 2023-07-03 DIAGNOSIS — Z79.4 TYPE 2 DIABETES MELLITUS WITH DIABETIC POLYNEUROPATHY, WITH LONG-TERM CURRENT USE OF INSULIN (HCC): ICD-10-CM

## 2023-07-03 DIAGNOSIS — I10 ESSENTIAL HYPERTENSION: ICD-10-CM

## 2023-07-03 DIAGNOSIS — E11.9 DIABETES MELLITUS, TYPE 2 (HCC): ICD-10-CM

## 2023-07-03 DIAGNOSIS — E11.42 TYPE 2 DIABETES MELLITUS WITH DIABETIC POLYNEUROPATHY, WITH LONG-TERM CURRENT USE OF INSULIN (HCC): ICD-10-CM

## 2023-07-03 DIAGNOSIS — R06.02 SHORTNESS OF BREATH: ICD-10-CM

## 2023-07-03 DIAGNOSIS — K21.00 GASTROESOPHAGEAL REFLUX DISEASE WITH ESOPHAGITIS WITHOUT HEMORRHAGE: ICD-10-CM

## 2023-07-05 RX ORDER — OMEPRAZOLE 40 MG/1
40 CAPSULE, DELAYED RELEASE ORAL DAILY
Qty: 90 CAPSULE | Refills: 1 | Status: SHIPPED | OUTPATIENT
Start: 2023-07-05

## 2023-07-05 RX ORDER — LOSARTAN POTASSIUM 100 MG/1
100 TABLET ORAL DAILY
Qty: 90 TABLET | Refills: 1 | Status: SHIPPED | OUTPATIENT
Start: 2023-07-05

## 2023-07-05 RX ORDER — ATORVASTATIN CALCIUM 80 MG/1
80 TABLET, FILM COATED ORAL DAILY
Qty: 90 TABLET | Refills: 1 | Status: SHIPPED | OUTPATIENT
Start: 2023-07-05

## 2023-07-05 RX ORDER — FUROSEMIDE 40 MG/1
40 TABLET ORAL DAILY
Qty: 90 TABLET | Refills: 1 | Status: SHIPPED | OUTPATIENT
Start: 2023-07-05

## 2023-07-05 RX ORDER — AMLODIPINE BESYLATE 5 MG/1
5 TABLET ORAL DAILY
Qty: 90 TABLET | Refills: 1 | Status: SHIPPED | OUTPATIENT
Start: 2023-07-05

## 2023-07-05 RX ORDER — GABAPENTIN 400 MG/1
400 CAPSULE ORAL 3 TIMES DAILY
Qty: 270 CAPSULE | Refills: 1 | Status: SHIPPED | OUTPATIENT
Start: 2023-07-05

## 2023-07-11 ENCOUNTER — TELEPHONE (OUTPATIENT)
Dept: INTERNAL MEDICINE CLINIC | Facility: CLINIC | Age: 63
End: 2023-07-11

## 2023-07-11 DIAGNOSIS — E11.42 TYPE 2 DIABETES MELLITUS WITH DIABETIC POLYNEUROPATHY, WITH LONG-TERM CURRENT USE OF INSULIN (HCC): Primary | ICD-10-CM

## 2023-07-11 DIAGNOSIS — Z79.4 TYPE 2 DIABETES MELLITUS WITH DIABETIC POLYNEUROPATHY, WITH LONG-TERM CURRENT USE OF INSULIN (HCC): Primary | ICD-10-CM

## 2023-07-11 NOTE — TELEPHONE ENCOUNTER
Pt needs insulin pen needles BD 32G x 1/4" 0.23mm x 6mm. Send to [facility] in [facility].

## 2023-07-25 ENCOUNTER — RA CDI HCC (OUTPATIENT)
Dept: OTHER | Facility: HOSPITAL | Age: 63
End: 2023-07-25

## 2023-07-25 NOTE — PROGRESS NOTES
[address] coding opportunities          Chart Reviewed number of suggestions sent to Provider: 1     Patients Insurance        Commercial Insurance: Mike Gibbons       I11.0: Hypertensive heart disease with heart failure ([address]) - Please review if this is correct and assess and document, if applicable -     Per ICD 10 CM coding guidelines the classification presumes a causal relationship between hypertension and heart involvement and between hypertension unless the documentation clearly states the conditions are unrelated

## 2023-07-26 ENCOUNTER — APPOINTMENT (OUTPATIENT)
Dept: LAB | Facility: CLINIC | Age: 63
End: 2023-07-26
Payer: COMMERCIAL

## 2023-07-26 DIAGNOSIS — E04.1 THYROID NODULE: ICD-10-CM

## 2023-07-26 DIAGNOSIS — E11.42 TYPE 2 DIABETES MELLITUS WITH DIABETIC POLYNEUROPATHY, WITH LONG-TERM CURRENT USE OF INSULIN (HCC): ICD-10-CM

## 2023-07-26 DIAGNOSIS — Z79.4 TYPE 2 DIABETES MELLITUS WITH DIABETIC POLYNEUROPATHY, WITH LONG-TERM CURRENT USE OF INSULIN (HCC): ICD-10-CM

## 2023-07-26 DIAGNOSIS — I10 ESSENTIAL HYPERTENSION: ICD-10-CM

## 2023-07-26 LAB
ALBUMIN SERPL BCP-MCNC: 3.8 G/DL (ref 3.5–5)
ALP SERPL-CCNC: 84 U/L (ref 46–116)
ALT SERPL W P-5'-P-CCNC: 27 U/L (ref 12–78)
ANION GAP SERPL CALCULATED.3IONS-SCNC: 1 MMOL/L
AST SERPL W P-5'-P-CCNC: 15 U/L (ref 5–45)
BASOPHILS # BLD AUTO: 0.04 THOUSANDS/ÂΜL (ref 0–0.1)
BASOPHILS NFR BLD AUTO: 1 % (ref 0–1)
BILIRUB SERPL-MCNC: 0.71 MG/DL (ref 0.2–1)
BUN SERPL-MCNC: 16 MG/DL (ref 5–25)
CALCIUM SERPL-MCNC: 9.5 MG/DL (ref 8.3–10.1)
CHLORIDE SERPL-SCNC: 108 MMOL/L (ref 96–108)
CHOLEST SERPL-MCNC: 207 MG/DL
CO2 SERPL-SCNC: 33 MMOL/L (ref 21–32)
CREAT SERPL-MCNC: 0.65 MG/DL (ref 0.6–1.3)
CREAT UR-MCNC: 73.5 MG/DL
EOSINOPHIL # BLD AUTO: 0.09 THOUSAND/ÂΜL (ref 0–0.61)
EOSINOPHIL NFR BLD AUTO: 2 % (ref 0–6)
ERYTHROCYTE [DISTWIDTH] IN BLOOD BY AUTOMATED COUNT: 12.6 % (ref 11.6–15.1)
GFR SERPL CREATININE-BSD FRML MDRD: 94 ML/MIN/1.73SQ M
GLUCOSE P FAST SERPL-MCNC: 185 MG/DL (ref 65–99)
HCT VFR BLD AUTO: 39.8 % (ref 34.8–46.1)
HDLC SERPL-MCNC: 47 MG/DL
HGB BLD-MCNC: 13 G/DL (ref 11.5–15.4)
IMM GRANULOCYTES # BLD AUTO: 0.02 THOUSAND/UL (ref 0–0.2)
IMM GRANULOCYTES NFR BLD AUTO: 0 % (ref 0–2)
LDLC SERPL CALC-MCNC: 89 MG/DL (ref 0–100)
LYMPHOCYTES # BLD AUTO: 1.56 THOUSANDS/ÂΜL (ref 0.6–4.47)
LYMPHOCYTES NFR BLD AUTO: 34 % (ref 14–44)
MCH RBC QN AUTO: 28.6 PG (ref 26.8–34.3)
MCHC RBC AUTO-ENTMCNC: 32.7 G/DL (ref 31.4–37.4)
MCV RBC AUTO: 88 FL (ref 82–98)
MICROALBUMIN UR-MCNC: 12.4 MG/L (ref 0–20)
MICROALBUMIN/CREAT 24H UR: 17 MG/G CREATININE (ref 0–30)
MONOCYTES # BLD AUTO: 0.35 THOUSAND/ÂΜL (ref 0.17–1.22)
MONOCYTES NFR BLD AUTO: 8 % (ref 4–12)
NEUTROPHILS # BLD AUTO: 2.5 THOUSANDS/ÂΜL (ref 1.85–7.62)
NEUTS SEG NFR BLD AUTO: 55 % (ref 43–75)
NRBC BLD AUTO-RTO: 0 /100 WBCS
PLATELET # BLD AUTO: 192 THOUSANDS/UL (ref 149–390)
PMV BLD AUTO: 11.7 FL (ref 8.9–12.7)
POTASSIUM SERPL-SCNC: 4.3 MMOL/L (ref 3.5–5.3)
PROT SERPL-MCNC: 7.3 G/DL (ref 6.4–8.4)
RBC # BLD AUTO: 4.55 MILLION/UL (ref 3.81–5.12)
SODIUM SERPL-SCNC: 142 MMOL/L (ref 135–147)
TRIGL SERPL-MCNC: 357 MG/DL
TSH SERPL DL<=0.05 MIU/L-ACNC: 1.2 UIU/ML (ref 0.45–4.5)
WBC # BLD AUTO: 4.56 THOUSAND/UL (ref 4.31–10.16)

## 2023-07-26 PROCEDURE — 80053 COMPREHEN METABOLIC PANEL: CPT

## 2023-07-26 PROCEDURE — 84443 ASSAY THYROID STIM HORMONE: CPT

## 2023-07-26 PROCEDURE — 80061 LIPID PANEL: CPT

## 2023-07-26 PROCEDURE — 36415 COLL VENOUS BLD VENIPUNCTURE: CPT

## 2023-07-26 PROCEDURE — 85025 COMPLETE CBC W/AUTO DIFF WBC: CPT

## 2023-07-28 NOTE — PATIENT INSTRUCTIONS
Basic Carbohydrate Counting   AMBULATORY CARE:   Carbohydrate counting  is a way to plan your meals by counting the amount of carbohydrate in foods. Carbohydrates are the sugars, starches, and fiber found in fruit, grains, vegetables, and milk products. Carbohydrates increase your blood sugar levels. Carbohydrate counting can help you eat the right amount of carbohydrate to keep your blood sugar levels under control. What you need to know about planning meals using carbohydrate counting:  • A dietitian or healthcare provider will help you develop a healthy meal plan that works best for you. You will be taught how much carbohydrate to eat or drink for each meal and snack. Your meal plan will be based on your age, weight, usual food intake, and physical activity level. If you have diabetes, it will also include your blood sugar levels and diabetes medicine. Once you know how much carbohydrate you should eat, you can decide what type of food you want to eat. • You will need to know what foods contain carbohydrate and how much they contain. Keep track of the amount of carbohydrate in meals and snacks in order to follow your meal plan. Do not avoid carbohydrates or skip meals. Your blood sugar may fall too low if you do not eat enough carbohydrate or you skip meals. Foods that contain carbohydrate:   • Breads:  Each serving of food listed below contains about 15 g of carbohydrate . ? 1 slice of bread (1 ounce) or 1 flour or corn tortilla (6 inch)    ? ½ of a hamburger bun or ¼ of a large bagel (about 1 ounce)    ? 1 pancake (about 4 inches across and ¼ inch thick)    • Cereals and grains:  Serving sizes of ready-to-eat cereals vary. Look at the serving size and the total carbohydrate amount listed on the food label. Each serving of food listed below contains about 15 g of carbohydrate . ? ¾ cup of dry, unsweetened, ready-to-eat cereal or ¼ cup of low-fat granola     ?  ½ cup of oatmeal or other cooked cereal     ? ? cup of cooked rice or pasta    • Starchy vegetables and beans:  Each serving of food listed below contains about 15 g of carbohydrate . ? ½ cup of corn, green peas, sweet potatoes, or mashed potatoes    ? ¼ of a large baked potato    ? ½ cup of beans, lentils, and peas (garbanzo, rosales, kidney, white, split, black-eyed)    • Crackers and snacks:  Each serving of food listed below contains about 15 g of carbohydrate . ? 3 danis cracker squares or 8 animal crackers     ? 6 saltine-type crackers    ? 3 cups of popcorn or ¾ ounce of pretzels, potato chips, or tortilla chips    • Fruit:  Each serving of food listed below contains about 15 g of carbohydrate . ? 1 small (4 ounce) piece of fresh fruit or ¾ to 1 cup of fresh fruit    ? ½ cup of canned or frozen fruit, packed in natural juice    ? ½ cup (4 ounces) of unsweetened fruit juice    ? 2 tablespoons of dried fruit    • Desserts or sugary foods:  Each serving of food listed below contains about 15 g of carbohydrate . ? 2-inch square unfrosted cake or brownie     ? 2 small cookies    ? ½ cup of ice cream, frozen yogurt, or nondairy frozen yogurt    ? ¼ cup of sherbet or sorbet    ? 1 tablespoon of regular syrup, jam, or jelly    ? 2 tablespoons of light syrup    • Milk and yogurt:  Foods from the milk group contain about 12 g of carbohydrate per serving. ? 1 cup of fat-free or low-fat milk    ? 1 cup of soy milk    ? ? cup of fat-free, yogurt sweetened with artificial sweetener    • Non-starchy vegetables:  Each serving contains about 5 g of carbohydrate . Three servings of non-starch vegetables count as 1 carbohydrate serving. ? ½ cup of cooked vegetables or 1 cup of raw vegetables. This includes beets, broccoli, cabbage, cauliflower, cucumber, mushrooms, tomatoes, and zucchini    ?  ½ cup of vegetable juice    How to use carbohydrate counting to plan meals:   • Count carbohydrate amounts using serving sizes:      ? Pasta dinner example: You plan to have pasta, tossed salad, and an 8-ounce glass of milk. Your healthcare provider tells you that you may have 4 carbohydrate servings for dinner. One carbohydrate serving of pasta is ? cup. One cup of pasta will equal 3 carbohydrate servings. An 8-ounce glass of milk will count as 1 carbohydrate serving. These amounts of food would equal 4 carbohydrate servings. One cup of tossed salad does not count toward your carbohydrate servings. • Count carbohydrate amounts using food labels:  Find the total amount of carbohydrate in a packaged food by reading the food label. Food labels tell you the serving size of the food and the total carbohydrate amount in each serving. Find the serving size on the food label and then decide how many servings you will eat. Multiply the number of servings you plan to eat by the carbohydrate amount per serving. ? Granola bar snack example: Your meal plan allows you to have 2 carbohydrate servings (30 grams) of carbohydrate for a snack. You plan to eat 1 package of granola bars, which contains 2 bars. According to the food label, the serving size of food in this package is 1 bar. Each serving (1 bar) contains 25 grams of carbohydrate. The total amount of carbohydrate in this package of granola bars would be 50 g. Based on your meal plan, you should eat only 1 bar. Follow up with your doctor as directed:  Write down your questions so you remember to ask them during your visits. © Copyright [employer]ya Hodge 2022 Information is for End User's use only and may not be sold, redistributed or otherwise used for commercial purposes. The above information is an  only. It is not intended as medical advice for individual conditions or treatments. Talk to your doctor, nurse or pharmacist before following any medical regimen to see if it is safe and effective for you.

## 2023-08-01 ENCOUNTER — OFFICE VISIT (OUTPATIENT)
Dept: INTERNAL MEDICINE CLINIC | Facility: CLINIC | Age: 63
End: 2023-08-01
Payer: COMMERCIAL

## 2023-08-01 VITALS
WEIGHT: 240 LBS | DIASTOLIC BLOOD PRESSURE: 76 MMHG | HEART RATE: 94 BPM | OXYGEN SATURATION: 97 % | TEMPERATURE: 98.3 F | HEIGHT: 70 IN | SYSTOLIC BLOOD PRESSURE: 124 MMHG | BODY MASS INDEX: 34.36 KG/M2

## 2023-08-01 DIAGNOSIS — M15.9 PRIMARY OSTEOARTHRITIS INVOLVING MULTIPLE JOINTS: ICD-10-CM

## 2023-08-01 DIAGNOSIS — Z12.11 SCREEN FOR COLON CANCER: ICD-10-CM

## 2023-08-01 DIAGNOSIS — I25.10 CORONARY ARTERY DISEASE INVOLVING NATIVE CORONARY ARTERY OF NATIVE HEART WITHOUT ANGINA PECTORIS: ICD-10-CM

## 2023-08-01 DIAGNOSIS — I50.32 CHRONIC DIASTOLIC (CONGESTIVE) HEART FAILURE (HCC): ICD-10-CM

## 2023-08-01 DIAGNOSIS — E11.42 TYPE 2 DIABETES MELLITUS WITH DIABETIC POLYNEUROPATHY, WITH LONG-TERM CURRENT USE OF INSULIN (HCC): Primary | ICD-10-CM

## 2023-08-01 DIAGNOSIS — E11.9 ENCOUNTER FOR DIABETIC FOOT EXAM (HCC): ICD-10-CM

## 2023-08-01 DIAGNOSIS — E78.2 MIXED HYPERLIPIDEMIA: ICD-10-CM

## 2023-08-01 DIAGNOSIS — Z23 ENCOUNTER FOR VACCINATION: ICD-10-CM

## 2023-08-01 DIAGNOSIS — Z79.4 TYPE 2 DIABETES MELLITUS WITH DIABETIC POLYNEUROPATHY, WITH LONG-TERM CURRENT USE OF INSULIN (HCC): Primary | ICD-10-CM

## 2023-08-01 DIAGNOSIS — I10 ESSENTIAL HYPERTENSION: ICD-10-CM

## 2023-08-01 DIAGNOSIS — Z85.3 HISTORY OF BREAST CANCER: ICD-10-CM

## 2023-08-01 DIAGNOSIS — F32.9 REACTIVE DEPRESSION: ICD-10-CM

## 2023-08-01 DIAGNOSIS — M54.16 LUMBAR RADICULOPATHY: ICD-10-CM

## 2023-08-01 DIAGNOSIS — M51.26 HNP (HERNIATED NUCLEUS PULPOSUS), LUMBAR: ICD-10-CM

## 2023-08-01 PROBLEM — R07.9 CHEST PAIN: Status: RESOLVED | Noted: 2021-11-07 | Resolved: 2023-08-01

## 2023-08-01 LAB — SL AMB POCT HEMOGLOBIN AIC: 8.6 (ref ?–6.5)

## 2023-08-01 PROCEDURE — 99214 OFFICE O/P EST MOD 30 MIN: CPT | Performed by: INTERNAL MEDICINE

## 2023-08-01 PROCEDURE — 83036 HEMOGLOBIN GLYCOSYLATED A1C: CPT | Performed by: INTERNAL MEDICINE

## 2023-08-01 RX ORDER — DULOXETIN HYDROCHLORIDE 60 MG/1
60 CAPSULE, DELAYED RELEASE ORAL DAILY
Qty: 90 CAPSULE | Refills: 3 | Status: SHIPPED | OUTPATIENT
Start: 2023-08-01

## 2023-08-01 NOTE — PROGRESS NOTES
Depression Screening and Follow-up Plan: Patient was screened for depression during today's encounter. They screened negative with a PHQ-2 score of 0. Assessment/Plan:  Problem List Items Addressed This Visit        Endocrine    Type 2 diabetes mellitus with diabetic polyneuropathy, with long-term current use of insulin ([facility]) - Primary    Relevant Medications    DULoxetine (CYMBALTA) 60 mg delayed release capsule    Other Relevant Orders    POCT hemoglobin A1c (Completed)    Ambulatory Referral to Endocrinology       Cardiovascular and Mediastinum    Essential hypertension    Coronary artery disease involving native coronary artery of native heart without angina pectoris    Chronic diastolic (congestive) heart failure (HCC)       Nervous and Auditory    Lumbar radiculopathy    Relevant Medications    DULoxetine (CYMBALTA) 60 mg delayed release capsule    Other Relevant Orders    Ambulatory Referral to Pain Management       Musculoskeletal and Integument    Primary osteoarthritis involving multiple joints    HNP (herniated nucleus pulposus), lumbar    Relevant Medications    DULoxetine (CYMBALTA) 60 mg delayed release capsule    Other Relevant Orders    Ambulatory Referral to Pain Management       Other    Mixed hyperlipidemia    Relevant Orders    Ambulatory Referral to Endocrinology    History of breast cancer   Other Visit Diagnoses     Encounter for diabetic foot exam ([address])        Screen for colon cancer        Relevant Orders    Cologuard    Encounter for vaccination        Reactive depression        Relevant Medications    DULoxetine (CYMBALTA) 60 mg delayed release capsule           Diagnoses and all orders for this visit:    Type 2 diabetes mellitus with diabetic polyneuropathy, with long-term current use of insulin ([facility])  -     POCT hemoglobin A1c  -     DULoxetine (CYMBALTA) 60 mg delayed release capsule;  Take 1 capsule (60 mg total) by mouth daily  -     Ambulatory Referral to Endocrinology; Future    Essential hypertension    Coronary artery disease involving native coronary artery of native heart without angina pectoris    Chronic diastolic (congestive) heart failure (HCC)    Primary osteoarthritis involving multiple joints    Mixed hyperlipidemia  -     Ambulatory Referral to Endocrinology; Future    History of breast cancer    Encounter for diabetic foot exam ([address])    Screen for colon cancer  -     Cologuard    Encounter for vaccination    HNP (herniated nucleus pulposus), lumbar  -     DULoxetine (CYMBALTA) 60 mg delayed release capsule; Take 1 capsule (60 mg total) by mouth daily  -     Ambulatory Referral to Pain Management; Future    Lumbar radiculopathy  -     DULoxetine (CYMBALTA) 60 mg delayed release capsule; Take 1 capsule (60 mg total) by mouth daily  -     Ambulatory Referral to Pain Management; Future    Reactive depression  -     DULoxetine (CYMBALTA) 60 mg delayed release capsule; Take 1 capsule (60 mg total) by mouth daily        No problem-specific Assessment & Plan notes found for this encounter. A/P: Doing ok and discussed recent labs. Recommend adding zetia and vascepa for the LDL and CAD. Pt wants to hold on zetia and will start OTC omega 3. HgA1c still up at 8.6. REcommend meal time insulin, but defers. Pt seems overwhelmed with the lack of improvement, multiple meds, worsening pain, etc. Continues to work daily. Will refer to endo for second opinion. Suspect worsening HNP and will refer to pain management. Seems depressed and given the pain, will start duloxetine. Continue eliz. Discussed bariatric sx eval and defers. Continue current treatment and RTC one month for f/u pain, MDD, sugars, etc. .     Subjective:      Patient ID: Kelley Eaton is a 61 y.o. female. WF RTC for f/u DM, HTN, etc. Doing ok and no new issues, but sugars remain elevated. No low sugar events. LBP is worse and pain down the left into the thigh.   Denies CP, SOB, palpitations, edema, orthopnea or PND. Chronic pain is mangeable. Breast ca is in remission. Labs up to date except for HgA1c. Due for CRC. Recent labs ok except for elevated TG and LDL. The following portions of the patient's history were reviewed and updated as appropriate:   She has a past medical history of Arthritis, Breast cancer ([address]), Cancer ([address]), Cardiac disease, CHF (congestive heart failure) ([address]), Coronary artery disease, Diabetes mellitus ([address]), Heartburn, radiation therapy, Hypercholesteremia, Hyperlipidemia, Hypertension, and Neuropathy. ,  does not have any pertinent problems on file. ,   has a past surgical history that includes Breast surgery (Left); Knee surgery; Nose surgery; Mouth surgery; Foot surgery (Left); Tubal ligation; Ovarian cyst removal (Left); Cholecystectomy; pr surgical arthroscopy riley w/coracoacrm ligm rls (Right, 05/16/2016); pr surgical arthroscopy shoulder biceps tenodesis (Right, 05/16/2016); Cardiac surgery; Tubal ligation; Colonoscopy; and Breast lumpectomy (Left). ,  family history includes Breast cancer in her paternal aunt; Esophageal cancer in her father; Leukemia in her father; Liver disease in her brother; Lung cancer in her family, father, and mother; No Known Problems in her maternal aunt, maternal aunt, maternal aunt, maternal aunt, maternal grandmother, paternal aunt, paternal aunt, paternal aunt, paternal aunt, paternal aunt, paternal grandmother, sister, sister, and sister; Stomach cancer in her family; Thyroid disease in her mother. ,   reports that she quit smoking about 23 years ago. Her smoking use included cigarettes. She smoked an average of 1 pack per day. She has never used smokeless tobacco. She reports that she does not currently use alcohol. She reports that she does not currently use drugs. ,  is allergic to codeine, iodinated contrast media, iodine - food allergy, and penicillins. .  Current Outpatient Medications   Medication Sig Dispense Refill   • amLODIPine (NORVASC) 5 mg tablet Take 1 tablet (5 mg total) by mouth daily 90 tablet 1   • aspirin 81 MG tablet Take 81 mg by mouth daily. • atorvastatin (LIPITOR) 80 mg tablet Take 1 tablet (80 mg total) by mouth daily 90 tablet 1   • docusate sodium (COLACE) 100 mg capsule Take 100 mg by mouth 2 (two) times a day     • DULoxetine (CYMBALTA) 60 mg delayed release capsule Take 1 capsule (60 mg total) by mouth daily 90 capsule 3   • furosemide (LASIX) 40 mg tablet Take 1 tablet (40 mg total) by mouth daily 90 tablet 1   • gabapentin (NEURONTIN) 400 mg capsule Take 1 capsule (400 mg total) by mouth 3 (three) times a day 270 capsule 1   • insulin detemir (Levemir FlexTouch) 100 Units/mL injection pen 60UNITS IN AM AND 75 UNITS IN PM (Patient taking differently: 60UNITS IN AM AND 60 UNITS IN PM) 45 mL 5   • Jardiance 25 MG TABS TAKE 1 TABLET BY MOUTH EVERY DAY IN THE MORNING 90 tablet 1   • losartan (COZAAR) 100 MG tablet Take 1 tablet (100 mg total) by mouth daily 90 tablet 1   • metoprolol succinate (TOPROL-XL) 100 mg 24 hr tablet Take 1 tablet (100 mg total) by mouth daily 90 tablet 1   • omeprazole (PriLOSEC) 40 MG capsule Take 1 capsule (40 mg total) by mouth daily 90 capsule 1   • ranolazine (RANEXA) 500 mg 12 hr tablet Take 1 tablet (500 mg total) by mouth 2 (two) times a day 180 tablet 3   • Trulicity 4.5 ET/3.2TN injection INJECT 1/2 (ONE-HALF) ML SUBCUTANEOUSLY  ONCE A WEEK 4 mL 0   • TURMERIC PO Take by mouth     • Continuous Blood Gluc Sensor (FreeStyle Nelda 2 Sensor) MISC Check blood sugars 4 times per day 6 each 3   • Insulin Pen Needle 32G X 6 MM MISC Use in the morning 100 each 3   • Insulin Syringe-Needle U-100 31G X 15/64" 0.5 ML MISC Use 2 (two) times a day 90 each 1     No current facility-administered medications for this visit. Review of Systems   Constitutional: Negative for activity change, chills, diaphoresis, fatigue and fever. HENT: Negative. Eyes: Negative for visual disturbance.    Respiratory: Negative for cough, chest tightness, shortness of breath and wheezing. Cardiovascular: Negative for chest pain, palpitations and leg swelling. Gastrointestinal: Negative for abdominal pain, constipation, diarrhea, nausea and vomiting. Endocrine: Negative for cold intolerance and heat intolerance. Genitourinary: Negative for difficulty urinating, dysuria and frequency. Musculoskeletal: Negative for arthralgias, gait problem and myalgias. Neurological: Negative for dizziness, seizures, syncope, weakness, light-headedness and headaches. Psychiatric/Behavioral: Negative for confusion, dysphoric mood and sleep disturbance. The patient is not nervous/anxious. PHQ-2/9 Depression Screening    Little interest or pleasure in doing things: 0 - not at all  Feeling down, depressed, or hopeless: 0 - not at all  PHQ-2 Score: 0  PHQ-2 Interpretation: Negative depression screen        Objective:  Vitals:    08/01/23 0856   BP: 124/76   Pulse: 94   Temp: 98.3 °F (36.8 °C)   SpO2: 97%   Weight: 109 kg (240 lb)   Height: 5' 10" (1.778 m)     Body mass index is 34.44 kg/m². Physical Exam  Vitals and nursing note reviewed. Constitutional:       General: She is not in acute distress. Appearance: Normal appearance. She is not ill-appearing. HENT:      Head: Normocephalic and atraumatic. Mouth/Throat:      Mouth: Mucous membranes are moist.   Eyes:      Extraocular Movements: Extraocular movements intact. Conjunctiva/sclera: Conjunctivae normal.      Pupils: Pupils are equal, round, and reactive to light. Neck:      Vascular: No carotid bruit. Cardiovascular:      Rate and Rhythm: Normal rate and regular rhythm. Pulses: no weak pulses          Dorsalis pedis pulses are 1+ on the right side and 1+ on the left side. Posterior tibial pulses are 1+ on the right side and 1+ on the left side. Heart sounds: Normal heart sounds. No murmur heard.   Pulmonary:      Effort: Pulmonary effort is normal. No respiratory distress. Breath sounds: Normal breath sounds. No wheezing, rhonchi or rales. Abdominal:      General: Bowel sounds are normal. There is no distension. Palpations: Abdomen is soft. Tenderness: There is no abdominal tenderness. Musculoskeletal:      Cervical back: Neck supple. Right lower leg: No edema. Left lower leg: No edema. Feet:      Right foot:      Skin integrity: Callus present. No ulcer, skin breakdown, erythema, warmth or dry skin. Left foot:      Skin integrity: Callus present. No ulcer, skin breakdown, erythema, warmth or dry skin. Neurological:      General: No focal deficit present. Mental Status: She is alert and oriented to person, place, and time. Mental status is at baseline. Psychiatric:         Mood and Affect: Mood normal.         Behavior: Behavior normal.         Thought Content: Thought content normal.         Judgment: Judgment normal.           Patient's shoes and socks removed. Right Foot/Ankle   Right Foot Inspection  Skin Exam: skin normal, skin intact, callus and callus. No dry skin, no warmth, no erythema, no maceration, no abnormal color, no pre-ulcer and no ulcer. Toe Exam: ROM and strength within normal limits. No swelling, no tenderness, erythema and  no right toe deformity    Sensory   Monofilament testing: intact    Vascular  Capillary refills: < 3 seconds  The right DP pulse is 1+. The right PT pulse is 1+. Left Foot/Ankle  Left Foot Inspection  Skin Exam: skin normal, skin intact and callus. No dry skin, no warmth, no erythema, no maceration, normal color, no pre-ulcer and no ulcer. Toe Exam: ROM and strength within normal limits. No swelling, no tenderness and no erythema. Sensory   Monofilament testing: intact    Vascular  Capillary refills: < 3 seconds  The left DP pulse is 1+. The left PT pulse is 1+.      Assign Risk Category  No deformity present  No loss of protective sensation  No weak pulses  Risk: 0

## 2023-08-02 ENCOUNTER — TELEPHONE (OUTPATIENT)
Dept: ENDOCRINOLOGY | Facility: CLINIC | Age: 63
End: 2023-08-02

## 2023-08-03 ENCOUNTER — TELEPHONE (OUTPATIENT)
Dept: INTERNAL MEDICINE CLINIC | Facility: CLINIC | Age: 63
End: 2023-08-03

## 2023-08-03 NOTE — TELEPHONE ENCOUNTER
patient states she was in for an appointment Tuesday and forgot to ask you. She states she was prescribed Cymbalta. She wants to know if she should be taking both the gabapentin and the cymbalta or just the cymbalta. Please advise.

## 2023-08-08 DIAGNOSIS — Z79.4 TYPE 2 DIABETES MELLITUS WITH DIABETIC POLYNEUROPATHY, WITH LONG-TERM CURRENT USE OF INSULIN (HCC): ICD-10-CM

## 2023-08-08 DIAGNOSIS — E11.42 TYPE 2 DIABETES MELLITUS WITH DIABETIC POLYNEUROPATHY, WITH LONG-TERM CURRENT USE OF INSULIN (HCC): ICD-10-CM

## 2023-08-08 RX ORDER — DULAGLUTIDE 4.5 MG/.5ML
INJECTION, SOLUTION SUBCUTANEOUS
Qty: 4 ML | Refills: 0 | Status: SHIPPED | OUTPATIENT
Start: 2023-08-08

## 2023-09-05 ENCOUNTER — CONSULT (OUTPATIENT)
Dept: ENDOCRINOLOGY | Facility: CLINIC | Age: 63
End: 2023-09-05
Payer: COMMERCIAL

## 2023-09-05 VITALS
BODY MASS INDEX: 34.11 KG/M2 | HEART RATE: 79 BPM | SYSTOLIC BLOOD PRESSURE: 120 MMHG | WEIGHT: 238.25 LBS | HEIGHT: 70 IN | RESPIRATION RATE: 17 BRPM | OXYGEN SATURATION: 96 % | TEMPERATURE: 97.8 F | DIASTOLIC BLOOD PRESSURE: 70 MMHG

## 2023-09-05 DIAGNOSIS — I10 ESSENTIAL HYPERTENSION: ICD-10-CM

## 2023-09-05 DIAGNOSIS — E78.2 MIXED HYPERLIPIDEMIA: ICD-10-CM

## 2023-09-05 DIAGNOSIS — Z79.4 TYPE 2 DIABETES MELLITUS WITH DIABETIC POLYNEUROPATHY, WITH LONG-TERM CURRENT USE OF INSULIN (HCC): Primary | ICD-10-CM

## 2023-09-05 DIAGNOSIS — E11.42 TYPE 2 DIABETES MELLITUS WITH DIABETIC POLYNEUROPATHY, WITH LONG-TERM CURRENT USE OF INSULIN (HCC): Primary | ICD-10-CM

## 2023-09-05 DIAGNOSIS — E55.9 VITAMIN D DEFICIENCY: ICD-10-CM

## 2023-09-05 LAB — SL AMB POCT HEMOGLOBIN AIC: 7.9 (ref ?–6.5)

## 2023-09-05 PROCEDURE — 83036 HEMOGLOBIN GLYCOSYLATED A1C: CPT | Performed by: STUDENT IN AN ORGANIZED HEALTH CARE EDUCATION/TRAINING PROGRAM

## 2023-09-05 PROCEDURE — 95251 CONT GLUC MNTR ANALYSIS I&R: CPT | Performed by: STUDENT IN AN ORGANIZED HEALTH CARE EDUCATION/TRAINING PROGRAM

## 2023-09-05 PROCEDURE — 99244 OFF/OP CNSLTJ NEW/EST MOD 40: CPT | Performed by: STUDENT IN AN ORGANIZED HEALTH CARE EDUCATION/TRAINING PROGRAM

## 2023-09-05 RX ORDER — CEPHALEXIN 500 MG/1
500 TABLET ORAL 3 TIMES DAILY
COMMUNITY
Start: 2023-08-30 | End: 2023-09-12

## 2023-09-05 NOTE — PATIENT INSTRUCTIONS
Mounjaro 10 mg once weekly for 1 month, before your last dose call the office I will order the next dose which will be 12.5 mg once weekly  Discontinue Trulicity. If your blood sugars are below 80 consistently in the morning decrease Levemir dose to 55 twice a day. Continue Jardiance.
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
## 2023-09-12 ENCOUNTER — OFFICE VISIT (OUTPATIENT)
Dept: INTERNAL MEDICINE CLINIC | Facility: CLINIC | Age: 63
End: 2023-09-12
Payer: COMMERCIAL

## 2023-09-12 VITALS
HEART RATE: 76 BPM | HEIGHT: 70 IN | SYSTOLIC BLOOD PRESSURE: 128 MMHG | DIASTOLIC BLOOD PRESSURE: 78 MMHG | WEIGHT: 240.25 LBS | TEMPERATURE: 97.3 F | OXYGEN SATURATION: 97 % | BODY MASS INDEX: 34.4 KG/M2

## 2023-09-12 DIAGNOSIS — M54.42 CHRONIC MIDLINE LOW BACK PAIN WITH BILATERAL SCIATICA: ICD-10-CM

## 2023-09-12 DIAGNOSIS — M54.41 CHRONIC MIDLINE LOW BACK PAIN WITH BILATERAL SCIATICA: ICD-10-CM

## 2023-09-12 DIAGNOSIS — N76.0 VULVOVAGINITIS: ICD-10-CM

## 2023-09-12 DIAGNOSIS — Z79.4 TYPE 2 DIABETES MELLITUS WITH DIABETIC POLYNEUROPATHY, WITH LONG-TERM CURRENT USE OF INSULIN (HCC): Primary | ICD-10-CM

## 2023-09-12 DIAGNOSIS — G89.29 CHRONIC MIDLINE LOW BACK PAIN WITH BILATERAL SCIATICA: ICD-10-CM

## 2023-09-12 DIAGNOSIS — E11.42 TYPE 2 DIABETES MELLITUS WITH DIABETIC POLYNEUROPATHY, WITH LONG-TERM CURRENT USE OF INSULIN (HCC): Primary | ICD-10-CM

## 2023-09-12 DIAGNOSIS — Z23 ENCOUNTER FOR VACCINATION: ICD-10-CM

## 2023-09-12 PROCEDURE — 99214 OFFICE O/P EST MOD 30 MIN: CPT | Performed by: INTERNAL MEDICINE

## 2023-09-12 RX ORDER — FLUCONAZOLE 150 MG/1
150 TABLET ORAL ONCE
Qty: 2 TABLET | Refills: 0 | Status: SHIPPED | OUTPATIENT
Start: 2023-09-12 | End: 2023-09-12

## 2023-09-12 NOTE — PROGRESS NOTES
Assessment/Plan:  Problem List Items Addressed This Visit        Endocrine    Type 2 diabetes mellitus with diabetic polyneuropathy, with long-term current use of insulin ([address]) - Primary   Other Visit Diagnoses     Chronic midline low back pain with bilateral sciatica        Encounter for vaccination        Vulvovaginitis        Relevant Medications    fluconazole (DIFLUCAN) 150 mg tablet           Diagnoses and all orders for this visit:    Type 2 diabetes mellitus with diabetic polyneuropathy, with long-term current use of insulin (HCC)    Chronic midline low back pain with bilateral sciatica    Encounter for vaccination    Vulvovaginitis  -     fluconazole (DIFLUCAN) 150 mg tablet; Take 1 tablet (150 mg total) by mouth once for 1 dose May repeat in  One week if needed. No problem-specific Assessment & Plan notes found for this encounter. A/P: Doing better. Tolerating the meds. Appreciate DPM and endo input. Will see how pt does on the [facility]. Foot infection resolved. LBP a little better and is off the SNRI. Will continue the eliz and still recommend pain management. Continue current treatment and RTC three months for routine. Defers flu vaccine. Will send in diflucan for vulvovaginitis. Subjective:      Patient ID: Marko Mann is a 61 y.o. female. WF RTC for f/u several issues. First, uncontrolled DM and pt deferred meal time insulin. Referred to endo and switched from [facility] to [facility] after pt again deferred meal time insulin. Just started and sugars still up. Next had toe pain and seen by DPM and had an infect and I&D and placed on abx. Feeling better ,but notes vaginal infection from the abx. . Third, chronic LBP. Started on cymbalta for pain and MDD. Stopped the med due to the way it made her feel. DOing better in regards to pain. Tolerating eliz. No new issues.        The following portions of the patient's history were reviewed and updated as appropriate:   She has a past medical history of Arthritis, Breast cancer ([address]), Cancer ([address]), Cardiac disease, CHF (congestive heart failure) ([address]), Coronary artery disease, Diabetes mellitus ([address]), Heartburn, radiation therapy, Hypercholesteremia, Hyperlipidemia, Hypertension, and Neuropathy. ,  does not have any pertinent problems on file. ,   has a past surgical history that includes Breast surgery (Left); Knee surgery; Nose surgery; Mouth surgery; Foot surgery (Left); Tubal ligation; Ovarian cyst removal (Left); Cholecystectomy; pr surgical arthroscopy riley w/coracoacrm ligm rls (Right, 05/16/2016); pr surgical arthroscopy shoulder biceps tenodesis (Right, 05/16/2016); Cardiac surgery; Tubal ligation; Colonoscopy; and Breast lumpectomy (Left). ,  family history includes Breast cancer in her paternal aunt; Esophageal cancer in her father; Leukemia in her father; Liver disease in her brother; Lung cancer in her family, father, and mother; No Known Problems in her maternal aunt, maternal aunt, maternal aunt, maternal aunt, maternal grandmother, paternal aunt, paternal aunt, paternal aunt, paternal aunt, paternal aunt, paternal grandmother, sister, sister, and sister; Stomach cancer in her family; Thyroid disease in her mother. ,   reports that she quit smoking about 23 years ago. Her smoking use included cigarettes. She smoked an average of 1 pack per day. She has never used smokeless tobacco. She reports that she does not currently use alcohol. She reports that she does not currently use drugs. ,  is allergic to codeine, iodinated contrast media, iodine - food allergy, and penicillins. .  Current Outpatient Medications   Medication Sig Dispense Refill   • amLODIPine (NORVASC) 5 mg tablet Take 1 tablet (5 mg total) by mouth daily 90 tablet 1   • aspirin 81 MG tablet Take 81 mg by mouth daily.      • atorvastatin (LIPITOR) 80 mg tablet Take 1 tablet (80 mg total) by mouth daily 90 tablet 1   • fluconazole (DIFLUCAN) 150 mg tablet Take 1 tablet (150 mg total) by mouth once for 1 dose May repeat in  One week if needed. 2 tablet 0   • furosemide (LASIX) 40 mg tablet Take 1 tablet (40 mg total) by mouth daily 90 tablet 1   • gabapentin (NEURONTIN) 400 mg capsule Take 1 capsule (400 mg total) by mouth 3 (three) times a day 270 capsule 1   • insulin detemir (Levemir FlexTouch) 100 Units/mL injection pen 60UNITS IN AM AND 75 UNITS IN PM (Patient taking differently: 60UNITS IN AM AND 60 UNITS IN PM) 45 mL 5   • Jardiance 25 MG TABS TAKE 1 TABLET BY MOUTH EVERY DAY IN THE MORNING 90 tablet 1   • losartan (COZAAR) 100 MG tablet Take 1 tablet (100 mg total) by mouth daily 90 tablet 1   • metoprolol succinate (TOPROL-XL) 100 mg 24 hr tablet Take 1 tablet (100 mg total) by mouth daily 90 tablet 1   • omeprazole (PriLOSEC) 40 MG capsule Take 1 capsule (40 mg total) by mouth daily 90 capsule 1   • ranolazine (RANEXA) 500 mg 12 hr tablet Take 1 tablet (500 mg total) by mouth 2 (two) times a day 180 tablet 3   • tirzepatide (Mounjaro) 10 MG/0.5ML Inject 0.5 mL (10 mg total) under the skin every 7 days 2 mL 2   • Continuous Blood Gluc Sensor (FreeStyle Nelda 2 Sensor) MISC Check blood sugars 4 times per day 6 each 3   • docusate sodium (COLACE) 100 mg capsule Take 100 mg by mouth 2 (two) times a day     • Insulin Pen Needle 32G X 6 MM MISC Use in the morning 100 each 3   • mupirocin (BACTROBAN) 2 % ointment APPLY OINTMENT TOPICALLY TO AFFECTED AREA ONCE DAILY     • TURMERIC PO Take by mouth       No current facility-administered medications for this visit. Review of Systems   Constitutional: Negative for activity change, chills, diaphoresis, fatigue and fever. HENT: Negative. Eyes: Negative for visual disturbance. Respiratory: Negative for cough, chest tightness, shortness of breath and wheezing. Cardiovascular: Negative for chest pain, palpitations and leg swelling. Gastrointestinal: Negative for abdominal pain, constipation, diarrhea, nausea and vomiting. Endocrine: Negative for cold intolerance and heat intolerance. Genitourinary: Positive for vaginal pain. Negative for difficulty urinating, dysuria and frequency. Musculoskeletal: Negative for arthralgias, gait problem and myalgias. Neurological: Negative for dizziness, seizures, syncope, weakness, light-headedness and headaches. Psychiatric/Behavioral: Negative for confusion, dysphoric mood and sleep disturbance. The patient is not nervous/anxious. PHQ-2/9 Depression Screening          Objective:  Vitals:    09/12/23 0912   BP: 128/78   Pulse: 76   Temp: (!) 97.3 °F (36.3 °C)   SpO2: 97%   Weight: 109 kg (240 lb 4 oz)   Height: 5' 10" (1.778 m)     Body mass index is 34.47 kg/m². Physical Exam  Vitals and nursing note reviewed. Constitutional:       General: She is not in acute distress. Appearance: Normal appearance. She is not ill-appearing. HENT:      Head: Normocephalic and atraumatic. Mouth/Throat:      Mouth: Mucous membranes are moist.   Eyes:      Extraocular Movements: Extraocular movements intact. Conjunctiva/sclera: Conjunctivae normal.      Pupils: Pupils are equal, round, and reactive to light. Cardiovascular:      Rate and Rhythm: Normal rate and regular rhythm. Heart sounds: Normal heart sounds. No murmur heard. Pulmonary:      Effort: Pulmonary effort is normal. No respiratory distress. Breath sounds: Normal breath sounds. No wheezing, rhonchi or rales. Abdominal:      General: Bowel sounds are normal. There is no distension. Palpations: Abdomen is soft. Tenderness: There is no abdominal tenderness. Neurological:      General: No focal deficit present. Mental Status: She is alert and oriented to person, place, and time. Mental status is at baseline. Psychiatric:         Mood and Affect: Mood normal.         Behavior: Behavior normal.         Thought Content:  Thought content normal.         Judgment: Judgment normal.

## 2023-09-28 ENCOUNTER — TELEPHONE (OUTPATIENT)
Dept: ENDOCRINOLOGY | Facility: CLINIC | Age: 63
End: 2023-09-28

## 2023-09-28 NOTE — TELEPHONE ENCOUNTER
Pt called that she was told by Dr. Konstantin Dodson to report how she was doing with [facility] before starting 4th dose. Pt reports she is toleraring it well, numbers are staying in the green section and she is doing okay.

## 2023-10-02 DIAGNOSIS — E11.42 TYPE 2 DIABETES MELLITUS WITH DIABETIC POLYNEUROPATHY, WITH LONG-TERM CURRENT USE OF INSULIN (HCC): ICD-10-CM

## 2023-10-02 DIAGNOSIS — E11.9 ENCOUNTER FOR DIABETIC FOOT EXAM (HCC): ICD-10-CM

## 2023-10-02 DIAGNOSIS — Z79.4 TYPE 2 DIABETES MELLITUS WITH DIABETIC POLYNEUROPATHY, WITH LONG-TERM CURRENT USE OF INSULIN (HCC): Primary | ICD-10-CM

## 2023-10-02 DIAGNOSIS — Z79.4 TYPE 2 DIABETES MELLITUS WITH DIABETIC POLYNEUROPATHY, WITH LONG-TERM CURRENT USE OF INSULIN (HCC): ICD-10-CM

## 2023-10-02 DIAGNOSIS — E11.42 TYPE 2 DIABETES MELLITUS WITH DIABETIC POLYNEUROPATHY, WITH LONG-TERM CURRENT USE OF INSULIN (HCC): Primary | ICD-10-CM

## 2023-10-02 RX ORDER — EMPAGLIFLOZIN 25 MG/1
TABLET, FILM COATED ORAL
Qty: 90 TABLET | Refills: 1 | Status: SHIPPED | OUTPATIENT
Start: 2023-10-02

## 2023-10-05 NOTE — TELEPHONE ENCOUNTER
[facility] MEDICINE     PATIENT ID:  NAME:  Doris Tate  MRN:               [id number]  YOB: 1963    Date: 8:17 AM      Resident: Nicholas Colbert M.D.    CC:  Elevated blood pressure reading    HPI: Doris Tate is a 60 y.o. female who presented  at the advice of her oral surgeon.  The patient states that she is currently planning to have all of her teeth pulled but was advised that her blood pressure was too high and she will need to see a primary care physician and be started on blood pressure medicine.  Patient states otherwise she feels at her baseline state of health and has no concerns.  She does note that she has not been seen in quite some time primary care and has not kept up on her preventative health measures.  Patient states she is currently smoking and has for the past 40 years, half pack per day and has no intention of stopping at this time.  Denies any drug use or much alcohol.  Patient states overall things are going very well for her but she wants to have her dental procedure so she decided to come to the doctor's office.    No problems updated.    REVIEW OF SYSTEMS:   Ten systems reviewed and were negative except as noted in the HPI.                PROBLEM LIST  Patient Active Problem List   Diagnosis    Closed Galeazzi's fracture of left radius        PAST SURGICAL HISTORY:  Past Surgical History:   Procedure Laterality Date    ORIF, WRIST Left 4/20/2017    Procedure: WRIST ORIF;  Surgeon: Cortez Mccall M.D.;  Location: SURGERY [facility];  Service:        FAMILY HISTORY:  No family history on file.    SOCIAL HISTORY:   Social History     Tobacco Use    Smoking status: Every Day    Smokeless tobacco: Former   Substance Use Topics    Alcohol use: Yes       ALLERGIES:  No Known Allergies    OUTPATIENT MEDICATIONS:    Current Outpatient Medications:     guaiFENesin ER (MUCINEX) 600 MG TABLET SR 12 HR, Take 1 Tablet by mouth every 12 hours., Disp: 28 Tablet, Rfl: 2     Patient called and asked if Dr. Delfino Preciado meant to send the mounjaro 15 mg or if it was supposed to be 12.5? "losartan (COZAAR) 25 MG Tab, Take 1 Tablet by mouth every day., Disp: 30 Tablet, Rfl: 1    benzonatate (TESSALON) 100 MG Cap, Take 1 Capsule by mouth 3 times a day as needed for Cough., Disp: 60 Capsule, Rfl: 0    albuterol 108 (90 Base) MCG/ACT Aero Soln inhalation aerosol, Inhale 2 Puffs every 6 hours as needed for Shortness of Breath., Disp: 8.5 g, Rfl: 0    ibuprofen (MOTRIN) 200 MG Tab, Take 400 mg by mouth every 6 hours as needed for Mild Pain. Indications: over the counter (Patient not taking: Reported on 10/3/2023), Disp: , Rfl:     PHYSICAL EXAM:  Vitals:    10/03/23 0810   BP: (!) 142/88   BP Location: Left arm   Patient Position: Sitting   BP Cuff Size: Adult   Pulse: 92   Temp: 36.6 °C (97.8 °F)   TempSrc: Temporal   SpO2: 97%   Weight: 90.2 kg (198 lb 14.4 oz)   Height: 1.727 m (5' 8\")       General: Pt resting in NAD, pleasant and cooperative   Skin:  Pink, warm and dry.  HEENT: NC/AT. EOMI. poor dentition.  Lungs:  Symmetrical.  CTAB, good air movement   Cardiovascular:  S1/S2 RRR   Abdomen:  Abdomen is soft, obese, nontender  Extremities:  Full range of motion.  CNS:  Muscle tone is normal. No gross focal neurologic deficits      ASSESSMENT/PLAN:   60 y.o. female presents clinic for concern over elevated blood pressure reading at her oral surgeon's office.  Patient states that she is currently hoping to have all of her teeth pulled but her oral surgeon said that they could not perform this due to her blood pressure and was advised be seen at a primary care physician office secondary to medication.  Counseled patient on high blood pressure, previous blood pressures seen at this clinic were slightly elevated.  We will start losartan 25 mg daily at this time and advised patient to start ambulatory monitoring at home.  We will follow-up in 2 months to evaluate response to treatment and adjustment.    Patient also has not been seen in quite some time and has not been keeping up on preventative health " measures.  Counseled patient on screening for breast cancer, colorectal cancer, lung cancer, and osteoporosis. Will order for colonoscopy, mammogram, low dose CT and Dexa scan. Will also order baseline labwork including CMP (obesity), lipid panel (obesity, smoking), TSH (obesity) and UA (hypertension).     Counseled patient about smoking related health risks and potential for COPD for over 10 minutes. Lungs CTA on exam today. Advised her that smoking cessation was recommended and she states she would like to think about this.     Problem List Items Addressed This Visit    None  Visit Diagnoses       Essential hypertension        Relevant Medications    losartan (COZAAR) 25 MG Tab    Other Relevant Orders    TSH WITH REFLEX TO FT4    URINALYSIS    Encounter for screening mammogram for breast cancer        Relevant Orders    MA-SCREENING MAMMO BILAT W/TOMOSYNTHESIS W/CAD    Screening for colorectal cancer        Relevant Orders    Referral to GI for Colonoscopy    Smoking        Relevant Orders    REFERRAL TO LUNG CANCER SCREENING PROGRAM    Postmenopausal        Relevant Orders    DS-BONE DENSITY STUDY (DEXA)    Obesity (BMI 30-39.9)        Relevant Orders    Comp Metabolic Panel    TSH WITH REFLEX TO FT4    Lipid Profile            Nicholas Colbert M.D.  PGY-3  [facility]

## 2023-10-06 DIAGNOSIS — E11.42 TYPE 2 DIABETES MELLITUS WITH DIABETIC POLYNEUROPATHY, WITH LONG-TERM CURRENT USE OF INSULIN (HCC): Primary | ICD-10-CM

## 2023-10-06 DIAGNOSIS — Z79.4 TYPE 2 DIABETES MELLITUS WITH DIABETIC POLYNEUROPATHY, WITH LONG-TERM CURRENT USE OF INSULIN (HCC): Primary | ICD-10-CM

## 2023-10-06 NOTE — TELEPHONE ENCOUNTER
Called and left message for patient stating "I believe prior dose was 10 mg weekly, that's why we sent 12.5 mg weekly. "

## 2023-10-27 DIAGNOSIS — E11.42 TYPE 2 DIABETES MELLITUS WITH DIABETIC POLYNEUROPATHY, WITH LONG-TERM CURRENT USE OF INSULIN (HCC): ICD-10-CM

## 2023-10-27 DIAGNOSIS — Z79.4 TYPE 2 DIABETES MELLITUS WITH DIABETIC POLYNEUROPATHY, WITH LONG-TERM CURRENT USE OF INSULIN (HCC): ICD-10-CM

## 2023-10-27 DIAGNOSIS — I21.4 NSTEMI (NON-ST ELEVATED MYOCARDIAL INFARCTION) (HCC): ICD-10-CM

## 2023-10-27 RX ORDER — METOPROLOL SUCCINATE 100 MG/1
100 TABLET, EXTENDED RELEASE ORAL DAILY
Qty: 90 TABLET | Refills: 1 | Status: SHIPPED | OUTPATIENT
Start: 2023-10-27

## 2023-10-27 RX ORDER — INSULIN DETEMIR 100 [IU]/ML
INJECTION, SOLUTION SUBCUTANEOUS
Qty: 45 ML | Refills: 5 | Status: SHIPPED | OUTPATIENT
Start: 2023-10-27

## 2023-10-27 NOTE — TELEPHONE ENCOUNTER
Patient needs refill on insulin detemir (Levemir FlexTouch) 100 Units/mL injection pen  and metoprolol succinate (TOPROL-XL) 100 mg 24 hr tablet  please send to [facility]

## 2023-11-14 ENCOUNTER — OFFICE VISIT (OUTPATIENT)
Dept: OBGYN CLINIC | Facility: CLINIC | Age: 63
End: 2023-11-14
Payer: COMMERCIAL

## 2023-11-14 VITALS
DIASTOLIC BLOOD PRESSURE: 82 MMHG | HEIGHT: 70 IN | BODY MASS INDEX: 33.21 KG/M2 | SYSTOLIC BLOOD PRESSURE: 134 MMHG | WEIGHT: 232 LBS

## 2023-11-14 DIAGNOSIS — Z01.419 ENCOUNTER FOR GYNECOLOGICAL EXAMINATION: Primary | ICD-10-CM

## 2023-11-14 DIAGNOSIS — Z12.31 BREAST CANCER SCREENING BY MAMMOGRAM: ICD-10-CM

## 2023-11-14 PROCEDURE — S0610 ANNUAL GYNECOLOGICAL EXAMINA: HCPCS | Performed by: STUDENT IN AN ORGANIZED HEALTH CARE EDUCATION/TRAINING PROGRAM

## 2023-11-14 PROCEDURE — G0145 SCR C/V CYTO,THINLAYER,RESCR: HCPCS | Performed by: STUDENT IN AN ORGANIZED HEALTH CARE EDUCATION/TRAINING PROGRAM

## 2023-11-14 PROCEDURE — G0476 HPV COMBO ASSAY CA SCREEN: HCPCS | Performed by: STUDENT IN AN ORGANIZED HEALTH CARE EDUCATION/TRAINING PROGRAM

## 2023-11-14 NOTE — PROGRESS NOTES
[facility] [address] [place] [address]    ASSESSMENT/PLAN: Cr Estrada is a 61 y.o. [id number] who presents for annual gynecologic exam.    Encounter for routine gynecologic examination  - Routine well woman exam completed today. - Cervical Cancer Screening: Current ASCCP Guidelines reviewed. Last Pap: 01/20/2016. Next Pap Due: Done today. - HPV Vaccination status: Not immunized  - STI screening offered including HIV: Declines, not sexually active. - Breast Cancer Screening: Last Mammogram 09/21/2022, ordered. - Colorectal cancer screening was ordered. Additional problems addressed at this visit:  1. Encounter for gynecological examination  -     Liquid-based pap, screening    2. Breast cancer screening by mammogram  -     Mammo screening bilateral w 3d & cad; Future; Expected date: 11/14/2023          CC: Annual Gynecologic Examination    HPI: Cr Estrada is a 61 y.o. [id number] who presents for annual gynecologic examination. She reports  no new changes to her health. She reports no breast concerns. Her last mammogram was 2022. She is overdue for colon cancer screening and has a cologuard ordered. She is postmenopausal and reports  no postmenopausal bleeding. She has no vaginal discharge, vulvar or vaginal lesions, pelvic pain. She has no sexual health concerns and is not currently sexually active. She has no symptoms of pelvic organ prolapse, urinary, or fecal incontinence. She denies intimate partner violence. The following portions of the patient's history were reviewed and updated as appropriate: allergies, current medications, past family history, past medical history, obstetric history, gynecologic history, past social history, past surgical history and problem list.    Review of Systems   Constitutional:  Negative for chills and fever. Respiratory:  Negative for cough and shortness of breath.     Cardiovascular:  Negative for chest pain and leg swelling. Gastrointestinal:  Negative for abdominal pain, nausea and vomiting. Genitourinary:  Negative for dysuria, frequency and urgency. Neurological:  Negative for dizziness, light-headedness and headaches. Objective:  /82   Ht 5' 10" (1.778 m)   Wt 105 kg (232 lb)   BMI 33.29 kg/m²    Physical Exam  Chaperone present: chaparone offered, patient declined. Constitutional:       Appearance: Normal appearance. HENT:      Head: Normocephalic and atraumatic. Cardiovascular:      Rate and Rhythm: Normal rate. Pulmonary:      Effort: Pulmonary effort is normal.   Chest:   Breasts:     Breasts are symmetrical.      Right: Normal. No swelling, bleeding, inverted nipple, mass, nipple discharge, skin change or tenderness. Left: Normal. No swelling, bleeding, inverted nipple, mass, nipple discharge, skin change or tenderness. Abdominal:      General: There is no distension. Tenderness: There is no abdominal tenderness. There is no guarding. Genitourinary:     Exam position: Lithotomy position. Pubic Area: No rash. Labia:         Right: No rash, tenderness or lesion. Left: No rash, tenderness or lesion. Urethra: No prolapse, urethral swelling or urethral lesion. Vagina: Normal. No vaginal discharge, erythema, tenderness, bleeding or lesions. Cervix: No cervical motion tenderness, discharge, friability or erythema. Uterus: Not enlarged, not tender and no uterine prolapse. Adnexa:         Right: No mass, tenderness or fullness. Left: No mass, tenderness or fullness. Lymphadenopathy:      Upper Body:      Right upper body: No axillary adenopathy. Left upper body: No axillary adenopathy. Lower Body: No right inguinal adenopathy. No left inguinal adenopathy. Neurological:      Mental Status: She is alert.              Anderson Hart MD  [facility]  11/14/23 9:26 AM

## 2023-11-16 LAB
HPV HR 12 DNA CVX QL NAA+PROBE: NEGATIVE
HPV16 DNA CVX QL NAA+PROBE: NEGATIVE
HPV18 DNA CVX QL NAA+PROBE: NEGATIVE

## 2023-11-22 LAB
LAB AP GYN PRIMARY INTERPRETATION: NORMAL
Lab: NORMAL

## 2023-11-24 ENCOUNTER — OFFICE VISIT (OUTPATIENT)
Dept: URGENT CARE | Facility: CLINIC | Age: 63
End: 2023-11-24
Payer: COMMERCIAL

## 2023-11-24 VITALS
OXYGEN SATURATION: 98 % | BODY MASS INDEX: 33.64 KG/M2 | SYSTOLIC BLOOD PRESSURE: 185 MMHG | TEMPERATURE: 97.6 F | HEIGHT: 70 IN | DIASTOLIC BLOOD PRESSURE: 77 MMHG | HEART RATE: 68 BPM | RESPIRATION RATE: 20 BRPM | WEIGHT: 235 LBS

## 2023-11-24 DIAGNOSIS — L08.9 TOE INFECTION: Primary | ICD-10-CM

## 2023-11-24 PROCEDURE — 99213 OFFICE O/P EST LOW 20 MIN: CPT | Performed by: PHYSICIAN ASSISTANT

## 2023-11-24 RX ORDER — DOXYCYCLINE HYCLATE 100 MG/1
100 CAPSULE ORAL EVERY 12 HOURS SCHEDULED
Qty: 20 CAPSULE | Refills: 0 | Status: SHIPPED | OUTPATIENT
Start: 2023-11-24 | End: 2023-12-04

## 2023-11-24 NOTE — PATIENT INSTRUCTIONS
Antibiotic as directed. Continue topical antibiotic. Keep cotton ball between toes to relieve pressure. Go to emergency room if worsening. Follow up with podiatry.
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
## 2023-11-25 DIAGNOSIS — R06.02 SHORTNESS OF BREATH: ICD-10-CM

## 2023-11-25 DIAGNOSIS — E11.42 TYPE 2 DIABETES MELLITUS WITH DIABETIC POLYNEUROPATHY, WITH LONG-TERM CURRENT USE OF INSULIN (HCC): ICD-10-CM

## 2023-11-25 DIAGNOSIS — Z79.4 TYPE 2 DIABETES MELLITUS WITH DIABETIC POLYNEUROPATHY, WITH LONG-TERM CURRENT USE OF INSULIN (HCC): ICD-10-CM

## 2023-11-25 DIAGNOSIS — E11.9 ENCOUNTER FOR DIABETIC FOOT EXAM (HCC): ICD-10-CM

## 2023-11-27 RX ORDER — FUROSEMIDE 40 MG/1
40 TABLET ORAL DAILY
Qty: 90 TABLET | Refills: 3 | Status: SHIPPED | OUTPATIENT
Start: 2023-11-27

## 2023-12-05 ENCOUNTER — OFFICE VISIT (OUTPATIENT)
Dept: ENDOCRINOLOGY | Facility: CLINIC | Age: 63
End: 2023-12-05
Payer: COMMERCIAL

## 2023-12-05 VITALS
RESPIRATION RATE: 18 BRPM | TEMPERATURE: 97.8 F | SYSTOLIC BLOOD PRESSURE: 130 MMHG | HEIGHT: 70 IN | OXYGEN SATURATION: 99 % | BODY MASS INDEX: 33.96 KG/M2 | HEART RATE: 64 BPM | WEIGHT: 237.25 LBS | DIASTOLIC BLOOD PRESSURE: 70 MMHG

## 2023-12-05 DIAGNOSIS — E78.2 MIXED HYPERLIPIDEMIA: ICD-10-CM

## 2023-12-05 DIAGNOSIS — E66.09 CLASS 1 OBESITY DUE TO EXCESS CALORIES WITH SERIOUS COMORBIDITY AND BODY MASS INDEX (BMI) OF 34.0 TO 34.9 IN ADULT: ICD-10-CM

## 2023-12-05 DIAGNOSIS — I10 ESSENTIAL HYPERTENSION: ICD-10-CM

## 2023-12-05 DIAGNOSIS — Z79.4 TYPE 2 DIABETES MELLITUS WITH DIABETIC POLYNEUROPATHY, WITH LONG-TERM CURRENT USE OF INSULIN (HCC): Primary | ICD-10-CM

## 2023-12-05 DIAGNOSIS — E11.42 TYPE 2 DIABETES MELLITUS WITH DIABETIC POLYNEUROPATHY, WITH LONG-TERM CURRENT USE OF INSULIN (HCC): Primary | ICD-10-CM

## 2023-12-05 DIAGNOSIS — E55.9 VITAMIN D DEFICIENCY: ICD-10-CM

## 2023-12-05 LAB — SL AMB POCT HEMOGLOBIN AIC: 7.1 (ref ?–6.5)

## 2023-12-05 PROCEDURE — 95251 CONT GLUC MNTR ANALYSIS I&R: CPT | Performed by: STUDENT IN AN ORGANIZED HEALTH CARE EDUCATION/TRAINING PROGRAM

## 2023-12-05 PROCEDURE — 83036 HEMOGLOBIN GLYCOSYLATED A1C: CPT | Performed by: STUDENT IN AN ORGANIZED HEALTH CARE EDUCATION/TRAINING PROGRAM

## 2023-12-05 PROCEDURE — 99214 OFFICE O/P EST MOD 30 MIN: CPT | Performed by: STUDENT IN AN ORGANIZED HEALTH CARE EDUCATION/TRAINING PROGRAM

## 2023-12-05 RX ORDER — INSULIN DETEMIR 100 [IU]/ML
INJECTION, SOLUTION SUBCUTANEOUS
Qty: 45 ML | Refills: 5 | Status: SHIPPED | OUTPATIENT
Start: 2023-12-05

## 2023-12-05 NOTE — PATIENT INSTRUCTIONS
Basic Carbohydrate Counting   AMBULATORY CARE:   Carbohydrate counting  is a way to plan your meals by counting the amount of carbohydrate in foods. Carbohydrates are the sugars, starches, and fiber found in fruit, grains, vegetables, and milk products. Carbohydrates increase your blood sugar levels. Carbohydrate counting can help you eat the right amount of carbohydrate to keep your blood sugar levels under control. What you need to know about planning meals using carbohydrate counting:  A dietitian or healthcare provider will help you develop a healthy meal plan that works best for you. You will be taught how much carbohydrate to eat or drink for each meal and snack. Your meal plan will be based on your age, weight, usual food intake, and physical activity level. If you have diabetes, it will also include your blood sugar levels and diabetes medicine. Once you know how much carbohydrate you should eat, you can decide what type of food you want to eat. You will need to know what foods contain carbohydrate and how much they contain. Keep track of the amount of carbohydrate in meals and snacks in order to follow your meal plan. Do not avoid carbohydrates or skip meals. Your blood sugar may fall too low if you do not eat enough carbohydrate or you skip meals. Foods that contain carbohydrate:   Breads:  Each serving of food listed below contains about 15 g of carbohydrate . 1 slice of bread (1 ounce) or 1 flour or corn tortilla (6 inch)    ½ of a hamburger bun or ¼ of a large bagel (about 1 ounce)    1 pancake (about 4 inches across and ¼ inch thick)    Cereals and grains:  Serving sizes of ready-to-eat cereals vary. Look at the serving size and the total carbohydrate amount listed on the food label. Each serving of food listed below contains about 15 g of carbohydrate .     ¾ cup of dry, unsweetened, ready-to-eat cereal or ¼ cup of low-fat granola     ½ cup of oatmeal or other cooked cereal     ? cup of cooked rice or pasta    Starchy vegetables and beans:  Each serving of food listed below contains about 15 g of carbohydrate . ½ cup of corn, green peas, sweet potatoes, or mashed potatoes    ¼ of a large baked potato    ½ cup of beans, lentils, and peas (garbanzo, rosales, kidney, white, split, black-eyed)    Crackers and snacks:  Each serving of food listed below contains about 15 g of carbohydrate . 3 danis cracker squares or 8 animal crackers     6 saltine-type crackers    3 cups of popcorn or ¾ ounce of pretzels, potato chips, or tortilla chips    Fruit:  Each serving of food listed below contains about 15 g of carbohydrate . 1 small (4 ounce) piece of fresh fruit or ¾ to 1 cup of fresh fruit    ½ cup of canned or frozen fruit, packed in natural juice    ½ cup (4 ounces) of unsweetened fruit juice    2 tablespoons of dried fruit    Desserts or sugary foods:  Each serving of food listed below contains about 15 g of carbohydrate . 2-inch square unfrosted cake or brownie     2 small cookies    ½ cup of ice cream, frozen yogurt, or nondairy frozen yogurt    ¼ cup of sherbet or sorbet    1 tablespoon of regular syrup, jam, or jelly    2 tablespoons of light syrup    Milk and yogurt:  Foods from the milk group contain about 12 g of carbohydrate per serving. 1 cup of fat-free or low-fat milk    1 cup of soy milk    ? cup of fat-free, yogurt sweetened with artificial sweetener    Non-starchy vegetables:  Each serving contains about 5 g of carbohydrate . Three servings of non-starch vegetables count as 1 carbohydrate serving. ½ cup of cooked vegetables or 1 cup of raw vegetables. This includes beets, broccoli, cabbage, cauliflower, cucumber, mushrooms, tomatoes, and zucchini    ½ cup of vegetable juice    How to use carbohydrate counting to plan meals:   Count carbohydrate amounts using serving sizes:      Pasta dinner example: You plan to have pasta, tossed salad, and an 8-ounce glass of milk.  Your healthcare provider tells you that you may have 4 carbohydrate servings for dinner. One carbohydrate serving of pasta is ? cup. One cup of pasta will equal 3 carbohydrate servings. An 8-ounce glass of milk will count as 1 carbohydrate serving. These amounts of food would equal 4 carbohydrate servings. One cup of tossed salad does not count toward your carbohydrate servings. Count carbohydrate amounts using food labels:  Find the total amount of carbohydrate in a packaged food by reading the food label. Food labels tell you the serving size of the food and the total carbohydrate amount in each serving. Find the serving size on the food label and then decide how many servings you will eat. Multiply the number of servings you plan to eat by the carbohydrate amount per serving. Granola bar snack example: Your meal plan allows you to have 2 carbohydrate servings (30 grams) of carbohydrate for a snack. You plan to eat 1 package of granola bars, which contains 2 bars. According to the food label, the serving size of food in this package is 1 bar. Each serving (1 bar) contains 25 grams of carbohydrate. The total amount of carbohydrate in this package of granola bars would be 50 g. Based on your meal plan, you should eat only 1 bar. Follow up with your doctor as directed:  Write down your questions so you remember to ask them during your visits. © Copyright [facility] 2023 Information is for End User's use only and may not be sold, redistributed or otherwise used for commercial purposes. The above information is an  only. It is not intended as medical advice for individual conditions or treatments. Talk to your doctor, nurse or pharmacist before following any medical regimen to see if it is safe and effective for you.
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
## 2023-12-10 ENCOUNTER — HOSPITAL ENCOUNTER (EMERGENCY)
Facility: HOSPITAL | Age: 63
Discharge: HOME | End: 2023-12-10
Attending: FAMILY MEDICINE | Admitting: FAMILY MEDICINE
Payer: COMMERCIAL

## 2023-12-10 ENCOUNTER — OFFICE VISIT (OUTPATIENT)
Dept: URGENT CARE | Facility: CLINIC | Age: 63
End: 2023-12-10
Payer: COMMERCIAL

## 2023-12-10 ENCOUNTER — APPOINTMENT (EMERGENCY)
Dept: RADIOLOGY | Facility: HOSPITAL | Age: 63
End: 2023-12-10
Payer: COMMERCIAL

## 2023-12-10 VITALS
OXYGEN SATURATION: 99 % | BODY MASS INDEX: 34.04 KG/M2 | HEIGHT: 70 IN | HEART RATE: 63 BPM | TEMPERATURE: 98 F | RESPIRATION RATE: 18 BRPM | SYSTOLIC BLOOD PRESSURE: 186 MMHG | DIASTOLIC BLOOD PRESSURE: 77 MMHG

## 2023-12-10 VITALS
HEART RATE: 61 BPM | RESPIRATION RATE: 18 BRPM | DIASTOLIC BLOOD PRESSURE: 72 MMHG | TEMPERATURE: 98 F | OXYGEN SATURATION: 99 % | SYSTOLIC BLOOD PRESSURE: 166 MMHG

## 2023-12-10 DIAGNOSIS — M25.512 ACUTE PAIN OF LEFT SHOULDER: Primary | ICD-10-CM

## 2023-12-10 DIAGNOSIS — M54.12 CERVICAL RADICULOPATHY: Primary | ICD-10-CM

## 2023-12-10 LAB
ATRIAL RATE: 61 BPM
P AXIS: 47 DEGREES
PR INTERVAL: 148 MS
QRS AXIS: 1 DEGREES
QRSD INTERVAL: 92 MS
QT INTERVAL: 438 MS
QTC INTERVAL: 440 MS
T WAVE AXIS: 67 DEGREES
VENTRICULAR RATE: 61 BPM

## 2023-12-10 PROCEDURE — 73010 X-RAY EXAM OF SHOULDER BLADE: CPT

## 2023-12-10 PROCEDURE — 99284 EMERGENCY DEPT VISIT MOD MDM: CPT

## 2023-12-10 PROCEDURE — 93005 ELECTROCARDIOGRAM TRACING: CPT | Performed by: PHYSICIAN ASSISTANT

## 2023-12-10 PROCEDURE — 96372 THER/PROPH/DIAG INJ SC/IM: CPT

## 2023-12-10 PROCEDURE — 99213 OFFICE O/P EST LOW 20 MIN: CPT | Performed by: FAMILY MEDICINE

## 2023-12-10 PROCEDURE — 93005 ELECTROCARDIOGRAM TRACING: CPT

## 2023-12-10 PROCEDURE — 99284 EMERGENCY DEPT VISIT MOD MDM: CPT | Performed by: FAMILY MEDICINE

## 2023-12-10 RX ORDER — KETOROLAC TROMETHAMINE 30 MG/ML
15 INJECTION, SOLUTION INTRAMUSCULAR; INTRAVENOUS ONCE
Status: COMPLETED | OUTPATIENT
Start: 2023-12-10 | End: 2023-12-10

## 2023-12-10 RX ORDER — DEXAMETHASONE SODIUM PHOSPHATE 10 MG/ML
10 INJECTION, SOLUTION INTRAMUSCULAR; INTRAVENOUS ONCE
Status: COMPLETED | OUTPATIENT
Start: 2023-12-10 | End: 2023-12-10

## 2023-12-10 RX ADMIN — DEXAMETHASONE SODIUM PHOSPHATE 10 MG: 10 INJECTION INTRAMUSCULAR; INTRAVENOUS at 15:06

## 2023-12-10 RX ADMIN — KETOROLAC TROMETHAMINE 15 MG: 30 INJECTION, SOLUTION INTRAMUSCULAR at 15:06

## 2023-12-10 NOTE — Clinical Note
Jael Blackburn was seen and treated in our emergency department on 12/10/2023. No restrictions    ? ? Diagnosis: Cervical radiculopathy    Ana Don  may return to work on return date. She may return on this date: 12/12/2023    ? If you have any questions or concerns, please don't hesitate to call.       Harvey Arce PA-C    ______________________________           _______________          _______________  Hospital Representative                              Date                                Time

## 2023-12-10 NOTE — PATIENT INSTRUCTIONS
EKG show NSR at 60 bpm.  No st or T wave changes. Due to patient's history, diabetes, CAD recommend evaluation in the ED to rule out cardiac cause. But likely muscular in nature.
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
## 2023-12-12 LAB
ATRIAL RATE: 60 BPM
ATRIAL RATE: 60 BPM
P AXIS: 34 DEGREES
P AXIS: 34 DEGREES
PR INTERVAL: 140 MS
PR INTERVAL: 140 MS
QRS AXIS: -12 DEGREES
QRS AXIS: -12 DEGREES
QRSD INTERVAL: 90 MS
QRSD INTERVAL: 90 MS
QT INTERVAL: 448 MS
QT INTERVAL: 448 MS
QTC INTERVAL: 448 MS
QTC INTERVAL: 448 MS
T WAVE AXIS: 46 DEGREES
T WAVE AXIS: 46 DEGREES
VENTRICULAR RATE: 60 BPM
VENTRICULAR RATE: 60 BPM

## 2023-12-13 ENCOUNTER — OFFICE VISIT (OUTPATIENT)
Dept: INTERNAL MEDICINE CLINIC | Facility: CLINIC | Age: 63
End: 2023-12-13
Payer: COMMERCIAL

## 2023-12-13 VITALS
HEIGHT: 70 IN | TEMPERATURE: 97.1 F | WEIGHT: 237.38 LBS | SYSTOLIC BLOOD PRESSURE: 144 MMHG | BODY MASS INDEX: 33.98 KG/M2 | HEART RATE: 77 BPM | DIASTOLIC BLOOD PRESSURE: 82 MMHG | OXYGEN SATURATION: 97 %

## 2023-12-13 DIAGNOSIS — E11.42 TYPE 2 DIABETES MELLITUS WITH DIABETIC POLYNEUROPATHY, WITH LONG-TERM CURRENT USE OF INSULIN (HCC): Primary | ICD-10-CM

## 2023-12-13 DIAGNOSIS — Z79.4 TYPE 2 DIABETES MELLITUS WITH DIABETIC POLYNEUROPATHY, WITH LONG-TERM CURRENT USE OF INSULIN (HCC): Primary | ICD-10-CM

## 2023-12-13 DIAGNOSIS — M54.2 CERVICALGIA: ICD-10-CM

## 2023-12-13 DIAGNOSIS — Z85.3 HISTORY OF BREAST CANCER: ICD-10-CM

## 2023-12-13 DIAGNOSIS — I10 ESSENTIAL HYPERTENSION: ICD-10-CM

## 2023-12-13 DIAGNOSIS — M54.12 CERVICAL RADICULOPATHY: ICD-10-CM

## 2023-12-13 DIAGNOSIS — I21.4 NSTEMI (NON-ST ELEVATED MYOCARDIAL INFARCTION) (HCC): ICD-10-CM

## 2023-12-13 DIAGNOSIS — K21.00 GASTROESOPHAGEAL REFLUX DISEASE WITH ESOPHAGITIS WITHOUT HEMORRHAGE: ICD-10-CM

## 2023-12-13 DIAGNOSIS — I50.32 CHRONIC DIASTOLIC (CONGESTIVE) HEART FAILURE (HCC): ICD-10-CM

## 2023-12-13 DIAGNOSIS — I25.10 CORONARY ARTERY DISEASE INVOLVING NATIVE CORONARY ARTERY OF NATIVE HEART WITHOUT ANGINA PECTORIS: ICD-10-CM

## 2023-12-13 DIAGNOSIS — Z23 ENCOUNTER FOR IMMUNIZATION: ICD-10-CM

## 2023-12-13 DIAGNOSIS — Z23 ENCOUNTER FOR VACCINATION: ICD-10-CM

## 2023-12-13 DIAGNOSIS — M15.9 PRIMARY OSTEOARTHRITIS INVOLVING MULTIPLE JOINTS: ICD-10-CM

## 2023-12-13 DIAGNOSIS — I25.118 CORONARY ARTERY DISEASE OF NATIVE ARTERY OF NATIVE HEART WITH STABLE ANGINA PECTORIS (HCC): ICD-10-CM

## 2023-12-13 PROCEDURE — 96372 THER/PROPH/DIAG INJ SC/IM: CPT | Performed by: INTERNAL MEDICINE

## 2023-12-13 PROCEDURE — 99214 OFFICE O/P EST MOD 30 MIN: CPT | Performed by: INTERNAL MEDICINE

## 2023-12-13 RX ORDER — LOSARTAN POTASSIUM 100 MG/1
100 TABLET ORAL DAILY
Qty: 90 TABLET | Refills: 0 | Status: SHIPPED | OUTPATIENT
Start: 2023-12-13

## 2023-12-13 RX ORDER — RANOLAZINE 500 MG/1
500 TABLET, EXTENDED RELEASE ORAL 2 TIMES DAILY
Qty: 180 TABLET | Refills: 0 | Status: SHIPPED | OUTPATIENT
Start: 2023-12-13

## 2023-12-13 RX ORDER — ATORVASTATIN CALCIUM 80 MG/1
80 TABLET, FILM COATED ORAL DAILY
Qty: 90 TABLET | Refills: 0 | Status: SHIPPED | OUTPATIENT
Start: 2023-12-13

## 2023-12-13 RX ORDER — MELOXICAM 15 MG/1
15 TABLET ORAL DAILY
Qty: 30 TABLET | Refills: 1 | Status: SHIPPED | OUTPATIENT
Start: 2023-12-13

## 2023-12-13 RX ORDER — OMEPRAZOLE 40 MG/1
40 CAPSULE, DELAYED RELEASE ORAL DAILY
Qty: 90 CAPSULE | Refills: 0 | Status: SHIPPED | OUTPATIENT
Start: 2023-12-13

## 2023-12-13 RX ORDER — KETOROLAC TROMETHAMINE 30 MG/ML
30 INJECTION, SOLUTION INTRAMUSCULAR; INTRAVENOUS ONCE
Status: COMPLETED | OUTPATIENT
Start: 2023-12-13 | End: 2023-12-13

## 2023-12-13 RX ORDER — AMLODIPINE BESYLATE 5 MG/1
5 TABLET ORAL DAILY
Qty: 90 TABLET | Refills: 0 | Status: SHIPPED | OUTPATIENT
Start: 2023-12-13

## 2023-12-13 RX ORDER — METHOCARBAMOL 500 MG/1
500 TABLET, FILM COATED ORAL 4 TIMES DAILY
Qty: 90 TABLET | Refills: 0 | Status: SHIPPED | OUTPATIENT
Start: 2023-12-13

## 2023-12-13 RX ADMIN — KETOROLAC TROMETHAMINE 30 MG: 30 INJECTION, SOLUTION INTRAMUSCULAR; INTRAVENOUS at 09:25

## 2023-12-13 NOTE — PROGRESS NOTES
Assessment/Plan:  Problem List Items Addressed This Visit        Endocrine    Type 2 diabetes mellitus with diabetic polyneuropathy, with long-term current use of insulin ([address]) - Primary    Relevant Orders    Comprehensive metabolic panel    LDL cholesterol, direct    Triglycerides       Cardiovascular and Mediastinum    Essential hypertension    Relevant Orders    Comprehensive metabolic panel    Coronary artery disease involving native coronary artery of native heart without angina pectoris    Relevant Orders    Comprehensive metabolic panel    LDL cholesterol, direct    Triglycerides    Chronic diastolic (congestive) heart failure (HCC)       Musculoskeletal and Integument    Primary osteoarthritis involving multiple joints       Other    History of breast cancer   Other Visit Diagnoses     Encounter for immunization        Encounter for vaccination        Cervicalgia        Relevant Medications    ketorolac (TORADOL) injection 30 mg    methocarbamol (ROBAXIN) 500 mg tablet    meloxicam (MOBIC) 15 mg tablet    Cervical radiculopathy               Diagnoses and all orders for this visit:    Type 2 diabetes mellitus with diabetic polyneuropathy, with long-term current use of insulin (HCC)  -     Comprehensive metabolic panel; Future  -     LDL cholesterol, direct; Future  -     Triglycerides; Future    Encounter for immunization    Essential hypertension  -     Comprehensive metabolic panel; Future    Coronary artery disease involving native coronary artery of native heart without angina pectoris  -     Comprehensive metabolic panel; Future  -     LDL cholesterol, direct; Future  -     Triglycerides; Future    Chronic diastolic (congestive) heart failure (HCC)    Primary osteoarthritis involving multiple joints    History of breast cancer    Encounter for vaccination    Cervicalgia  -     ketorolac (TORADOL) injection 30 mg  -     methocarbamol (ROBAXIN) 500 mg tablet;  Take 1 tablet (500 mg total) by mouth 4 (four) times a day  -     meloxicam (MOBIC) 15 mg tablet; Take 1 tablet (15 mg total) by mouth daily    Cervical radiculopathy        No problem-specific Assessment & Plan notes found for this encounter. A/P: Stable and will check labs. HgA1c ok at 7.1 and seeing endo. Discussed vaccines and defers the flu vaccine. Need to get her eye exam results. Suspect c spine strain/sprain with ? HNP. Will start NSAID and muscle relaxant with warm compresses. May need PT. Colleen Angles Continue current treatment and RTC one week for f/u neck. .    Subjective:      Patient ID: Flora Mohan is a 61 y.o. female. WF RTC for f/u DM, HTN, etc. Doing ok, but was seen in the ER for neck pain and no better. Pain an 8/10 and down the left UE. No prior trauma. RHD. Remains active otherwise and no falls. Sugars less than 140 and no low sugar events. HgA1c done recently at Endo and was 7.1. Denies CP, SOB, edema, palpitations, orthopnea or PND. Chronic pain is manageable. Breast Ca is in remission. Due for labs, eye exam, and vaccines. The following portions of the patient's history were reviewed and updated as appropriate:   She has a past medical history of Arthritis, Breast cancer ([address]), Cancer ([address]), Cardiac disease, CHF (congestive heart failure) ([address]), Coronary artery disease, Diabetes mellitus ([address]), Heartburn, radiation therapy, Hypercholesteremia, Hyperlipidemia, Hypertension, and Neuropathy. ,  does not have any pertinent problems on file. ,   has a past surgical history that includes Breast surgery (Left); Knee surgery; Nose surgery; Mouth surgery; Foot surgery (Left); Tubal ligation; Ovarian cyst removal (Left); Cholecystectomy; pr surgical arthroscopy riley w/coracoacrm ligm rls (Right, 05/16/2016); pr surgical arthroscopy shoulder biceps tenodesis (Right, 05/16/2016); Cardiac surgery; Tubal ligation; Colonoscopy; and Breast lumpectomy (Left). ,  family history includes Breast cancer in her paternal aunt; Esophageal cancer in her father; Leukemia in her father; Liver disease in her brother; Lung cancer in her family, father, and mother; No Known Problems in her maternal aunt, maternal aunt, maternal aunt, maternal aunt, maternal grandmother, paternal aunt, paternal aunt, paternal aunt, paternal aunt, paternal aunt, paternal grandmother, sister, sister, and sister; Stomach cancer in her family; Thyroid disease in her mother. ,   reports that she quit smoking about 23 years ago. Her smoking use included cigarettes. She has never used smokeless tobacco. She reports that she does not currently use alcohol. She reports that she does not currently use drugs. ,  is allergic to codeine, iodinated contrast media, iodine - food allergy, and penicillins. .  Current Outpatient Medications   Medication Sig Dispense Refill   • meloxicam (MOBIC) 15 mg tablet Take 1 tablet (15 mg total) by mouth daily 30 tablet 1   • methocarbamol (ROBAXIN) 500 mg tablet Take 1 tablet (500 mg total) by mouth 4 (four) times a day 90 tablet 0   • amLODIPine (NORVASC) 5 mg tablet Take 1 tablet (5 mg total) by mouth daily 90 tablet 0   • aspirin 81 MG tablet Take 81 mg by mouth daily.      • atorvastatin (LIPITOR) 80 mg tablet Take 1 tablet (80 mg total) by mouth daily 90 tablet 0   • Continuous Blood Gluc Sensor (FreeStyle Nelda 2 Sensor) MISC Check blood sugars 4 times per day 6 each 3   • docusate sodium (COLACE) 100 mg capsule Take 100 mg by mouth 2 (two) times a day     • Empagliflozin (Jardiance) 25 MG TABS Take 1 tablet (25 mg total) by mouth every morning 90 tablet 3   • furosemide (LASIX) 40 mg tablet Take 1 tablet (40 mg total) by mouth daily 90 tablet 3   • gabapentin (NEURONTIN) 400 mg capsule Take 1 capsule (400 mg total) by mouth 3 (three) times a day 270 capsule 1   • insulin detemir (Levemir FlexTouch) 100 Units/mL injection pen 55 UNITS IN AM AND 60 UNITS IN PM 45 mL 5   • Insulin Pen Needle 32G X 6 MM MISC Use in the morning 100 each 3   • losartan (COZAAR) 100 MG tablet Take 1 tablet (100 mg total) by mouth daily 90 tablet 0   • metoprolol succinate (TOPROL-XL) 100 mg 24 hr tablet Take 1 tablet (100 mg total) by mouth daily 90 tablet 1   • mupirocin (BACTROBAN) 2 % ointment APPLY OINTMENT TOPICALLY TO AFFECTED AREA ONCE DAILY     • omeprazole (PriLOSEC) 40 MG capsule Take 1 capsule (40 mg total) by mouth daily 90 capsule 0   • ranolazine (RANEXA) 500 mg 12 hr tablet Take 1 tablet (500 mg total) by mouth 2 (two) times a day 180 tablet 0   • tirzepatide (Mounjaro) 12.5 MG/0.5ML Inject 0.5 mL (12.5 mg total) under the skin every 7 days 6 mL 0   • TURMERIC PO Take by mouth       Current Facility-Administered Medications   Medication Dose Route Frequency Provider Last Rate Last Admin   • ketorolac (TORADOL) injection 30 mg  30 mg Intramuscular Once Mark Cassidy,            Review of Systems   Constitutional:  Negative for activity change, chills, diaphoresis, fatigue and fever. HENT: Negative. Eyes:  Negative for visual disturbance. Respiratory:  Negative for cough, chest tightness, shortness of breath and wheezing. Cardiovascular:  Negative for chest pain, palpitations and leg swelling. Gastrointestinal:  Negative for abdominal pain, constipation, diarrhea, nausea and vomiting. Endocrine: Negative for cold intolerance and heat intolerance. Genitourinary:  Negative for difficulty urinating, dysuria and frequency. Musculoskeletal:  Negative for arthralgias, gait problem and myalgias. Neurological:  Negative for dizziness, seizures, syncope, weakness, light-headedness and headaches. Psychiatric/Behavioral:  Negative for confusion, dysphoric mood and sleep disturbance. The patient is not nervous/anxious. PHQ-2/9 Depression Screening          Objective:  Vitals:    12/13/23 0844   BP: 144/82   Pulse: 77   Temp: (!) 97.1 °F (36.2 °C)   SpO2: 97%   Weight: 108 kg (237 lb 6 oz)   Height: 5' 10" (1.778 m)     Body mass index is 34.06 kg/m². Physical Exam  Vitals and nursing note reviewed. Constitutional:       General: She is not in acute distress. Appearance: Normal appearance. She is not ill-appearing. HENT:      Head: Normocephalic and atraumatic. Mouth/Throat:      Mouth: Mucous membranes are moist.   Eyes:      Extraocular Movements: Extraocular movements intact. Conjunctiva/sclera: Conjunctivae normal.      Pupils: Pupils are equal, round, and reactive to light. Neck:      Vascular: No carotid bruit. Cardiovascular:      Rate and Rhythm: Normal rate and regular rhythm. Heart sounds: Normal heart sounds. No murmur heard. Pulmonary:      Effort: Pulmonary effort is normal. No respiratory distress. Breath sounds: Normal breath sounds. No wheezing, rhonchi or rales. Abdominal:      General: Bowel sounds are normal. There is no distension. Palpations: Abdomen is soft. Tenderness: There is no abdominal tenderness. Musculoskeletal:         General: Tenderness present. Cervical back: Neck supple. Right lower leg: No edema. Left lower leg: No edema. Comments: C Spine w/o gross deformities, increase temp, erythema, swelling, or lesions. Tenderness midline C3-C6 and left traps. ROM wnl. Spasms noted. . UE strength 5/5 with tone/ROM WNL. DTR 2/4. Grasp wnl. Neurological:      General: No focal deficit present. Mental Status: She is alert and oriented to person, place, and time. Mental status is at baseline. Psychiatric:         Mood and Affect: Mood normal.         Behavior: Behavior normal.         Thought Content:  Thought content normal.         Judgment: Judgment normal.

## 2023-12-14 ENCOUNTER — TELEPHONE (OUTPATIENT)
Dept: ADMINISTRATIVE | Facility: OTHER | Age: 63
End: 2023-12-14

## 2023-12-14 NOTE — TELEPHONE ENCOUNTER
----- Message from Saran Terry sent at 12/13/2023 12:48 PM EST -----  Regarding: diabetic eye exam  12/13/23 12:49 PM    Hello, our patient Ines Ivy has had Diabetic Eye Exam completed/performed. Please assist in updating the patient chart by making an External outreach to [facility] facility located in [address] [place] [address] [address], [place] [address]. The date of service is within the year.    Thank you,  Saran Terry   [facility]

## 2023-12-14 NOTE — LETTER
Diabetic Eye Exam Form    Date Requested: 23  Patient: Ines Ivy  Patient : 1960   Referring Provider: Jorge L Najera DO      DIABETIC Eye Exam Date _______________________________      Type of Exam MUST be documented for Diabetic Eye Exams. Please CHECK ONE.     Retinal Exam       Dilated Retinal Exam       OCT       Optomap-Iris Exam      Fundus Photography       Left Eye - Please check Retinopathy or No Retinopathy        Exam did show retinopathy    Exam did not show retinopathy       Right Eye - Please check Retinopathy or No Retinopathy       Exam did show retinopathy    Exam did not show retinopathy       Comments __________________________________________________________    Practice Providing Exam ______________________________________________    Exam Performed By (print name) _______________________________________      Provider Signature ___________________________________________________      These reports are needed for  compliance.  Please fax this completed form and a copy of the Diabetic Eye Exam report to our office located at [address] [place] [address] as soon as possible via Fax [phone] attention Jeannette: Phone [phone]  We thank you for your assistance in treating our mutual patient.

## 2023-12-18 NOTE — TELEPHONE ENCOUNTER
Upon review of the In Basket request and the patient's chart, initial outreach has been made via fax to facility. Please see Contacts section for details.     Thank you  Jeannette Serrano

## 2023-12-22 DIAGNOSIS — E11.42 TYPE 2 DIABETES MELLITUS WITH DIABETIC POLYNEUROPATHY, WITH LONG-TERM CURRENT USE OF INSULIN (HCC): ICD-10-CM

## 2023-12-22 DIAGNOSIS — Z79.4 TYPE 2 DIABETES MELLITUS WITH DIABETIC POLYNEUROPATHY, WITH LONG-TERM CURRENT USE OF INSULIN (HCC): ICD-10-CM

## 2023-12-22 RX ORDER — GABAPENTIN 400 MG/1
400 CAPSULE ORAL 3 TIMES DAILY
Qty: 270 CAPSULE | Refills: 0 | Status: SHIPPED | OUTPATIENT
Start: 2023-12-22

## 2023-12-22 RX ORDER — TIRZEPATIDE 12.5 MG/.5ML
INJECTION, SOLUTION SUBCUTANEOUS
Qty: 12 ML | Refills: 0 | Status: SHIPPED | OUTPATIENT
Start: 2023-12-22

## 2023-12-27 ENCOUNTER — TELEPHONE (OUTPATIENT)
Dept: ENDOCRINOLOGY | Facility: CLINIC | Age: 63
End: 2023-12-27

## 2024-01-02 ENCOUNTER — TELEPHONE (OUTPATIENT)
Dept: INTERNAL MEDICINE CLINIC | Facility: CLINIC | Age: 64
End: 2024-01-02

## 2024-01-02 DIAGNOSIS — E11.42 TYPE 2 DIABETES MELLITUS WITH DIABETIC POLYNEUROPATHY, WITH LONG-TERM CURRENT USE OF INSULIN (HCC): Primary | ICD-10-CM

## 2024-01-02 DIAGNOSIS — Z79.4 TYPE 2 DIABETES MELLITUS WITH DIABETIC POLYNEUROPATHY, WITH LONG-TERM CURRENT USE OF INSULIN (HCC): Primary | ICD-10-CM

## 2024-01-02 RX ORDER — INSULIN GLARGINE 100 [IU]/ML
INJECTION, SOLUTION SUBCUTANEOUS
Qty: 15 ML | Refills: 3 | Status: SHIPPED | OUTPATIENT
Start: 2024-01-02

## 2024-01-02 NOTE — TELEPHONE ENCOUNTER
Upon review of the In Basket request we were able to locate, review, and update the patient chart as requested for Diabetic Eye Exam.    Any additional questions or concerns should be emailed to the Practice Liaisons via the appropriate education email address, please do not reply via In Basket.    Thank you  Jeannette Serrano

## 2024-01-11 NOTE — TELEPHONE ENCOUNTER
Pt called back, made aware of medication change. She will find out from her pharmacy if it is covered or not. She states she thought her endocrinologist was taking care of this. She will call if there is any issue.

## 2024-01-16 ENCOUNTER — TELEPHONE (OUTPATIENT)
Dept: ENDOCRINOLOGY | Facility: CLINIC | Age: 64
End: 2024-01-16

## 2024-01-16 NOTE — TELEPHONE ENCOUNTER
Patient called and stated she can't get levemir pens anymore and Dr. Galvan told her to call when she is almost so he can send something else over.    Patients family doctor called in [facility], but she rather doctor take care of her insulin.

## 2024-01-16 NOTE — TELEPHONE ENCOUNTER
Spoke to patient ensured her that using Basaglar was ok as Levemir is no longer available. It is slightly more costly then levemir, so if patient does have any trouble we can call the pharmacy and see if Lantus would be any better

## 2024-01-18 DIAGNOSIS — E11.42 TYPE 2 DIABETES MELLITUS WITH DIABETIC POLYNEUROPATHY, WITH LONG-TERM CURRENT USE OF INSULIN (HCC): ICD-10-CM

## 2024-01-18 DIAGNOSIS — Z79.4 TYPE 2 DIABETES MELLITUS WITH DIABETIC POLYNEUROPATHY, WITH LONG-TERM CURRENT USE OF INSULIN (HCC): ICD-10-CM

## 2024-01-18 RX ORDER — INSULIN GLARGINE 100 [IU]/ML
INJECTION, SOLUTION SUBCUTANEOUS
Qty: 90 ML | Refills: 1 | Status: SHIPPED | OUTPATIENT
Start: 2024-01-18

## 2024-02-19 NOTE — SOCIAL WORK
D/c plan to be transferred for cardiac cath was discussed at the d/c planning meeting today  Encounter Date: 2/19/2024       History     Chief Complaint   Patient presents with    Flank Pain     RT> L     38-year-old male PMH significant for hypertension, hyperlipidemia, diabetes, IgA nephropathy presents to the emergency room today for evaluation of bilateral flank pain over the last week.  Over the last couple of days, has localized well to the right flank.  Not particularly worse with movement.  He is unable to get comfortable.  Tells me he had 1 episode of chills yesterday and improved after Tylenol without measurable fever.  No dysuria, hematuria, abdominal pain.    The history is provided by the patient. No  was used.     Review of patient's allergies indicates:   Allergen Reactions    Zithromax [azithromycin] Rash     Past Medical History:   Diagnosis Date    Anxiety     Asthma     Depression     Diabetes mellitus     diet controlled    Diabetes mellitus, type 2     GERD (gastroesophageal reflux disease)     Gout     Hyperlipidemia     Hypertension     IgA nephropathy     Insomnia      Past Surgical History:   Procedure Laterality Date    COLONOSCOPY N/A 5/8/2020    Procedure: COLONOSCOPY;  Surgeon: Hammad Castorena III, MD;  Location: [facility];  Service: Endoscopy;  Laterality: N/A;    ESOPHAGOGASTRODUODENOSCOPY N/A 5/8/2020    Procedure: EGD (ESOPHAGOGASTRODUODENOSCOPY);  Surgeon: Hammad Castorena III, MD;  Location: [facility];  Service: Endoscopy;  Laterality: N/A;    nose polyp removal       Family History   Problem Relation Age of Onset    Hypertension Maternal Grandmother     Cancer Maternal Grandfather     Diabetes Maternal Grandfather     Heart disease Maternal Grandfather     Heart disease Mother     Stroke Mother     Diabetes Mother      Social History     Tobacco Use    Smoking status: Former     Current packs/day: 0.00     Average packs/day: 0.3 packs/day for 5.0 years (1.3 ttl pk-yrs)     Types: Cigars, Cigarettes     Start date: 5/9/2017     Quit date:  2022     Years since quittin.7     Passive exposure: Past    Smokeless tobacco: Never    Tobacco comments:     One cigar a day   Substance Use Topics    Alcohol use: Yes    Drug use: No     Review of Systems   Constitutional:  Positive for chills. Negative for fatigue and fever.   HENT:  Negative for congestion, hearing loss, sore throat and trouble swallowing.    Eyes:  Negative for visual disturbance.   Respiratory:  Negative for cough, chest tightness and shortness of breath.    Cardiovascular:  Negative for chest pain.   Gastrointestinal:  Negative for abdominal pain and nausea.   Endocrine: Negative for polyuria.   Genitourinary:  Positive for flank pain. Negative for difficulty urinating.   Musculoskeletal:  Negative for arthralgias and myalgias.   Skin:  Negative for rash.   Neurological:  Negative for dizziness and headaches.   Psychiatric/Behavioral:  The patient is not nervous/anxious.        Physical Exam     Initial Vitals [24 1606]   BP Pulse Resp Temp SpO2   (!) 153/112 84 16 97.8 °F (36.6 °C) 97 %      MAP       --         Physical Exam    Nursing note and vitals reviewed.  Constitutional: He appears well-developed and well-nourished.   HENT:   Head: Normocephalic and atraumatic.   Eyes: Conjunctivae and EOM are normal.   Neck: Neck supple.   Cardiovascular:  Normal rate, regular rhythm, normal heart sounds and intact distal pulses.           Pulmonary/Chest: Breath sounds normal. No respiratory distress.   Abdominal:   Exam significant for right greater than left CVA tenderness.    Abdomen otherwise is nondistended, soft and nontender in all other quadrants. Normoactive bowel sounds. Nonperitoneal, no guarding or rigidity. No palpable masses or hepatosplenomegaly.  No suprapubic pain.  No overlying skin changes.   Distal pulses 2+ b/l.     Additional Tests //  (-)  Elizabeth's sign.   (-)  Rebound Tenderness  (-)  Psoas Sign  (-)  Heel Tap     Musculoskeletal:         General: Normal range  of motion.      Cervical back: Neck supple.     Neurological: He is alert and oriented to person, place, and time. GCS score is 15. GCS eye subscore is 4. GCS verbal subscore is 5. GCS motor subscore is 6.   Skin: Skin is warm and dry. Capillary refill takes less than 2 seconds.   Psychiatric: He has a normal mood and affect. His behavior is normal. Judgment and thought content normal.         ED Course   Procedures  Labs Reviewed   URINALYSIS, REFLEX TO URINE CULTURE - Abnormal; Notable for the following components:       Result Value    Protein, UA 2+ (*)     Glucose, UA 3+ (*)     Occult Blood UA Trace (*)     All other components within normal limits    Narrative:     Specimen Source->Urine   CBC W/ AUTO DIFFERENTIAL - Abnormal; Notable for the following components:    Eos # 0.7 (*)     Eosinophil % 8.4 (*)     All other components within normal limits   COMPREHENSIVE METABOLIC PANEL - Abnormal; Notable for the following components:    Sodium 135 (*)     Glucose 206 (*)     BUN 42 (*)     Creatinine 2.5 (*)     eGFR 32.9 (*)     All other components within normal limits   B-TYPE NATRIURETIC PEPTIDE   TROPONIN I HIGH SENSITIVITY   PROTIME-INR   MAGNESIUM   URINALYSIS MICROSCOPIC    Narrative:     Specimen Source->Urine          Imaging Results              CT Abdomen Pelvis  Without Contrast (Final result)  Result time 02/19/24 16:46:40      Final result by Jaycob Haskins Jr., MD (02/19/24 16:46:40)                   Narrative:    EXAMINATION:  CT ABDOMEN PELVIS WITHOUT CONTRAST    CLINICAL INDICATION: Male, 38 years old. Flank pain, kidney stone suspected    TECHNIQUE:  CT scan examination of the abdomen and pelvis is performed from the domes of the diaphragm to the pubic symphysis with administration of intravenous contrast material. Lack of intravenous contrast for the examination limits diagnostic evaluation for intra-abdominal organs and solid organ perfusion abnormalities.      CONTRAST:  None    COMPARISON: March 29, 2023    FINDINGS:  Lower Chest: Visualized lung bases are clear. Heart is normal in size. No pericardial or pleural effusion.  Liver: Hepatomegaly with liver reaching almost 23 cm in the long axis. No evidence of focal mass or distention of the intrahepatic radicles.  Bile Ducts: Normal caliber.  Gallbladder: No stones, wall thickening, or pericholecystic fluid.  Pancreas: Normal appearance without focal lesion.  Spleen: Normal in size and contour.  Adrenals: Normal configuration.  Kidneys and Ureters: No appreciable stone formation within the kidneys. No evidence of ureterolithiasis. Both ureters appear normal in course caliber and contour with normal insertion upon the base of the urinary bladder.  Bladder: Concentric thickening of the bladder wall is standard the basis of cystitis similar appears to the patient's previous comparison study.  Stomach: Unremarkable.  Small Intestine: Unremarkable.  Appendix: No inflammatory changes.  Colon: Unremarkable.  Vessels: Abdominal aorta and inferior vena cava are normal in course and caliber.  Reproductive Organs: The prostate and seminal vessels are normal appearing.  Lymph Nodes: No pathologic mesenteric or retroperitoneal lymph nodes.  Peritoneum: No free air, free fluid, or fluid collection.  Abdominal Wall: No hernia or mass.  Musculoskeletal: No acute abnormality or suspicious bony lesion.    IMPRESSION:  1.No CT evidence of obstructing genitourinary pathology.  2. Stable concentric thickening of urinary bladder wall that may be attributed for cystitis though diminished bladder distention may be a contributing factor. Correlate with patient's clinical history and laboratory values. Moderately  3. Hepatomegaly.    This exam was performed according to our departmental dose-optimization program which includes automated exposure control, adjustment of the mA and/or kV according to patient size and/or use of iterative reconstruction  technique.    Electronically signed by:  Jaycob Haskins MD  02/19/2024 04:46 PM CST Workstation: [id number]                                     Medications   sodium chloride 0.9% bolus 1,000 mL 1,000 mL (0 mLs Intravenous Stopped 2/19/24 1830)     Medical Decision Making  Patient presents for emergent evaluation of flank pain. Workup today consistent with likely flank pain.  Differential includes nephrolithiasis, pyelonephritis, acute kidney injury.  Considered but felt less likely includes rhabdomyolysis.  Doubt appendicitis, cholecystitis, choledocholithiasis, cholangitis.  Patient is nontoxic and well appearing, Afebrile with stable vital signs.  Labs were ordered and documented in the ED course.  Stable CKD, no acute pathology noted on CBC, CMP, BNP, troponin.  Imaging was ordered and documented in the ED course.  CT without acute pathology.    Patient will be given short course of Norco 5 for pain management at home.  I did recommend he follow-up with his nephrologist.  He is to return to the emergency room for any new or worsening symptoms.    Given patient presentation and workup, doubt emergent or surgical pathology necessitating consultation or admission from the emergency department.  I discussed the findings with the patient.  I discussed strict and strong return precautions. They will be discharged home in stable condition.      Amount and/or Complexity of Data Reviewed  Labs: ordered.  Radiology: ordered.    Risk  Prescription drug management.               ED Course as of 02/19/24 1916 Mon Feb 19, 2024   1611 BP(!): 153/112 [AN]   1611 Temp: 97.8 °F (36.6 °C) [AN]   1611 Pulse: 84 [AN]   1611 Resp: 16 [AN]   1611 SpO2: 97 % [AN]      ED Course User Index  [AN] Good Dalton PA-C                           Clinical Impression:  Final diagnoses:  [I10] Hypertension  [R10.9] Flank pain (Primary)          ED Disposition Condition    Discharge Stable          ED Prescriptions       Medication Sig  Dispense Start Date End Date Auth. Provider    HYDROcodone-acetaminophen (NORCO) 5-325 mg per tablet Take 1 tablet by mouth every 12 (twelve) hours as needed for Pain. 6 tablet 2/19/2024 2/22/2024 Good Dalton PA-C          Follow-up Information       Follow up With Specialties Details Why Contact Info Additional Information    Jose Grant, NP Family Medicine Schedule an appointment as soon as possible for a visit in 1 week  [address]  [facility] [address]  [phone]       [facility] - Emergency Dept Emergency Medicine Go to  As needed, If symptoms worsen [address] [facility] [address]  [phone] 1st floor             Good Dalton PA-C  02/19/24 1916

## 2024-02-27 ENCOUNTER — APPOINTMENT (OUTPATIENT)
Dept: LAB | Facility: CLINIC | Age: 64
End: 2024-02-27
Payer: COMMERCIAL

## 2024-02-27 DIAGNOSIS — Z79.4 TYPE 2 DIABETES MELLITUS WITH DIABETIC POLYNEUROPATHY, WITH LONG-TERM CURRENT USE OF INSULIN (HCC): ICD-10-CM

## 2024-02-27 DIAGNOSIS — E11.42 TYPE 2 DIABETES MELLITUS WITH DIABETIC POLYNEUROPATHY, WITH LONG-TERM CURRENT USE OF INSULIN (HCC): ICD-10-CM

## 2024-02-27 DIAGNOSIS — E78.2 MIXED HYPERLIPIDEMIA: ICD-10-CM

## 2024-02-27 DIAGNOSIS — E55.9 VITAMIN D DEFICIENCY: ICD-10-CM

## 2024-02-27 DIAGNOSIS — E66.09 CLASS 1 OBESITY DUE TO EXCESS CALORIES WITH SERIOUS COMORBIDITY AND BODY MASS INDEX (BMI) OF 34.0 TO 34.9 IN ADULT: ICD-10-CM

## 2024-02-27 DIAGNOSIS — I25.10 CORONARY ARTERY DISEASE INVOLVING NATIVE CORONARY ARTERY OF NATIVE HEART WITHOUT ANGINA PECTORIS: ICD-10-CM

## 2024-02-27 DIAGNOSIS — I10 ESSENTIAL HYPERTENSION: ICD-10-CM

## 2024-02-27 LAB
25(OH)D3 SERPL-MCNC: 19.8 NG/ML (ref 30–100)
ALBUMIN SERPL BCP-MCNC: 4.3 G/DL (ref 3.5–5)
ALP SERPL-CCNC: 56 U/L (ref 34–104)
ALT SERPL W P-5'-P-CCNC: 16 U/L (ref 7–52)
ANION GAP SERPL CALCULATED.3IONS-SCNC: 8 MMOL/L
AST SERPL W P-5'-P-CCNC: 17 U/L (ref 13–39)
BILIRUB SERPL-MCNC: 0.65 MG/DL (ref 0.2–1)
BUN SERPL-MCNC: 17 MG/DL (ref 5–25)
CALCIUM SERPL-MCNC: 9.2 MG/DL (ref 8.4–10.2)
CHLORIDE SERPL-SCNC: 102 MMOL/L (ref 96–108)
CHOLEST SERPL-MCNC: 139 MG/DL
CO2 SERPL-SCNC: 33 MMOL/L (ref 21–32)
CREAT SERPL-MCNC: 0.58 MG/DL (ref 0.6–1.3)
CREAT UR-MCNC: 90.9 MG/DL
EST. AVERAGE GLUCOSE BLD GHB EST-MCNC: 151 MG/DL
GFR SERPL CREATININE-BSD FRML MDRD: 98 ML/MIN/1.73SQ M
GLUCOSE P FAST SERPL-MCNC: 128 MG/DL (ref 65–99)
HBA1C MFR BLD: 6.9 %
HDLC SERPL-MCNC: 42 MG/DL
LDLC SERPL CALC-MCNC: 70 MG/DL (ref 0–100)
LDLC SERPL DIRECT ASSAY-MCNC: 76 MG/DL (ref 0–100)
MICROALBUMIN UR-MCNC: 21.5 MG/L
MICROALBUMIN/CREAT 24H UR: 24 MG/G CREATININE (ref 0–30)
POTASSIUM SERPL-SCNC: 4.3 MMOL/L (ref 3.5–5.3)
PROT SERPL-MCNC: 6.6 G/DL (ref 6.4–8.4)
SODIUM SERPL-SCNC: 143 MMOL/L (ref 135–147)
TRIGL SERPL-MCNC: 134 MG/DL

## 2024-02-27 PROCEDURE — 82043 UR ALBUMIN QUANTITATIVE: CPT

## 2024-02-27 PROCEDURE — 83036 HEMOGLOBIN GLYCOSYLATED A1C: CPT

## 2024-02-27 PROCEDURE — 36415 COLL VENOUS BLD VENIPUNCTURE: CPT

## 2024-02-27 PROCEDURE — 83721 ASSAY OF BLOOD LIPOPROTEIN: CPT

## 2024-02-27 PROCEDURE — 80053 COMPREHEN METABOLIC PANEL: CPT

## 2024-02-27 PROCEDURE — 82570 ASSAY OF URINE CREATININE: CPT

## 2024-02-27 PROCEDURE — 80061 LIPID PANEL: CPT

## 2024-02-27 PROCEDURE — 82306 VITAMIN D 25 HYDROXY: CPT

## 2024-03-02 DIAGNOSIS — I21.4 NSTEMI (NON-ST ELEVATED MYOCARDIAL INFARCTION) (HCC): ICD-10-CM

## 2024-03-02 DIAGNOSIS — I10 ESSENTIAL HYPERTENSION: ICD-10-CM

## 2024-03-02 DIAGNOSIS — K21.00 GASTROESOPHAGEAL REFLUX DISEASE WITH ESOPHAGITIS WITHOUT HEMORRHAGE: ICD-10-CM

## 2024-03-02 DIAGNOSIS — I25.118 CORONARY ARTERY DISEASE OF NATIVE ARTERY OF NATIVE HEART WITH STABLE ANGINA PECTORIS (HCC): ICD-10-CM

## 2024-03-04 RX ORDER — RANOLAZINE 500 MG/1
500 TABLET, EXTENDED RELEASE ORAL 2 TIMES DAILY
Qty: 180 TABLET | Refills: 0 | Status: SHIPPED | OUTPATIENT
Start: 2024-03-04

## 2024-03-04 RX ORDER — ATORVASTATIN CALCIUM 80 MG/1
80 TABLET, FILM COATED ORAL DAILY
Qty: 90 TABLET | Refills: 0 | Status: SHIPPED | OUTPATIENT
Start: 2024-03-04

## 2024-03-04 RX ORDER — LOSARTAN POTASSIUM 100 MG/1
100 TABLET ORAL DAILY
Qty: 90 TABLET | Refills: 0 | Status: SHIPPED | OUTPATIENT
Start: 2024-03-04

## 2024-03-04 RX ORDER — AMLODIPINE BESYLATE 5 MG/1
5 TABLET ORAL DAILY
Qty: 90 TABLET | Refills: 0 | Status: SHIPPED | OUTPATIENT
Start: 2024-03-04

## 2024-03-04 RX ORDER — OMEPRAZOLE 40 MG/1
40 CAPSULE, DELAYED RELEASE ORAL DAILY
Qty: 90 CAPSULE | Refills: 0 | Status: SHIPPED | OUTPATIENT
Start: 2024-03-04

## 2024-03-06 ENCOUNTER — OFFICE VISIT (OUTPATIENT)
Dept: ENDOCRINOLOGY | Facility: CLINIC | Age: 64
End: 2024-03-06
Payer: COMMERCIAL

## 2024-03-06 ENCOUNTER — OFFICE VISIT (OUTPATIENT)
Dept: CARDIOLOGY CLINIC | Facility: CLINIC | Age: 64
End: 2024-03-06
Payer: COMMERCIAL

## 2024-03-06 VITALS
WEIGHT: 231 LBS | HEART RATE: 63 BPM | BODY MASS INDEX: 33.07 KG/M2 | DIASTOLIC BLOOD PRESSURE: 68 MMHG | SYSTOLIC BLOOD PRESSURE: 128 MMHG | OXYGEN SATURATION: 98 % | HEIGHT: 70 IN

## 2024-03-06 VITALS
WEIGHT: 229 LBS | HEART RATE: 72 BPM | DIASTOLIC BLOOD PRESSURE: 72 MMHG | SYSTOLIC BLOOD PRESSURE: 130 MMHG | TEMPERATURE: 97.6 F | OXYGEN SATURATION: 97 % | BODY MASS INDEX: 32.78 KG/M2 | HEIGHT: 70 IN

## 2024-03-06 DIAGNOSIS — I10 ESSENTIAL HYPERTENSION: ICD-10-CM

## 2024-03-06 DIAGNOSIS — E55.9 VITAMIN D DEFICIENCY: ICD-10-CM

## 2024-03-06 DIAGNOSIS — E11.42 TYPE 2 DIABETES MELLITUS WITH DIABETIC POLYNEUROPATHY, WITH LONG-TERM CURRENT USE OF INSULIN (HCC): Primary | ICD-10-CM

## 2024-03-06 DIAGNOSIS — I21.4 NSTEMI (NON-ST ELEVATED MYOCARDIAL INFARCTION) (HCC): Primary | ICD-10-CM

## 2024-03-06 DIAGNOSIS — I25.10 CORONARY ARTERY DISEASE INVOLVING NATIVE CORONARY ARTERY OF NATIVE HEART WITHOUT ANGINA PECTORIS: ICD-10-CM

## 2024-03-06 DIAGNOSIS — Z95.5 S/P DRUG ELUTING CORONARY STENT PLACEMENT: ICD-10-CM

## 2024-03-06 DIAGNOSIS — E78.2 MIXED HYPERLIPIDEMIA: ICD-10-CM

## 2024-03-06 DIAGNOSIS — I50.32 CHRONIC DIASTOLIC (CONGESTIVE) HEART FAILURE (HCC): ICD-10-CM

## 2024-03-06 DIAGNOSIS — Z79.4 TYPE 2 DIABETES MELLITUS WITH DIABETIC POLYNEUROPATHY, WITH LONG-TERM CURRENT USE OF INSULIN (HCC): Primary | ICD-10-CM

## 2024-03-06 DIAGNOSIS — Z01.810 PREOPERATIVE CARDIOVASCULAR EXAMINATION: ICD-10-CM

## 2024-03-06 DIAGNOSIS — E66.09 CLASS 1 OBESITY DUE TO EXCESS CALORIES WITH SERIOUS COMORBIDITY AND BODY MASS INDEX (BMI) OF 32.0 TO 32.9 IN ADULT: ICD-10-CM

## 2024-03-06 PROBLEM — E66.811 CLASS 1 OBESITY DUE TO EXCESS CALORIES WITH SERIOUS COMORBIDITY AND BODY MASS INDEX (BMI) OF 32.0 TO 32.9 IN ADULT: Status: ACTIVE | Noted: 2024-03-06

## 2024-03-06 PROCEDURE — 99214 OFFICE O/P EST MOD 30 MIN: CPT | Performed by: STUDENT IN AN ORGANIZED HEALTH CARE EDUCATION/TRAINING PROGRAM

## 2024-03-06 PROCEDURE — 93000 ELECTROCARDIOGRAM COMPLETE: CPT | Performed by: INTERNAL MEDICINE

## 2024-03-06 PROCEDURE — 95251 CONT GLUC MNTR ANALYSIS I&R: CPT | Performed by: STUDENT IN AN ORGANIZED HEALTH CARE EDUCATION/TRAINING PROGRAM

## 2024-03-06 PROCEDURE — 99214 OFFICE O/P EST MOD 30 MIN: CPT | Performed by: INTERNAL MEDICINE

## 2024-03-06 RX ORDER — ERGOCALCIFEROL 1.25 MG/1
50000 CAPSULE ORAL WEEKLY
Qty: 4 CAPSULE | Refills: 1 | Status: SHIPPED | OUTPATIENT
Start: 2024-03-06 | End: 2024-04-25

## 2024-03-06 NOTE — ASSESSMENT & PLAN NOTE
Wt Readings from Last 3 Encounters:   03/06/24 104 kg (229 lb)   12/13/23 108 kg (237 lb 6 oz)   12/05/23 108 kg (237 lb 4 oz)     Continue Jardiance.  Cardiology follow-up.

## 2024-03-06 NOTE — ASSESSMENT & PLAN NOTE
Emphasized importance of daily exercise, weight loss, caloric reduction, small meals, consumption of multiple servings of fruits and vegetables and avoidance of concentrated sweets such as juice and soda.   Continue Mounjaro 12.5 mg weekly.

## 2024-03-06 NOTE — ASSESSMENT & PLAN NOTE
Controlled, blood pressure 130/72.  Continue current regimen which includes losartan 100 mg and amlodipine 5 mg daily.
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
## 2024-03-06 NOTE — ASSESSMENT & PLAN NOTE
Lab Results   Component Value Date    HGBA1C 6.9 (H) 02/27/2024     Well-controlled, A1c of 6.9%, she is cleared for knee replacement surgery from endocrinology standpoint.  She was advised to hold Mounjaro 1 week before procedure and Jardiance 4 days before procedure.  Continue Basaglar as directed.  Return back in 3 months.  Labs prior to next visit.
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
## 2024-03-13 DIAGNOSIS — Z79.4 TYPE 2 DIABETES MELLITUS WITH DIABETIC POLYNEUROPATHY, WITH LONG-TERM CURRENT USE OF INSULIN (HCC): ICD-10-CM

## 2024-03-13 DIAGNOSIS — E11.42 TYPE 2 DIABETES MELLITUS WITH DIABETIC POLYNEUROPATHY, WITH LONG-TERM CURRENT USE OF INSULIN (HCC): ICD-10-CM

## 2024-03-13 RX ORDER — GABAPENTIN 400 MG/1
400 CAPSULE ORAL 3 TIMES DAILY
Qty: 270 CAPSULE | Refills: 1 | Status: SHIPPED | OUTPATIENT
Start: 2024-03-13

## 2024-03-15 ENCOUNTER — TELEPHONE (OUTPATIENT)
Dept: ENDOCRINOLOGY | Facility: CLINIC | Age: 64
End: 2024-03-15

## 2024-03-19 ENCOUNTER — APPOINTMENT (OUTPATIENT)
Dept: LAB | Facility: CLINIC | Age: 64
End: 2024-03-19
Payer: COMMERCIAL

## 2024-03-19 DIAGNOSIS — Z01.818 OTHER SPECIFIED PRE-OPERATIVE EXAMINATION: ICD-10-CM

## 2024-03-19 DIAGNOSIS — Z01.812 PRE-OPERATIVE LABORATORY EXAMINATION: ICD-10-CM

## 2024-03-19 LAB
BACTERIA UR QL AUTO: NORMAL /HPF
BASOPHILS # BLD AUTO: 0.05 THOUSANDS/ÂΜL (ref 0–0.1)
BASOPHILS NFR BLD AUTO: 1 % (ref 0–1)
BILIRUB UR QL STRIP: NEGATIVE
CLARITY UR: CLEAR
COLOR UR: ABNORMAL
EOSINOPHIL # BLD AUTO: 0.13 THOUSAND/ÂΜL (ref 0–0.61)
EOSINOPHIL NFR BLD AUTO: 2 % (ref 0–6)
ERYTHROCYTE [DISTWIDTH] IN BLOOD BY AUTOMATED COUNT: 13 % (ref 11.6–15.1)
GLUCOSE UR STRIP-MCNC: ABNORMAL MG/DL
HCT VFR BLD AUTO: 40.2 % (ref 34.8–46.1)
HGB BLD-MCNC: 12.9 G/DL (ref 11.5–15.4)
HGB UR QL STRIP.AUTO: NEGATIVE
IMM GRANULOCYTES # BLD AUTO: 0.01 THOUSAND/UL (ref 0–0.2)
IMM GRANULOCYTES NFR BLD AUTO: 0 % (ref 0–2)
KETONES UR STRIP-MCNC: NEGATIVE MG/DL
LEUKOCYTE ESTERASE UR QL STRIP: ABNORMAL
LYMPHOCYTES # BLD AUTO: 1.73 THOUSANDS/ÂΜL (ref 0.6–4.47)
LYMPHOCYTES NFR BLD AUTO: 32 % (ref 14–44)
MCH RBC QN AUTO: 28.5 PG (ref 26.8–34.3)
MCHC RBC AUTO-ENTMCNC: 32.1 G/DL (ref 31.4–37.4)
MCV RBC AUTO: 89 FL (ref 82–98)
MONOCYTES # BLD AUTO: 0.47 THOUSAND/ÂΜL (ref 0.17–1.22)
MONOCYTES NFR BLD AUTO: 9 % (ref 4–12)
NEUTROPHILS # BLD AUTO: 2.99 THOUSANDS/ÂΜL (ref 1.85–7.62)
NEUTS SEG NFR BLD AUTO: 56 % (ref 43–75)
NITRITE UR QL STRIP: NEGATIVE
NON-SQ EPI CELLS URNS QL MICRO: NORMAL /HPF
NRBC BLD AUTO-RTO: 0 /100 WBCS
PH UR STRIP.AUTO: 6.5 [PH]
PLATELET # BLD AUTO: 179 THOUSANDS/UL (ref 149–390)
PMV BLD AUTO: 11.3 FL (ref 8.9–12.7)
PROT UR STRIP-MCNC: NEGATIVE MG/DL
RBC # BLD AUTO: 4.53 MILLION/UL (ref 3.81–5.12)
RBC #/AREA URNS AUTO: NORMAL /HPF
SP GR UR STRIP.AUTO: 1.03 (ref 1–1.03)
TRANS CELLS #/AREA URNS HPF: PRESENT /[HPF]
UROBILINOGEN UR STRIP-ACNC: <2 MG/DL
WBC # BLD AUTO: 5.38 THOUSAND/UL (ref 4.31–10.16)
WBC #/AREA URNS AUTO: NORMAL /HPF

## 2024-03-19 PROCEDURE — 85025 COMPLETE CBC W/AUTO DIFF WBC: CPT

## 2024-03-19 PROCEDURE — 81001 URINALYSIS AUTO W/SCOPE: CPT

## 2024-03-19 PROCEDURE — 36415 COLL VENOUS BLD VENIPUNCTURE: CPT

## 2024-03-26 ENCOUNTER — CONSULT (OUTPATIENT)
Dept: INTERNAL MEDICINE CLINIC | Facility: CLINIC | Age: 64
End: 2024-03-26
Payer: COMMERCIAL

## 2024-03-26 VITALS
HEIGHT: 70 IN | SYSTOLIC BLOOD PRESSURE: 126 MMHG | DIASTOLIC BLOOD PRESSURE: 80 MMHG | WEIGHT: 234 LBS | BODY MASS INDEX: 33.5 KG/M2 | HEART RATE: 87 BPM | TEMPERATURE: 97.8 F | OXYGEN SATURATION: 99 %

## 2024-03-26 DIAGNOSIS — I10 ESSENTIAL HYPERTENSION: ICD-10-CM

## 2024-03-26 DIAGNOSIS — I50.32 CHRONIC DIASTOLIC (CONGESTIVE) HEART FAILURE (HCC): ICD-10-CM

## 2024-03-26 DIAGNOSIS — E11.42 TYPE 2 DIABETES MELLITUS WITH DIABETIC POLYNEUROPATHY, WITH LONG-TERM CURRENT USE OF INSULIN (HCC): ICD-10-CM

## 2024-03-26 DIAGNOSIS — Z01.818 VISIT FOR PRE-OPERATIVE EXAMINATION: Primary | ICD-10-CM

## 2024-03-26 DIAGNOSIS — I25.10 CORONARY ARTERY DISEASE INVOLVING NATIVE CORONARY ARTERY OF NATIVE HEART WITHOUT ANGINA PECTORIS: ICD-10-CM

## 2024-03-26 DIAGNOSIS — M15.9 PRIMARY OSTEOARTHRITIS INVOLVING MULTIPLE JOINTS: ICD-10-CM

## 2024-03-26 DIAGNOSIS — Z79.4 TYPE 2 DIABETES MELLITUS WITH DIABETIC POLYNEUROPATHY, WITH LONG-TERM CURRENT USE OF INSULIN (HCC): ICD-10-CM

## 2024-03-26 PROCEDURE — 99244 OFF/OP CNSLTJ NEW/EST MOD 40: CPT | Performed by: INTERNAL MEDICINE

## 2024-03-26 NOTE — PROGRESS NOTES
Assessment/Plan:  Problem List Items Addressed This Visit          Cardiovascular and Mediastinum    Essential hypertension    Coronary artery disease involving native coronary artery of native heart without angina pectoris    Chronic diastolic (congestive) heart failure (HCC)       Endocrine    Type 2 diabetes mellitus with diabetic polyneuropathy, with long-term current use of insulin (HCC)       Musculoskeletal and Integument    Primary osteoarthritis involving multiple joints     Other Visit Diagnoses       Visit for pre-operative examination    -  Primary             Diagnoses and all orders for this visit:    Visit for pre-operative examination    Type 2 diabetes mellitus with diabetic polyneuropathy, with long-term current use of insulin (HCC)    Primary osteoarthritis involving multiple joints    Essential hypertension    Coronary artery disease involving native coronary artery of native heart without angina pectoris    Chronic diastolic (congestive) heart failure (HCC)        No problem-specific Assessment & Plan notes found for this encounter.    A/P: PAT tests reviewed and c/o labs and an EKG. Some repeat labs pending, but recent routine labs ok with HgA1c 6.9. EKG wnl. Pt and co-morbidities are stable. Pt is a Hawthorne's Class I and carries a cardiac risk of about 1%. Recommend holding any ASA,Ranexa,  NSAID's, omega 3, and MVT one week prior to the procedure unless ortho request any vitamins or minerals or ASA to continue. Recommend holding her SGLT2 four days prior to sx and losartan one day prior to sx and both on the day of sx. Hold mounjaro one week prior to sx as well. On the day prior to sx, pt to only half her PM insulin. On call to the OR, may take with a sip of water, her norvasc, eliz, and metoprolol and ranolazine. May resume her meds once taking PO. Post op, check an EKG and glucose. Cover with rapid acting insulin prn. Aggressive DVT prophylaxis recommended. Aggressive pulmonary secretion  control and watch for hypoxia due to past smoking and body habitus. Recommend ATC OTC tylenol for 48 hours prior to sx to aide in pain management. Limit IVF's due to h/o dCHF and watch pt for mobilization of fluids post op. Pt to remove her dentures prior to sx.   No other recs at this time.  Thanks and good luck.     Subjective:      Patient ID: Ines Ivy is a 64 y.o. female.    WF presents at the request of Dr. Hairston for pre-op eval for upcoming right TKR  tentatively scheduled for 4/8/24. Since last visit, doing well and no recent illnesses. Remains active w/o difficulty and no falls. No travel history. Denies depression. No recent illnesses. No fever, chills, or sweats. No unexplained wt changes. Denies CP, SOB, or palpitations. No edema. No orthopnea or PND. No sz or syncope. No changes in bowel or bladder habits. PMH includes DM, CAD s/p MI, dCHF, thyroid nodule, neuropathy, DJD, former smoker, glaucoma, breast cancer, and HLD. . Past sx include PTCA, shoulder sx, nasal sx, knees sx, multiple breast sx, and tubal and reports no problems with prior procedures or anesthesia. Past remote smoking and denies recent  ETOH use. No history of DVT or PE. NO history of bleeding issues and is on ASA and Ranexa, but  not on anti-coagulants. Admits to  dental plates. Denies. C spine issues. No objections to getting blood products if deemed necessary. Had PAT testing done.            The following portions of the patient's history were reviewed and updated as appropriate:   She has a past medical history of Arthritis, Breast cancer (HCC), Cancer (HCC), Cardiac disease, CHF (congestive heart failure) (HCC), Coronary artery disease, Diabetes mellitus (HCC), Heartburn, radiation therapy, Hypercholesteremia, Hyperlipidemia, Hypertension, and Neuropathy.,  does not have any pertinent problems on file.,   has a past surgical history that includes Breast surgery (Left); Knee surgery; Nose surgery; Mouth surgery; Foot  surgery (Left); Tubal ligation; Ovarian cyst removal (Left); Cholecystectomy; pr surgical arthroscopy riley w/coracoacrm ligm rls (Right, 05/16/2016); pr surgical arthroscopy shoulder biceps tenodesis (Right, 05/16/2016); Cardiac surgery; Tubal ligation; Colonoscopy; and Breast lumpectomy (Left).,  family history includes Breast cancer in her paternal aunt; Esophageal cancer in her father; Leukemia in her father; Liver disease in her brother; Lung cancer in her family, father, and mother; No Known Problems in her maternal aunt, maternal aunt, maternal aunt, maternal aunt, maternal grandmother, paternal aunt, paternal aunt, paternal aunt, paternal aunt, paternal aunt, paternal grandmother, sister, sister, and sister; Stomach cancer in her family; Thyroid disease in her mother.,   reports that she quit smoking about 24 years ago. Her smoking use included cigarettes. She has never been exposed to tobacco smoke. She has never used smokeless tobacco. She reports that she does not currently use alcohol. She reports that she does not currently use drugs.,  is allergic to codeine, iodinated contrast media, iodine - food allergy, and penicillins..  Current Outpatient Medications   Medication Sig Dispense Refill    amLODIPine (NORVASC) 5 mg tablet Take 1 tablet by mouth once daily 90 tablet 0    aspirin 81 MG tablet Take 81 mg by mouth daily.      atorvastatin (LIPITOR) 80 mg tablet Take 1 tablet by mouth once daily 90 tablet 0    Continuous Blood Gluc Sensor (FreeStyle Nelda 2 Sensor) MISC Check blood sugars 4 times per day 6 each 3    docusate sodium (COLACE) 100 mg capsule Take 100 mg by mouth 2 (two) times a day      Empagliflozin (Jardiance) 25 MG TABS Take 1 tablet (25 mg total) by mouth every morning 90 tablet 3    ergocalciferol (VITAMIN D2) 50,000 units Take 1 capsule (50,000 Units total) by mouth once a week for 8 doses 4 capsule 1    furosemide (LASIX) 40 mg tablet Take 1 tablet (40 mg total) by mouth daily 90 tablet  3    gabapentin (NEURONTIN) 400 mg capsule TAKE 1 CAPSULE BY MOUTH THREE TIMES DAILY 270 capsule 1    Insulin Glargine Solostar (Basaglar KwikPen) 100 UNIT/ML SOPN 55units sq in AM and 60 units sq q PM 90 mL 1    Insulin Pen Needle 32G X 6 MM MISC Use in the morning 100 each 3    losartan (COZAAR) 100 MG tablet Take 1 tablet by mouth once daily 90 tablet 0    metoprolol succinate (TOPROL-XL) 100 mg 24 hr tablet Take 1 tablet (100 mg total) by mouth daily 90 tablet 1    Mounjaro 12.5 MG/0.5ML INJECT 12.5 MG UNDER THE SKIN ONCE A WEEK 12 mL 0    omeprazole (PriLOSEC) 40 MG capsule Take 1 capsule by mouth once daily 90 capsule 0    ranolazine (RANEXA) 500 mg 12 hr tablet Take 1 tablet by mouth twice daily 180 tablet 0    TURMERIC PO Take by mouth       No current facility-administered medications for this visit.       Review of Systems   Constitutional:  Positive for activity change. Negative for chills, diaphoresis, fatigue and fever.   HENT: Negative.     Eyes:  Negative for visual disturbance.   Respiratory:  Negative for cough, chest tightness, shortness of breath and wheezing.    Cardiovascular:  Negative for chest pain, palpitations and leg swelling.   Gastrointestinal:  Negative for abdominal pain, constipation, diarrhea, nausea and vomiting.   Endocrine: Negative for cold intolerance and heat intolerance.   Genitourinary:  Negative for difficulty urinating, dysuria and frequency.   Musculoskeletal:  Positive for arthralgias and gait problem. Negative for joint swelling and myalgias.   Neurological:  Negative for dizziness, seizures, syncope, weakness, light-headedness and headaches.   Psychiatric/Behavioral:  Negative for confusion, dysphoric mood and sleep disturbance. The patient is not nervous/anxious.        PHQ-2/9 Depression Screening    Little interest or pleasure in doing things: 0 - not at all  Feeling down, depressed, or hopeless: 0 - not at all  PHQ-2 Score: 0  PHQ-2 Interpretation: Negative  "depression screen        Objective:  Vitals:    03/26/24 0929   BP: 126/80   Pulse: 87   Temp: 97.8 °F (36.6 °C)   SpO2: 99%   Weight: 106 kg (234 lb)   Height: 5' 10\" (1.778 m)     Body mass index is 33.58 kg/m².     Physical Exam  Vitals and nursing note reviewed.   Constitutional:       General: She is not in acute distress.     Appearance: Normal appearance. She is obese. She is not ill-appearing.   HENT:      Head: Normocephalic and atraumatic.      Comments: NO oropharyngeal obstructions     Mouth/Throat:      Mouth: Mucous membranes are moist.   Eyes:      Extraocular Movements: Extraocular movements intact.      Conjunctiva/sclera: Conjunctivae normal.      Pupils: Pupils are equal, round, and reactive to light.   Neck:      Vascular: No carotid bruit.      Comments: No  C spine restrictions.   Cardiovascular:      Rate and Rhythm: Normal rate and regular rhythm.      Heart sounds: Normal heart sounds. No murmur heard.  Pulmonary:      Effort: Pulmonary effort is normal. No respiratory distress.      Breath sounds: Normal breath sounds. No wheezing, rhonchi or rales.   Abdominal:      General: Bowel sounds are normal. There is no distension.      Palpations: Abdomen is soft.      Tenderness: There is no abdominal tenderness.   Musculoskeletal:         General: Tenderness present. No swelling or deformity. Normal range of motion.      Cervical back: Normal range of motion and neck supple. No rigidity or tenderness.      Right lower leg: No edema.      Left lower leg: No edema.   Lymphadenopathy:      Cervical: No cervical adenopathy.   Neurological:      General: No focal deficit present.      Mental Status: She is alert and oriented to person, place, and time. Mental status is at baseline.      Cranial Nerves: No cranial nerve deficit.      Sensory: No sensory deficit.      Motor: No weakness.      Gait: Gait normal.      Deep Tendon Reflexes: Reflexes normal.   Psychiatric:         Mood and Affect: Mood " normal.         Behavior: Behavior normal.         Thought Content: Thought content normal.         Judgment: Judgment normal.

## 2024-03-29 DIAGNOSIS — E11.42 TYPE 2 DIABETES MELLITUS WITH DIABETIC POLYNEUROPATHY, WITH LONG-TERM CURRENT USE OF INSULIN (HCC): ICD-10-CM

## 2024-03-29 DIAGNOSIS — Z79.4 TYPE 2 DIABETES MELLITUS WITH DIABETIC POLYNEUROPATHY, WITH LONG-TERM CURRENT USE OF INSULIN (HCC): ICD-10-CM

## 2024-03-29 RX ORDER — TIRZEPATIDE 12.5 MG/.5ML
INJECTION, SOLUTION SUBCUTANEOUS
Qty: 12 ML | Refills: 0 | Status: SHIPPED | OUTPATIENT
Start: 2024-03-29

## 2024-04-05 ENCOUNTER — TELEPHONE (OUTPATIENT)
Dept: INTERNAL MEDICINE CLINIC | Facility: CLINIC | Age: 64
End: 2024-04-05

## 2024-04-05 NOTE — TELEPHONE ENCOUNTER
[facility] called asking for chest XR, EKG and office notes from Pre op appointment. Faxed all information to [employer] @ [phone]

## 2024-04-08 DIAGNOSIS — I21.4 NSTEMI (NON-ST ELEVATED MYOCARDIAL INFARCTION) (HCC): ICD-10-CM

## 2024-04-08 RX ORDER — METOPROLOL SUCCINATE 100 MG/1
100 TABLET, EXTENDED RELEASE ORAL DAILY
Qty: 90 TABLET | Refills: 1 | Status: ON HOLD | OUTPATIENT
Start: 2024-04-08

## 2024-04-13 ENCOUNTER — APPOINTMENT (EMERGENCY)
Dept: NON INVASIVE DIAGNOSTICS | Facility: HOSPITAL | Age: 64
DRG: 555 | End: 2024-04-13
Payer: COMMERCIAL

## 2024-04-13 ENCOUNTER — HOSPITAL ENCOUNTER (INPATIENT)
Facility: HOSPITAL | Age: 64
LOS: 4 days | Discharge: NURSING, REHAB OR LONG-TERM CARE | DRG: 555 | End: 2024-04-17
Attending: EMERGENCY MEDICINE | Admitting: INTERNAL MEDICINE
Payer: COMMERCIAL

## 2024-04-13 ENCOUNTER — APPOINTMENT (EMERGENCY)
Dept: CT IMAGING | Facility: HOSPITAL | Age: 64
DRG: 555 | End: 2024-04-13
Payer: COMMERCIAL

## 2024-04-13 DIAGNOSIS — G89.18 POST-OP PAIN: ICD-10-CM

## 2024-04-13 DIAGNOSIS — I25.10 CORONARY ARTERY DISEASE INVOLVING NATIVE CORONARY ARTERY OF NATIVE HEART WITHOUT ANGINA PECTORIS: ICD-10-CM

## 2024-04-13 DIAGNOSIS — M25.561 KNEE PAIN, RIGHT: Primary | ICD-10-CM

## 2024-04-13 DIAGNOSIS — Z96.651 S/P TKR (TOTAL KNEE REPLACEMENT), RIGHT: ICD-10-CM

## 2024-04-13 DIAGNOSIS — R26.2 AMBULATORY DYSFUNCTION: ICD-10-CM

## 2024-04-13 DIAGNOSIS — M15.9 PRIMARY OSTEOARTHRITIS INVOLVING MULTIPLE JOINTS: ICD-10-CM

## 2024-04-13 LAB
ALBUMIN SERPL BCP-MCNC: 4 G/DL (ref 3.5–5)
ALP SERPL-CCNC: 57 U/L (ref 34–104)
ALT SERPL W P-5'-P-CCNC: 12 U/L (ref 7–52)
ANION GAP SERPL CALCULATED.3IONS-SCNC: 11 MMOL/L (ref 4–13)
AST SERPL W P-5'-P-CCNC: 34 U/L (ref 13–39)
BASOPHILS # BLD AUTO: 0.02 THOUSANDS/ÂΜL (ref 0–0.1)
BASOPHILS NFR BLD AUTO: 0 % (ref 0–1)
BILIRUB SERPL-MCNC: 1.34 MG/DL (ref 0.2–1)
BUN SERPL-MCNC: 14 MG/DL (ref 5–25)
CALCIUM SERPL-MCNC: 9.2 MG/DL (ref 8.4–10.2)
CHLORIDE SERPL-SCNC: 98 MMOL/L (ref 96–108)
CO2 SERPL-SCNC: 26 MMOL/L (ref 21–32)
CREAT SERPL-MCNC: 0.54 MG/DL (ref 0.6–1.3)
EOSINOPHIL # BLD AUTO: 0.17 THOUSAND/ÂΜL (ref 0–0.61)
EOSINOPHIL NFR BLD AUTO: 3 % (ref 0–6)
ERYTHROCYTE [DISTWIDTH] IN BLOOD BY AUTOMATED COUNT: 12.8 % (ref 11.6–15.1)
GFR SERPL CREATININE-BSD FRML MDRD: 100 ML/MIN/1.73SQ M
GLUCOSE SERPL-MCNC: 160 MG/DL (ref 65–140)
GLUCOSE SERPL-MCNC: 172 MG/DL (ref 65–140)
HCT VFR BLD AUTO: 32.3 % (ref 34.8–46.1)
HGB BLD-MCNC: 10.6 G/DL (ref 11.5–15.4)
IMM GRANULOCYTES # BLD AUTO: 0.02 THOUSAND/UL (ref 0–0.2)
IMM GRANULOCYTES NFR BLD AUTO: 0 % (ref 0–2)
LYMPHOCYTES # BLD AUTO: 1.46 THOUSANDS/ÂΜL (ref 0.6–4.47)
LYMPHOCYTES NFR BLD AUTO: 22 % (ref 14–44)
MCH RBC QN AUTO: 28.5 PG (ref 26.8–34.3)
MCHC RBC AUTO-ENTMCNC: 32.8 G/DL (ref 31.4–37.4)
MCV RBC AUTO: 87 FL (ref 82–98)
MONOCYTES # BLD AUTO: 0.72 THOUSAND/ÂΜL (ref 0.17–1.22)
MONOCYTES NFR BLD AUTO: 11 % (ref 4–12)
NEUTROPHILS # BLD AUTO: 4.24 THOUSANDS/ÂΜL (ref 1.85–7.62)
NEUTS SEG NFR BLD AUTO: 64 % (ref 43–75)
NRBC BLD AUTO-RTO: 0 /100 WBCS
PLATELET # BLD AUTO: 230 THOUSANDS/UL (ref 149–390)
PMV BLD AUTO: 10.6 FL (ref 8.9–12.7)
POTASSIUM SERPL-SCNC: 4.8 MMOL/L (ref 3.5–5.3)
PROT SERPL-MCNC: 7.3 G/DL (ref 6.4–8.4)
RBC # BLD AUTO: 3.72 MILLION/UL (ref 3.81–5.12)
SODIUM SERPL-SCNC: 135 MMOL/L (ref 135–147)
WBC # BLD AUTO: 6.63 THOUSAND/UL (ref 4.31–10.16)

## 2024-04-13 PROCEDURE — 99285 EMERGENCY DEPT VISIT HI MDM: CPT

## 2024-04-13 PROCEDURE — 75635 CT ANGIO ABDOMINAL ARTERIES: CPT

## 2024-04-13 PROCEDURE — 82948 REAGENT STRIP/BLOOD GLUCOSE: CPT

## 2024-04-13 PROCEDURE — 85025 COMPLETE CBC W/AUTO DIFF WBC: CPT

## 2024-04-13 PROCEDURE — 93971 EXTREMITY STUDY: CPT | Performed by: SURGERY

## 2024-04-13 PROCEDURE — 93971 EXTREMITY STUDY: CPT

## 2024-04-13 PROCEDURE — 36415 COLL VENOUS BLD VENIPUNCTURE: CPT

## 2024-04-13 PROCEDURE — 99223 1ST HOSP IP/OBS HIGH 75: CPT | Performed by: INTERNAL MEDICINE

## 2024-04-13 PROCEDURE — 80053 COMPREHEN METABOLIC PANEL: CPT

## 2024-04-13 RX ORDER — PANTOPRAZOLE SODIUM 40 MG/1
40 TABLET, DELAYED RELEASE ORAL
Status: DISCONTINUED | OUTPATIENT
Start: 2024-04-14 | End: 2024-04-17 | Stop reason: HOSPADM

## 2024-04-13 RX ORDER — ATORVASTATIN CALCIUM 80 MG/1
80 TABLET, FILM COATED ORAL DAILY
Status: DISCONTINUED | OUTPATIENT
Start: 2024-04-14 | End: 2024-04-17 | Stop reason: HOSPADM

## 2024-04-13 RX ORDER — LOSARTAN POTASSIUM 50 MG/1
100 TABLET ORAL DAILY
Status: DISCONTINUED | OUTPATIENT
Start: 2024-04-14 | End: 2024-04-17 | Stop reason: HOSPADM

## 2024-04-13 RX ORDER — METOPROLOL SUCCINATE 50 MG/1
100 TABLET, EXTENDED RELEASE ORAL DAILY
Status: DISCONTINUED | OUTPATIENT
Start: 2024-04-14 | End: 2024-04-17 | Stop reason: HOSPADM

## 2024-04-13 RX ORDER — INSULIN GLARGINE 100 [IU]/ML
50 INJECTION, SOLUTION SUBCUTANEOUS EVERY 12 HOURS SCHEDULED
Status: DISCONTINUED | OUTPATIENT
Start: 2024-04-13 | End: 2024-04-17 | Stop reason: HOSPADM

## 2024-04-13 RX ORDER — POLYETHYLENE GLYCOL 3350 17 G/17G
17 POWDER, FOR SOLUTION ORAL DAILY
Status: DISCONTINUED | OUTPATIENT
Start: 2024-04-14 | End: 2024-04-17 | Stop reason: HOSPADM

## 2024-04-13 RX ORDER — FUROSEMIDE 40 MG/1
40 TABLET ORAL DAILY
Status: DISCONTINUED | OUTPATIENT
Start: 2024-04-14 | End: 2024-04-17 | Stop reason: HOSPADM

## 2024-04-13 RX ORDER — OXYCODONE HYDROCHLORIDE AND ACETAMINOPHEN 5; 325 MG/1; MG/1
1 TABLET ORAL EVERY 4 HOURS PRN
Status: DISCONTINUED | OUTPATIENT
Start: 2024-04-13 | End: 2024-04-17 | Stop reason: HOSPADM

## 2024-04-13 RX ORDER — ASPIRIN 81 MG/1
81 TABLET, CHEWABLE ORAL DAILY
Status: DISCONTINUED | OUTPATIENT
Start: 2024-04-14 | End: 2024-04-17 | Stop reason: HOSPADM

## 2024-04-13 RX ORDER — ONDANSETRON 2 MG/ML
4 INJECTION INTRAMUSCULAR; INTRAVENOUS EVERY 8 HOURS PRN
Status: DISCONTINUED | OUTPATIENT
Start: 2024-04-13 | End: 2024-04-17 | Stop reason: HOSPADM

## 2024-04-13 RX ORDER — INSULIN LISPRO 100 [IU]/ML
1-6 INJECTION, SOLUTION INTRAVENOUS; SUBCUTANEOUS
Status: DISCONTINUED | OUTPATIENT
Start: 2024-04-13 | End: 2024-04-17 | Stop reason: HOSPADM

## 2024-04-13 RX ORDER — GABAPENTIN 400 MG/1
400 CAPSULE ORAL 3 TIMES DAILY
Status: DISCONTINUED | OUTPATIENT
Start: 2024-04-13 | End: 2024-04-17 | Stop reason: HOSPADM

## 2024-04-13 RX ORDER — DOCUSATE SODIUM 100 MG/1
100 CAPSULE, LIQUID FILLED ORAL 2 TIMES DAILY
Status: DISCONTINUED | OUTPATIENT
Start: 2024-04-13 | End: 2024-04-17 | Stop reason: HOSPADM

## 2024-04-13 RX ORDER — RANOLAZINE 500 MG/1
500 TABLET, EXTENDED RELEASE ORAL 2 TIMES DAILY
Status: DISCONTINUED | OUTPATIENT
Start: 2024-04-13 | End: 2024-04-17 | Stop reason: HOSPADM

## 2024-04-13 RX ORDER — AMLODIPINE BESYLATE 5 MG/1
5 TABLET ORAL DAILY
Status: DISCONTINUED | OUTPATIENT
Start: 2024-04-14 | End: 2024-04-17 | Stop reason: HOSPADM

## 2024-04-13 RX ADMIN — GABAPENTIN 400 MG: 400 CAPSULE ORAL at 20:10

## 2024-04-13 RX ADMIN — IOHEXOL 120 ML: 350 INJECTION, SOLUTION INTRAVENOUS at 15:56

## 2024-04-13 RX ADMIN — INSULIN GLARGINE 50 UNITS: 100 INJECTION, SOLUTION SUBCUTANEOUS at 20:29

## 2024-04-13 RX ADMIN — INSULIN LISPRO 1 UNITS: 100 INJECTION, SOLUTION INTRAVENOUS; SUBCUTANEOUS at 20:29

## 2024-04-13 RX ADMIN — DOCUSATE SODIUM 100 MG: 100 CAPSULE, LIQUID FILLED ORAL at 20:10

## 2024-04-13 RX ADMIN — RANOLAZINE 500 MG: 500 TABLET, FILM COATED, EXTENDED RELEASE ORAL at 20:10

## 2024-04-13 RX ADMIN — APIXABAN 2.5 MG: 2.5 TABLET, FILM COATED ORAL at 20:10

## 2024-04-13 NOTE — ASSESSMENT & PLAN NOTE
History of CAD s/p PCI.  Following with cardiology.  Continue aspirin, statin, Lasix, losartan, metoprolol, ranolazine.

## 2024-04-13 NOTE — ASSESSMENT & PLAN NOTE
Lab Results   Component Value Date    HGBA1C 6.9 (H) 02/27/2024     Hold Wegovy and Jardiance.  Continue sliding scale coverage.  Hypoglycemic protocol.  Carbohydrate controlled diet.

## 2024-04-13 NOTE — ASSESSMENT & PLAN NOTE
Patient presented for not being able to take care of herself after getting recent right TKR.   is wheelchair-bound and cannot help her with ADLs.  Patient complaining of right leg pain after surgery and poor ambulation.  Does not feel comfortable at home.  Presented with right leg numbness and pain which started after the surgery.  Normal neurological exam.  CTA lower extremity runoff showing patent vessels.  Doppler studies unremarkable.  X-ray showing no acute finding.  Vitals otherwise stable.    PT OT  May need rehab  Case management consulted  DVT prophylaxis  Percocet as needed  Bowel regimen in place
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
## 2024-04-13 NOTE — ASSESSMENT & PLAN NOTE
Recently underwent rt TKR at [facility] and was discharged on next day.  Patient presented to ED with ambulatory dysfunction as she could not take care of herself.  Also she stated  her  is wheelchair-bound and cannot help her as well.    See plan for ambulatory dysfunction  Eliquis 2.5 twice daily for DVT prophylaxis

## 2024-04-14 LAB
ANION GAP SERPL CALCULATED.3IONS-SCNC: 8 MMOL/L (ref 4–13)
BUN SERPL-MCNC: 12 MG/DL (ref 5–25)
CALCIUM SERPL-MCNC: 9.5 MG/DL (ref 8.4–10.2)
CHLORIDE SERPL-SCNC: 99 MMOL/L (ref 96–108)
CO2 SERPL-SCNC: 33 MMOL/L (ref 21–32)
CREAT SERPL-MCNC: 0.5 MG/DL (ref 0.6–1.3)
ERYTHROCYTE [DISTWIDTH] IN BLOOD BY AUTOMATED COUNT: 13.1 % (ref 11.6–15.1)
GFR SERPL CREATININE-BSD FRML MDRD: 102 ML/MIN/1.73SQ M
GLUCOSE SERPL-MCNC: 114 MG/DL (ref 65–140)
GLUCOSE SERPL-MCNC: 116 MG/DL (ref 65–140)
GLUCOSE SERPL-MCNC: 125 MG/DL (ref 65–140)
GLUCOSE SERPL-MCNC: 165 MG/DL (ref 65–140)
GLUCOSE SERPL-MCNC: 184 MG/DL (ref 65–140)
HCT VFR BLD AUTO: 32.9 % (ref 34.8–46.1)
HGB BLD-MCNC: 10.6 G/DL (ref 11.5–15.4)
MCH RBC QN AUTO: 28.6 PG (ref 26.8–34.3)
MCHC RBC AUTO-ENTMCNC: 32.2 G/DL (ref 31.4–37.4)
MCV RBC AUTO: 89 FL (ref 82–98)
PLATELET # BLD AUTO: 252 THOUSANDS/UL (ref 149–390)
PMV BLD AUTO: 10.4 FL (ref 8.9–12.7)
POTASSIUM SERPL-SCNC: 3.7 MMOL/L (ref 3.5–5.3)
RBC # BLD AUTO: 3.7 MILLION/UL (ref 3.81–5.12)
SODIUM SERPL-SCNC: 140 MMOL/L (ref 135–147)
WBC # BLD AUTO: 6.84 THOUSAND/UL (ref 4.31–10.16)

## 2024-04-14 PROCEDURE — 82948 REAGENT STRIP/BLOOD GLUCOSE: CPT

## 2024-04-14 PROCEDURE — 85027 COMPLETE CBC AUTOMATED: CPT | Performed by: INTERNAL MEDICINE

## 2024-04-14 PROCEDURE — 99232 SBSQ HOSP IP/OBS MODERATE 35: CPT | Performed by: INTERNAL MEDICINE

## 2024-04-14 PROCEDURE — 80048 BASIC METABOLIC PNL TOTAL CA: CPT | Performed by: INTERNAL MEDICINE

## 2024-04-14 RX ORDER — DIPHENHYDRAMINE HCL 25 MG
25 TABLET ORAL
Status: DISCONTINUED | OUTPATIENT
Start: 2024-04-14 | End: 2024-04-17 | Stop reason: HOSPADM

## 2024-04-14 RX ADMIN — PANTOPRAZOLE SODIUM 40 MG: 40 TABLET, DELAYED RELEASE ORAL at 05:39

## 2024-04-14 RX ADMIN — METOPROLOL SUCCINATE 100 MG: 50 TABLET, EXTENDED RELEASE ORAL at 09:46

## 2024-04-14 RX ADMIN — DIPHENHYDRAMINE HYDROCHLORIDE 25 MG: 25 TABLET ORAL at 21:50

## 2024-04-14 RX ADMIN — FUROSEMIDE 40 MG: 40 TABLET ORAL at 09:47

## 2024-04-14 RX ADMIN — RANOLAZINE 500 MG: 500 TABLET, FILM COATED, EXTENDED RELEASE ORAL at 17:32

## 2024-04-14 RX ADMIN — INSULIN LISPRO 1 UNITS: 100 INJECTION, SOLUTION INTRAVENOUS; SUBCUTANEOUS at 12:39

## 2024-04-14 RX ADMIN — ASPIRIN 81 MG 81 MG: 81 TABLET ORAL at 09:47

## 2024-04-14 RX ADMIN — DOCUSATE SODIUM 100 MG: 100 CAPSULE, LIQUID FILLED ORAL at 17:33

## 2024-04-14 RX ADMIN — APIXABAN 2.5 MG: 2.5 TABLET, FILM COATED ORAL at 09:47

## 2024-04-14 RX ADMIN — GABAPENTIN 400 MG: 400 CAPSULE ORAL at 17:32

## 2024-04-14 RX ADMIN — LOSARTAN POTASSIUM 100 MG: 50 TABLET, FILM COATED ORAL at 09:46

## 2024-04-14 RX ADMIN — AMLODIPINE BESYLATE 5 MG: 5 TABLET ORAL at 09:47

## 2024-04-14 RX ADMIN — INSULIN GLARGINE 50 UNITS: 100 INJECTION, SOLUTION SUBCUTANEOUS at 09:47

## 2024-04-14 RX ADMIN — DOCUSATE SODIUM 100 MG: 100 CAPSULE, LIQUID FILLED ORAL at 09:46

## 2024-04-14 RX ADMIN — APIXABAN 2.5 MG: 2.5 TABLET, FILM COATED ORAL at 17:31

## 2024-04-14 RX ADMIN — OXYCODONE HYDROCHLORIDE AND ACETAMINOPHEN 1 TABLET: 5; 325 TABLET ORAL at 15:41

## 2024-04-14 RX ADMIN — GABAPENTIN 400 MG: 400 CAPSULE ORAL at 21:35

## 2024-04-14 RX ADMIN — GABAPENTIN 400 MG: 400 CAPSULE ORAL at 09:47

## 2024-04-14 RX ADMIN — OXYCODONE HYDROCHLORIDE AND ACETAMINOPHEN 1 TABLET: 5; 325 TABLET ORAL at 19:50

## 2024-04-14 RX ADMIN — OXYCODONE HYDROCHLORIDE AND ACETAMINOPHEN 1 TABLET: 5; 325 TABLET ORAL at 07:23

## 2024-04-14 RX ADMIN — RANOLAZINE 500 MG: 500 TABLET, FILM COATED, EXTENDED RELEASE ORAL at 09:47

## 2024-04-14 RX ADMIN — INSULIN GLARGINE 50 UNITS: 100 INJECTION, SOLUTION SUBCUTANEOUS at 21:35

## 2024-04-14 RX ADMIN — ATORVASTATIN CALCIUM 80 MG: 80 TABLET, FILM COATED ORAL at 09:47

## 2024-04-14 NOTE — PROGRESS NOTES
[facility]  Progress Note  Name: Ines Ivy I  MRN: [id number]  Unit/Bed#: -01 I Date of Admission: 4/13/2024   Date of Service: 4/14/2024 I Hospital Day: 1    Assessment/Plan   * Ambulatory dysfunction  Assessment & Plan  Patient presented for not being able to take care of herself after getting recent right TKR.   is wheelchair-bound and cannot help her with ADLs.  Patient complaining of right leg pain after surgery and poor ambulation.  Does not feel comfortable at home.  Presented with right leg numbness and pain which started after the surgery.  Normal neurological exam.  CTA lower extremity runoff showing patent vessels.  Doppler studies unremarkable.  X-ray showing no acute finding.  Vitals otherwise stable.    PT OT  May need rehab  Case management consulted  DVT prophylaxis  Percocet as needed  Bowel regimen in place    S/P TKR (total knee replacement), right  Assessment & Plan  Recently underwent rt TKR at [facility] and was discharged on next day.  Patient presented to ED with ambulatory dysfunction as she could not take care of herself.  Also she stated  her  is wheelchair-bound and cannot help her as well.    See plan for ambulatory dysfunction  Eliquis 2.5 twice daily for DVT prophylaxis    Chronic diastolic (congestive) heart failure (HCC)  Assessment & Plan  Currently appearing euvolemic.  Continue Lasix.  Daily weight, I's and O's, low-salt diet.    Coronary artery disease involving native coronary artery of native heart without angina pectoris  Assessment & Plan  Continue aspirin, statin, metoprolol, Jardiance, Lasix, losartan, ranolazine    NSTEMI (non-ST elevated myocardial infarction) (HCC)  Assessment & Plan  History of CAD s/p PCI.  Following with cardiology.  Continue aspirin, statin, Lasix, losartan, metoprolol, ranolazine.    Essential hypertension  Assessment & Plan  Continue metoprolol, losartan, Lasix,  amlodipine    Type 2 diabetes mellitus with diabetic polyneuropathy, with long-term current use of insulin ([facility])  Assessment & Plan  Lab Results   Component Value Date    HGBA1C 6.9 (H) 2024     Hold Wegovy and Jardiance.  Continue sliding scale coverage.  Hypoglycemic protocol.  Carbohydrate controlled diet.               VTE Pharmacologic Prophylaxis: VTE Score: 3 Moderate Risk (Score 3-4) - Pharmacological DVT Prophylaxis Ordered: apixaban (Eliquis).    Mobility:   Basic Mobility Inpatient Raw Score: 19  JH-HLM Goal: 6: Walk 10 steps or more  JH-HLM Achieved: 6: Walk 10 steps or more  JH-HLM Goal achieved. Continue to encourage appropriate mobility.    Patient Centered Rounds: I performed bedside rounds with nursing staff today.   Discussions with Specialists or Other Care Team Provider: yes    Education and Discussions with Family / Patient: Updated  () at bedside.    Current Length of Stay: 1 day(s)  Current Patient Status: Inpatient   Certification Statement: The patient will continue to require additional inpatient hospital stay due to PT/OT eval  Discharge Plan: Anticipate discharge in 24-48 hrs to discharge location to be determined pending rehab evaluations.    Code Status: Level 1 - Full Code    Subjective:   No overnight events noted.  States pain in right lower extremity improving    Objective:     Vitals:   Temp (24hrs), Av °F (36.7 °C), Min:97.8 °F (36.6 °C), Max:98.4 °F (36.9 °C)    Temp:  [97.8 °F (36.6 °C)-98.4 °F (36.9 °C)] 97.9 °F (36.6 °C)  HR:  [77-89] 85  Resp:  [17-20] 17  BP: (133-169)/(62-79) 137/77  SpO2:  [92 %-98 %] 98 %  Body mass index is 32.9 kg/m².     Input and Output Summary (last 24 hours):     Intake/Output Summary (Last 24 hours) at 2024 1225  Last data filed at 2024 0555  Gross per 24 hour   Intake 420 ml   Output --   Net 420 ml       Physical Exam:   Physical Exam  Constitutional:       General: She is not in acute distress.  HENT:       Head: Normocephalic and atraumatic.      Nose: Nose normal.      Mouth/Throat:      Mouth: Mucous membranes are moist.   Eyes:      Extraocular Movements: Extraocular movements intact.      Conjunctiva/sclera: Conjunctivae normal.   Cardiovascular:      Rate and Rhythm: Normal rate and regular rhythm.   Pulmonary:      Effort: Pulmonary effort is normal. No respiratory distress.   Abdominal:      Palpations: Abdomen is soft.      Tenderness: There is no abdominal tenderness.   Musculoskeletal:         General: Normal range of motion.      Cervical back: Normal range of motion and neck supple.      Comments: Right lower extremity swelling noted   Skin:     General: Skin is warm and dry.      Findings: Bruising present.   Neurological:      General: No focal deficit present.      Mental Status: She is alert. Mental status is at baseline.      Cranial Nerves: No cranial nerve deficit.   Psychiatric:         Mood and Affect: Mood normal.         Behavior: Behavior normal.          Additional Data:     Labs:  Results from last 7 days   Lab Units 04/14/24  0543 04/13/24  1501   WBC Thousand/uL 6.84 6.63   HEMOGLOBIN g/dL 10.6* 10.6*   HEMATOCRIT % 32.9* 32.3*   PLATELETS Thousands/uL 252 230   SEGS PCT %  --  64   LYMPHO PCT %  --  22   MONO PCT %  --  11   EOS PCT %  --  3     Results from last 7 days   Lab Units 04/14/24  0543 04/13/24  1451   SODIUM mmol/L 140 135   POTASSIUM mmol/L 3.7 4.8   CHLORIDE mmol/L 99 98   CO2 mmol/L 33* 26   BUN mg/dL 12 14   CREATININE mg/dL 0.50* 0.54*   ANION GAP mmol/L 8 11   CALCIUM mg/dL 9.5 9.2   ALBUMIN g/dL  --  4.0   TOTAL BILIRUBIN mg/dL  --  1.34*   ALK PHOS U/L  --  57   ALT U/L  --  12   AST U/L  --  34   GLUCOSE RANDOM mg/dL 116 160*         Results from last 7 days   Lab Units 04/14/24  1105 04/14/24  0720 04/13/24 2012   POC GLUCOSE mg/dl 184* 125 172*               Lines/Drains:  Invasive Devices       Peripheral Intravenous Line  Duration             Peripheral IV  04/13/24 Dorsal (posterior);Right Forearm <1 day                    Imaging: No pertinent imaging reviewed.    Recent Cultures (last 7 days):         Last 24 Hours Medication List:   Current Facility-Administered Medications   Medication Dose Route Frequency Provider Last Rate    amLODIPine  5 mg Oral Daily Mary Rothman MD      apixaban  2.5 mg Oral BID Mary Rothman MD      aspirin  81 mg Oral Daily Mary Rothman MD      atorvastatin  80 mg Oral Daily Mary Rothman MD      docusate sodium  100 mg Oral BID Mary Rothman MD      furosemide  40 mg Oral Daily Mary Rothman MD      gabapentin  400 mg Oral TID Mary Rothman MD      insulin glargine  50 Units Subcutaneous Q12H TEJAL Mary Rothman MD      insulin lispro  1-6 Units Subcutaneous TID AC Mary Rothman MD      losartan  100 mg Oral Daily Mary Rothman MD      metoprolol succinate  100 mg Oral Daily Mary Rothman MD      ondansetron  4 mg Intravenous Q8H PRN Mary Rothman MD      oxyCODONE-acetaminophen  1 tablet Oral Q4H PRN Mary Rothman MD      pantoprazole  40 mg Oral Early Morning Mary Rothman MD      polyethylene glycol  17 g Oral Daily Mary Rothman MD      ranolazine  500 mg Oral BID Mary Rothman MD          Today, Patient Was Seen By: Dustin Sotelo MD    **Please Note: This note may have been constructed using a voice recognition system.**

## 2024-04-14 NOTE — UTILIZATION REVIEW
NOTIFICATION OF INPATIENT ADMISSION   AUTHORIZATION REQUEST   SERVICING FACILITY:   [place] [address]  Tax ID: [id number]  NPI: [id number]   ATTENDING PROVIDER:  Attending Name and NPI#: Dustin Sotelo Md [[id number]]  Address: [address] [place] [address]  Phone: [phone]     ADMISSION INFORMATION:  Place of Service: Inpatient [facility]  Place of Service Code: 21  Inpatient Admission Date/Time: 4/13/24  6:08 PM  Discharge Date/Time: No discharge date for patient encounter.  Admitting Diagnosis Code/Description:  Knee pain [M25.569]  Post-op pain [G89.18]  Knee pain, right [M25.561]  Ambulatory dysfunction [R26.2]     UTILIZATION REVIEW CONTACT:  Minal Correa Utilization   Network Utilization Review Department  Phone: [phone]  Fax [phone]  Email: [email or web address]  Contact for approvals/pending authorizations, clinical reviews, and discharge.     PHYSICIAN ADVISORY SERVICES:  Medical Necessity Denial & [employer] Review  Phone: [phone]  Fax: [phone]  Email: [email or web address]     DISCHARGE SUPPORT TEAM:  For Patients Discharge Needs & Updates  Phone: [phone] opt. 2 Fax: [phone]  Email: [email or web address]

## 2024-04-14 NOTE — PLAN OF CARE
Problem: PAIN - ADULT  Goal: Verbalizes/displays adequate comfort level or baseline comfort level  Description: Interventions:  - Encourage patient to monitor pain and request assistance  - Assess pain using appropriate pain scale  - Administer analgesics based on type and severity of pain and evaluate response  - Implement non-pharmacological measures as appropriate and evaluate response  - Consider cultural and social influences on pain and pain management  - Notify physician/advanced practitioner if interventions unsuccessful or patient reports new pain  Outcome: Progressing     Problem: INFECTION - ADULT  Goal: Absence or prevention of progression during hospitalization  Description: INTERVENTIONS:  - Assess and monitor for signs and symptoms of infection  - Monitor lab/diagnostic results  - Monitor all insertion sites, i.e. indwelling lines, tubes, and drains  - Monitor endotracheal if appropriate and nasal secretions for changes in amount and color  - [place] appropriate cooling/warming therapies per order  - Administer medications as ordered  - Instruct and encourage patient and family to use good hand hygiene technique  - Identify and instruct in appropriate isolation precautions for identified infection/condition  Outcome: Progressing  Goal: Absence of fever/infection during neutropenic period  Description: INTERVENTIONS:  - Monitor WBC    Outcome: Progressing     Problem: SAFETY ADULT  Goal: Patient will remain free of falls  Description: INTERVENTIONS:  - Educate patient/family on patient safety including physical limitations  - Instruct patient to call for assistance with activity   - Consult OT/PT to assist with strengthening/mobility   - Keep Call bell within reach  - Keep bed low and locked with side rails adjusted as appropriate  - Keep care items and personal belongings within reach  - Initiate and maintain comfort rounds  - Make Fall Risk Sign visible to staff  - Offer Toileting every 2 Hours,  in advance of need  - Initiate/Maintain bed alarm  - Obtain necessary fall risk management equipment:   - Apply yellow socks and bracelet for high fall risk patients  - Consider moving patient to room near nurses station  Outcome: Progressing  Goal: Maintain or return to baseline ADL function  Description: INTERVENTIONS:  -  Assess patient's ability to carry out ADLs; assess patient's baseline for ADL function and identify physical deficits which impact ability to perform ADLs (bathing, care of mouth/teeth, toileting, grooming, dressing, etc.)  - Assess/evaluate cause of self-care deficits   - Assess range of motion  - Assess patient's mobility; develop plan if impaired  - Assess patient's need for assistive devices and provide as appropriate  - Encourage maximum independence but intervene and supervise when necessary  - Involve family in performance of ADLs  - Assess for home care needs following discharge   - Consider OT consult to assist with ADL evaluation and planning for discharge  - Provide patient education as appropriate  Outcome: Progressing  Goal: Maintains/Returns to pre admission functional level  Description: INTERVENTIONS:  - Perform AM-PAC 6 Click Basic Mobility/ Daily Activity assessment daily.  - Set and communicate daily mobility goal to care team and patient/family/caregiver.   - Collaborate with rehabilitation services on mobility goals if consulted  - Perform Range of Motion 2 times a day.  - Reposition patient every 2 hours.  - Dangle patient 2 times a day  - Stand patient 2 times a day  - Ambulate patient 2 times a day  - Out of bed to chair 2 times a day   - Out of bed for meals 2 times a day  - Out of bed for toileting  - Record patient progress and toleration of activity level   Outcome: Progressing     Problem: DISCHARGE PLANNING  Goal: Discharge to home or other facility with appropriate resources  Description: INTERVENTIONS:  - Identify barriers to discharge w/patient and caregiver  -  Arrange for needed discharge resources and transportation as appropriate  - Identify discharge learning needs (meds, wound care, etc.)  - Arrange for interpretive services to assist at discharge as needed  - Refer to Case Management Department for coordinating discharge planning if the patient needs post-hospital services based on physician/advanced practitioner order or complex needs related to functional status, cognitive ability, or social support system  Outcome: Progressing     Problem: Knowledge Deficit  Goal: Patient/family/caregiver demonstrates understanding of disease process, treatment plan, medications, and discharge instructions  Description: Complete learning assessment and assess knowledge base.  Interventions:  - Provide teaching at level of understanding  - Provide teaching via preferred learning methods  Outcome: Progressing     Problem: SKIN/TISSUE INTEGRITY - ADULT  Goal: Incision(s), wounds(s) or drain site(s) healing without S/S of infection  Description: INTERVENTIONS  - Assess and document dressing, incision, wound bed, drain sites and surrounding tissue  - Provide patient and family education  - Perform skin care/dressing changes every shift  Outcome: Progressing     Problem: MUSCULOSKELETAL - ADULT  Goal: Maintain or return mobility to safest level of function  Description: INTERVENTIONS:  - Assess patient's ability to carry out ADLs; assess patient's baseline for ADL function and identify physical deficits which impact ability to perform ADLs (bathing, care of mouth/teeth, toileting, grooming, dressing, etc.)  - Assess/evaluate cause of self-care deficits   - Assess range of motion  - Assess patient's mobility  - Assess patient's need for assistive devices and provide as appropriate  - Encourage maximum independence but intervene and supervise when necessary  - Involve family in performance of ADLs  - Assess for home care needs following discharge   - Consider OT consult to assist with ADL  evaluation and planning for discharge  - Provide patient education as appropriate  Outcome: Progressing  Goal: Maintain proper alignment of affected body part  Description: INTERVENTIONS:  - Support, maintain and protect limb and body alignment  - Provide patient/ family with appropriate education  Outcome: Progressing

## 2024-04-14 NOTE — PLAN OF CARE
Problem: PAIN - ADULT  Goal: Verbalizes/displays adequate comfort level or baseline comfort level  Description: Interventions:  - Encourage patient to monitor pain and request assistance  - Assess pain using appropriate pain scale  - Administer analgesics based on type and severity of pain and evaluate response  - Implement non-pharmacological measures as appropriate and evaluate response  - Consider cultural and social influences on pain and pain management  - Notify physician/advanced practitioner if interventions unsuccessful or patient reports new pain  Outcome: Progressing     Problem: INFECTION - ADULT  Goal: Absence or prevention of progression during hospitalization  Description: INTERVENTIONS:  - Assess and monitor for signs and symptoms of infection  - Monitor lab/diagnostic results  - Monitor all insertion sites, i.e. indwelling lines, tubes, and drains  - Monitor endotracheal if appropriate and nasal secretions for changes in amount and color  - [place] appropriate cooling/warming therapies per order  - Administer medications as ordered  - Instruct and encourage patient and family to use good hand hygiene technique  - Identify and instruct in appropriate isolation precautions for identified infection/condition  Outcome: Progressing  Goal: Absence of fever/infection during neutropenic period  Description: INTERVENTIONS:  - Monitor WBC    Outcome: Progressing     Problem: SAFETY ADULT  Goal: Patient will remain free of falls  Description: INTERVENTIONS:  - Educate patient/family on patient safety including physical limitations  - Instruct patient to call for assistance with activity   - Consult OT/PT to assist with strengthening/mobility   - Keep Call bell within reach  - Keep bed low and locked with side rails adjusted as appropriate  - Keep care items and personal belongings within reach  - Initiate and maintain comfort rounds  - Make Fall Risk Sign visible to staff  - Offer Toileting every 2 Hours,  in advance of need  - Initiate/Maintain bed alarm  - Obtain necessary fall risk management equipment:   - Apply yellow socks and bracelet for high fall risk patients  - Consider moving patient to room near nurses station  Outcome: Progressing  Goal: Maintain or return to baseline ADL function  Description: INTERVENTIONS:  -  Assess patient's ability to carry out ADLs; assess patient's baseline for ADL function and identify physical deficits which impact ability to perform ADLs (bathing, care of mouth/teeth, toileting, grooming, dressing, etc.)  - Assess/evaluate cause of self-care deficits   - Assess range of motion  - Assess patient's mobility; develop plan if impaired  - Assess patient's need for assistive devices and provide as appropriate  - Encourage maximum independence but intervene and supervise when necessary  - Involve family in performance of ADLs  - Assess for home care needs following discharge   - Consider OT consult to assist with ADL evaluation and planning for discharge  - Provide patient education as appropriate  Outcome: Progressing  Goal: Maintains/Returns to pre admission functional level  Description: INTERVENTIONS:  - Perform AM-PAC 6 Click Basic Mobility/ Daily Activity assessment daily.  - Set and communicate daily mobility goal to care team and patient/family/caregiver.   - Collaborate with rehabilitation services on mobility goals if consulted  - Perform Range of Motion 3 times a day.  - Reposition patient every 2 hours.  - Dangle patient 3 times a day  - Stand patient 3 times a day  - Ambulate patient 3 times a day  - Out of bed to chair 3 times a day   - Out of bed for meals 3 times a day  - Out of bed for toileting  - Record patient progress and toleration of activity level   Outcome: Progressing     Problem: DISCHARGE PLANNING  Goal: Discharge to home or other facility with appropriate resources  Description: INTERVENTIONS:  - Identify barriers to discharge w/patient and caregiver  -  Arrange for needed discharge resources and transportation as appropriate  - Identify discharge learning needs (meds, wound care, etc.)  - Arrange for interpretive services to assist at discharge as needed  - Refer to Case Management Department for coordinating discharge planning if the patient needs post-hospital services based on physician/advanced practitioner order or complex needs related to functional status, cognitive ability, or social support system  Outcome: Progressing     Problem: Knowledge Deficit  Goal: Patient/family/caregiver demonstrates understanding of disease process, treatment plan, medications, and discharge instructions  Description: Complete learning assessment and assess knowledge base.  Interventions:  - Provide teaching at level of understanding  - Provide teaching via preferred learning methods  Outcome: Progressing     Problem: SKIN/TISSUE INTEGRITY - ADULT  Goal: Incision(s), wounds(s) or drain site(s) healing without S/S of infection  Description: INTERVENTIONS  - Assess and document dressing, incision, wound bed, drain sites and surrounding tissue  - Provide patient and family education  - Perform skin care/dressing changes every shift  Outcome: Progressing     Problem: MUSCULOSKELETAL - ADULT  Goal: Maintain or return mobility to safest level of function  Description: INTERVENTIONS:  - Assess patient's ability to carry out ADLs; assess patient's baseline for ADL function and identify physical deficits which impact ability to perform ADLs (bathing, care of mouth/teeth, toileting, grooming, dressing, etc.)  - Assess/evaluate cause of self-care deficits   - Assess range of motion  - Assess patient's mobility  - Assess patient's need for assistive devices and provide as appropriate  - Encourage maximum independence but intervene and supervise when necessary  - Involve family in performance of ADLs  - Assess for home care needs following discharge   - Consider OT consult to assist with ADL  evaluation and planning for discharge  - Provide patient education as appropriate  Outcome: Progressing  Goal: Maintain proper alignment of affected body part  Description: INTERVENTIONS:  - Support, maintain and protect limb and body alignment  - Provide patient/ family with appropriate education  Outcome: Progressing

## 2024-04-14 NOTE — NURSING NOTE
Pt admitted to room 315 from ER. Alert and oriented, pleasant and cooperative. S/P right knee replacement at  5 days ago, swollen, tender, ecchymotic. Good cap refill, toes/foot pink and mobile, +1 pulse, reporting slight numbness. Ambulates well with walker. States pain is 'bearable', aware pain meds are ordered and to request. Call bell within reach. Bed alarm active.

## 2024-04-15 LAB
GLUCOSE SERPL-MCNC: 122 MG/DL (ref 65–140)
GLUCOSE SERPL-MCNC: 157 MG/DL (ref 65–140)
GLUCOSE SERPL-MCNC: 168 MG/DL (ref 65–140)
GLUCOSE SERPL-MCNC: 234 MG/DL (ref 65–140)

## 2024-04-15 PROCEDURE — 97167 OT EVAL HIGH COMPLEX 60 MIN: CPT

## 2024-04-15 PROCEDURE — 82948 REAGENT STRIP/BLOOD GLUCOSE: CPT

## 2024-04-15 PROCEDURE — 99232 SBSQ HOSP IP/OBS MODERATE 35: CPT | Performed by: INTERNAL MEDICINE

## 2024-04-15 PROCEDURE — 97163 PT EVAL HIGH COMPLEX 45 MIN: CPT

## 2024-04-15 RX ADMIN — GABAPENTIN 400 MG: 400 CAPSULE ORAL at 21:07

## 2024-04-15 RX ADMIN — ATORVASTATIN CALCIUM 80 MG: 80 TABLET, FILM COATED ORAL at 08:46

## 2024-04-15 RX ADMIN — RANOLAZINE 500 MG: 500 TABLET, FILM COATED, EXTENDED RELEASE ORAL at 17:12

## 2024-04-15 RX ADMIN — GABAPENTIN 400 MG: 400 CAPSULE ORAL at 08:46

## 2024-04-15 RX ADMIN — INSULIN LISPRO 1 UNITS: 100 INJECTION, SOLUTION INTRAVENOUS; SUBCUTANEOUS at 17:15

## 2024-04-15 RX ADMIN — DOCUSATE SODIUM 100 MG: 100 CAPSULE, LIQUID FILLED ORAL at 08:45

## 2024-04-15 RX ADMIN — DOCUSATE SODIUM 100 MG: 100 CAPSULE, LIQUID FILLED ORAL at 17:13

## 2024-04-15 RX ADMIN — METOPROLOL SUCCINATE 100 MG: 50 TABLET, EXTENDED RELEASE ORAL at 08:45

## 2024-04-15 RX ADMIN — APIXABAN 2.5 MG: 2.5 TABLET, FILM COATED ORAL at 17:13

## 2024-04-15 RX ADMIN — OXYCODONE HYDROCHLORIDE AND ACETAMINOPHEN 1 TABLET: 5; 325 TABLET ORAL at 20:24

## 2024-04-15 RX ADMIN — OXYCODONE HYDROCHLORIDE AND ACETAMINOPHEN 1 TABLET: 5; 325 TABLET ORAL at 08:52

## 2024-04-15 RX ADMIN — RANOLAZINE 500 MG: 500 TABLET, FILM COATED, EXTENDED RELEASE ORAL at 08:46

## 2024-04-15 RX ADMIN — INSULIN GLARGINE 50 UNITS: 100 INJECTION, SOLUTION SUBCUTANEOUS at 08:46

## 2024-04-15 RX ADMIN — PANTOPRAZOLE SODIUM 40 MG: 40 TABLET, DELAYED RELEASE ORAL at 05:46

## 2024-04-15 RX ADMIN — APIXABAN 2.5 MG: 2.5 TABLET, FILM COATED ORAL at 08:46

## 2024-04-15 RX ADMIN — ASPIRIN 81 MG 81 MG: 81 TABLET ORAL at 08:46

## 2024-04-15 RX ADMIN — LOSARTAN POTASSIUM 100 MG: 50 TABLET, FILM COATED ORAL at 08:45

## 2024-04-15 RX ADMIN — OXYCODONE HYDROCHLORIDE AND ACETAMINOPHEN 1 TABLET: 5; 325 TABLET ORAL at 14:39

## 2024-04-15 RX ADMIN — FUROSEMIDE 40 MG: 40 TABLET ORAL at 08:46

## 2024-04-15 RX ADMIN — GABAPENTIN 400 MG: 400 CAPSULE ORAL at 17:12

## 2024-04-15 RX ADMIN — DIPHENHYDRAMINE HYDROCHLORIDE 25 MG: 25 TABLET ORAL at 21:06

## 2024-04-15 RX ADMIN — AMLODIPINE BESYLATE 5 MG: 5 TABLET ORAL at 08:46

## 2024-04-15 RX ADMIN — INSULIN GLARGINE 50 UNITS: 100 INJECTION, SOLUTION SUBCUTANEOUS at 21:06

## 2024-04-15 NOTE — PLAN OF CARE
Problem: PHYSICAL THERAPY ADULT  Goal: Performs mobility at highest level of function for planned discharge setting.  See evaluation for individualized goals.  Description: Treatment/Interventions: Functional transfer training, Therapeutic exercise, Endurance training, Gait training, Bed mobility, Equipment eval/education  Equipment Recommended: Walker       See flowsheet documentation for full assessment, interventions and recommendations.  Outcome: Progressing  Note: Prognosis: Fair     Assessment: Pt is 64 y.o. female seen for PT evaluation s/p admit to [facility] on 4/13/2024 w/ Ambulatory dysfunction. PT consulted to assess pt's functional mobility and d/c needs. Order placed for PT eval and tx, w/ WBAT RLE order. Pt agreeable to PT  session upon arrival, pt found  sitting EOB .  PTA, pt was independent w/ all functional mobility w/ no AD, ambulates unrestricted distances and all terrain, lives w/  in 2 level home, and works full time.  Pt to benefit from continued PT tx to address deficits and maximize level of functional independent mobility and consistency. Upon conclusion pt  seated in chair. Complexity: Comorbidities affecting pt's physical performance at time of assessment include: CHF, DM, CAD, and R TKR . Personal factors affecting pt at time of IE include: limited mobility, ambulating with assistive device, steps to enter home, and unable to perform caregiver tasks. Please find objective findings from PT assessment regarding body systems outlined above with impairments and limitations including weakness, impaired balance, gait deviations, pain, altered sensation, and fall risk.  Pt's clinical presentation is currently unstable/unpredictable seen in pt's presentation of pain, recent surgery, and polypharmacy. The patient's AM-PAC Basic Mobility Inpatient Short Form Raw Score is 18.  Based on patient presentations and impairments, pt would most appropriately benefit from Level 2 resource  intensity upon discharge. Please also refer to the recommendation of the Physical Therapist for safe discharge planning. Pt seen as a co-eval with OT due to the patient's co-morbidities, clinically unstable presentation, and present impairments which are a regression from the patient's baseline.  Barriers to Discharge: Inaccessible home environment, Decreased caregiver support     Rehab Resource Intensity Level, PT: II (Moderate Resource Intensity)    See flowsheet documentation for full assessment.

## 2024-04-15 NOTE — UTILIZATION REVIEW
NOTIFICATION OF INPATIENT ADMISSION   AUTHORIZATION REQUEST   SERVICING FACILITY:   [place] [address]  Tax ID: [id number]  NPI: [id number]   ATTENDING PROVIDER:  Attending Name and NPI#: Dustin Sotelo Md [[id number]]  Address: [address] [place] [address]  Phone: [phone]     ADMISSION INFORMATION:  Place of Service: Inpatient [facility]  Place of Service Code: 21  Inpatient Admission Date/Time: 4/13/24  6:08 PM  Discharge Date/Time: No discharge date for patient encounter.  Admitting Diagnosis Code/Description:  Knee pain [M25.569]  Post-op pain [G89.18]  Knee pain, right [M25.561]  Ambulatory dysfunction [R26.2]     UTILIZATION REVIEW CONTACT:  Minal Correa Utilization   Network Utilization Review Department  Phone: [phone]  Fax [phone]  Email: [email or web address]  Contact for approvals/pending authorizations, clinical reviews, and discharge.     PHYSICIAN ADVISORY SERVICES:  Medical Necessity Denial & Pgsg-ww-Gucn Review  Phone: [phone]  Fax: [phone]  Email: [email or web address]     DISCHARGE SUPPORT TEAM:  For Patients Discharge Needs & Updates  Phone: [phone] opt. 2 Fax: [phone]  Email: [email or web address]

## 2024-04-15 NOTE — PLAN OF CARE
Problem: PAIN - ADULT  Goal: Verbalizes/displays adequate comfort level or baseline comfort level  Description: Interventions:  - Encourage patient to monitor pain and request assistance  - Assess pain using appropriate pain scale  - Administer analgesics based on type and severity of pain and evaluate response  - Implement non-pharmacological measures as appropriate and evaluate response  - Consider cultural and social influences on pain and pain management  - Notify physician/advanced practitioner if interventions unsuccessful or patient reports new pain  Outcome: Progressing     Problem: INFECTION - ADULT  Goal: Absence or prevention of progression during hospitalization  Description: INTERVENTIONS:  - Assess and monitor for signs and symptoms of infection  - Monitor lab/diagnostic results  - Monitor all insertion sites, i.e. indwelling lines, tubes, and drains  - Monitor endotracheal if appropriate and nasal secretions for changes in amount and color  - [place] appropriate cooling/warming therapies per order  - Administer medications as ordered  - Instruct and encourage patient and family to use good hand hygiene technique  - Identify and instruct in appropriate isolation precautions for identified infection/condition  Outcome: Progressing  Goal: Absence of fever/infection during neutropenic period  Description: INTERVENTIONS:  - Monitor WBC    Outcome: Progressing     Problem: SAFETY ADULT  Goal: Patient will remain free of falls  Description: INTERVENTIONS:  - Educate patient/family on patient safety including physical limitations  - Instruct patient to call for assistance with activity   - Consult OT/PT to assist with strengthening/mobility   - Keep Call bell within reach  - Keep bed low and locked with side rails adjusted as appropriate  - Keep care items and personal belongings within reach  - Initiate and maintain comfort rounds  - Make Fall Risk Sign visible to staff  - Offer Toileting every 2 Hours,  in advance of need  - Obtain necessary fall risk management equipment: nonskid socks  - Apply yellow socks and bracelet for high fall risk patients  - Consider moving patient to room near nurses station  Outcome: Progressing  Goal: Maintain or return to baseline ADL function  Description: INTERVENTIONS:  -  Assess patient's ability to carry out ADLs; assess patient's baseline for ADL function and identify physical deficits which impact ability to perform ADLs (bathing, care of mouth/teeth, toileting, grooming, dressing, etc.)  - Assess/evaluate cause of self-care deficits   - Assess range of motion  - Assess patient's mobility; develop plan if impaired  - Assess patient's need for assistive devices and provide as appropriate  - Encourage maximum independence but intervene and supervise when necessary  - Involve family in performance of ADLs  - Assess for home care needs following discharge   - Consider OT consult to assist with ADL evaluation and planning for discharge  - Provide patient education as appropriate  Outcome: Progressing  Goal: Maintains/Returns to pre admission functional level  Description: INTERVENTIONS:  - Perform AM-PAC 6 Click Basic Mobility/ Daily Activity assessment daily.  - Set and communicate daily mobility goal to care team and patient/family/caregiver.   - Collaborate with rehabilitation services on mobility goals if consulted  - Perform Range of Motion 3 times a day.  - Reposition patient every 2 hours.  - Dangle patient 3 times a day  - Stand patient 3 times a day  - Ambulate patient 3 times a day  - Out of bed to chair 3 times a day   - Out of bed for meals 3 times a day  - Out of bed for toileting  - Record patient progress and toleration of activity level   Outcome: Progressing     Problem: DISCHARGE PLANNING  Goal: Discharge to home or other facility with appropriate resources  Description: INTERVENTIONS:  - Identify barriers to discharge w/patient and caregiver  - Arrange for needed  discharge resources and transportation as appropriate  - Identify discharge learning needs (meds, wound care, etc.)  - Arrange for interpretive services to assist at discharge as needed  - Refer to Case Management Department for coordinating discharge planning if the patient needs post-hospital services based on physician/advanced practitioner order or complex needs related to functional status, cognitive ability, or social support system  Outcome: Progressing     Problem: Knowledge Deficit  Goal: Patient/family/caregiver demonstrates understanding of disease process, treatment plan, medications, and discharge instructions  Description: Complete learning assessment and assess knowledge base.  Interventions:  - Provide teaching at level of understanding  - Provide teaching via preferred learning methods  Outcome: Progressing     Problem: SKIN/TISSUE INTEGRITY - ADULT  Goal: Incision(s), wounds(s) or drain site(s) healing without S/S of infection  Description: INTERVENTIONS  - Assess and document dressing, incision, wound bed, drain sites and surrounding tissue  - Provide patient and family education  - Perform skin care/dressing changes every shift  Outcome: Progressing     Problem: MUSCULOSKELETAL - ADULT  Goal: Maintain or return mobility to safest level of function  Description: INTERVENTIONS:  - Assess patient's ability to carry out ADLs; assess patient's baseline for ADL function and identify physical deficits which impact ability to perform ADLs (bathing, care of mouth/teeth, toileting, grooming, dressing, etc.)  - Assess/evaluate cause of self-care deficits   - Assess range of motion  - Assess patient's mobility  - Assess patient's need for assistive devices and provide as appropriate  - Encourage maximum independence but intervene and supervise when necessary  - Involve family in performance of ADLs  - Assess for home care needs following discharge   - Consider OT consult to assist with ADL evaluation and  planning for discharge  - Provide patient education as appropriate  Outcome: Progressing  Goal: Maintain proper alignment of affected body part  Description: INTERVENTIONS:  - Support, maintain and protect limb and body alignment  - Provide patient/ family with appropriate education  Outcome: Progressing

## 2024-04-15 NOTE — UTILIZATION REVIEW
Initial Clinical Review    Admission: Date/Time/Statement:   Admission Orders (From admission, onward)       Ordered        04/13/24 1808  INPATIENT ADMISSION  Once                          Orders Placed This Encounter   Procedures    INPATIENT ADMISSION     Standing Status:   Standing     Number of Occurrences:   1     Order Specific Question:   Level of Care     Answer:   Med Surg [16]     Order Specific Question:   Estimated length of stay     Answer:   More than 2 Midnights     Order Specific Question:   Certification     Answer:   I certify that inpatient services are medically necessary for this patient for a duration of greater than two midnights. See H&P and MD Progress Notes for additional information about the patient's course of treatment.     ED Arrival Information       Expected   -    Arrival   4/13/2024 13:41    Acuity   Urgent              Means of arrival   Ambulance    Escorted by   [facility]    Service   Hospitalist    Admission type   Emergency              Arrival complaint   -             Chief Complaint   Patient presents with    Knee Pain     Right knee replacement, done at [facility]. Increased pain, and tightness       Initial Presentation: 64 y.o. female with PMH of CHF, CAD s/p PCI, breast cancer, diabetes mellitus, hypertension, hypercholesterolemia who presents after recently getting right TKR and was discharged home on next day. Presented with right lower extremity numbness and pain with unable to ambulate. Stays with her  who is also wheelchair-bound and cannot help her at home. She has decreased ambulation and is dependent for ADLs as well. Does not feel safe at home. Plan: Inpatient admission for evaluation and treatment of ambulatory dysfunction, s/p right TKR, CHF, CAD, hx of NSTEMI, HTN, DM: PT/OT eval, CM consult, bowel regimen, continue Eliquis, lasix, low salt diet, continue ASA, statin, metoprolol, Jardiance, losartan, ranolazine, amlodipine.     Date: 4/14   Day 2:      Internal medicine: PT/OT eval, CM consult, bowel regimen, continue Eliquis, lasix, low salt diet, continue ASA, statin, metoprolol, Jardiance, losartan, ranolazine, amlodipine.     ED Triage Vitals [04/13/24 1348]   Temperature Pulse Respirations Blood Pressure SpO2   97.8 °F (36.6 °C) 84 20 169/70 98 %      Temp src Heart Rate Source Patient Position - Orthostatic VS BP Location FiO2 (%)   -- Monitor Lying Left arm --      Pain Score       8          Wt Readings from Last 1 Encounters:   04/15/24 104 kg (229 lb 0.9 oz)     Additional Vital Signs:     Date/Time Temp Pulse Resp BP MAP (mmHg) SpO2 O2 Device   04/15/24 0900 -- -- -- -- -- 98 % None (Room air)   04/15/24 07:33:08 98.3 °F (36.8 °C) 73 18 140/66 91 96 % --   04/14/24 21:39:07 98.1 °F (36.7 °C) 71 18 132/68 89 97 % --   04/14/24 15:14:39 98.3 °F (36.8 °C) 76 18 138/68 91 99 % --   04/14/24 09:46:23 -- 85 -- 137/77 97 98 % --   04/14/24 0946 -- 85 -- 137/77 -- -- --   04/14/24 07:15:23 97.9 °F (36.6 °C) -- 17 142/75 97 -- --   04/13/24 1942 -- 89 -- 165/79 108 -- --   04/13/24 1928 98.4 °F (36.9 °C) -- 19 165/79 108 -- --   04/13/24 1802 -- 78 18 159/70 101 96 % --   04/13/24 1730 -- 78 -- -- -- 95 % --   04/13/24 1700 -- 78 -- -- -- 97 % --   04/13/24 1630 -- 77 18 133/62 89 96 % --   04/13/24 1515 -- 83 18 154/68 98 92 % --     Pertinent Labs/Diagnostic Test Results:   CTA abdominal w run off w wo contrast   Final Result by Shaheen Little MD (04/13 3819)      Postoperative changes are present from right knee replacement, with adjacent soft tissue edema and right knee joint effusion present. The right knee arthroplasty hardware is intact and in normal anatomic alignment.      Right lower extremity: Limited evaluation of the popliteal artery due to streak artifact from adjacent knee arthroplasty hardware. Otherwise, the the right lower extremity arteries are patent, with patency of the visualized portions of the popliteal    artery. No active  extravasation of contrast or pseudoaneurysm is identified.      Although proximal the left posterior tibial artery is not visualized, dominant flow is via the peroneal artery, which is a normal anatomic variant.      Left lower extremity: The left lower extremity arteries are patent.         Workstation performed: [id number]           4/13 VAS venous duplex:  Impression:  RIGHT LOWER LIMB  No evidence of acute or chronic deep vein thrombosis .  No evidence of superficial thrombophlebitis noted.  Doppler evaluation shows a normal response to augmentation maneuvers.  Popliteal, posterior tibial and anterior tibial arterial Doppler waveform's are  triphasic.     LEFT LOWER LIMB LIMITED  Evaluation shows no evidence of thrombus in the common femoral vein.  Doppler evaluation shows a normal response to augmentation maneuvers.      Results from last 7 days   Lab Units 04/14/24  0543 04/13/24  1501 04/09/24  0303   WBC Thousand/uL 6.84 6.63  --    HEMOGLOBIN g/dL 10.6* 10.6* 10.4*   HEMATOCRIT % 32.9* 32.3* 30.6*   PLATELETS Thousands/uL 252 230  --    TOTAL NEUT ABS Thousands/µL  --  4.24  --          Results from last 7 days   Lab Units 04/14/24  0543 04/13/24  1451 04/09/24  0303   SODIUM mmol/L 140 135 136   POTASSIUM mmol/L 3.7 4.8 3.5   CHLORIDE mmol/L 99 98 100   CO2 mmol/L 33* 26 28   ANION GAP mmol/L 8 11 8   BUN mg/dL 12 14 17   CREATININE mg/dL 0.50* 0.54* 0.47   EGFR ml/min/1.73sq m 102 100 106   CALCIUM mg/dL 9.5 9.2 8.8     Results from last 7 days   Lab Units 04/13/24  1451   AST U/L 34   ALT U/L 12   ALK PHOS U/L 57   TOTAL PROTEIN g/dL 7.3   ALBUMIN g/dL 4.0   TOTAL BILIRUBIN mg/dL 1.34*     Results from last 7 days   Lab Units 04/15/24  1103 04/15/24  0703 04/14/24  2135 04/14/24  1625 04/14/24  1105 04/14/24  0720 04/13/24 2012   POC GLUCOSE mg/dl 157* 122 165* 114 184* 125 172*     Results from last 7 days   Lab Units 04/14/24  0543 04/13/24  1451 04/09/24  0303   GLUCOSE RANDOM mg/dL 116 160* 149*          ED Treatment:   Medication Administration from 04/13/2024 1341 to 04/13/2024 1906         Date/Time Order Dose Route Action     04/13/2024 1556 EDT iohexol (OMNIPAQUE) 350 MG/ML injection (MULTI-DOSE) 120 mL 120 mL Intravenous Given          Past Medical History:   Diagnosis Date    Arthritis     Breast cancer (HCC)     left    Cancer (HCC)     L breast    Cardiac disease     CHF (congestive heart failure) (HCC)     Coronary artery disease     Diabetes mellitus (HCC)     Heartburn     Hx of radiation therapy     Hypercholesteremia     Hyperlipidemia     Hypertension     Neuropathy     bilateral feet     Present on Admission:   Coronary artery disease involving native coronary artery of native heart without angina pectoris   Chronic diastolic (congestive) heart failure ([facility])   Ambulatory dysfunction   Essential hypertension   NSTEMI (non-ST elevated myocardial infarction) ([facility])      Admitting Diagnosis: Knee pain [M25.569]  Post-op pain [G89.18]  Knee pain, right [M25.561]  Ambulatory dysfunction [R26.2]  Age/Sex: 64 y.o. female  Admission Orders:  Scheduled Medications:  amLODIPine, 5 mg, Oral, Daily  apixaban, 2.5 mg, Oral, BID  aspirin, 81 mg, Oral, Daily  atorvastatin, 80 mg, Oral, Daily  docusate sodium, 100 mg, Oral, BID  furosemide, 40 mg, Oral, Daily  gabapentin, 400 mg, Oral, TID  insulin glargine, 50 Units, Subcutaneous, Q12H TEJAL  insulin lispro, 1-6 Units, Subcutaneous, TID AC  losartan, 100 mg, Oral, Daily  metoprolol succinate, 100 mg, Oral, Daily  pantoprazole, 40 mg, Oral, Early Morning  polyethylene glycol, 17 g, Oral, Daily  ranolazine, 500 mg, Oral, BID      Continuous IV Infusions:     PRN Meds:  diphenhydrAMINE, 25 mg, Oral, HS PRN  ondansetron, 4 mg, Intravenous, Q8H PRN  oxyCODONE-acetaminophen, 1 tablet, Oral, Q4H PRN        None    Network Utilization Review Department  ATTENTION: Please call with any questions or concerns to [phone] and carefully listen to the prompts so that you are  directed to the right person. All voicemails are confidential.   For Discharge needs, contact Care Management DC Support Team at [phone] opt. 2  Send all requests for admission clinical reviews, approved or denied determinations and any other requests to dedicated fax number below belonging to the [place] where the patient is receiving treatment. List of dedicated fax numbers for the Facilities:  FACILITY NAME UR FAX NUMBER   ADMISSION DENIALS (Administrative/Medical Necessity) [phone]   DISCHARGE SUPPORT TEAM (NETWORK) [phone]   PARENT CHILD HEALTH (Maternity/NICU/Pediatrics) [phone]   [facility] [phone]   [facility] [phone]   [facility] [phone]   [facility] [phone]   [facility] [phone]   [facility] [phone]   [facility] [phone]   [facility] [phone]   [facility] [phone]   [facility] [phone]   [facility] [phone]   [facility] [phone]

## 2024-04-15 NOTE — ASSESSMENT & PLAN NOTE
Recently underwent rt TKR at [facility] and was discharged on next day.  Patient presented to ED with ambulatory dysfunction as she could not take care of herself.  Also she stated  her  is wheelchair-bound and cannot help her as well.    See plan for ambulatory dysfunction  Eliquis 2.5 twice daily for DVT prophylaxis

## 2024-04-15 NOTE — PLAN OF CARE
Problem: OCCUPATIONAL THERAPY ADULT  Goal: Performs self-care activities at highest level of function for planned discharge setting.  See evaluation for individualized goals.  Description: Treatment Interventions: ADL retraining, Functional transfer training, UE strengthening/ROM, Endurance training, Patient/family training, Equipment evaluation/education, Compensatory technique education, Energy conservation, Activityengagement          See flowsheet documentation for full assessment, interventions and recommendations.   Note: Limitation: Decreased ADL status, Decreased UE strength, Decreased endurance, Decreased self-care trans, Decreased high-level ADLs  Prognosis: Good  Assessment: Patient is a 64 y.o. female seen for OT evaluation s/p admit to  [facility] on 4/13/2024 w/Ambulatory dysfunction + commorbidities/PMHx (as listed in medical record) affecting patient's functional performance c ADL tasks at time of assessment. OT orders placed for evaluation and treatment to assess pt's ADLs, cognitive status + performance during functional tasks in order to formulate appropriate d/c recommendations.     Therapist performed at least two patient identifiers during session including name and wristband. Personal factors affecting patient at time of initial evaluation include: step(s) to enter environment, multi-level environment, limited home support, inability to perform current job functions, inability to perform IADLs, inability to perform ADLs, new need for AD, inability to ambulate household distances, inability to navigate community distances, and decreased initiation and engagement.   Pt's clinical presentation is currently unstable/unpredictable given new onset deficits that effect pt's occupational performance and ability to safely return to PLOF including decrease activity tolerance, decrease standing balance, decrease performance during ADL tasks, decrease UB MS, increased pain, decrease activity  engagement, decrease performance during functional transfers, and steps to enter home combined with medical complications of pain impacting overall mobility status, abnormal blood sugars, and need for input for mobility technique/safety. This evaluation required an extensive review of medical and/or therapy records and additional review of physical, cognitive and psychosocial history related to functional performance. Based upon functional performance deficits and assessments, this evaluation has been identified as a  high complexity evaluation.      Patient to benefit from continued Occupational Therapy treatment while in the hospital to address aforementioned deficits and maximize level of functional independence with ADLs and functional mobility. Pt currently demonstrates WFL ability to collaboratively engage in d/c planning.     Rehab Resource Intensity Level, OT: II (Moderate Resource Intensity)

## 2024-04-15 NOTE — CASE MANAGEMENT
Case Management Assessment & Discharge Planning Note    Patient name Ines Ivy  Location /-01 MRN [id number]  : 1960 Date 4/15/2024       Current Admission Date: 2024  Current Admission Diagnosis:Ambulatory dysfunction   Patient Active Problem List    Diagnosis Date Noted    S/P TKR (total knee replacement), right 2024    Ambulatory dysfunction 2024    Class 1 obesity due to excess calories with serious comorbidity and body mass index (BMI) of 32.0 to 32.9 in adult 2024    Chronic diastolic (congestive) heart failure (HCC) 2023    HNP (herniated nucleus pulposus), lumbar 10/10/2022    S/P drug eluting coronary stent placement 2021    Former smoker 2019    Lumbar radiculopathy 2019    Thyroid nodule 2019    Post-menopausal 2019    Other specified glaucoma 2019    Other age-related cataract 2019    History of breast cancer 2019    Coronary artery disease involving native coronary artery of native heart without angina pectoris 08/15/2018    NSTEMI (non-ST elevated myocardial infarction) (HCC) 2018    Primary open angle glaucoma (POAG) of both eyes 12/15/2017    Primary osteoarthritis involving multiple joints 2016    Neuropathy 2014    Type 2 diabetes mellitus with diabetic polyneuropathy, with long-term current use of insulin (HCC) 2014    Essential hypertension 2014    Mixed hyperlipidemia 2014      LOS (days): 2  Geometric Mean LOS (GMLOS) (days):   Days to GMLOS:     OBJECTIVE:    Risk of Unplanned Readmission Score: 11.94         Current admission status: Inpatient  Referral Reason: Other (d/c planning)    Preferred Pharmacy:   [id number]  [place] - [address]  [address] [address]  Phone: [phone] Fax: [phone]    [facility]/pharmacy #[id number] - [place], [place] - [address]  [place] [address]  Phone: [phone] Fax: [phone]    Primary Care Provider: Jorge L Najera DO    Primary Insurance: BLUE CROSS  Secondary Insurance:     ASSESSMENT:  Active Health Care Proxies       Jorge L Ivy [facility] Care Representative - Spouse   Primary Phone: [phone] (Home)                 Advance Directives  Does patient have a Health Care POA?: No  Was patient offered paperwork?: Yes (declined paperwork)  Does patient have Advance Directives?: No  Was patient offered paperwork?: Yes (declined paperwork)         Readmission Root Cause  30 Day Readmission: No    Patient Information  Admitted from:: Home  Mental Status: Alert  During Assessment patient was accompanied by: Not accompanied during assessment  Assessment information provided by:: Patient  Primary Caregiver: Self  Support Systems: Spouse/significant other  County of Residence: [facility] do you live in?: [place]  Home entry access options. Select all that apply.: Garage, Stairs (1 step in the back of the home but she is not able to climb the hill to get to the back of [facility])  Number of steps to enter home.: One Flight  Do the steps have railings?: Yes  Type of Current Residence: [facility])  Living Arrangements: Lives w/ Spouse/significant other (spouse is w/c bound)  Is patient a ?: No    Activities of Daily Living Prior to Admission  Functional Status: Independent  Completes ADLs independently?: Yes  Ambulates independently?: Yes  Does patient use assisted devices?: Yes  Assisted Devices (DME) used: Walker  Does patient currently own DME?: Yes  What DME does the patient currently own?: Walker, Bedside Commode, Shower Chair, Straight Cane, Other (Comment) (glucometer)  Does patient have a history of Outpatient Therapy (PT/OT)?: Yes ([facility]-   pt;s spouse cannot drive her there in a car- he can drive his truck ,but she is not able to get into the truck)  Does the patient have a history of Short-Term  Rehab?: No  Does patient have a history of HHC?: No  Does patient currently have HHC?: No         Patient Information Continued  Income Source: Employed  Does patient have prescription coverage?: Yes ([employer])  Does patient receive dialysis treatments?: No  Does patient have a history of substance abuse?: No  Does patient have a history of Mental Health Diagnosis?: No         Means of Transportation  Means of Transport to Appts:: Drives Self          DISCHARGE DETAILS:    Discharge planning discussed with:: stanfordn & spouse at the bedside  Freedom of Choice: Yes  Comments - Freedom of Choice: recommendation is for moderate level- pt gave permission to send referrals - request to ARU and permission to place a blanket referral for snf rehab- authorization is needed  CM contacted family/caregiver?: Yes             Contacts  Patient Contacts: Jorge L   Relationship to Patient:: Family (spouse)  Contact Method: In Person  Reason/Outcome: Discharge Planning    Requested Home Health Care         Is the patient interested in HHC at discharge?: No    DME Referral Provided  Referral made for DME?: No    Other Referral/Resources/Interventions Provided:  Interventions: Short Term Rehab, Acute Rehab  Referral Comments: referrals sent via ariel- auth is needed    Would you like to participate in our [facility] service program?  : No - Declined    Treatment Team Recommendation: Acute Rehab, Short Term Rehab ([facility] vs snf rehab auth is needec- tbd)                                         Family notified:: spouse at the bedside

## 2024-04-15 NOTE — ASSESSMENT & PLAN NOTE
Patient presented for not being able to take care of herself after getting recent right TKR.   is wheelchair-bound and cannot help her with ADLs.  Patient complaining of right leg pain after surgery and poor ambulation.  Does not feel comfortable at home.  Presented with right leg numbness and pain which started after the surgery.  Normal neurological exam.  CTA lower extremity runoff showing patent vessels.  Doppler studies unremarkable.  X-ray showing no acute finding.  Vitals otherwise stable.    PT OT  Recommending rehab  Case management consulted  DVT prophylaxis  Percocet as needed  Bowel regimen in place

## 2024-04-15 NOTE — PROGRESS NOTES
Critical access hospital  Progress Note  Name: Ines Ivy I  MRN: [id number]  Unit/Bed#: -01 I Date of Admission: 4/13/2024   Date of Service: 4/15/2024 I Hospital Day: 2    Assessment/Plan   * Ambulatory dysfunction  Assessment & Plan  Patient presented for not being able to take care of herself after getting recent right TKR.   is wheelchair-bound and cannot help her with ADLs.  Patient complaining of right leg pain after surgery and poor ambulation.  Does not feel comfortable at home.  Presented with right leg numbness and pain which started after the surgery.  Normal neurological exam.  CTA lower extremity runoff showing patent vessels.  Doppler studies unremarkable.  X-ray showing no acute finding.  Vitals otherwise stable.    PT OT  Recommending rehab  Case management consulted  DVT prophylaxis  Percocet as needed  Bowel regimen in place    S/P TKR (total knee replacement), right  Assessment & Plan  Recently underwent rt TKR at [facility] and was discharged on next day.  Patient presented to ED with ambulatory dysfunction as she could not take care of herself.  Also she stated  her  is wheelchair-bound and cannot help her as well.    See plan for ambulatory dysfunction  Eliquis 2.5 twice daily for DVT prophylaxis    Chronic diastolic (congestive) heart failure (HCC)  Assessment & Plan  Currently appearing euvolemic.  Continue Lasix.  Daily weight, I's and O's, low-salt diet.    Coronary artery disease involving native coronary artery of native heart without angina pectoris  Assessment & Plan  Continue aspirin, statin, metoprolol, Jardiance, Lasix, losartan, ranolazine    NSTEMI (non-ST elevated myocardial infarction) (HCC)  Assessment & Plan  History of CAD s/p PCI.  Following with cardiology.  Continue aspirin, statin, Lasix, losartan, metoprolol, ranolazine.    Essential hypertension  Assessment & Plan  Continue metoprolol, losartan, Lasix,  amlodipine    Type 2 diabetes mellitus with diabetic polyneuropathy, with long-term current use of insulin (formerly [facility])  Assessment & Plan  Lab Results   Component Value Date    HGBA1C 6.9 (H) 2024     Hold Wegovy and Jardiance.  Continue sliding scale coverage.  Hypoglycemic protocol.  Carbohydrate controlled diet.             VTE Pharmacologic Prophylaxis: VTE Score: 3 Moderate Risk (Score 3-4) - Pharmacological DVT Prophylaxis Ordered: apixaban (Eliquis).    Mobility:   Basic Mobility Inpatient Raw Score: 19  JH-HLM Goal: 6: Walk 10 steps or more  JH-HLM Achieved: 2: Bed activities/Dependent transfer  JH-HLM Goal achieved. Continue to encourage appropriate mobility.    Patient Centered Rounds: I performed bedside rounds with nursing staff today.   Discussions with Specialists or Other Care Team Provider: yes    Education and Discussions with Family / Patient:  Updated patient regarding plan of care.     Current Length of Stay: 2 day(s)  Current Patient Status: Inpatient   Certification Statement: The patient will continue to require additional inpatient hospital stay due to pending placement  Discharge Plan: Anticipate discharge in 24-48 hrs to rehab facility.    Code Status: Level 1 - Full Code    Subjective:   No overnight events noted    Objective:     Vitals:   Temp (24hrs), Av.2 °F (36.8 °C), Min:98.1 °F (36.7 °C), Max:98.3 °F (36.8 °C)    Temp:  [98.1 °F (36.7 °C)-98.3 °F (36.8 °C)] 98.3 °F (36.8 °C)  HR:  [71-76] 73  Resp:  [18] 18  BP: (132-140)/(66-68) 140/66  SpO2:  [96 %-99 %] 98 %  Body mass index is 32.87 kg/m².     Input and Output Summary (last 24 hours):     Intake/Output Summary (Last 24 hours) at 4/15/2024 1242  Last data filed at 4/15/2024 0900  Gross per 24 hour   Intake 1200 ml   Output --   Net 1200 ml       Physical Exam:   Physical Exam  Constitutional:       General: She is not in acute distress.  HENT:      Head: Normocephalic and atraumatic.      Nose: Nose normal.      Mouth/Throat:       Mouth: Mucous membranes are moist.   Eyes:      Extraocular Movements: Extraocular movements intact.      Conjunctiva/sclera: Conjunctivae normal.   Cardiovascular:      Rate and Rhythm: Normal rate and regular rhythm.   Pulmonary:      Effort: Pulmonary effort is normal. No respiratory distress.   Abdominal:      Palpations: Abdomen is soft.      Tenderness: There is no abdominal tenderness.   Musculoskeletal:         General: Swelling present. Normal range of motion.      Cervical back: Normal range of motion and neck supple.   Skin:     General: Skin is warm and dry.      Findings: Bruising present.   Neurological:      General: No focal deficit present.      Mental Status: She is alert. Mental status is at baseline.      Cranial Nerves: No cranial nerve deficit.   Psychiatric:         Mood and Affect: Mood normal.         Behavior: Behavior normal.          Additional Data:     Labs:  Results from last 7 days   Lab Units 04/14/24  0543 04/13/24  1501   WBC Thousand/uL 6.84 6.63   HEMOGLOBIN g/dL 10.6* 10.6*   HEMATOCRIT % 32.9* 32.3*   PLATELETS Thousands/uL 252 230   SEGS PCT %  --  64   LYMPHO PCT %  --  22   MONO PCT %  --  11   EOS PCT %  --  3     Results from last 7 days   Lab Units 04/14/24  0543 04/13/24  1451   SODIUM mmol/L 140 135   POTASSIUM mmol/L 3.7 4.8   CHLORIDE mmol/L 99 98   CO2 mmol/L 33* 26   BUN mg/dL 12 14   CREATININE mg/dL 0.50* 0.54*   ANION GAP mmol/L 8 11   CALCIUM mg/dL 9.5 9.2   ALBUMIN g/dL  --  4.0   TOTAL BILIRUBIN mg/dL  --  1.34*   ALK PHOS U/L  --  57   ALT U/L  --  12   AST U/L  --  34   GLUCOSE RANDOM mg/dL 116 160*         Results from last 7 days   Lab Units 04/15/24  1103 04/15/24  0703 04/14/24  2135 04/14/24  1625 04/14/24  1105 04/14/24  0720 04/13/24 2012   POC GLUCOSE mg/dl 157* 122 165* 114 184* 125 172*               Lines/Drains:  Invasive Devices       Peripheral Intravenous Line  Duration             Peripheral IV 04/13/24 Dorsal (posterior);Right Forearm  1 day                          Imaging: No pertinent imaging reviewed.    Recent Cultures (last 7 days):         Last 24 Hours Medication List:   Current Facility-Administered Medications   Medication Dose Route Frequency Provider Last Rate    amLODIPine  5 mg Oral Daily Mary Rothman MD      apixaban  2.5 mg Oral BID Mary Rothman MD      aspirin  81 mg Oral Daily Mary Rothman MD      atorvastatin  80 mg Oral Daily Mary Rothman MD      diphenhydrAMINE  25 mg Oral HS PRN Jose Antonio Grady PA-C      docusate sodium  100 mg Oral BID Mary Rothman MD      furosemide  40 mg Oral Daily Mary Rothman MD      gabapentin  400 mg Oral TID Mary Rothman MD      insulin glargine  50 Units Subcutaneous Q12H TEJAL Mary Rothman MD      insulin lispro  1-6 Units Subcutaneous TID AC Mary Rothman MD      losartan  100 mg Oral Daily Mary Rothman MD      metoprolol succinate  100 mg Oral Daily Mary Rothman MD      ondansetron  4 mg Intravenous Q8H PRN Mary Rothman MD      oxyCODONE-acetaminophen  1 tablet Oral Q4H PRN Mary Rothman MD      pantoprazole  40 mg Oral Early Morning Mary Rothman MD      polyethylene glycol  17 g Oral Daily Mary Rothman MD      ranolazine  500 mg Oral BID Mary Rothman MD          Today, Patient Was Seen By: Dustin Sotelo MD    **Please Note: This note may have been constructed using a voice recognition system.**

## 2024-04-15 NOTE — PROGRESS NOTES
Pastoral Care Progress Note    4/15/2024  Patient: Ines Ivy : 1960  Admission Date & Time: 2024 1347  MRN: [id number] CSN: [id number]    CH initiated support visit to pt. Pt received CH reclined in bed, no family present.  CH introduced self and role, encouraged pt to ask for her if she can be of service.     04/15/24 0900   Clinical Encounter Type   Visited With Patient   Routine Visit Introduction

## 2024-04-15 NOTE — PLAN OF CARE
Problem: PAIN - ADULT  Goal: Verbalizes/displays adequate comfort level or baseline comfort level  Description: Interventions:  - Encourage patient to monitor pain and request assistance  - Assess pain using appropriate pain scale  - Administer analgesics based on type and severity of pain and evaluate response  - Implement non-pharmacological measures as appropriate and evaluate response  - Consider cultural and social influences on pain and pain management  - Notify physician/advanced practitioner if interventions unsuccessful or patient reports new pain  Outcome: Progressing     Problem: INFECTION - ADULT  Goal: Absence or prevention of progression during hospitalization  Description: INTERVENTIONS:  - Assess and monitor for signs and symptoms of infection  - Monitor lab/diagnostic results  - Monitor all insertion sites, i.e. indwelling lines, tubes, and drains  - Monitor endotracheal if appropriate and nasal secretions for changes in amount and color  - [place] appropriate cooling/warming therapies per order  - Administer medications as ordered  - Instruct and encourage patient and family to use good hand hygiene technique  - Identify and instruct in appropriate isolation precautions for identified infection/condition  Outcome: Progressing  Goal: Absence of fever/infection during neutropenic period  Description: INTERVENTIONS:  - Monitor WBC    Outcome: Progressing     Problem: SAFETY ADULT  Goal: Patient will remain free of falls  Description: INTERVENTIONS:  - Educate patient/family on patient safety including physical limitations  - Instruct patient to call for assistance with activity   - Consult OT/PT to assist with strengthening/mobility   - Keep Call bell within reach  - Keep bed low and locked with side rails adjusted as appropriate  - Keep care items and personal belongings within reach  - Initiate and maintain comfort rounds  - Make Fall Risk Sign visible to staff  - Offer Toileting every 2 Hours,  in advance of need  - Initiate/Maintain bed alarm  - Obtain necessary fall risk management equipment:   - Apply yellow socks and bracelet for high fall risk patients  - Consider moving patient to room near nurses station  Outcome: Progressing  Goal: Maintain or return to baseline ADL function  Description: INTERVENTIONS:  -  Assess patient's ability to carry out ADLs; assess patient's baseline for ADL function and identify physical deficits which impact ability to perform ADLs (bathing, care of mouth/teeth, toileting, grooming, dressing, etc.)  - Assess/evaluate cause of self-care deficits   - Assess range of motion  - Assess patient's mobility; develop plan if impaired  - Assess patient's need for assistive devices and provide as appropriate  - Encourage maximum independence but intervene and supervise when necessary  - Involve family in performance of ADLs  - Assess for home care needs following discharge   - Consider OT consult to assist with ADL evaluation and planning for discharge  - Provide patient education as appropriate  Outcome: Progressing  Goal: Maintains/Returns to pre admission functional level  Description: INTERVENTIONS:  - Perform AM-PAC 6 Click Basic Mobility/ Daily Activity assessment daily.  - Set and communicate daily mobility goal to care team and patient/family/caregiver.   - Collaborate with rehabilitation services on mobility goals if consulted  - Perform Range of Motion 2 times a day.  - Reposition patient every 2 hours.  - Dangle patient 2 times a day  - Stand patient 2 times a day  - Ambulate patient 2 times a day  - Out of bed to chair 2 times a day   - Out of bed for meals 2 times a day  - Out of bed for toileting  - Record patient progress and toleration of activity level   Outcome: Progressing     Problem: DISCHARGE PLANNING  Goal: Discharge to home or other facility with appropriate resources  Description: INTERVENTIONS:  - Identify barriers to discharge w/patient and caregiver  -  Arrange for needed discharge resources and transportation as appropriate  - Identify discharge learning needs (meds, wound care, etc.)  - Arrange for interpretive services to assist at discharge as needed  - Refer to Case Management Department for coordinating discharge planning if the patient needs post-hospital services based on physician/advanced practitioner order or complex needs related to functional status, cognitive ability, or social support system  Outcome: Progressing     Problem: Knowledge Deficit  Goal: Patient/family/caregiver demonstrates understanding of disease process, treatment plan, medications, and discharge instructions  Description: Complete learning assessment and assess knowledge base.  Interventions:  - Provide teaching at level of understanding  - Provide teaching via preferred learning methods  Outcome: Progressing     Problem: SKIN/TISSUE INTEGRITY - ADULT  Goal: Incision(s), wounds(s) or drain site(s) healing without S/S of infection  Description: INTERVENTIONS  - Assess and document dressing, incision, wound bed, drain sites and surrounding tissue  - Provide patient and family education  - Perform skin care/dressing changes every shift  Outcome: Progressing     Problem: MUSCULOSKELETAL - ADULT  Goal: Maintain or return mobility to safest level of function  Description: INTERVENTIONS:  - Assess patient's ability to carry out ADLs; assess patient's baseline for ADL function and identify physical deficits which impact ability to perform ADLs (bathing, care of mouth/teeth, toileting, grooming, dressing, etc.)  - Assess/evaluate cause of self-care deficits   - Assess range of motion  - Assess patient's mobility  - Assess patient's need for assistive devices and provide as appropriate  - Encourage maximum independence but intervene and supervise when necessary  - Involve family in performance of ADLs  - Assess for home care needs following discharge   - Consider OT consult to assist with ADL  evaluation and planning for discharge  - Provide patient education as appropriate  Outcome: Progressing  Goal: Maintain proper alignment of affected body part  Description: INTERVENTIONS:  - Support, maintain and protect limb and body alignment  - Provide patient/ family with appropriate education  Outcome: Progressing
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
## 2024-04-16 LAB
ANION GAP SERPL CALCULATED.3IONS-SCNC: 8 MMOL/L (ref 4–13)
BUN SERPL-MCNC: 16 MG/DL (ref 5–25)
CALCIUM SERPL-MCNC: 9.3 MG/DL (ref 8.4–10.2)
CHLORIDE SERPL-SCNC: 95 MMOL/L (ref 96–108)
CO2 SERPL-SCNC: 33 MMOL/L (ref 21–32)
CREAT SERPL-MCNC: 0.53 MG/DL (ref 0.6–1.3)
ERYTHROCYTE [DISTWIDTH] IN BLOOD BY AUTOMATED COUNT: 13 % (ref 11.6–15.1)
FLUAV RNA RESP QL NAA+PROBE: NEGATIVE
FLUBV RNA RESP QL NAA+PROBE: NEGATIVE
GFR SERPL CREATININE-BSD FRML MDRD: 100 ML/MIN/1.73SQ M
GLUCOSE SERPL-MCNC: 115 MG/DL (ref 65–140)
GLUCOSE SERPL-MCNC: 134 MG/DL (ref 65–140)
GLUCOSE SERPL-MCNC: 158 MG/DL (ref 65–140)
GLUCOSE SERPL-MCNC: 186 MG/DL (ref 65–140)
GLUCOSE SERPL-MCNC: 187 MG/DL (ref 65–140)
HCT VFR BLD AUTO: 33.5 % (ref 34.8–46.1)
HGB BLD-MCNC: 10.7 G/DL (ref 11.5–15.4)
MCH RBC QN AUTO: 28.5 PG (ref 26.8–34.3)
MCHC RBC AUTO-ENTMCNC: 31.9 G/DL (ref 31.4–37.4)
MCV RBC AUTO: 89 FL (ref 82–98)
PLATELET # BLD AUTO: 297 THOUSANDS/UL (ref 149–390)
PMV BLD AUTO: 10.1 FL (ref 8.9–12.7)
POTASSIUM SERPL-SCNC: 3.7 MMOL/L (ref 3.5–5.3)
RBC # BLD AUTO: 3.75 MILLION/UL (ref 3.81–5.12)
RSV RNA RESP QL NAA+PROBE: NEGATIVE
SARS-COV-2 RNA RESP QL NAA+PROBE: NEGATIVE
SODIUM SERPL-SCNC: 136 MMOL/L (ref 135–147)
WBC # BLD AUTO: 6.76 THOUSAND/UL (ref 4.31–10.16)

## 2024-04-16 PROCEDURE — 0241U HB NFCT DS VIR RESP RNA 4 TRGT: CPT | Performed by: INTERNAL MEDICINE

## 2024-04-16 PROCEDURE — 82948 REAGENT STRIP/BLOOD GLUCOSE: CPT

## 2024-04-16 PROCEDURE — 80048 BASIC METABOLIC PNL TOTAL CA: CPT | Performed by: INTERNAL MEDICINE

## 2024-04-16 PROCEDURE — 99232 SBSQ HOSP IP/OBS MODERATE 35: CPT | Performed by: INTERNAL MEDICINE

## 2024-04-16 PROCEDURE — 85027 COMPLETE CBC AUTOMATED: CPT | Performed by: INTERNAL MEDICINE

## 2024-04-16 PROCEDURE — 97116 GAIT TRAINING THERAPY: CPT

## 2024-04-16 RX ADMIN — OXYCODONE HYDROCHLORIDE AND ACETAMINOPHEN 1 TABLET: 5; 325 TABLET ORAL at 15:15

## 2024-04-16 RX ADMIN — PANTOPRAZOLE SODIUM 40 MG: 40 TABLET, DELAYED RELEASE ORAL at 05:20

## 2024-04-16 RX ADMIN — AMLODIPINE BESYLATE 5 MG: 5 TABLET ORAL at 09:08

## 2024-04-16 RX ADMIN — ATORVASTATIN CALCIUM 80 MG: 80 TABLET, FILM COATED ORAL at 09:08

## 2024-04-16 RX ADMIN — DIPHENHYDRAMINE HYDROCHLORIDE 25 MG: 25 TABLET ORAL at 21:27

## 2024-04-16 RX ADMIN — APIXABAN 2.5 MG: 2.5 TABLET, FILM COATED ORAL at 17:13

## 2024-04-16 RX ADMIN — DOCUSATE SODIUM 100 MG: 100 CAPSULE, LIQUID FILLED ORAL at 09:07

## 2024-04-16 RX ADMIN — DOCUSATE SODIUM 100 MG: 100 CAPSULE, LIQUID FILLED ORAL at 17:13

## 2024-04-16 RX ADMIN — GABAPENTIN 400 MG: 400 CAPSULE ORAL at 09:08

## 2024-04-16 RX ADMIN — INSULIN GLARGINE 50 UNITS: 100 INJECTION, SOLUTION SUBCUTANEOUS at 21:27

## 2024-04-16 RX ADMIN — OXYCODONE HYDROCHLORIDE AND ACETAMINOPHEN 1 TABLET: 5; 325 TABLET ORAL at 19:35

## 2024-04-16 RX ADMIN — RANOLAZINE 500 MG: 500 TABLET, FILM COATED, EXTENDED RELEASE ORAL at 17:13

## 2024-04-16 RX ADMIN — INSULIN LISPRO 1 UNITS: 100 INJECTION, SOLUTION INTRAVENOUS; SUBCUTANEOUS at 11:55

## 2024-04-16 RX ADMIN — ASPIRIN 81 MG 81 MG: 81 TABLET ORAL at 09:08

## 2024-04-16 RX ADMIN — INSULIN GLARGINE 50 UNITS: 100 INJECTION, SOLUTION SUBCUTANEOUS at 09:08

## 2024-04-16 RX ADMIN — APIXABAN 2.5 MG: 2.5 TABLET, FILM COATED ORAL at 09:08

## 2024-04-16 RX ADMIN — GABAPENTIN 400 MG: 400 CAPSULE ORAL at 15:15

## 2024-04-16 RX ADMIN — LOSARTAN POTASSIUM 100 MG: 50 TABLET, FILM COATED ORAL at 09:08

## 2024-04-16 RX ADMIN — FUROSEMIDE 40 MG: 40 TABLET ORAL at 09:07

## 2024-04-16 RX ADMIN — METOPROLOL SUCCINATE 100 MG: 50 TABLET, EXTENDED RELEASE ORAL at 09:07

## 2024-04-16 RX ADMIN — GABAPENTIN 400 MG: 400 CAPSULE ORAL at 21:28

## 2024-04-16 RX ADMIN — RANOLAZINE 500 MG: 500 TABLET, FILM COATED, EXTENDED RELEASE ORAL at 09:08

## 2024-04-16 RX ADMIN — OXYCODONE HYDROCHLORIDE AND ACETAMINOPHEN 1 TABLET: 5; 325 TABLET ORAL at 10:56

## 2024-04-16 NOTE — CASE MANAGEMENT
Case Management Discharge Planning Note    Patient name Ines Ivy  Location /-01 MRN [id number]  : 1960 Date 2024       Current Admission Date: 2024  Current Admission Diagnosis:Ambulatory dysfunction   Patient Active Problem List    Diagnosis Date Noted    S/P TKR (total knee replacement), right 2024    Ambulatory dysfunction 2024    Class 1 obesity due to excess calories with serious comorbidity and body mass index (BMI) of 32.0 to 32.9 in adult 2024    Chronic diastolic (congestive) heart failure (HCC) 2023    HNP (herniated nucleus pulposus), lumbar 10/10/2022    S/P drug eluting coronary stent placement 2021    Former smoker 2019    Lumbar radiculopathy 2019    Thyroid nodule 2019    Post-menopausal 2019    Other specified glaucoma 2019    Other age-related cataract 2019    History of breast cancer 2019    Coronary artery disease involving native coronary artery of native heart without angina pectoris 08/15/2018    NSTEMI (non-ST elevated myocardial infarction) (HCC) 2018    Primary open angle glaucoma (POAG) of both eyes 12/15/2017    Primary osteoarthritis involving multiple joints 2016    Neuropathy 2014    Type 2 diabetes mellitus with diabetic polyneuropathy, with long-term current use of insulin (HCC) 2014    Essential hypertension 2014    Mixed hyperlipidemia 2014      LOS (days): 3  Geometric Mean LOS (GMLOS) (days):   Days to GMLOS:     OBJECTIVE:  Risk of Unplanned Readmission Score: 12.13         Current admission status: Inpatient   Preferred Pharmacy:   [id number]  [place] - [address]  [address] [address]  Phone: [phone] Fax: [phone]    [facility]/pharmacy #[id number] - [place], [place] - [address]  [place] [address]  Phone: [phone] Fax:  [phone]    Primary Care Provider: Jorge L Najera DO    Primary Insurance: BLUE CROSS  Secondary Insurance:     DISCHARGE DETAILS:    Discharge planning discussed with:: patient  Freedom of Choice: Yes  Comments - Freedom of Choice: rehab recommended - pt's choice was -[facility]  authorization was received                               Other Referral/Resources/Interventions Provided:  Interventions: Short Term Rehab  Referral Comments: pt accepted to [facility] spouse & pt's choice- auth received - pt needs covid/rsv/flu test - report needs to be called & avs faxed-  pt is cleared to be d/c tomorrow - snf can accept tomorrow-1 2:30pm w/c santy-  rn in agreement with the mode of transport - pt is aware there is a fee    Would you like to participate in our [facility] service program?  : No - Declined    Treatment Team Recommendation: Short Term Rehab (-TCF auth received- 12:30pm w/c santy 4/17/24 [place])
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
## 2024-04-16 NOTE — UTILIZATION REVIEW
Continued Stay Review    Date: 4/15/24                          Current Patient Class: IP  Current Level of Care: Med Surg    HPI:64 y.o. female initially admitted on 4/13     Assessment/Plan: PT/OT recommending rehab post discharge. CM on consult-referrals placed. Continue Eliquis, lasix, aspirin, statin, metoprolol, losartan, ranolazine, amlodipine, hold Wegovy and Jardiance, continue SSI.     Vital Signs:     Date/Time Temp Pulse Resp BP MAP (mmHg) SpO2 O2 Device   04/16/24 12:57:53 98.3 °F (36.8 °C) 72 19 110/63 79 95 % None (Room air)   04/16/24 0918 -- -- -- -- -- 96 % None (Room air)   04/16/24 07:08:28 98 °F (36.7 °C) 77 -- 151/75 100 95 % --   04/15/24 21:10:19 98.3 °F (36.8 °C) 72 18 136/61 86 93 % --   04/15/24 14:49:40 97.9 °F (36.6 °C) 77 18 158/71 100 96 % --   04/15/24 0900 -- -- -- -- -- 98 % None (Room air)   04/15/24 07:33:08 98.3 °F (36.8 °C) 73 18 140/66 91 96 % --   04/14/24 21:39:07 98.1 °F (36.7 °C) 71 18 132/68 89 97 % --   04/14/24 15:14:39 98.3 °F (36.8 °C) 76 18 138/68 91 99 % --       Pertinent Labs/Diagnostic Results:       Results from last 7 days   Lab Units 04/14/24  0543 04/13/24  1501   WBC Thousand/uL 6.84 6.63   HEMOGLOBIN g/dL 10.6* 10.6*   HEMATOCRIT % 32.9* 32.3*   PLATELETS Thousands/uL 252 230   TOTAL NEUT ABS Thousands/µL  --  4.24         Results from last 7 days   Lab Units 04/14/24  0543 04/13/24  1451   SODIUM mmol/L 140 135   POTASSIUM mmol/L 3.7 4.8   CHLORIDE mmol/L 99 98   CO2 mmol/L 33* 26   ANION GAP mmol/L 8 11   BUN mg/dL 12 14   CREATININE mg/dL 0.50* 0.54*   EGFR ml/min/1.73sq m 102 100   CALCIUM mg/dL 9.5 9.2     Results from last 7 days   Lab Units 04/13/24  1451   AST U/L 34   ALT U/L 12   ALK PHOS U/L 57   TOTAL PROTEIN g/dL 7.3   ALBUMIN g/dL 4.0   TOTAL BILIRUBIN mg/dL 1.34*     Results from last 7 days   Lab Units 04/16/24  1055 04/16/24  0643 04/15/24  2057 04/15/24  1629 04/15/24  1103 04/15/24  0703 04/14/24  2135 04/14/24  1625 04/14/24  1105  04/14/24  0720 04/13/24 2012   POC GLUCOSE mg/dl 187* 115 234* 168* 157* 122 165* 114 184* 125 172*     Results from last 7 days   Lab Units 04/14/24  0543 04/13/24  1451   GLUCOSE RANDOM mg/dL 116 160*         Medications:   Scheduled Medications:  amLODIPine, 5 mg, Oral, Daily  apixaban, 2.5 mg, Oral, BID  aspirin, 81 mg, Oral, Daily  atorvastatin, 80 mg, Oral, Daily  docusate sodium, 100 mg, Oral, BID  furosemide, 40 mg, Oral, Daily  gabapentin, 400 mg, Oral, TID  insulin glargine, 50 Units, Subcutaneous, Q12H TEJAL  insulin lispro, 1-6 Units, Subcutaneous, TID AC  losartan, 100 mg, Oral, Daily  metoprolol succinate, 100 mg, Oral, Daily  pantoprazole, 40 mg, Oral, Early Morning  polyethylene glycol, 17 g, Oral, Daily  ranolazine, 500 mg, Oral, BID      Continuous IV Infusions:     PRN Meds:  diphenhydrAMINE, 25 mg, Oral, HS PRN  ondansetron, 4 mg, Intravenous, Q8H PRN  oxyCODONE-acetaminophen, 1 tablet, Oral, Q4H PRN        Discharge Plan: D    Network Utilization Review Department  ATTENTION: Please call with any questions or concerns to [phone] and carefully listen to the prompts so that you are directed to the right person. All voicemails are confidential.   For Discharge needs, contact Care Management DC Support Team at [phone] opt. 2  Send all requests for admission clinical reviews, approved or denied determinations and any other requests to dedicated fax number below belonging to the [place] where the patient is receiving treatment. List of dedicated fax numbers for the Facilities:  FACILITY NAME UR FAX NUMBER   ADMISSION DENIALS (Administrative/Medical Necessity) [phone]   DISCHARGE SUPPORT TEAM (NETWORK) [phone]   PARENT CHILD HEALTH (Maternity/NICU/Pediatrics) [phone]   [facility] [phone]   [facility] [phone]   [facility] [phone]   [facility] [phone]    [facility] [phone]   [facility] [phone]   [facility] [phone]   [facility] [phone]   [facility] [phone]   [facility] [phone]   [facility] [phone]   [facility] [phone]

## 2024-04-16 NOTE — PROGRESS NOTES
Patient:    MRN:  [id number]    Enriqueta Request ID:  [id number]    Level of care reserved:  Skilled Nursing Facility    Partner Reserved:  [facility], [place] [id number] [phone]    Clinical needs requested:    Geography searched:  30 miles around [address]    Start of Service:    Request sent:  6:11pm EDT on 4/15/2024 by Roxy Ma    Partner reserved:  10:41am EDT on 4/16/2024 by Roxy Ma    Choice list shared:  10:07am EDT on 4/16/2024 by Roxy Ma

## 2024-04-16 NOTE — PLAN OF CARE
Problem: PHYSICAL THERAPY ADULT  Goal: Performs mobility at highest level of function for planned discharge setting.  See evaluation for individualized goals.  Description: Treatment/Interventions: Functional transfer training, Therapeutic exercise, Endurance training, Gait training, Bed mobility, Equipment eval/education  Equipment Recommended: Walker       See flowsheet documentation for full assessment, interventions and recommendations.  Outcome: Progressing  Note: Prognosis: Good  Problem List: Decreased strength, Decreased endurance, Impaired balance, Decreased range of motion, Decreased mobility, Pain  Assessment: Pt motivated to participate.  Limited by pain in the R knee, decreased R knee flexion, and R knee edema.  Pt was able to complete transfers with S. Increased gait distance today with pt gait training 250' with the RW with CGA to S.  Pt slow with gait with decreased R knee flexion throughout the gait cycle. Pt gait training with step to gait pattern due to pain and decreased ROM in the R knee. Pt deferred steps at this time due to fatigue.  She reports that she does not feel safe returning home due to her the need to complete multiple steps to access main living area.  Pt's  is disabled and can provide only minimal assist. Pt reports that due to decreased R knee ROM she was unable to get into a vehicle to travel to outpatient PT.  She will benefit from continued PT to progress R knee ROM and strengthening, I with gait, transfers, steps, improve balance, and increase overall safety in order to maximize function and decrease risk for falls. The patient's [facility] Basic Mobility Inpatient Short Form Raw Score is 20. A Raw score of greater than 16 suggests the patient may benefit from discharge to home, however, due to decreased safety at home, pt will benefit from moderate intensity therapy to maximize function. Please also refer to the recommendation of the Physical Therapist for safe discharge  planning.  Barriers to Discharge: Decreased caregiver support, Inaccessible home environment     Rehab Resource Intensity Level, PT: II (Moderate Resource Intensity)    See flowsheet documentation for full assessment.

## 2024-04-16 NOTE — PROGRESS NOTES
Patient:    MRN:  [id number]    Enriqueta Request ID:  [id number]    Level of care reserved:  Skilled Nursing Facility    Partner Reserved:  [facility], [place] [id number] [phone]    Clinical needs requested:    Geography searched:  30 miles around [address]    Start of Service:    Request sent:  6:11pm EDT on 4/15/2024 by Royx Ma    Partner reserved:  10:41am EDT on 4/16/2024 by Roxy Ma    Choice list shared:  10:07am EDT on 4/16/2024 by Roxy Ma

## 2024-04-16 NOTE — CASE MANAGEMENT
[facility] has received APPROVED authorization.  Insurance:   [employer] [place]  Authorization received for: SNF  Facility: [facility]   Authorization #: [id number]  Start of Care: 4/16  Next Review Date: 4/22  Continued Stay Care Coordinator: none given  Submit next review to: [phone]      Care Manager notified: Roxy Ma

## 2024-04-16 NOTE — PLAN OF CARE
Problem: PAIN - ADULT  Goal: Verbalizes/displays adequate comfort level or baseline comfort level  Description: Interventions:  - Encourage patient to monitor pain and request assistance  - Assess pain using appropriate pain scale  - Administer analgesics based on type and severity of pain and evaluate response  - Implement non-pharmacological measures as appropriate and evaluate response  - Consider cultural and social influences on pain and pain management  - Notify physician/advanced practitioner if interventions unsuccessful or patient reports new pain  Outcome: Progressing     Problem: INFECTION - ADULT  Goal: Absence or prevention of progression during hospitalization  Description: INTERVENTIONS:  - Assess and monitor for signs and symptoms of infection  - Monitor lab/diagnostic results  - Monitor all insertion sites, i.e. indwelling lines, tubes, and drains  - Monitor endotracheal if appropriate and nasal secretions for changes in amount and color  - [place] appropriate cooling/warming therapies per order  - Administer medications as ordered  - Instruct and encourage patient and family to use good hand hygiene technique  - Identify and instruct in appropriate isolation precautions for identified infection/condition  Outcome: Progressing  Goal: Absence of fever/infection during neutropenic period  Description: INTERVENTIONS:  - Monitor WBC    Outcome: Progressing     Problem: SAFETY ADULT  Goal: Patient will remain free of falls  Description: INTERVENTIONS:  - Educate patient/family on patient safety including physical limitations  - Instruct patient to call for assistance with activity   - Consult OT/PT to assist with strengthening/mobility   - Keep Call bell within reach  - Keep bed low and locked with side rails adjusted as appropriate  - Keep care items and personal belongings within reach  - Initiate and maintain comfort rounds  - Make Fall Risk Sign visible to staff  - Offer Toileting every 2 Hours,  in advance of need  - Obtain necessary fall risk management equipment: nonskid socks  - Apply yellow socks and bracelet for high fall risk patients  - Consider moving patient to room near nurses station  Outcome: Progressing  Goal: Maintain or return to baseline ADL function  Description: INTERVENTIONS:  -  Assess patient's ability to carry out ADLs; assess patient's baseline for ADL function and identify physical deficits which impact ability to perform ADLs (bathing, care of mouth/teeth, toileting, grooming, dressing, etc.)  - Assess/evaluate cause of self-care deficits   - Assess range of motion  - Assess patient's mobility; develop plan if impaired  - Assess patient's need for assistive devices and provide as appropriate  - Encourage maximum independence but intervene and supervise when necessary  - Involve family in performance of ADLs  - Assess for home care needs following discharge   - Consider OT consult to assist with ADL evaluation and planning for discharge  - Provide patient education as appropriate  Outcome: Progressing  Goal: Maintains/Returns to pre admission functional level  Description: INTERVENTIONS:  - Perform AM-PAC 6 Click Basic Mobility/ Daily Activity assessment daily.  - Set and communicate daily mobility goal to care team and patient/family/caregiver.   - Collaborate with rehabilitation services on mobility goals if consulted  - Perform Range of Motion 3 times a day.  - Reposition patient every 2 hours.  - Dangle patient 3 times a day  - Stand patient 3 times a day  - Ambulate patient 3 times a day  - Out of bed to chair 3 times a day   - Out of bed for meals 3 times a day  - Out of bed for toileting  - Record patient progress and toleration of activity level   Outcome: Progressing     Problem: DISCHARGE PLANNING  Goal: Discharge to home or other facility with appropriate resources  Description: INTERVENTIONS:  - Identify barriers to discharge w/patient and caregiver  - Arrange for needed  discharge resources and transportation as appropriate  - Identify discharge learning needs (meds, wound care, etc.)  - Arrange for interpretive services to assist at discharge as needed  - Refer to Case Management Department for coordinating discharge planning if the patient needs post-hospital services based on physician/advanced practitioner order or complex needs related to functional status, cognitive ability, or social support system  Outcome: Progressing     Problem: Knowledge Deficit  Goal: Patient/family/caregiver demonstrates understanding of disease process, treatment plan, medications, and discharge instructions  Description: Complete learning assessment and assess knowledge base.  Interventions:  - Provide teaching at level of understanding  - Provide teaching via preferred learning methods  Outcome: Progressing     Problem: SKIN/TISSUE INTEGRITY - ADULT  Goal: Incision(s), wounds(s) or drain site(s) healing without S/S of infection  Description: INTERVENTIONS  - Assess and document dressing, incision, wound bed, drain sites and surrounding tissue  - Provide patient and family education  - Perform skin care/dressing changes every shift  Outcome: Progressing     Problem: MUSCULOSKELETAL - ADULT  Goal: Maintain or return mobility to safest level of function  Description: INTERVENTIONS:  - Assess patient's ability to carry out ADLs; assess patient's baseline for ADL function and identify physical deficits which impact ability to perform ADLs (bathing, care of mouth/teeth, toileting, grooming, dressing, etc.)  - Assess/evaluate cause of self-care deficits   - Assess range of motion  - Assess patient's mobility  - Assess patient's need for assistive devices and provide as appropriate  - Encourage maximum independence but intervene and supervise when necessary  - Involve family in performance of ADLs  - Assess for home care needs following discharge   - Consider OT consult to assist with ADL evaluation and  planning for discharge  - Provide patient education as appropriate  Outcome: Progressing  Goal: Maintain proper alignment of affected body part  Description: INTERVENTIONS:  - Support, maintain and protect limb and body alignment  - Provide patient/ family with appropriate education  Outcome: Progressing

## 2024-04-16 NOTE — CASE MANAGEMENT
Case Management Discharge Planning Note    Patient name Ines Ivy  Location /-01 MRN [id number]  : 1960 Date 2024       Current Admission Date: 2024  Current Admission Diagnosis:Ambulatory dysfunction   Patient Active Problem List    Diagnosis Date Noted    S/P TKR (total knee replacement), right 2024    Ambulatory dysfunction 2024    Class 1 obesity due to excess calories with serious comorbidity and body mass index (BMI) of 32.0 to 32.9 in adult 2024    Chronic diastolic (congestive) heart failure (HCC) 2023    HNP (herniated nucleus pulposus), lumbar 10/10/2022    S/P drug eluting coronary stent placement 2021    Former smoker 2019    Lumbar radiculopathy 2019    Thyroid nodule 2019    Post-menopausal 2019    Other specified glaucoma 2019    Other age-related cataract 2019    History of breast cancer 2019    Coronary artery disease involving native coronary artery of native heart without angina pectoris 08/15/2018    NSTEMI (non-ST elevated myocardial infarction) (HCC) 2018    Primary open angle glaucoma (POAG) of both eyes 12/15/2017    Primary osteoarthritis involving multiple joints 2016    Neuropathy 2014    Type 2 diabetes mellitus with diabetic polyneuropathy, with long-term current use of insulin (HCC) 2014    Essential hypertension 2014    Mixed hyperlipidemia 2014      LOS (days): 3  Geometric Mean LOS (GMLOS) (days):   Days to GMLOS:     OBJECTIVE:  Risk of Unplanned Readmission Score: 12.1         Current admission status: Inpatient   Preferred Pharmacy:   [id number]  [place] - [address]  [address] [address]  Phone: [phone] Fax: [phone]    [facility]/pharmacy #[id number] - [place] - [address]  [place] [address]  Phone: [phone] Fax:  [phone]    Primary Care Provider: Jorge L Najera DO    Primary Insurance: BLUE CROSS  Secondary Insurance:     DISCHARGE DETAILS:                                                                                                               Facility Insurance Auth Number: [id number]

## 2024-04-16 NOTE — ASSESSMENT & PLAN NOTE
Recently underwent rt TKR at [facility] and was discharged on next day.  Patient presented to ED with ambulatory dysfunction as she could not take care of herself.  Also she stated  her  is wheelchair-bound and cannot help her as well.    See plan for ambulatory dysfunction  Eliquis 2.5 twice daily for DVT prophylaxis

## 2024-04-16 NOTE — PROGRESS NOTES
Formerly [facility]  Progress Note  Name: Ines Ivy I  MRN: [id number]  Unit/Bed#: -01 I Date of Admission: 4/13/2024   Date of Service: 4/16/2024 I Hospital Day: 3    Assessment/Plan   * Ambulatory dysfunction  Assessment & Plan  Patient presented for not being able to take care of herself after getting recent right TKR.   is wheelchair-bound and cannot help her with ADLs.  Patient complaining of right leg pain after surgery and poor ambulation.  Does not feel comfortable at home.  Presented with right leg numbness and pain which started after the surgery.  Normal neurological exam.  CTA lower extremity runoff showing patent vessels.  Doppler studies unremarkable.  X-ray showing no acute finding.  Vitals otherwise stable.    PT OT  Recommending rehab  Case management consulted  DVT prophylaxis  Percocet as needed  Bowel regimen in place    S/P TKR (total knee replacement), right  Assessment & Plan  Recently underwent rt TKR at [facility] and was discharged on next day.  Patient presented to ED with ambulatory dysfunction as she could not take care of herself.  Also she stated  her  is wheelchair-bound and cannot help her as well.    See plan for ambulatory dysfunction  Eliquis 2.5 twice daily for DVT prophylaxis    Chronic diastolic (congestive) heart failure (HCC)  Assessment & Plan  Currently appearing euvolemic.  Continue Lasix.  Daily weight, I's and O's, low-salt diet.    Coronary artery disease involving native coronary artery of native heart without angina pectoris  Assessment & Plan  Continue aspirin, statin, metoprolol, Jardiance, Lasix, losartan, ranolazine    NSTEMI (non-ST elevated myocardial infarction) (HCC)  Assessment & Plan  History of CAD s/p PCI.  Following with cardiology.  Continue aspirin, statin, Lasix, losartan, metoprolol, ranolazine.    Essential hypertension  Assessment & Plan  Continue metoprolol, losartan, Lasix,  amlodipine    Type 2 diabetes mellitus with diabetic polyneuropathy, with long-term current use of insulin ([facility])  Assessment & Plan  Lab Results   Component Value Date    HGBA1C 6.9 (H) 2024     Hold Wegovy and Jardiance.  Continue sliding scale coverage.  Hypoglycemic protocol.  Carbohydrate controlled diet.             VTE Pharmacologic Prophylaxis: VTE Score: 3 Moderate Risk (Score 3-4) - Pharmacological DVT Prophylaxis Ordered: apixaban (Eliquis).    Mobility:   Basic Mobility Inpatient Raw Score: 20  JH-HLM Goal: 6: Walk 10 steps or more  JH-HLM Achieved: 8: Walk 250 feet ot more  JH-HLM Goal achieved. Continue to encourage appropriate mobility.    Patient Centered Rounds: I performed bedside rounds with nursing staff today.   Discussions with Specialists or Other Care Team Provider: yes    Education and Discussions with Family / Patient: Updated  () at bedside.    Current Length of Stay: 3 day(s)  Current Patient Status: Inpatient   Certification Statement: The patient will continue to require additional inpatient hospital stay due to pending placement  Discharge Plan: Anticipate discharge in 24-48 hrs to rehab facility.    Code Status: Level 1 - Full Code    Subjective:   No overnight events noted.  Patient states her right lower extremity pain is improving.  Mobility is improving    Objective:     Vitals:   Temp (24hrs), Av.1 °F (36.7 °C), Min:97.9 °F (36.6 °C), Max:98.3 °F (36.8 °C)    Temp:  [97.9 °F (36.6 °C)-98.3 °F (36.8 °C)] 98.3 °F (36.8 °C)  HR:  [72-77] 72  Resp:  [18-19] 19  BP: (110-158)/(61-75) 110/63  SpO2:  [93 %-96 %] 95 %  Body mass index is 33.44 kg/m².     Input and Output Summary (last 24 hours):     Intake/Output Summary (Last 24 hours) at 2024 1442  Last data filed at 2024 1100  Gross per 24 hour   Intake 1080 ml   Output --   Net 1080 ml       Physical Exam:   Physical Exam  Constitutional:       General: She is not in acute distress.  HENT:       Head: Normocephalic and atraumatic.      Nose: Nose normal.      Mouth/Throat:      Mouth: Mucous membranes are moist.   Eyes:      Extraocular Movements: Extraocular movements intact.      Conjunctiva/sclera: Conjunctivae normal.   Cardiovascular:      Rate and Rhythm: Normal rate and regular rhythm.   Pulmonary:      Effort: Pulmonary effort is normal. No respiratory distress.   Abdominal:      Palpations: Abdomen is soft.      Tenderness: There is no abdominal tenderness.   Musculoskeletal:         General: Normal range of motion.      Cervical back: Normal range of motion and neck supple.      Comments: Right lower extremity mobility improving   Skin:     General: Skin is warm and dry.      Findings: Bruising present.   Neurological:      General: No focal deficit present.      Mental Status: She is alert. Mental status is at baseline.      Cranial Nerves: No cranial nerve deficit.   Psychiatric:         Mood and Affect: Mood normal.         Behavior: Behavior normal.          Additional Data:     Labs:  Results from last 7 days   Lab Units 04/14/24  0543 04/13/24  1501   WBC Thousand/uL 6.84 6.63   HEMOGLOBIN g/dL 10.6* 10.6*   HEMATOCRIT % 32.9* 32.3*   PLATELETS Thousands/uL 252 230   SEGS PCT %  --  64   LYMPHO PCT %  --  22   MONO PCT %  --  11   EOS PCT %  --  3     Results from last 7 days   Lab Units 04/14/24  0543 04/13/24  1451   SODIUM mmol/L 140 135   POTASSIUM mmol/L 3.7 4.8   CHLORIDE mmol/L 99 98   CO2 mmol/L 33* 26   BUN mg/dL 12 14   CREATININE mg/dL 0.50* 0.54*   ANION GAP mmol/L 8 11   CALCIUM mg/dL 9.5 9.2   ALBUMIN g/dL  --  4.0   TOTAL BILIRUBIN mg/dL  --  1.34*   ALK PHOS U/L  --  57   ALT U/L  --  12   AST U/L  --  34   GLUCOSE RANDOM mg/dL 116 160*         Results from last 7 days   Lab Units 04/16/24  1055 04/16/24  0643 04/15/24  2057 04/15/24  1629 04/15/24  1103 04/15/24  0703 04/14/24  2135 04/14/24  1625 04/14/24  1105 04/14/24  0720 04/13/24 2012   POC GLUCOSE mg/dl 187* 115 234*  168* 157* 122 165* 114 184* 125 172*               Lines/Drains:  Invasive Devices       Peripheral Intravenous Line  Duration             Peripheral IV 04/13/24 Dorsal (posterior);Right Forearm 2 days                    Imaging: No pertinent imaging reviewed.    Recent Cultures (last 7 days):         Last 24 Hours Medication List:   Current Facility-Administered Medications   Medication Dose Route Frequency Provider Last Rate    amLODIPine  5 mg Oral Daily Mary Rothman MD      apixaban  2.5 mg Oral BID Mary Rothman MD      aspirin  81 mg Oral Daily Mary Rothman MD      atorvastatin  80 mg Oral Daily Mary Rothman MD      diphenhydrAMINE  25 mg Oral HS PRN Jose Antonio Grady PA-C      docusate sodium  100 mg Oral BID Mary Rothman MD      furosemide  40 mg Oral Daily Mary Rothman MD      gabapentin  400 mg Oral TID Mary Rothman MD      insulin glargine  50 Units Subcutaneous Q12H TEJAL Mary Rothman MD      insulin lispro  1-6 Units Subcutaneous TID AC Mary Rothman MD      losartan  100 mg Oral Daily Mary Rothman MD      metoprolol succinate  100 mg Oral Daily Mary Rothman MD      ondansetron  4 mg Intravenous Q8H PRN Mary Rothman MD      oxyCODONE-acetaminophen  1 tablet Oral Q4H PRN Mary Rothman MD      pantoprazole  40 mg Oral Early Morning Mary Rothman MD      polyethylene glycol  17 g Oral Daily Mary Rothman MD      ranolazine  500 mg Oral BID Mary Rothman MD          Today, Patient Was Seen By: Dustin Sotelo MD    **Please Note: This note may have been constructed using a voice recognition system.**

## 2024-04-16 NOTE — CASE MANAGEMENT
Case Management Discharge Planning Note    Patient name Ines Ivy  Location /-01 MRN [id number]  : 1960 Date 2024       Current Admission Date: 2024  Current Admission Diagnosis:Ambulatory dysfunction   Patient Active Problem List    Diagnosis Date Noted    S/P TKR (total knee replacement), right 2024    Ambulatory dysfunction 2024    Class 1 obesity due to excess calories with serious comorbidity and body mass index (BMI) of 32.0 to 32.9 in adult 2024    Chronic diastolic (congestive) heart failure (HCC) 2023    HNP (herniated nucleus pulposus), lumbar 10/10/2022    S/P drug eluting coronary stent placement 2021    Former smoker 2019    Lumbar radiculopathy 2019    Thyroid nodule 2019    Post-menopausal 2019    Other specified glaucoma 2019    Other age-related cataract 2019    History of breast cancer 2019    Coronary artery disease involving native coronary artery of native heart without angina pectoris 08/15/2018    NSTEMI (non-ST elevated myocardial infarction) (HCC) 2018    Primary open angle glaucoma (POAG) of both eyes 12/15/2017    Primary osteoarthritis involving multiple joints 2016    Neuropathy 2014    Type 2 diabetes mellitus with diabetic polyneuropathy, with long-term current use of insulin (HCC) 2014    Essential hypertension 2014    Mixed hyperlipidemia 2014      LOS (days): 3  Geometric Mean LOS (GMLOS) (days):   Days to GMLOS:     OBJECTIVE:  Risk of Unplanned Readmission Score: 12.07         Current admission status: Inpatient   Preferred Pharmacy:   [id number]  [place] - [address]  [address] [address]  Phone: [phone] Fax: [phone]    [facility] #[id number] - [place], [place] - [address]  [place] [address]  Phone: [phone] Fax:  [phone]    Primary Care Provider: Jorge L Najera DO    Primary Insurance: BLUE CROSS  Secondary Insurance:     DISCHARGE DETAILS:    Discharge planning discussed with:: patietn & spouse via speaker phone  Freedom of Choice: Yes  Comments - Freedom of Choice: recomenndation is for morderate level- pt was given the list of snf rehabs and she is aware that ARU accepted - spouse & pt's choice is for [facility]- sent for auth  CM contacted family/caregiver?: Yes             Contacts  Patient Contacts: Jorge L,   Relationship to Patient:: Family  Contact Method: Phone  Phone Number: pt's cell phone via speaker phone  Reason/Outcome: Discharge Planning    Requested Home Health Care         Is the patient interested in HHC at discharge?: No    DME Referral Provided  Referral made for DME?: No    Other Referral/Resources/Interventions Provided:  Interventions: Short Term Rehab  Referral Comments: pt accepted to [facility] pt's choice-   sent for auth via ariel to CM support team    Would you like to participate in our [facility] service program?  : No - Declined    Treatment Team Recommendation: Short Term Rehab ([facility]- [facility] need auth - TBD)

## 2024-04-16 NOTE — CASE MANAGEMENT
[facility] Support Center received request for authorization from Care Manager.  Authorization request for: SNF  Facility Name: [facility]  NPI: [id number]  Facility MD:  Neo Martinez   NPI: [id number]  Authorization initiated by contacting insurance:  SAMMIE VIRAMONTES  Via: Fax  Clinicals submitted via: fax #  [phone]    Care Manager notified:  Roxy Ma

## 2024-04-17 ENCOUNTER — TRANSITIONAL CARE MANAGEMENT (OUTPATIENT)
Dept: INTERNAL MEDICINE CLINIC | Facility: CLINIC | Age: 64
End: 2024-04-17

## 2024-04-17 VITALS
HEIGHT: 70 IN | BODY MASS INDEX: 32.63 KG/M2 | TEMPERATURE: 98.3 F | OXYGEN SATURATION: 96 % | WEIGHT: 227.96 LBS | DIASTOLIC BLOOD PRESSURE: 64 MMHG | RESPIRATION RATE: 18 BRPM | HEART RATE: 69 BPM | SYSTOLIC BLOOD PRESSURE: 141 MMHG

## 2024-04-17 LAB
GLUCOSE SERPL-MCNC: 105 MG/DL (ref 65–140)
GLUCOSE SERPL-MCNC: 158 MG/DL (ref 65–140)

## 2024-04-17 PROCEDURE — 99239 HOSP IP/OBS DSCHRG MGMT >30: CPT | Performed by: INTERNAL MEDICINE

## 2024-04-17 PROCEDURE — 82948 REAGENT STRIP/BLOOD GLUCOSE: CPT

## 2024-04-17 RX ORDER — OXYCODONE HYDROCHLORIDE 5 MG/1
5 TABLET ORAL EVERY 6 HOURS PRN
Qty: 10 TABLET | Refills: 0 | Status: SHIPPED | OUTPATIENT
Start: 2024-04-17 | End: 2024-04-27

## 2024-04-17 RX ORDER — OXYCODONE HYDROCHLORIDE 5 MG/1
5 TABLET ORAL EVERY 6 HOURS PRN
Qty: 10 TABLET | Refills: 0 | Status: SHIPPED | OUTPATIENT
Start: 2024-04-17 | End: 2024-04-17

## 2024-04-17 RX ADMIN — OXYCODONE HYDROCHLORIDE AND ACETAMINOPHEN 1 TABLET: 5; 325 TABLET ORAL at 05:25

## 2024-04-17 RX ADMIN — DOCUSATE SODIUM 100 MG: 100 CAPSULE, LIQUID FILLED ORAL at 08:28

## 2024-04-17 RX ADMIN — LOSARTAN POTASSIUM 100 MG: 50 TABLET, FILM COATED ORAL at 08:35

## 2024-04-17 RX ADMIN — APIXABAN 2.5 MG: 2.5 TABLET, FILM COATED ORAL at 08:35

## 2024-04-17 RX ADMIN — INSULIN GLARGINE 50 UNITS: 100 INJECTION, SOLUTION SUBCUTANEOUS at 08:34

## 2024-04-17 RX ADMIN — AMLODIPINE BESYLATE 5 MG: 5 TABLET ORAL at 08:35

## 2024-04-17 RX ADMIN — METOPROLOL SUCCINATE 100 MG: 50 TABLET, EXTENDED RELEASE ORAL at 08:35

## 2024-04-17 RX ADMIN — INSULIN LISPRO 1 UNITS: 100 INJECTION, SOLUTION INTRAVENOUS; SUBCUTANEOUS at 11:58

## 2024-04-17 RX ADMIN — GABAPENTIN 400 MG: 400 CAPSULE ORAL at 08:35

## 2024-04-17 RX ADMIN — FUROSEMIDE 40 MG: 40 TABLET ORAL at 08:35

## 2024-04-17 RX ADMIN — RANOLAZINE 500 MG: 500 TABLET, FILM COATED, EXTENDED RELEASE ORAL at 08:35

## 2024-04-17 RX ADMIN — ATORVASTATIN CALCIUM 80 MG: 80 TABLET, FILM COATED ORAL at 08:35

## 2024-04-17 RX ADMIN — OXYCODONE HYDROCHLORIDE AND ACETAMINOPHEN 1 TABLET: 5; 325 TABLET ORAL at 11:14

## 2024-04-17 RX ADMIN — ASPIRIN 81 MG 81 MG: 81 TABLET ORAL at 08:35

## 2024-04-17 RX ADMIN — PANTOPRAZOLE SODIUM 40 MG: 40 TABLET, DELAYED RELEASE ORAL at 05:25

## 2024-04-17 NOTE — ASSESSMENT & PLAN NOTE
Recently underwent rt TKR at [facility] and was discharged on next day.  Patient presented to ED with ambulatory dysfunction as she could not take care of herself.  Also she stated  her  is wheelchair-bound and cannot help her as well.    See plan for ambulatory dysfunction  Eliquis 2.5 twice daily for DVT prophylaxis

## 2024-04-17 NOTE — UTILIZATION REVIEW
NOTIFICATION OF ADMISSION DISCHARGE   This is a Notification of Discharge from [facility]. Please be advised that this patient has been discharge from our facility. Below you will find the admission and discharge date and time including the patient’s disposition.   UTILIZATION REVIEW CONTACT:  Minal Correa  Utilization   Network Utilization Review Department  Phone: [phone] carefully listen to the prompts. All voicemails are confidential.  Email: [email or web address]     ADMISSION INFORMATION  PRESENTATION DATE: 4/13/2024  1:47 PM  OBERVATION ADMISSION DATE:   INPATIENT ADMISSION DATE: 4/13/24  6:08 PM   DISCHARGE DATE: 4/17/2024 12:50 PM   DISPOSITION:Released to SNF/TCU/SNU Facility    Network Utilization Review Department  ATTENTION: Please call with any questions or concerns to [phone] and carefully listen to the prompts so that you are directed to the right person. All voicemails are confidential.   For Discharge needs, contact Care Management DC Support Team at [phone] opt. [phone]  Send all requests for admission clinical reviews, approved or denied determinations and any other requests to dedicated fax number below belonging to the campus where the patient is receiving treatment. List of dedicated fax numbers for the Facilities:  FACILITY NAME UR FAX NUMBER   ADMISSION DENIALS (Administrative/Medical Necessity) [phone]   DISCHARGE SUPPORT TEAM ([facility]) [phone]   PARENT CHILD HEALTH (Maternity/NICU/Pediatrics) [phone]   [facility] [phone]   [facility] [phone]   [facility] [phone]   [facility] [phone]   [facility] [phone]   [facility] [phone]   [facility] [phone]   [facility]  [place] [phone]   [facility] [phone]   [facility] [phone]   [facility] [phone]   [facility] [phone]

## 2024-04-17 NOTE — PLAN OF CARE
Problem: PAIN - ADULT  Goal: Verbalizes/displays adequate comfort level or baseline comfort level  Description: Interventions:  - Encourage patient to monitor pain and request assistance  - Assess pain using appropriate pain scale  - Administer analgesics based on type and severity of pain and evaluate response  - Implement non-pharmacological measures as appropriate and evaluate response  - Consider cultural and social influences on pain and pain management  - Notify physician/advanced practitioner if interventions unsuccessful or patient reports new pain  Outcome: Progressing     Problem: INFECTION - ADULT  Goal: Absence or prevention of progression during hospitalization  Description: INTERVENTIONS:  - Assess and monitor for signs and symptoms of infection  - Monitor lab/diagnostic results  - Monitor all insertion sites, i.e. indwelling lines, tubes, and drains  - Monitor endotracheal if appropriate and nasal secretions for changes in amount and color  - Virgie appropriate cooling/warming therapies per order  - Administer medications as ordered  - Instruct and encourage patient and family to use good hand hygiene technique  - Identify and instruct in appropriate isolation precautions for identified infection/condition  Outcome: Progressing  Goal: Absence of fever/infection during neutropenic period  Description: INTERVENTIONS:  - Monitor WBC    Outcome: Progressing     Problem: SAFETY ADULT  Goal: Patient will remain free of falls  Description: INTERVENTIONS:  - Educate patient/family on patient safety including physical limitations  - Instruct patient to call for assistance with activity   - Consult OT/PT to assist with strengthening/mobility   - Keep Call bell within reach  - Keep bed low and locked with side rails adjusted as appropriate  - Keep care items and personal belongings within reach  - Initiate and maintain comfort rounds  - Make Fall Risk Sign visible to staff  - Offer Toileting every 2 Hours,  in advance of need  - Obtain necessary fall risk management equipment: nonskid socks  - Apply yellow socks and bracelet for high fall risk patients  - Consider moving patient to room near nurses station  Outcome: Progressing  Goal: Maintain or return to baseline ADL function  Description: INTERVENTIONS:  -  Assess patient's ability to carry out ADLs; assess patient's baseline for ADL function and identify physical deficits which impact ability to perform ADLs (bathing, care of mouth/teeth, toileting, grooming, dressing, etc.)  - Assess/evaluate cause of self-care deficits   - Assess range of motion  - Assess patient's mobility; develop plan if impaired  - Assess patient's need for assistive devices and provide as appropriate  - Encourage maximum independence but intervene and supervise when necessary  - Involve family in performance of ADLs  - Assess for home care needs following discharge   - Consider OT consult to assist with ADL evaluation and planning for discharge  - Provide patient education as appropriate  Outcome: Progressing  Goal: Maintains/Returns to pre admission functional level  Description: INTERVENTIONS:  - Perform AM-PAC 6 Click Basic Mobility/ Daily Activity assessment daily.  - Set and communicate daily mobility goal to care team and patient/family/caregiver.   - Collaborate with rehabilitation services on mobility goals if consulted  - Perform Range of Motion 3 times a day.  - Reposition patient every 2 hours.  - Dangle patient 3 times a day  - Stand patient 3 times a day  - Ambulate patient 3 times a day  - Out of bed to chair 3 times a day   - Out of bed for meals 3 times a day  - Out of bed for toileting  - Record patient progress and toleration of activity level   Outcome: Progressing     Problem: DISCHARGE PLANNING  Goal: Discharge to home or other facility with appropriate resources  Description: INTERVENTIONS:  - Identify barriers to discharge w/patient and caregiver  - Arrange for needed  discharge resources and transportation as appropriate  - Identify discharge learning needs (meds, wound care, etc.)  - Arrange for interpretive services to assist at discharge as needed  - Refer to Case Management Department for coordinating discharge planning if the patient needs post-hospital services based on physician/advanced practitioner order or complex needs related to functional status, cognitive ability, or social support system  Outcome: Progressing     Problem: Knowledge Deficit  Goal: Patient/family/caregiver demonstrates understanding of disease process, treatment plan, medications, and discharge instructions  Description: Complete learning assessment and assess knowledge base.  Interventions:  - Provide teaching at level of understanding  - Provide teaching via preferred learning methods  Outcome: Progressing     Problem: SKIN/TISSUE INTEGRITY - ADULT  Goal: Incision(s), wounds(s) or drain site(s) healing without S/S of infection  Description: INTERVENTIONS  - Assess and document dressing, incision, wound bed, drain sites and surrounding tissue  - Provide patient and family education  - Perform skin care/dressing changes every shift  Outcome: Progressing     Problem: MUSCULOSKELETAL - ADULT  Goal: Maintain or return mobility to safest level of function  Description: INTERVENTIONS:  - Assess patient's ability to carry out ADLs; assess patient's baseline for ADL function and identify physical deficits which impact ability to perform ADLs (bathing, care of mouth/teeth, toileting, grooming, dressing, etc.)  - Assess/evaluate cause of self-care deficits   - Assess range of motion  - Assess patient's mobility  - Assess patient's need for assistive devices and provide as appropriate  - Encourage maximum independence but intervene and supervise when necessary  - Involve family in performance of ADLs  - Assess for home care needs following discharge   - Consider OT consult to assist with ADL evaluation and  planning for discharge  - Provide patient education as appropriate  Outcome: Progressing  Goal: Maintain proper alignment of affected body part  Description: INTERVENTIONS:  - Support, maintain and protect limb and body alignment  - Provide patient/ family with appropriate education  Outcome: Progressing

## 2024-04-17 NOTE — DISCHARGE INSTR - AVS FIRST PAGE
Patient was placed on Eliquis postoperatively during previous admission for DVT prophylaxis.  Follow-up with orthopedic team regarding when to discontinue Eliquis.  Likely can discontinue 28 days after initial surgery    Patient has an appointment with orthopedics next Wednesday.  Preferable that patient is able to see them in person however if not possible then patient should at least reach out to orthopedic team within the next week to ensure appropriate progression of patient's postop course

## 2024-04-17 NOTE — DISCHARGE SUMMARY
[facility]  Discharge- Ines Ivy 1960, 64 y.o. female MRN: [id number]  Unit/Bed#: -01 Encounter: [id number]  Primary Care Provider: Jorge L Najera DO   Date and time admitted to hospital: 4/13/2024  1:47 PM    * Ambulatory dysfunction  Assessment & Plan  Patient presented for not being able to take care of herself after getting recent right TKR.   is wheelchair-bound and cannot help her with ADLs.  Patient complaining of right leg pain after surgery and poor ambulation.  Does not feel comfortable at home.  Presented with right leg numbness and pain which started after the surgery.  Normal neurological exam.  CTA lower extremity runoff showing patent vessels.  Doppler studies unremarkable.  X-ray showing no acute finding.  Vitals otherwise stable.    PT OT Recommending rehab  Case management consulted  DVT prophylaxis with Eliquis which was started postoperatively per notes  Pain control as needed  Follow-up with orthopedic team as outpatient    S/P TKR (total knee replacement), right  Assessment & Plan  Recently underwent rt TKR at [facility] and was discharged on next day.  Patient presented to ED with ambulatory dysfunction as she could not take care of herself.  Also she stated  her  is wheelchair-bound and cannot help her as well.    See plan for ambulatory dysfunction  Eliquis 2.5 twice daily for DVT prophylaxis    Chronic diastolic (congestive) heart failure (HCC)  Assessment & Plan  Currently appearing euvolemic.  Continue Lasix.  Daily weight, I's and O's, low-salt diet.    Coronary artery disease involving native coronary artery of native heart without angina pectoris  Assessment & Plan  Continue aspirin, statin, metoprolol, Jardiance, Lasix, losartan, ranolazine    NSTEMI (non-ST elevated myocardial infarction) (HCC)  Assessment & Plan  History of CAD s/p PCI.  Following with cardiology.  Continue aspirin, statin, Lasix, losartan,  metoprolol, ranolazine.    Essential hypertension  Assessment & Plan  Continue metoprolol, losartan, Lasix, amlodipine    Type 2 diabetes mellitus with diabetic polyneuropathy, with long-term current use of insulin (HCC)  Assessment & Plan  Lab Results   Component Value Date    HGBA1C 6.9 (H) 02/27/2024     Continue home diabetic regimen        Medical Problems       Resolved Problems  Date Reviewed: 12/13/2023   None       Discharging Physician / Practitioner: Dustin Sotelo MD  PCP: Jorge L Najera DO  Admission Date:   Admission Orders (From admission, onward)       Ordered        04/13/24 1808  INPATIENT ADMISSION  Once                          Discharge Date: 04/17/24    Consultations During Hospital Stay:  None    Procedures Performed:   None    Significant Findings / Test Results:   Ambulatory dysfunction    Incidental Findings:   None    Test Results Pending at Discharge (will require follow up):   None     Outpatient Tests Requested:  Routine labs with PCP as outpatient    Complications: None    Reason for Admission: Ambulatory dysfunction right lower extremity pain    Hospital Course:   Ines Ivy is a 64 y.o. female patient who originally presented to the hospital on 4/13/2024 due to ambulatory dysfunction, right lower extremity pain.  Patient with recent right total knee replacement.  Patient was at home however due to  being wheelchair-bound it was difficult for patient to manage on her own.  Patient returns with pain and swelling of right lower extremity.  Imaging studies in the ER were negative for DVT, CTA without any obvious vascular defect noted.  Patient's pain and swelling has gradually improved during hospitalization.  Patient's mobility has improved.  Patient currently denies any pain.  No numbness/tingling.  No discoloration aside from some expected ecchymosis postoperatively.  Patient's hemoglobin level has been stable with no concern for bleeding.  Patient  "maintained on DVT prophylaxis with Eliquis which was recommended per orthopedic team on discharge from recent surgery.  PT/OT eval recommended rehab given patient's home living situation and current ambulatory dysfunction given right total knee replacement.  Management has arranged for patient to be discharged to rehab.  Patient will be discharged today.  Patient has an appointment with orthopedic team next week which patient was advised to make sure she follows up with to ensure appropriate postop progression    Please see above list of diagnoses and related plan for additional information.     Condition at Discharge: stable    Discharge Day Visit / Exam:   Subjective: No complaints at this time  Vitals: Blood Pressure: 141/64 (04/17/24 0740)  Pulse: 69 (04/17/24 0740)  Temperature: 98.3 °F (36.8 °C) (04/16/24 1257)  Temp Source: Oral (04/16/24 1257)  Respirations: 18 (04/17/24 0740)  Height: 5' 10\" (177.8 cm) (04/13/24 2100)  Weight - Scale: 103 kg (227 lb 15.3 oz) (04/17/24 0600)  SpO2: 96 % (04/17/24 0835)  Exam:   Physical Exam  Constitutional:       General: She is not in acute distress.  HENT:      Head: Normocephalic and atraumatic.      Nose: Nose normal.      Mouth/Throat:      Mouth: Mucous membranes are moist.   Eyes:      Extraocular Movements: Extraocular movements intact.      Conjunctiva/sclera: Conjunctivae normal.   Cardiovascular:      Rate and Rhythm: Normal rate and regular rhythm.      Comments: Dorsalis pedis and right posterior tibial pulses intact.  Pulmonary:      Effort: Pulmonary effort is normal. No respiratory distress.   Abdominal:      Palpations: Abdomen is soft.      Tenderness: There is no abdominal tenderness.   Musculoskeletal:         General: Normal range of motion.      Cervical back: Normal range of motion and neck supple.      Comments: Range of motion intact of bilateral lower extremities.   Skin:     General: Skin is warm and dry.      Findings: Bruising present. "   Neurological:      General: No focal deficit present.      Mental Status: She is alert. Mental status is at baseline.      Cranial Nerves: No cranial nerve deficit.   Psychiatric:         Mood and Affect: Mood normal.         Behavior: Behavior normal.          Discussion with Family:  Updated patient regarding discharge plan.  Spoke with patient's  at bedside yesterday regarding plan of care.     Discharge instructions/Information to patient and family:   See after visit summary for information provided to patient and family.      Provisions for Follow-Up Care:  See after visit summary for information related to follow-up care and any pertinent home health orders.      Mobility at time of Discharge:   Basic Mobility Inpatient Raw Score: 20  JH-HLM Goal: 6: Walk 10 steps or more  JH-HLM Achieved: 7: Walk 25 feet or more  HLM Goal achieved. Continue to encourage appropriate mobility.     Disposition:   Other Skilled Nursing Facility at [place]    Planned Readmission: no     Discharge Statement:  I spent 35 minutes discharging the patient. This time was spent on the day of discharge. I had direct contact with the patient on the day of discharge. Greater than 50% of the total time was spent examining patient, answering all patient questions, arranging and discussing plan of care with patient as well as directly providing post-discharge instructions.  Additional time then spent on discharge activities.    Discharge Medications:  See after visit summary for reconciled discharge medications provided to patient and/or family.      **Please Note: This note may have been constructed using a voice recognition system**

## 2024-04-17 NOTE — ASSESSMENT & PLAN NOTE
Patient presented for not being able to take care of herself after getting recent right TKR.   is wheelchair-bound and cannot help her with ADLs.  Patient complaining of right leg pain after surgery and poor ambulation.  Does not feel comfortable at home.  Presented with right leg numbness and pain which started after the surgery.  Normal neurological exam.  CTA lower extremity runoff showing patent vessels.  Doppler studies unremarkable.  X-ray showing no acute finding.  Vitals otherwise stable.    PT OT Recommending rehab  Case management consulted  DVT prophylaxis with Eliquis which was started postoperatively per notes  Pain control as needed  Follow-up with orthopedic team as outpatient

## 2024-04-17 NOTE — ASSESSMENT & PLAN NOTE
Lab Results   Component Value Date    HGBA1C 6.9 (H) 02/27/2024     Continue home diabetic regimen

## 2024-04-17 NOTE — CASE MANAGEMENT
Case Management Discharge Planning Note    Patient name Ines Ivy  Location /-01 MRN [id number]  : 1960 Date 2024       Current Admission Date: 2024  Current Admission Diagnosis:Ambulatory dysfunction   Patient Active Problem List    Diagnosis Date Noted    S/P TKR (total knee replacement), right 2024    Ambulatory dysfunction 2024    Class 1 obesity due to excess calories with serious comorbidity and body mass index (BMI) of 32.0 to 32.9 in adult 2024    Chronic diastolic (congestive) heart failure (HCC) 2023    HNP (herniated nucleus pulposus), lumbar 10/10/2022    S/P drug eluting coronary stent placement 2021    Former smoker 2019    Lumbar radiculopathy 2019    Thyroid nodule 2019    Post-menopausal 2019    Other specified glaucoma 2019    Other age-related cataract 2019    History of breast cancer 2019    Coronary artery disease involving native coronary artery of native heart without angina pectoris 08/15/2018    NSTEMI (non-ST elevated myocardial infarction) (HCC) 2018    Primary open angle glaucoma (POAG) of both eyes 12/15/2017    Primary osteoarthritis involving multiple joints 2016    Neuropathy 2014    Type 2 diabetes mellitus with diabetic polyneuropathy, with long-term current use of insulin (HCC) 2014    Essential hypertension 2014    Mixed hyperlipidemia 2014      LOS (days): 4  Geometric Mean LOS (GMLOS) (days):   Days to GMLOS:     OBJECTIVE:  Risk of Unplanned Readmission Score: 12.25         Current admission status: Inpatient   Preferred Pharmacy:   [id number]  [place] - [address]  [address] [address]  Phone: [phone] Fax: [phone]    [facility]/pharmacy #[id number] - [place], [place] - [address]  [place] [address]  Phone: [phone] Fax:  [phone]    Primary Care Provider: Jorge L Najera DO    Primary Insurance: BLUE CROSS  Secondary Insurance:     DISCHARGE DETAILS:    Discharge planning discussed with:: patietn & spouse was called at 10:46 am  Freedom of Choice: Yes  Comments - Freedom of Choice: recommendation is for rehab- pt was accepted to [facility]  spouse & pt's choice - auth was received  CM contacted family/caregiver?: Yes  Were Treatment Team discharge recommendations reviewed with patient/caregiver?: Yes  Did patient/caregiver verbalize understanding of patient care needs?: Yes  Were patient/caregiver advised of the risks associated with not following Treatment Team discharge recommendations?: Yes    Contacts  Patient Contacts: Jorge L,   Relationship to Patient:: Family  Contact Method: Phone  Phone Number: [phone]  Reason/Outcome: Discharge Planning    Requested Home Health Care         Is the patient interested in HHC at discharge?: No    DME Referral Provided  Referral made for DME?: No    Other Referral/Resources/Interventions Provided:  Interventions: Short Term Rehab  Referral Comments: pt accepted to [facility]- covid test completed-auth received- rn was given the report & fax#- rn, provider, snf, pt & family are aware of the transport- 12:30pm w/c santy Morton rn is in agreement with mode of transport - pt was made aware that there is a fee for the transport    Would you like to participate in our [facility] service program?  : No - Declined    Treatment Team Recommendation: Short Term Rehab ([facility]- 12:30pm w/c santy [place])  Discharge Destination Plan:: Short Term Rehab ([facility]  12:39 pm w/c santy Hoover)         Pt & spouse are in agreement with the d/c & d/c need                             Family notified:: spouse was called       Accepting Facility Name, City & State : [facility]  Receiving Facility/Agency Phone Number: [phone]  Facility/Agency Fax Number: [phone]

## 2024-04-18 ENCOUNTER — NURSING HOME VISIT (OUTPATIENT)
Dept: GERIATRICS | Facility: OTHER | Age: 64
End: 2024-04-18
Payer: COMMERCIAL

## 2024-04-18 DIAGNOSIS — D64.9 POSTOPERATIVE ANEMIA: ICD-10-CM

## 2024-04-18 DIAGNOSIS — E78.2 MIXED HYPERLIPIDEMIA: ICD-10-CM

## 2024-04-18 DIAGNOSIS — Z96.651 S/P TKR (TOTAL KNEE REPLACEMENT), RIGHT: Primary | ICD-10-CM

## 2024-04-18 DIAGNOSIS — Z91.89 AT RISK FOR DELIRIUM: ICD-10-CM

## 2024-04-18 DIAGNOSIS — G89.18 ACUTE POST-OPERATIVE PAIN: ICD-10-CM

## 2024-04-18 DIAGNOSIS — R26.2 AMBULATORY DYSFUNCTION: ICD-10-CM

## 2024-04-18 DIAGNOSIS — I25.10 CORONARY ARTERY DISEASE INVOLVING NATIVE CORONARY ARTERY OF NATIVE HEART WITHOUT ANGINA PECTORIS: ICD-10-CM

## 2024-04-18 DIAGNOSIS — I50.32 CHRONIC DIASTOLIC (CONGESTIVE) HEART FAILURE (HCC): ICD-10-CM

## 2024-04-18 DIAGNOSIS — Z85.3 HISTORY OF BREAST CANCER: ICD-10-CM

## 2024-04-18 DIAGNOSIS — E66.09 CLASS 1 OBESITY DUE TO EXCESS CALORIES WITH SERIOUS COMORBIDITY AND BODY MASS INDEX (BMI) OF 32.0 TO 32.9 IN ADULT: ICD-10-CM

## 2024-04-18 DIAGNOSIS — Z91.89 AT RISK FOR CONSTIPATION: ICD-10-CM

## 2024-04-18 DIAGNOSIS — Z71.89 ADVANCE CARE PLANNING: ICD-10-CM

## 2024-04-18 DIAGNOSIS — Z79.4 TYPE 2 DIABETES MELLITUS WITH DIABETIC POLYNEUROPATHY, WITH LONG-TERM CURRENT USE OF INSULIN (HCC): ICD-10-CM

## 2024-04-18 DIAGNOSIS — I10 ESSENTIAL HYPERTENSION: ICD-10-CM

## 2024-04-18 DIAGNOSIS — M15.9 PRIMARY OSTEOARTHRITIS INVOLVING MULTIPLE JOINTS: ICD-10-CM

## 2024-04-18 DIAGNOSIS — G62.9 NEUROPATHY: ICD-10-CM

## 2024-04-18 DIAGNOSIS — E11.42 TYPE 2 DIABETES MELLITUS WITH DIABETIC POLYNEUROPATHY, WITH LONG-TERM CURRENT USE OF INSULIN (HCC): ICD-10-CM

## 2024-04-18 PROCEDURE — 99306 1ST NF CARE HIGH MDM 50: CPT | Performed by: STUDENT IN AN ORGANIZED HEALTH CARE EDUCATION/TRAINING PROGRAM
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
## 2024-04-18 NOTE — ASSESSMENT & PLAN NOTE
Multifactorial including obesity, OA, recent R knee surgery  -PT/OT  -fall precautions  -encourage appropriate DME use  -SW to follow for safe discharge planning/homecare services as appropriate

## 2024-04-18 NOTE — ASSESSMENT & PLAN NOTE
Lab Results   Component Value Date    HGBA1C 6.9 (H) 02/27/2024       Monitor glucose  Avoid hypoglycemia  Continue regimen including jardiance 25mg daily, ulin glargine 55u AM and 60u HS  Follow up with PCP, Endocrine/Podiatry/Ophtho outpatient as appropriate

## 2024-04-18 NOTE — ASSESSMENT & PLAN NOTE
S/p R TKR at [facility] 4/8/24  Weight bearing as tolerated  PT/OT  Pain control as appropriate  DVT ppx: continue Eliquis  Follow up with PCP, Orthopedics

## 2024-04-18 NOTE — ASSESSMENT & PLAN NOTE
Fairly mild but slightly downtrending HB post-op  Baseline Hb seems around 12-13 range  -monitor on routine labs  -monitor for acute bleed - no present signs  -consider further workup, Heme consult if persistent/worsening  -transfuse PRN Hb <7

## 2024-04-18 NOTE — ASSESSMENT & PLAN NOTE
No acute cardiac complaints  Continue regimen including aspirin, statin, beta blocker, Jardiance, ranexa  Follow up with PCP, Cardiology

## 2024-04-18 NOTE — ASSESSMENT & PLAN NOTE
Monitor BP - generally stable in 1202-130s systolic range with limited data so far  Avoid hypotension  Continue cardiac regimen with hold parameters

## 2024-04-18 NOTE — ASSESSMENT & PLAN NOTE
"Reported hx of L breast cancer s/p L lumpectomy  No apparent acute/associated complaints  Mammogram Sept 2022 \"No mammographic evidence of malignancy \" - BIRADS 2  Follow up with PCP, Oncology as appropriate  "

## 2024-04-18 NOTE — ASSESSMENT & PLAN NOTE
Encourage lifestyle modifications including healthy diet, controlled weight loss, exercise as tolerated  Follow up with PCP

## 2024-04-18 NOTE — ASSESSMENT & PLAN NOTE
At risk due to hospitalization, relative immobility, comorbidities, opioid  Monitor stool output  Bowel regimen at facility: miralax and senna as appropriate; consider bisacodyl suppository PRN if no BM in 2-3 days  Encourage mobility as tolerated, PO hydration as appropriate, high fiber diet/prune juice (in outpatient setting as appropriate)  Goal is for 1 easy BM every 1-2 days

## 2024-04-18 NOTE — ASSESSMENT & PLAN NOTE
Wt Readings from Last 3 Encounters:   04/17/24 103 kg (227 lb 15.3 oz)   03/26/24 106 kg (234 lb)   03/06/24 105 kg (231 lb)       Monitor daily weight  Monitor volume status clinically - appears euvolemic  Continue lasix 40mg daily with hold parameters  Continue beta blocker  Follow up with PCP, Cardiology as appropriate

## 2024-04-18 NOTE — ASSESSMENT & PLAN NOTE
In setting of recent right TKR, likely with some associated R hip somatic dysfunction  PT/OT, exercise/stretches, patient is highly motivated and aware  Current regimen including tylenol 975mg q8hr, gabapentin 400mg TID, oxycodone 5mg q6hr PRN  Monitor pain - stable, responds to regimen, overall improving  Adjust/wean regimen as appropriate  Follow up with Orthopedics

## 2024-04-19 ENCOUNTER — NURSING HOME VISIT (OUTPATIENT)
Dept: GERIATRICS | Facility: OTHER | Age: 64
End: 2024-04-19
Payer: COMMERCIAL

## 2024-04-19 VITALS
OXYGEN SATURATION: 95 % | HEART RATE: 62 BPM | SYSTOLIC BLOOD PRESSURE: 172 MMHG | DIASTOLIC BLOOD PRESSURE: 75 MMHG | TEMPERATURE: 97.1 F

## 2024-04-19 DIAGNOSIS — Z79.4 TYPE 2 DIABETES MELLITUS WITH DIABETIC POLYNEUROPATHY, WITH LONG-TERM CURRENT USE OF INSULIN (HCC): ICD-10-CM

## 2024-04-19 DIAGNOSIS — Z96.651 S/P TKR (TOTAL KNEE REPLACEMENT), RIGHT: Primary | ICD-10-CM

## 2024-04-19 DIAGNOSIS — G62.9 NEUROPATHY: ICD-10-CM

## 2024-04-19 DIAGNOSIS — I25.10 CORONARY ARTERY DISEASE INVOLVING NATIVE CORONARY ARTERY OF NATIVE HEART WITHOUT ANGINA PECTORIS: ICD-10-CM

## 2024-04-19 DIAGNOSIS — E78.2 MIXED HYPERLIPIDEMIA: ICD-10-CM

## 2024-04-19 DIAGNOSIS — E11.42 TYPE 2 DIABETES MELLITUS WITH DIABETIC POLYNEUROPATHY, WITH LONG-TERM CURRENT USE OF INSULIN (HCC): ICD-10-CM

## 2024-04-19 DIAGNOSIS — E66.09 CLASS 1 OBESITY DUE TO EXCESS CALORIES WITH SERIOUS COMORBIDITY AND BODY MASS INDEX (BMI) OF 32.0 TO 32.9 IN ADULT: ICD-10-CM

## 2024-04-19 DIAGNOSIS — I10 ESSENTIAL HYPERTENSION: ICD-10-CM

## 2024-04-19 DIAGNOSIS — M15.9 PRIMARY OSTEOARTHRITIS INVOLVING MULTIPLE JOINTS: ICD-10-CM

## 2024-04-19 DIAGNOSIS — D64.9 POSTOPERATIVE ANEMIA: ICD-10-CM

## 2024-04-19 DIAGNOSIS — I50.32 CHRONIC DIASTOLIC (CONGESTIVE) HEART FAILURE (HCC): ICD-10-CM

## 2024-04-19 DIAGNOSIS — G89.18 ACUTE POST-OPERATIVE PAIN: ICD-10-CM

## 2024-04-19 DIAGNOSIS — R26.2 AMBULATORY DYSFUNCTION: ICD-10-CM

## 2024-04-19 PROCEDURE — 99316 NF DSCHRG MGMT 30 MIN+: CPT

## 2024-04-19 NOTE — ASSESSMENT & PLAN NOTE
Multifactorial including obesity, OA, recent R knee surgery  Continue PT/OT  Fall/safety precautions   Encourage appropriate DME use

## 2024-04-19 NOTE — PROGRESS NOTES
[facility]  [address] [address] [phone]  FACILITY: [facility]  Code 31 (STR)      Discharging Physician / Practitioner: REINALDO Marin  PCP: Jorge L Najera DO  Admission Date: 04/17/24  Discharge Date: 04/19/24    1. S/P TKR (total knee replacement), right  Assessment & Plan:  S/p R TKR at [facility] 4/8/24  Weight bearing as tolerated  PT/OT  Pain control as appropriate- see plan for post-op pain  DVT ppx: continue Eliquis  Follow up with PCP, Orthopedics      2. Ambulatory dysfunction  Assessment & Plan:  Multifactorial including obesity, OA, recent R knee surgery  Continue PT/OT  Fall/safety precautions   Encourage appropriate DME use          3. Chronic diastolic (congestive) heart failure (HCC)  Assessment & Plan:  Wt Readings from Last 3 Encounters:   04/17/24 103 kg (227 lb 15.3 oz)   03/26/24 106 kg (234 lb)   03/06/24 105 kg (231 lb)     Wt Readings from Last 3 Encounters:   04/17/24 103 kg (227 lb 15.3 oz)   03/26/24 106 kg (234 lb)   03/06/24 105 kg (231 lb)     Appears euvolemic  Continue lasix 40mg daily  Continue beta blocker  Follow up with PCP, Cardiology as appropriate  Monitor weights at home        4. Coronary artery disease involving native coronary artery of native heart without angina pectoris  Assessment & Plan:  No acute cardiac complaints  Continue aspirin and statin  Follow up with PCP      5. Essential hypertension  Assessment & Plan:  BP variable at rehab- elevated this morning likely d/t pain   Continue amlodipine 5 mg daily   Continue losartan 100 mg daily   Continue metoprolol 100 mg daily   Continue lasix 40 mg daily   F/u with PCP      6. Type 2 diabetes mellitus with diabetic polyneuropathy, with long-term current use of insulin (HCC)  Assessment & Plan:    Lab Results   Component Value Date    HGBA1C 6.9 (H) 02/27/2024     Appears well controlled  Continue current regimen: Jardiance 25 mg daily, Basaglar 55 u AM and 60 u QHS  Resume mounjaro  outpatient   Follow up with PCP, martinez       7. Neuropathy  Assessment & Plan:  Continue outpatient gabapentin 400 mg TID      8. Primary osteoarthritis involving multiple joints  Assessment & Plan:  Following Orthopedics  Pain control as appropriate  PT/OT  S/p right TKA on 4/8/24      9. Postoperative anemia  Assessment & Plan:  Baseline Hgb 12-13  Hgb yesterday 10.3  Mild, in the setting of recent TKA  No acute signs of bleed      10. Acute post-operative pain  Assessment & Plan:  In setting of recent right TKR, likely with some associated R hip somatic dysfunction  PT/OT, exercise/stretches, patient is highly motivated and aware  Current regimen including tylenol 975mg q8hr, gabapentin 400mg TID, oxycodone 5mg q6hr PRN  Follow up with Orthopedics- wean oxycodone as appropriate       11. Class 1 obesity due to excess calories with serious comorbidity and body mass index (BMI) of 32.0 to 32.9 in adult  Assessment & Plan:  Encourage lifestyle modifications including healthy diet, controlled weight loss, exercise as tolerated  Follow up with PCP      12. Mixed hyperlipidemia  Assessment & Plan:  Continue statin          Reason for Admission: S/P TKR (total knee replacement), right    History of Present Illness:     Patient is a 64-year-old female with past medical history of but not limited to diabetes, arthritis, hyperlipidemia, hypertension, CAD, glaucoma, and CHF.    Hospital course:  Patient had recent right TKA at [facility] on 4/8/2024.  She was discharged from [facility] and returned home where she felt she was unable to care for herself.  She then presented at [facility] on 4/13 with ambulatory dysfunction related to recent surgery. Imaging work up was negative for DVT. Pain and swelling gradually improved and was recommended for subacute rehab.    Rehab course:  Patient originally came to subacute rehab d/t inability to get in and out of vehicle. She participated in PT/OT and worked on car transfers. She  progressed to independent with rolling walker and felt more comfortable with car transfers.     On exam she is doing well without acute distress. She has bruising to RLE and normal post operative swelling. Continues with meplix dressing to R knee which is C/D/I. She denies CP/SOB. She reports having all medications at home as she recieved them after surgery and reports she will be doing outpatient PT/OT. She reports  will drive her to and from appointments and will call [facility] to set up follow up visit.         Please see above list of diagnoses and related plan for additional information.     Condition at Discharge: good     Discharge Day Visit / Exam:     Subjective:   Review of Systems   Musculoskeletal:  Positive for arthralgias.   Skin:         bruising   All other systems reviewed and are negative.    Vitals: Blood Pressure: (!) 172/75 (04/19/24 1018)  Pulse: 62 (04/19/24 1018)  Temperature: (!) 97.1 °F (36.2 °C) (04/19/24 1018)  SpO2: 95 % (04/19/24 1018)  Exam:   Physical Exam  Vitals reviewed.   Constitutional:       General: She is not in acute distress.     Appearance: Normal appearance. She is not ill-appearing, toxic-appearing or diaphoretic.   HENT:      Head: Normocephalic and atraumatic.   Eyes:      Conjunctiva/sclera: Conjunctivae normal.   Cardiovascular:      Rate and Rhythm: Normal rate and regular rhythm.      Pulses: Normal pulses.      Heart sounds: Normal heart sounds. No murmur heard.  Pulmonary:      Effort: Pulmonary effort is normal. No respiratory distress.      Breath sounds: Normal breath sounds.   Abdominal:      General: Bowel sounds are normal. There is no distension.      Palpations: Abdomen is soft.      Tenderness: There is no abdominal tenderness.   Musculoskeletal:         General: Swelling present.      Right lower leg: No edema.      Left lower leg: No edema.      Comments: Normal post operative swelling RLE   Skin:     General: Skin is warm and dry.      Findings:  Bruising present.   Neurological:      General: No focal deficit present.      Mental Status: She is alert and oriented to person, place, and time.   Psychiatric:         Mood and Affect: Mood normal.         Behavior: Behavior normal.         Thought Content: Thought content normal.           Discharge instructions/Information to patient and family:   See after visit summary for information provided to patient and family.      Provisions for Follow-Up Care:  See after visit summary for information related to follow-up care and any pertinent home health orders.      Disposition:     Home       Discharge Statement:  I spent 50 minutes discharging the patient. This time was spent on the day of discharge. I had direct contact with the patient on the day of discharge. Greater than 50% of the total time was spent examining patient, answering all patient questions, arranging and discussing plan of care with patient as well as directly providing post-discharge instructions.  Additional time then spent on discharge activities.    Discharge Medications:  See after visit summary for reconciled discharge medications provided to patient and family.      ** Please Note: This note has been constructed using a voice recognition system **

## 2024-04-19 NOTE — ASSESSMENT & PLAN NOTE
In setting of recent right TKR, likely with some associated R hip somatic dysfunction  PT/OT, exercise/stretches, patient is highly motivated and aware  Current regimen including tylenol 975mg q8hr, gabapentin 400mg TID, oxycodone 5mg q6hr PRN  Follow up with Orthopedics- wean oxycodone as appropriate

## 2024-04-19 NOTE — ASSESSMENT & PLAN NOTE
BP variable at rehab- elevated this morning likely d/t pain   Continue amlodipine 5 mg daily   Continue losartan 100 mg daily   Continue metoprolol 100 mg daily   Continue lasix 40 mg daily   F/u with PCP

## 2024-04-19 NOTE — ASSESSMENT & PLAN NOTE
S/p R TKR at [facility] 4/8/24  Weight bearing as tolerated  PT/OT  Pain control as appropriate- see plan for post-op pain  DVT ppx: continue Eliquis  Follow up with PCP, Orthopedics

## 2024-04-19 NOTE — ASSESSMENT & PLAN NOTE
Baseline Hgb 12-13  Hgb yesterday 10.3  Mild, in the setting of recent TKA  No acute signs of bleed

## 2024-04-19 NOTE — ASSESSMENT & PLAN NOTE
Lab Results   Component Value Date    HGBA1C 6.9 (H) 02/27/2024     Appears well controlled  Continue current regimen: Jardiance 25 mg daily, Basaglar 55 u AM and 60 u QHS  Resume mounjaro outpatient   Follow up with PCPmartinez

## 2024-04-19 NOTE — ASSESSMENT & PLAN NOTE
Wt Readings from Last 3 Encounters:   04/17/24 103 kg (227 lb 15.3 oz)   03/26/24 106 kg (234 lb)   03/06/24 105 kg (231 lb)     Wt Readings from Last 3 Encounters:   04/17/24 103 kg (227 lb 15.3 oz)   03/26/24 106 kg (234 lb)   03/06/24 105 kg (231 lb)     Appears euvolemic  Continue lasix 40mg daily  Continue beta blocker  Follow up with PCP, Cardiology as appropriate  Monitor weights at home

## 2024-04-20 DIAGNOSIS — E11.42 TYPE 2 DIABETES MELLITUS WITH DIABETIC POLYNEUROPATHY, WITH LONG-TERM CURRENT USE OF INSULIN (HCC): ICD-10-CM

## 2024-04-20 DIAGNOSIS — Z79.4 TYPE 2 DIABETES MELLITUS WITH DIABETIC POLYNEUROPATHY, WITH LONG-TERM CURRENT USE OF INSULIN (HCC): ICD-10-CM

## 2024-04-20 DIAGNOSIS — I21.4 NSTEMI (NON-ST ELEVATED MYOCARDIAL INFARCTION) (HCC): ICD-10-CM

## 2024-04-22 RX ORDER — METOPROLOL SUCCINATE 100 MG/1
100 TABLET, EXTENDED RELEASE ORAL DAILY
Qty: 90 TABLET | Refills: 0 | OUTPATIENT
Start: 2024-04-22

## 2024-04-22 RX ORDER — INSULIN GLARGINE 100 [IU]/ML
INJECTION, SOLUTION SUBCUTANEOUS
Qty: 90 ML | Refills: 1 | Status: SHIPPED | OUTPATIENT
Start: 2024-04-22

## 2024-04-24 ENCOUNTER — TELEPHONE (OUTPATIENT)
Age: 64
End: 2024-04-24

## 2024-04-24 DIAGNOSIS — E11.42 TYPE 2 DIABETES MELLITUS WITH DIABETIC POLYNEUROPATHY, WITH LONG-TERM CURRENT USE OF INSULIN (HCC): ICD-10-CM

## 2024-04-24 DIAGNOSIS — Z79.4 TYPE 2 DIABETES MELLITUS WITH DIABETIC POLYNEUROPATHY, WITH LONG-TERM CURRENT USE OF INSULIN (HCC): ICD-10-CM

## 2024-04-24 RX ORDER — ERGOCALCIFEROL 1.25 MG/1
CAPSULE ORAL
Qty: 4 CAPSULE | Refills: 0 | Status: SHIPPED | OUTPATIENT
Start: 2024-04-24 | End: 2024-04-24

## 2024-04-24 RX ORDER — ERGOCALCIFEROL 1.25 MG/1
50000 CAPSULE ORAL WEEKLY
Qty: 8 CAPSULE | Refills: 0 | Status: SHIPPED | OUTPATIENT
Start: 2024-04-24 | End: 2024-06-13

## 2024-04-24 NOTE — TELEPHONE ENCOUNTER
Pharmacy was calling in regards to ergocalciferol (Vitamin D2) The dispense quantity is 4, but the direction say to take 1 capsule by mouth once a week for 8 doses? Can we get this script updated, and sent back to [facility] pharmacy.

## 2024-05-24 DIAGNOSIS — I10 ESSENTIAL HYPERTENSION: ICD-10-CM

## 2024-05-24 DIAGNOSIS — I21.4 NSTEMI (NON-ST ELEVATED MYOCARDIAL INFARCTION) (HCC): ICD-10-CM

## 2024-05-24 DIAGNOSIS — I25.118 CORONARY ARTERY DISEASE OF NATIVE ARTERY OF NATIVE HEART WITH STABLE ANGINA PECTORIS (HCC): ICD-10-CM

## 2024-05-24 DIAGNOSIS — K21.00 GASTROESOPHAGEAL REFLUX DISEASE WITH ESOPHAGITIS WITHOUT HEMORRHAGE: ICD-10-CM

## 2024-05-24 RX ORDER — LOSARTAN POTASSIUM 100 MG/1
100 TABLET ORAL DAILY
Qty: 90 TABLET | Refills: 1 | Status: SHIPPED | OUTPATIENT
Start: 2024-05-24

## 2024-05-24 RX ORDER — ATORVASTATIN CALCIUM 80 MG/1
80 TABLET, FILM COATED ORAL DAILY
Qty: 90 TABLET | Refills: 1 | Status: SHIPPED | OUTPATIENT
Start: 2024-05-24

## 2024-05-24 RX ORDER — OMEPRAZOLE 40 MG/1
40 CAPSULE, DELAYED RELEASE ORAL DAILY
Qty: 90 CAPSULE | Refills: 1 | Status: SHIPPED | OUTPATIENT
Start: 2024-05-24

## 2024-05-24 RX ORDER — RANOLAZINE 500 MG/1
500 TABLET, EXTENDED RELEASE ORAL 2 TIMES DAILY
Qty: 60 TABLET | Refills: 0 | Status: SHIPPED | OUTPATIENT
Start: 2024-05-24

## 2024-05-24 RX ORDER — AMLODIPINE BESYLATE 5 MG/1
5 TABLET ORAL DAILY
Qty: 90 TABLET | Refills: 1 | Status: SHIPPED | OUTPATIENT
Start: 2024-05-24

## 2024-05-26 DIAGNOSIS — Z79.4 TYPE 2 DIABETES MELLITUS WITH DIABETIC POLYNEUROPATHY, WITH LONG-TERM CURRENT USE OF INSULIN (HCC): ICD-10-CM

## 2024-05-26 DIAGNOSIS — E11.42 TYPE 2 DIABETES MELLITUS WITH DIABETIC POLYNEUROPATHY, WITH LONG-TERM CURRENT USE OF INSULIN (HCC): ICD-10-CM

## 2024-05-28 ENCOUNTER — TELEPHONE (OUTPATIENT)
Dept: INTERNAL MEDICINE CLINIC | Facility: CLINIC | Age: 64
End: 2024-05-28

## 2024-05-28 NOTE — TELEPHONE ENCOUNTER
----- Message from Saran DELACRUZ sent at 5/28/2024  7:31 AM EDT -----  Call pt and get scheduled thank you  ----- Message -----  From: Jorge L Najera DO  Sent: 5/24/2024   2:01 PM EDT  To: [facility] Clinical    One month supply given. Overdue for routine.

## 2024-05-29 ENCOUNTER — TELEPHONE (OUTPATIENT)
Age: 64
End: 2024-05-29

## 2024-05-29 DIAGNOSIS — E11.42 TYPE 2 DIABETES MELLITUS WITH DIABETIC POLYNEUROPATHY, WITH LONG-TERM CURRENT USE OF INSULIN (HCC): Primary | ICD-10-CM

## 2024-05-29 DIAGNOSIS — Z79.4 TYPE 2 DIABETES MELLITUS WITH DIABETIC POLYNEUROPATHY, WITH LONG-TERM CURRENT USE OF INSULIN (HCC): Primary | ICD-10-CM

## 2024-05-29 NOTE — TELEPHONE ENCOUNTER
PA for FreeStyle Nelda 2 Sensor     Submitted via    []CMM-KEY   [x][employer]-Case ID # [id number]   []Faxed to plan   []Other website   []Phone call Case ID #     Office notes sent, clinical questions answered. Awaiting determination    Turnaround time for your insurance to make a decision on your Prior Authorization can take 7-21 business days.

## 2024-05-29 NOTE — TELEPHONE ENCOUNTER
PA for FreeStyle Nelda 2 Sensor  Denied    Reason:        Message sent to provider pool Yes    Denial letter scanned into Media Yes    Appeal started No    (Provider will need to decide if appeal is warranted and send clinical documentation to PA team for initiation.)

## 2024-05-30 RX ORDER — ACYCLOVIR 400 MG/1
1 TABLET ORAL
Qty: 9 EACH | Refills: 3 | Status: SHIPPED | OUTPATIENT
Start: 2024-05-30 | End: 2024-05-31 | Stop reason: SDUPTHER

## 2024-05-30 RX ORDER — ACYCLOVIR 400 MG/1
1 TABLET ORAL ONCE
Qty: 1 EACH | Refills: 0 | Status: SHIPPED | OUTPATIENT
Start: 2024-05-30 | End: 2024-06-04 | Stop reason: SDUPTHER

## 2024-05-30 NOTE — ADDENDUM NOTE
Addended by: YULI SALVADOR on: 5/30/2024 09:35 AM     Modules accepted: Orders     Xray Chest 1 View-PORTABLE IMMEDIATE

## 2024-05-30 NOTE — TELEPHONE ENCOUNTER
Pt called and said she was at pharmacy trying to  freestyle rx. They said it was cancelled. I told her the ins denied it and they want her to use the Dexcom. Pt said yes to the dexcom as along as the cost isnt going to be higher than what she was paying previously.

## 2024-05-31 DIAGNOSIS — Z79.4 TYPE 2 DIABETES MELLITUS WITH DIABETIC POLYNEUROPATHY, WITH LONG-TERM CURRENT USE OF INSULIN (HCC): ICD-10-CM

## 2024-05-31 DIAGNOSIS — E11.42 TYPE 2 DIABETES MELLITUS WITH DIABETIC POLYNEUROPATHY, WITH LONG-TERM CURRENT USE OF INSULIN (HCC): ICD-10-CM

## 2024-05-31 RX ORDER — ACYCLOVIR 400 MG/1
1 TABLET ORAL
Qty: 9 EACH | Refills: 3 | Status: SHIPPED | OUTPATIENT
Start: 2024-05-31 | End: 2024-06-04 | Stop reason: SDUPTHER

## 2024-06-03 NOTE — TELEPHONE ENCOUNTER
Patient calling about dexcom7 that was approved and not sent to pharmacy. Can the script be sent to  [facility] [phone]   Please advise patient when sent

## 2024-06-04 RX ORDER — ACYCLOVIR 400 MG/1
1 TABLET ORAL ONCE
Qty: 1 EACH | Refills: 3 | Status: SHIPPED | OUTPATIENT
Start: 2024-06-04 | End: 2024-06-05

## 2024-06-04 RX ORDER — ACYCLOVIR 400 MG/1
1 TABLET ORAL
Qty: 9 EACH | Refills: 3 | Status: SHIPPED | OUTPATIENT
Start: 2024-06-04 | End: 2024-06-06 | Stop reason: SDUPTHER

## 2024-06-05 ENCOUNTER — OFFICE VISIT (OUTPATIENT)
Dept: INTERNAL MEDICINE CLINIC | Facility: CLINIC | Age: 64
End: 2024-06-05
Payer: COMMERCIAL

## 2024-06-05 ENCOUNTER — DOCUMENTATION (OUTPATIENT)
Dept: INTERNAL MEDICINE CLINIC | Facility: CLINIC | Age: 64
End: 2024-06-05

## 2024-06-05 ENCOUNTER — TELEPHONE (OUTPATIENT)
Dept: INTERNAL MEDICINE CLINIC | Facility: CLINIC | Age: 64
End: 2024-06-05

## 2024-06-05 VITALS
HEART RATE: 81 BPM | WEIGHT: 221 LBS | HEIGHT: 69 IN | OXYGEN SATURATION: 96 % | DIASTOLIC BLOOD PRESSURE: 80 MMHG | SYSTOLIC BLOOD PRESSURE: 122 MMHG | TEMPERATURE: 96.6 F | BODY MASS INDEX: 32.73 KG/M2

## 2024-06-05 DIAGNOSIS — G62.9 NEUROPATHY: ICD-10-CM

## 2024-06-05 DIAGNOSIS — E11.9 ENCOUNTER FOR DIABETIC FOOT EXAM (HCC): ICD-10-CM

## 2024-06-05 DIAGNOSIS — M15.9 PRIMARY OSTEOARTHRITIS INVOLVING MULTIPLE JOINTS: ICD-10-CM

## 2024-06-05 DIAGNOSIS — Z00.00 WELL ADULT EXAM: Primary | ICD-10-CM

## 2024-06-05 DIAGNOSIS — E66.09 CLASS 1 OBESITY DUE TO EXCESS CALORIES WITH SERIOUS COMORBIDITY AND BODY MASS INDEX (BMI) OF 32.0 TO 32.9 IN ADULT: ICD-10-CM

## 2024-06-05 DIAGNOSIS — E11.42 TYPE 2 DIABETES MELLITUS WITH DIABETIC POLYNEUROPATHY, WITH LONG-TERM CURRENT USE OF INSULIN (HCC): ICD-10-CM

## 2024-06-05 DIAGNOSIS — Z79.4 TYPE 2 DIABETES MELLITUS WITH DIABETIC POLYNEUROPATHY, WITH LONG-TERM CURRENT USE OF INSULIN (HCC): ICD-10-CM

## 2024-06-05 DIAGNOSIS — I10 ESSENTIAL HYPERTENSION: ICD-10-CM

## 2024-06-05 DIAGNOSIS — I25.10 CORONARY ARTERY DISEASE INVOLVING NATIVE CORONARY ARTERY OF NATIVE HEART WITHOUT ANGINA PECTORIS: ICD-10-CM

## 2024-06-05 DIAGNOSIS — I50.32 CHRONIC DIASTOLIC (CONGESTIVE) HEART FAILURE (HCC): ICD-10-CM

## 2024-06-05 DIAGNOSIS — D64.9 ANEMIA, UNSPECIFIED TYPE: ICD-10-CM

## 2024-06-05 DIAGNOSIS — E11.42 TYPE 2 DIABETES MELLITUS WITH DIABETIC POLYNEUROPATHY, WITH LONG-TERM CURRENT USE OF INSULIN (HCC): Primary | ICD-10-CM

## 2024-06-05 DIAGNOSIS — Z85.3 HISTORY OF BREAST CANCER: ICD-10-CM

## 2024-06-05 DIAGNOSIS — E78.2 MIXED HYPERLIPIDEMIA: ICD-10-CM

## 2024-06-05 DIAGNOSIS — Z79.4 TYPE 2 DIABETES MELLITUS WITH DIABETIC POLYNEUROPATHY, WITH LONG-TERM CURRENT USE OF INSULIN (HCC): Primary | ICD-10-CM

## 2024-06-05 DIAGNOSIS — Z23 ENCOUNTER FOR IMMUNIZATION: ICD-10-CM

## 2024-06-05 PROBLEM — Z91.89 AT RISK FOR DELIRIUM: Status: RESOLVED | Noted: 2024-04-18 | Resolved: 2024-06-05

## 2024-06-05 PROBLEM — Z91.89 AT RISK FOR CONSTIPATION: Status: RESOLVED | Noted: 2024-04-18 | Resolved: 2024-06-05

## 2024-06-05 PROBLEM — Z96.651 S/P TKR (TOTAL KNEE REPLACEMENT), RIGHT: Status: RESOLVED | Noted: 2024-04-13 | Resolved: 2024-06-05

## 2024-06-05 PROBLEM — R26.2 AMBULATORY DYSFUNCTION: Status: RESOLVED | Noted: 2024-04-13 | Resolved: 2024-06-05

## 2024-06-05 PROBLEM — G89.18 ACUTE POST-OPERATIVE PAIN: Status: RESOLVED | Noted: 2024-04-18 | Resolved: 2024-06-05

## 2024-06-05 PROBLEM — Z95.5 S/P DRUG ELUTING CORONARY STENT PLACEMENT: Status: RESOLVED | Noted: 2021-04-19 | Resolved: 2024-06-05

## 2024-06-05 PROBLEM — Z71.89 ADVANCE CARE PLANNING: Status: RESOLVED | Noted: 2024-04-18 | Resolved: 2024-06-05

## 2024-06-05 LAB — SL AMB POCT HEMOGLOBIN AIC: 6.4 (ref ?–6.5)

## 2024-06-05 PROCEDURE — 83036 HEMOGLOBIN GLYCOSYLATED A1C: CPT | Performed by: INTERNAL MEDICINE

## 2024-06-05 PROCEDURE — 99396 PREV VISIT EST AGE 40-64: CPT | Performed by: INTERNAL MEDICINE

## 2024-06-05 NOTE — PROGRESS NOTES
Depression Screening and Follow-up Plan: Patient was screened for depression during today's encounter. They screened negative with a PHQ-2 score of 0.    Assessment/Plan:  Problem List Items Addressed This Visit          Cardiovascular and Mediastinum    Essential hypertension    Coronary artery disease involving native coronary artery of native heart without angina pectoris    Chronic diastolic (congestive) heart failure (HCC)       Endocrine    Type 2 diabetes mellitus with diabetic polyneuropathy, with long-term current use of insulin (HCC)    Relevant Orders    Diabetic foot exam    POCT hemoglobin A1c (Completed)       Nervous and Auditory    Neuropathy       Musculoskeletal and Integument    Primary osteoarthritis involving multiple joints       Oncology    History of breast cancer       Other    Mixed hyperlipidemia    Class 1 obesity due to excess calories with serious comorbidity and body mass index (BMI) of 32.0 to 32.9 in adult     Other Visit Diagnoses       Well adult exam    -  Primary    Encounter for immunization        Relevant Orders    TDAP VACCINE GREATER THAN OR EQUAL TO 6YO IM    Zoster Vaccine Recombinant IM    Encounter for diabetic foot exam (HCC)        Anemia, unspecified type        Relevant Orders    Hemoglobin and hematocrit, blood             Diagnoses and all orders for this visit:    Well adult exam    Type 2 diabetes mellitus with diabetic polyneuropathy, with long-term current use of insulin (HCC)  -     Diabetic foot exam; Future  -     POCT hemoglobin A1c    Encounter for immunization  -     TDAP VACCINE GREATER THAN OR EQUAL TO 6YO IM  -     Zoster Vaccine Recombinant IM    Essential hypertension    Coronary artery disease involving native coronary artery of native heart without angina pectoris    Chronic diastolic (congestive) heart failure (HCC)    Neuropathy    Primary osteoarthritis involving multiple joints    History of breast cancer    Class 1 obesity due to excess  calories with serious comorbidity and body mass index (BMI) of 32.0 to 32.9 in adult    Mixed hyperlipidemia    Encounter for diabetic foot exam (HCC)    Anemia, unspecified type  -     Hemoglobin and hematocrit, blood; Future        No problem-specific Assessment & Plan notes found for this encounter.    A/P: Doing well and co-morbidities are stable.  Labs are up to date except for HgA1c, which was good at 6.4. Keep f/u with Endo.. Will recheck her H/H given the anemia.  Defers vaccines. Discussed eye exam and will need to get recent report. Keep f/u with PT for the knee. . No mental or physical restrictions. No evidence of communicable diseases. Continue current treatment and RTC one year for annual and three months for routine..       Subjective:      Patient ID: Ines Ivy is a 64 y.o. female.    WF presents for a well exam. Doing ok and no c/o's. Still recovering from recent R TKR.  Remains active w/o difficulty and no falls otherwise. Reports sugars less than 140 and no low sugar events. Chronic pain is manageable. Vision no worse. Breast ca is in remission.. Denies depression. No suicidal or violent ideations. Working full time, but is off yet due to sx . No recent travel. No fever, chills, or sweats. No unexplained wt change. Denies CP, SOB, palpitations, edema, orthopnea, or PND. No sz or syncope. No changes in bowel or bladder habits. No trouble swallowing. Appetite and sleep are good. Doesn't need to see a DDS and just seen by her eye doctor. No change in PMH, PSH, ALL, OH, SH, or Fhx. Currently due for labs, foot exam, eye exam and vaccines. .              The following portions of the patient's history were reviewed and updated as appropriate:   She has a past medical history of Arthritis, Breast cancer (HCC), Cancer (HCC), Cardiac disease, CHF (congestive heart failure) (HCC), Coronary artery disease, Diabetes mellitus (HCC), Heartburn, radiation therapy, Hypercholesteremia, Hyperlipidemia,  Hypertension, and Neuropathy.,  does not have any pertinent problems on file.,   has a past surgical history that includes Breast surgery (Left); Knee surgery; Nose surgery; Mouth surgery; Foot surgery (Left); Tubal ligation; Ovarian cyst removal (Left); Cholecystectomy; pr surgical arthroscopy riley w/coracoacrm ligm rls (Right, 05/16/2016); pr surgical arthroscopy shoulder biceps tenodesis (Right, 05/16/2016); Cardiac surgery; Tubal ligation; Colonoscopy; and Breast lumpectomy (Left).,  family history includes Breast cancer in her paternal aunt; Esophageal cancer in her father; Leukemia in her father; Liver disease in her brother; Lung cancer in her family, father, and mother; No Known Problems in her maternal aunt, maternal aunt, maternal aunt, maternal aunt, maternal grandmother, paternal aunt, paternal aunt, paternal aunt, paternal aunt, paternal aunt, paternal grandmother, sister, sister, and sister; Stomach cancer in her family; Thyroid disease in her mother.,   reports that she quit smoking about 24 years ago. Her smoking use included cigarettes. She has been exposed to tobacco smoke. She has never used smokeless tobacco. She reports that she does not currently use alcohol. She reports that she does not currently use drugs.,  is allergic to codeine, iodinated contrast media, iodine - food allergy, and penicillins..  Current Outpatient Medications   Medication Sig Dispense Refill    amLODIPine (NORVASC) 5 mg tablet Take 1 tablet by mouth once daily 90 tablet 1    aspirin 81 MG tablet Take 81 mg by mouth daily.      atorvastatin (LIPITOR) 80 mg tablet Take 1 tablet by mouth once daily 90 tablet 1    Continuous Glucose Sensor (Dexcom G7 Sensor) Use 1 Device every 10 days 9 each 3    docusate sodium (COLACE) 100 mg capsule Take 100 mg by mouth 2 (two) times a day      Empagliflozin (Jardiance) 25 MG TABS Take 1 tablet (25 mg total) by mouth every morning 90 tablet 3    ergocalciferol (VITAMIN D2) 50,000 units Take  1 capsule (50,000 Units total) by mouth once a week for 8 doses 8 capsule 0    furosemide (LASIX) 40 mg tablet Take 1 tablet (40 mg total) by mouth daily 90 tablet 3    gabapentin (NEURONTIN) 400 mg capsule TAKE 1 CAPSULE BY MOUTH THREE TIMES DAILY 270 capsule 1    Insulin Glargine Solostar (Basaglar KwikPen) 100 UNIT/ML SOPN 55units sq in AM and 60 units sq q PM 90 mL 1    Insulin Pen Needle 32G X 6 MM MISC Use in the morning 100 each 3    losartan (COZAAR) 100 MG tablet Take 1 tablet by mouth once daily 90 tablet 1    metoprolol succinate (TOPROL-XL) 100 mg 24 hr tablet Take 1 tablet by mouth once daily 90 tablet 1    omeprazole (PriLOSEC) 40 MG capsule Take 1 capsule by mouth once daily 90 capsule 1    ranolazine (RANEXA) 500 mg 12 hr tablet Take 1 tablet by mouth twice daily 60 tablet 0    tirzepatide (Mounjaro) 12.5 MG/0.5ML INJECT 12.5 MG UNDER THE SKIN ONCE A WEEK 6 mL 1    TURMERIC PO Take by mouth       No current facility-administered medications for this visit.       Review of Systems   Constitutional:  Negative for activity change, chills, diaphoresis, fatigue and fever.   Respiratory:  Negative for cough, chest tightness, shortness of breath and wheezing.    Cardiovascular:  Negative for chest pain, palpitations and leg swelling.   Gastrointestinal:  Negative for abdominal pain, constipation, diarrhea, nausea and vomiting.   Genitourinary:  Negative for difficulty urinating, dysuria and frequency.   Musculoskeletal:  Positive for gait problem. Negative for arthralgias and myalgias.   Neurological:  Negative for dizziness, seizures, syncope, weakness, light-headedness and headaches.   Psychiatric/Behavioral:  Negative for confusion, dysphoric mood and sleep disturbance. The patient is not nervous/anxious.        PHQ-2/9 Depression Screening    Little interest or pleasure in doing things: 0 - not at all  Feeling down, depressed, or hopeless: 0 - not at all  PHQ-2 Score: 0  PHQ-2 Interpretation: Negative  "depression screen        Objective:  Vitals:    06/05/24 0859   BP: 122/80   BP Location: Right arm   Patient Position: Sitting   Pulse: 81   Temp: (!) 96.6 °F (35.9 °C)   TempSrc: Tympanic   SpO2: 96%   Weight: 100 kg (221 lb)   Height: 5' 9.29\" (1.76 m)     Body mass index is 32.36 kg/m².     Physical Exam  Vitals and nursing note reviewed.   Constitutional:       General: She is not in acute distress.     Appearance: Normal appearance. She is not ill-appearing.   HENT:      Head: Normocephalic and atraumatic.      Mouth/Throat:      Mouth: Mucous membranes are moist.   Eyes:      Extraocular Movements: Extraocular movements intact.      Conjunctiva/sclera: Conjunctivae normal.      Pupils: Pupils are equal, round, and reactive to light.   Neck:      Vascular: No carotid bruit.   Cardiovascular:      Rate and Rhythm: Normal rate and regular rhythm.      Pulses: no weak pulses.           Dorsalis pedis pulses are 1+ on the right side and 1+ on the left side.        Posterior tibial pulses are 1+ on the right side and 1+ on the left side.      Heart sounds: Normal heart sounds. No murmur heard.  Pulmonary:      Effort: Pulmonary effort is normal. No respiratory distress.      Breath sounds: Normal breath sounds. No wheezing, rhonchi or rales.   Abdominal:      General: Bowel sounds are normal. There is no distension.      Palpations: Abdomen is soft.      Tenderness: There is no abdominal tenderness.   Musculoskeletal:         General: Tenderness present.      Cervical back: Neck supple.      Right lower leg: No edema.      Left lower leg: No edema.   Feet:      Right foot:      Skin integrity: No ulcer, skin breakdown, erythema, warmth, callus or dry skin.      Left foot:      Skin integrity: No ulcer, skin breakdown, erythema, warmth, callus or dry skin.   Neurological:      General: No focal deficit present.      Mental Status: She is alert and oriented to person, place, and time. Mental status is at baseline. "   Psychiatric:         Mood and Affect: Mood normal.         Behavior: Behavior normal.         Thought Content: Thought content normal.         Judgment: Judgment normal.           Patient's shoes and socks removed.    Right Foot/Ankle   Right Foot Inspection  Skin Exam: skin normal and skin intact. No dry skin, no warmth, no callus, no erythema, no maceration, no abnormal color, no pre-ulcer, no ulcer and no callus.     Toe Exam: ROM and strength within normal limits. No swelling, no tenderness, erythema and  no right toe deformity    Sensory   Monofilament testing: absent    Vascular  Capillary refills: < 3 seconds  The right DP pulse is 1+. The right PT pulse is 1+.     Left Foot/Ankle  Left Foot Inspection  Skin Exam: skin normal and skin intact. No dry skin, no warmth, no erythema, no maceration, normal color, no pre-ulcer, no ulcer and no callus.     Toe Exam: ROM and strength within normal limits. No swelling, no tenderness, no erythema and no left toe deformity.     Sensory   Monofilament testing: intact    Vascular  Capillary refills: < 3 seconds  The left DP pulse is 1+. The left PT pulse is 1+.     Assign Risk Category  No deformity present  Loss of protective sensation  No weak pulses  Risk: 1

## 2024-06-05 NOTE — PROGRESS NOTES
Diabetic Foot Exam    Patient's shoes and socks removed.      Left Foot/Ankle  Left Foot Inspection  Skin Exam: skin normal and skin intact. No dry skin, no warmth, no erythema, no maceration, normal color, no pre-ulcer, no ulcer and no callus.     Toe Exam: ROM and strength within normal limits.

## 2024-06-05 NOTE — PATIENT INSTRUCTIONS
Basic Carbohydrate Counting   AMBULATORY CARE:   Carbohydrate counting  is a way to plan your meals by counting the amount of carbohydrate in foods. Carbohydrates are the sugars, starches, and fiber found in fruit, grains, vegetables, and milk products. Carbohydrates increase your blood sugar levels. Carbohydrate counting can help you eat the right amount of carbohydrate to keep your blood sugar levels under control.   What you need to know about planning meals using carbohydrate counting:  A dietitian or healthcare provider will help you develop a healthy meal plan that works best for you. You will be taught how much carbohydrate to eat or drink for each meal and snack. Your meal plan will be based on your age, weight, usual food intake, and physical activity level. If you have diabetes, it will also include your blood sugar levels and diabetes medicine. Once you know how much carbohydrate you should eat, you can decide what type of food you want to eat.    You will need to know what foods contain carbohydrate and how much they contain. Keep track of the amount of carbohydrate in meals and snacks in order to follow your meal plan. Do not avoid carbohydrates or skip meals. Your blood sugar may fall too low if you do not eat enough carbohydrate or you skip meals.    Foods that contain carbohydrate:   Breads:  Each serving of food listed below contains about 15 g of carbohydrate .    1 slice of bread (1 ounce) or 1 flour or corn tortilla (6 inch)    ½ of a hamburger bun or ¼ of a large bagel (about 1 ounce)    1 pancake (about 4 inches across and ¼ inch thick)    Cereals and grains:  Serving sizes of ready-to-eat cereals vary. Look at the serving size and the total carbohydrate amount listed on the food label. Each serving of food listed below contains about 15 g of carbohydrate .    ¾ cup of dry, unsweetened, ready-to-eat cereal or ¼ cup of low-fat granola     ½ cup of oatmeal or other cooked cereal     ? cup of  cooked rice or pasta    Starchy vegetables and beans:  Each serving of food listed below contains about 15 g of carbohydrate .    ½ cup of corn, green peas, sweet potatoes, or mashed potatoes    ¼ of a large baked potato    ½ cup of beans, lentils, and peas (garbanzo, rosales, kidney, white, split, black-eyed)    Crackers and snacks:  Each serving of food listed below contains about 15 g of carbohydrate .    3 danis cracker squares or 8 animal crackers     6 saltine-type crackers    3 cups of popcorn or ¾ ounce of pretzels, potato chips, or tortilla chips    Fruit:  Each serving of food listed below contains about 15 g of carbohydrate .    1 small (4 ounce) piece of fresh fruit or ¾ to 1 cup of fresh fruit    ½ cup of canned or frozen fruit, packed in natural juice    ½ cup (4 ounces) of unsweetened fruit juice    2 tablespoons of dried fruit    Desserts or sugary foods:  Each serving of food listed below contains about 15 g of carbohydrate .    2-inch square unfrosted cake or brownie     2 small cookies    ½ cup of ice cream, frozen yogurt, or nondairy frozen yogurt    ¼ cup of sherbet or sorbet    1 tablespoon of regular syrup, jam, or jelly    2 tablespoons of light syrup    Milk and yogurt:  Foods from the milk group contain about 12 g of carbohydrate per serving.    1 cup of fat-free or low-fat milk    1 cup of soy milk    ? cup of fat-free, yogurt sweetened with artificial sweetener    Non-starchy vegetables:  Each serving contains about 5 g of carbohydrate . Three servings of non-starch vegetables count as 1 carbohydrate serving.     ½ cup of cooked vegetables or 1 cup of raw vegetables. This includes beets, broccoli, cabbage, cauliflower, cucumber, mushrooms, tomatoes, and zucchini    ½ cup of vegetable juice    How to use carbohydrate counting to plan meals:   Count carbohydrate amounts using serving sizes:      Pasta dinner example:  You plan to have pasta, tossed salad, and an 8-ounce glass of milk. Your  healthcare provider tells you that you may have 4 carbohydrate servings for dinner. One carbohydrate serving of pasta is ? cup. One cup of pasta will equal 3 carbohydrate servings. An 8-ounce glass of milk will count as 1 carbohydrate serving. These amounts of food would equal 4 carbohydrate servings. One cup of tossed salad does not count toward your carbohydrate servings.       Count carbohydrate amounts using food labels:  Find the total amount of carbohydrate in a packaged food by reading the food label. Food labels tell you the serving size of the food and the total carbohydrate amount in each serving. Find the serving size on the food label and then decide how many servings you will eat. Multiply the number of servings you plan to eat by the carbohydrate amount per serving.     Granola bar snack example:  Your meal plan allows you to have 2 carbohydrate servings (30 grams) of carbohydrate for a snack. You plan to eat 1 package of granola bars, which contains 2 bars. According to the food label, the serving size of food in this package is 1 bar. Each serving (1 bar) contains 25 grams of carbohydrate. The total amount of carbohydrate in this package of granola bars would be 50 g. Based on your meal plan, you should eat only 1 bar.      Follow up with your doctor as directed:  Write down your questions so you remember to ask them during your visits.  © Copyright [employer] 2023 Information is for End User's use only and may not be sold, redistributed or otherwise used for commercial purposes.  The above information is an  only. It is not intended as medical advice for individual conditions or treatments. Talk to your doctor, nurse or pharmacist before following any medical regimen to see if it is safe and effective for you.

## 2024-06-06 ENCOUNTER — OFFICE VISIT (OUTPATIENT)
Dept: ENDOCRINOLOGY | Facility: CLINIC | Age: 64
End: 2024-06-06
Payer: COMMERCIAL

## 2024-06-06 VITALS
OXYGEN SATURATION: 98 % | HEART RATE: 81 BPM | HEIGHT: 69 IN | SYSTOLIC BLOOD PRESSURE: 122 MMHG | BODY MASS INDEX: 32.79 KG/M2 | DIASTOLIC BLOOD PRESSURE: 78 MMHG | TEMPERATURE: 97.6 F | WEIGHT: 221.4 LBS | RESPIRATION RATE: 16 BRPM

## 2024-06-06 DIAGNOSIS — I10 ESSENTIAL HYPERTENSION: ICD-10-CM

## 2024-06-06 DIAGNOSIS — E11.42 TYPE 2 DIABETES MELLITUS WITH DIABETIC POLYNEUROPATHY, WITH LONG-TERM CURRENT USE OF INSULIN (HCC): Primary | ICD-10-CM

## 2024-06-06 DIAGNOSIS — E78.2 MIXED HYPERLIPIDEMIA: ICD-10-CM

## 2024-06-06 DIAGNOSIS — E66.09 CLASS 1 OBESITY DUE TO EXCESS CALORIES WITH SERIOUS COMORBIDITY AND BODY MASS INDEX (BMI) OF 32.0 TO 32.9 IN ADULT: ICD-10-CM

## 2024-06-06 DIAGNOSIS — Z79.4 TYPE 2 DIABETES MELLITUS WITH DIABETIC POLYNEUROPATHY, WITH LONG-TERM CURRENT USE OF INSULIN (HCC): Primary | ICD-10-CM

## 2024-06-06 PROCEDURE — 99214 OFFICE O/P EST MOD 30 MIN: CPT | Performed by: STUDENT IN AN ORGANIZED HEALTH CARE EDUCATION/TRAINING PROGRAM

## 2024-06-06 PROCEDURE — 95251 CONT GLUC MNTR ANALYSIS I&R: CPT | Performed by: STUDENT IN AN ORGANIZED HEALTH CARE EDUCATION/TRAINING PROGRAM

## 2024-06-06 RX ORDER — INSULIN GLARGINE 100 [IU]/ML
INJECTION, SOLUTION SUBCUTANEOUS
Qty: 90 ML | Refills: 1 | Status: SHIPPED | OUTPATIENT
Start: 2024-06-06

## 2024-06-06 RX ORDER — ACYCLOVIR 400 MG/1
1 TABLET ORAL
Qty: 9 EACH | Refills: 3 | Status: SHIPPED | OUTPATIENT
Start: 2024-06-06

## 2024-06-06 NOTE — ASSESSMENT & PLAN NOTE
Lab Results   Component Value Date    HGBA1C 6.4 06/05/2024     Diabetes is well controlled, she was congratulated on her hard work managing her diabetes.  She has tolerated her current regimen,   She has been taking insulin just once a day most of the time, so I decreased Basaglar to 60 units once a day,  Continue Jardiance and Mounjaro at current dose.  Ophthalmology and podiatry follow-up.  Return back in 3 months.  Labs prior to next visit.
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
## 2024-06-06 NOTE — ASSESSMENT & PLAN NOTE
She has been losing weight, 15 to 20 pounds since January.  She was encouraged to continue lifestyle modifications including regular daily exercise and balanced diet.  Continue Mounjaro 12.5 g weekly.

## 2024-06-06 NOTE — ASSESSMENT & PLAN NOTE
Blood pressure is well-controlled, 122/78, the goal is less than 130/80.  Continue current regimen including losartan 100 mg daily.

## 2024-06-07 ENCOUNTER — TELEPHONE (OUTPATIENT)
Dept: INTERNAL MEDICINE CLINIC | Facility: CLINIC | Age: 64
End: 2024-06-07

## 2024-06-07 NOTE — TELEPHONE ENCOUNTER
Called patient to let her know her handicapped application is done and can be picked up.  Message left on voicemail.

## 2024-06-10 ENCOUNTER — TELEPHONE (OUTPATIENT)
Dept: ENDOCRINOLOGY | Facility: CLINIC | Age: 64
End: 2024-06-10

## 2024-06-12 RX ORDER — ACYCLOVIR 400 MG/1
1 TABLET ORAL ONCE
Qty: 1 EACH | Refills: 3 | Status: SHIPPED | OUTPATIENT
Start: 2024-06-12 | End: 2024-06-12

## 2024-06-12 NOTE — TELEPHONE ENCOUNTER
This is not listed on the current medication list.  Only the sensor is.  Please send a script to the pharmacy.  Once sent a PA can be started.

## 2024-06-17 ENCOUNTER — TELEPHONE (OUTPATIENT)
Dept: INTERNAL MEDICINE CLINIC | Facility: CLINIC | Age: 64
End: 2024-06-17

## 2024-06-18 ENCOUNTER — TELEPHONE (OUTPATIENT)
Age: 64
End: 2024-06-18

## 2024-06-18 NOTE — TELEPHONE ENCOUNTER
Called [facility] to clarify what was needed.     They need a prior auth for the Dexcom. They gave a key to start  [id number]    Sending to the prior auth team

## 2024-06-18 NOTE — TELEPHONE ENCOUNTER
Patient called in and stated that Dr. Galvan put in a rx for a Dexcom G7 for pt but [employer] [facility] won't give it to her until Dr. Galvan calls in and answers some questions for them. Please give them a call so that pt can receive her Dexcom as soon as possible. The number on pt's [employer] card for medications is [phone].

## 2024-06-28 DIAGNOSIS — E11.42 TYPE 2 DIABETES MELLITUS WITH DIABETIC POLYNEUROPATHY, WITH LONG-TERM CURRENT USE OF INSULIN (HCC): ICD-10-CM

## 2024-06-28 DIAGNOSIS — Z79.4 TYPE 2 DIABETES MELLITUS WITH DIABETIC POLYNEUROPATHY, WITH LONG-TERM CURRENT USE OF INSULIN (HCC): ICD-10-CM

## 2024-06-28 DIAGNOSIS — I21.4 NSTEMI (NON-ST ELEVATED MYOCARDIAL INFARCTION) (HCC): ICD-10-CM

## 2024-06-28 DIAGNOSIS — I10 ESSENTIAL HYPERTENSION: ICD-10-CM

## 2024-06-28 DIAGNOSIS — I25.118 CORONARY ARTERY DISEASE OF NATIVE ARTERY OF NATIVE HEART WITH STABLE ANGINA PECTORIS (HCC): ICD-10-CM

## 2024-06-28 RX ORDER — RANOLAZINE 500 MG/1
500 TABLET, EXTENDED RELEASE ORAL 2 TIMES DAILY
Qty: 60 TABLET | Refills: 0 | Status: SHIPPED | OUTPATIENT
Start: 2024-06-28

## 2024-06-28 RX ORDER — ERGOCALCIFEROL 1.25 MG/1
50000 CAPSULE ORAL WEEKLY
Qty: 8 CAPSULE | Refills: 0 | Status: SHIPPED | OUTPATIENT
Start: 2024-06-28 | End: 2024-08-17

## 2024-06-28 RX ORDER — AMLODIPINE BESYLATE 5 MG/1
5 TABLET ORAL DAILY
Qty: 90 TABLET | Refills: 0 | Status: SHIPPED | OUTPATIENT
Start: 2024-06-28

## 2024-06-28 RX ORDER — ATORVASTATIN CALCIUM 80 MG/1
80 TABLET, FILM COATED ORAL DAILY
Qty: 90 TABLET | Refills: 0 | Status: SHIPPED | OUTPATIENT
Start: 2024-06-28

## 2024-07-23 DIAGNOSIS — I25.118 CORONARY ARTERY DISEASE OF NATIVE ARTERY OF NATIVE HEART WITH STABLE ANGINA PECTORIS (HCC): ICD-10-CM

## 2024-07-23 RX ORDER — RANOLAZINE 500 MG/1
500 TABLET, EXTENDED RELEASE ORAL 2 TIMES DAILY
Qty: 60 TABLET | Refills: 0 | Status: SHIPPED | OUTPATIENT
Start: 2024-07-23

## 2024-07-31 ENCOUNTER — OFFICE VISIT (OUTPATIENT)
Dept: INTERNAL MEDICINE CLINIC | Facility: CLINIC | Age: 64
End: 2024-07-31
Payer: COMMERCIAL

## 2024-07-31 VITALS
WEIGHT: 223.25 LBS | OXYGEN SATURATION: 98 % | SYSTOLIC BLOOD PRESSURE: 124 MMHG | BODY MASS INDEX: 33.06 KG/M2 | HEIGHT: 69 IN | DIASTOLIC BLOOD PRESSURE: 72 MMHG | TEMPERATURE: 96.7 F | HEART RATE: 82 BPM

## 2024-07-31 DIAGNOSIS — G89.29 CHRONIC MIDLINE LOW BACK PAIN WITH RIGHT-SIDED SCIATICA: ICD-10-CM

## 2024-07-31 DIAGNOSIS — R06.02 SHORTNESS OF BREATH: ICD-10-CM

## 2024-07-31 DIAGNOSIS — Z23 ENCOUNTER FOR IMMUNIZATION: Primary | ICD-10-CM

## 2024-07-31 DIAGNOSIS — M48.061 SPINAL STENOSIS OF LUMBAR REGION WITHOUT NEUROGENIC CLAUDICATION: ICD-10-CM

## 2024-07-31 DIAGNOSIS — M54.41 CHRONIC MIDLINE LOW BACK PAIN WITH RIGHT-SIDED SCIATICA: ICD-10-CM

## 2024-07-31 DIAGNOSIS — M51.26 HNP (HERNIATED NUCLEUS PULPOSUS), LUMBAR: ICD-10-CM

## 2024-07-31 DIAGNOSIS — Z79.4 TYPE 2 DIABETES MELLITUS WITH DIABETIC POLYNEUROPATHY, WITH LONG-TERM CURRENT USE OF INSULIN (HCC): ICD-10-CM

## 2024-07-31 DIAGNOSIS — E11.42 TYPE 2 DIABETES MELLITUS WITH DIABETIC POLYNEUROPATHY, WITH LONG-TERM CURRENT USE OF INSULIN (HCC): ICD-10-CM

## 2024-07-31 PROCEDURE — 3078F DIAST BP <80 MM HG: CPT | Performed by: INTERNAL MEDICINE

## 2024-07-31 PROCEDURE — 3074F SYST BP LT 130 MM HG: CPT | Performed by: INTERNAL MEDICINE

## 2024-07-31 PROCEDURE — 99213 OFFICE O/P EST LOW 20 MIN: CPT | Performed by: INTERNAL MEDICINE

## 2024-07-31 RX ORDER — MELOXICAM 15 MG/1
15 TABLET ORAL DAILY
Qty: 30 TABLET | Refills: 1 | Status: SHIPPED | OUTPATIENT
Start: 2024-07-31

## 2024-07-31 RX ORDER — GABAPENTIN 400 MG/1
400 CAPSULE ORAL 3 TIMES DAILY
Qty: 270 CAPSULE | Refills: 1 | Status: SHIPPED | OUTPATIENT
Start: 2024-07-31

## 2024-07-31 RX ORDER — FUROSEMIDE 40 MG/1
40 TABLET ORAL 2 TIMES DAILY
Qty: 180 TABLET | Refills: 1 | Status: SHIPPED | OUTPATIENT
Start: 2024-07-31

## 2024-07-31 RX ORDER — METHOCARBAMOL 500 MG/1
500 TABLET, FILM COATED ORAL 4 TIMES DAILY
Qty: 90 TABLET | Refills: 0 | Status: SHIPPED | OUTPATIENT
Start: 2024-07-31

## 2024-07-31 NOTE — PROGRESS NOTES
Ambulatory Visit  Name: Ines Ivy      : 1960      MRN: [id number]  Encounter Provider: Jorge L Najera DO  Encounter Date: 2024   Encounter department: [facility]    Assessment & Plan   1. Encounter for immunization  -     Pneumococcal Conjugate Vaccine 20-valent (Pcv20)  -     TDAP VACCINE GREATER THAN OR EQUAL TO 6YO IM  -     Zoster Vaccine Recombinant IM  2. Shortness of breath  -     furosemide (LASIX) 40 mg tablet; Take 1 tablet (40 mg total) by mouth 2 (two) times a day  3. HNP (herniated nucleus pulposus), lumbar  -     gabapentin (NEURONTIN) 400 mg capsule; Take 1 capsule (400 mg total) by mouth 3 (three) times a day  -     meloxicam (MOBIC) 15 mg tablet; Take 1 tablet (15 mg total) by mouth daily  -     methocarbamol (ROBAXIN) 500 mg tablet; Take 1 tablet (500 mg total) by mouth 4 (four) times a day  -     Ambulatory referral to Physical Therapy; Future  4. Chronic midline low back pain with right-sided sciatica  -     gabapentin (NEURONTIN) 400 mg capsule; Take 1 capsule (400 mg total) by mouth 3 (three) times a day  -     meloxicam (MOBIC) 15 mg tablet; Take 1 tablet (15 mg total) by mouth daily  -     methocarbamol (ROBAXIN) 500 mg tablet; Take 1 tablet (500 mg total) by mouth 4 (four) times a day  -     Ambulatory referral to Physical Therapy; Future  5. Spinal stenosis of lumbar region without neurogenic claudication  -     gabapentin (NEURONTIN) 400 mg capsule; Take 1 capsule (400 mg total) by mouth 3 (three) times a day  -     meloxicam (MOBIC) 15 mg tablet; Take 1 tablet (15 mg total) by mouth daily  -     methocarbamol (ROBAXIN) 500 mg tablet; Take 1 tablet (500 mg total) by mouth 4 (four) times a day  -     Ambulatory referral to Physical Therapy; Future  6. Type 2 diabetes mellitus with diabetic polyneuropathy, with long-term current use of insulin (HCC)  -     gabapentin (NEURONTIN) 400 mg capsule; Take 1 capsule (400 mg total) by mouth 3 (three)  "times a day  A/P: Stable. Will start with NSAID's and muscle relaxants. Continue eliz and start PT. May need MRI, but needs PT first. Consider adding SNRI. May need to see pain management. RTC three weeks for f/u.      History of Present Illness     WF with chronic LBP with radiation to the right presents with progressive worsening over the past several months. No new injuries, but did have a TKR on the right. Constant pain 4/10 with flares to 10/10. No saddle anesthesia or change in bowel or bladder habits. No treatment, but taking eliz for diabetic issues. Seen by DC and feels her HNP/stenosis is worse.         Review of Systems   Constitutional:  Positive for activity change. Negative for chills, diaphoresis, fatigue and fever.   Respiratory:  Negative for cough, chest tightness, shortness of breath and wheezing.    Cardiovascular:  Negative for chest pain, palpitations and leg swelling.   Gastrointestinal:  Negative for abdominal pain, constipation, diarrhea, nausea and vomiting.   Genitourinary:  Negative for difficulty urinating, dysuria and frequency.   Musculoskeletal:  Positive for back pain and gait problem. Negative for arthralgias and myalgias.   Neurological:  Positive for numbness. Negative for dizziness, seizures, syncope, weakness, light-headedness and headaches.   Psychiatric/Behavioral:  Negative for confusion, dysphoric mood and sleep disturbance. The patient is not nervous/anxious.        Objective     /72 (BP Location: Right arm, Patient Position: Sitting)   Pulse 82   Temp (!) 96.7 °F (35.9 °C) (Tympanic)   Ht 5' 9\" (1.753 m)   Wt 101 kg (223 lb 4 oz)   SpO2 98%   BMI 32.97 kg/m²     Physical Exam  Vitals and nursing note reviewed.   Constitutional:       General: She is not in acute distress.     Appearance: Normal appearance. She is not ill-appearing.   HENT:      Head: Normocephalic and atraumatic.      Mouth/Throat:      Mouth: Mucous membranes are moist.   Eyes:      " Extraocular Movements: Extraocular movements intact.      Conjunctiva/sclera: Conjunctivae normal.      Pupils: Pupils are equal, round, and reactive to light.   Musculoskeletal:         General: Tenderness present.      Comments: LS Spine w/o gross deformities, increase temp, erythema, swelling, or lesions. Tenderness midline L2-L5. ROM wnl except extension reduced 50%. Spasms mild . LE strength 5/5 with tone/ROM WNL. DTR 2/4. Negative Straight leg raises. No gait abnormalities(toe/heel walking).       Neurological:      General: No focal deficit present.      Mental Status: She is alert and oriented to person, place, and time. Mental status is at baseline.   Psychiatric:         Mood and Affect: Mood normal.         Behavior: Behavior normal.         Thought Content: Thought content normal.         Judgment: Judgment normal.       Administrative Statements

## 2024-08-17 DIAGNOSIS — Z79.4 TYPE 2 DIABETES MELLITUS WITH DIABETIC POLYNEUROPATHY, WITH LONG-TERM CURRENT USE OF INSULIN (HCC): ICD-10-CM

## 2024-08-17 DIAGNOSIS — E11.42 TYPE 2 DIABETES MELLITUS WITH DIABETIC POLYNEUROPATHY, WITH LONG-TERM CURRENT USE OF INSULIN (HCC): ICD-10-CM

## 2024-08-19 RX ORDER — ERGOCALCIFEROL 1.25 MG/1
CAPSULE, LIQUID FILLED ORAL
Qty: 8 CAPSULE | Refills: 0 | Status: SHIPPED | OUTPATIENT
Start: 2024-08-19

## 2024-08-21 ENCOUNTER — OFFICE VISIT (OUTPATIENT)
Dept: INTERNAL MEDICINE CLINIC | Facility: CLINIC | Age: 64
End: 2024-08-21
Payer: COMMERCIAL

## 2024-08-21 VITALS
OXYGEN SATURATION: 97 % | DIASTOLIC BLOOD PRESSURE: 78 MMHG | HEART RATE: 70 BPM | BODY MASS INDEX: 34.21 KG/M2 | SYSTOLIC BLOOD PRESSURE: 130 MMHG | HEIGHT: 69 IN | WEIGHT: 231 LBS | TEMPERATURE: 97.5 F

## 2024-08-21 DIAGNOSIS — M51.26 HNP (HERNIATED NUCLEUS PULPOSUS), LUMBAR: ICD-10-CM

## 2024-08-21 DIAGNOSIS — Z12.11 SCREENING FOR COLON CANCER: ICD-10-CM

## 2024-08-21 DIAGNOSIS — M54.41 CHRONIC MIDLINE LOW BACK PAIN WITH RIGHT-SIDED SCIATICA: Primary | ICD-10-CM

## 2024-08-21 DIAGNOSIS — Z23 ENCOUNTER FOR IMMUNIZATION: ICD-10-CM

## 2024-08-21 DIAGNOSIS — G89.29 CHRONIC MIDLINE LOW BACK PAIN WITH RIGHT-SIDED SCIATICA: Primary | ICD-10-CM

## 2024-08-21 DIAGNOSIS — M48.061 SPINAL STENOSIS OF LUMBAR REGION WITHOUT NEUROGENIC CLAUDICATION: ICD-10-CM

## 2024-08-21 PROBLEM — H40.89 OTHER SPECIFIED GLAUCOMA: Status: RESOLVED | Noted: 2019-12-11 | Resolved: 2024-08-21

## 2024-08-21 PROCEDURE — 99213 OFFICE O/P EST LOW 20 MIN: CPT | Performed by: INTERNAL MEDICINE

## 2024-08-21 NOTE — PROGRESS NOTES
Ambulatory Visit  Name: Ines Ivy      : 1960      MRN: [id number]  Encounter Provider: Jorge L Najera DO  Encounter Date: 2024   Encounter department: [facility]    Assessment & Plan   1. Chronic midline low back pain with right-sided sciatica  -     MRI lumbar spine wo contrast; Future; Expected date: 2024  2. Encounter for immunization  -     Zoster Vaccine Recombinant IM  -     Pneumococcal Conjugate Vaccine 20-valent (Pcv20)  -     TDAP VACCINE GREATER THAN OR EQUAL TO 6YO IM  3. Screening for colon cancer  -     Ambulatory Referral to Gastroenterology; Future  4. HNP (herniated nucleus pulposus), lumbar  -     MRI lumbar spine wo contrast; Future; Expected date: 2024  5. Spinal stenosis of lumbar region without neurogenic claudication  -     MRI lumbar spine wo contrast; Future; Expected date: 2024     A/P: Stable and improved slightly. Discussed PT and feel this would help, but needs to get approval from insurance. Pt to get info from employer about contact numbers, pt ID, employer policy, etc. Discussed seeing pain management and pt reports DC doesn't want her seeing pain management until imaging is done. Will attempt to get and MRI and pt aware that insurance may require PT. Continue current meds. RTC one month for routine. Placard form filled out.   History of Present Illness     WF RTC for f/u several weeks acute on chronic LBP with right LE radiations. Started on NSAID's, muscle relaxants and eliz adjusted. Referred to PT, but never started due to pt using up her PT benefits for a recent knee sx. Still seeing a DC. Tolerating the meds. Reports good improvement. Rates pain as 1/10 and no radiation now. . No saddle anesthesia or change in bowel or bladder habits. Reports DC and she are requesting imaging studies since last images over 2 years old. Wants a parking placard filled out.         Review of Systems   Constitutional:  Positive for  "activity change. Negative for chills, diaphoresis, fatigue and fever.   Respiratory:  Negative for cough, chest tightness, shortness of breath and wheezing.    Cardiovascular:  Negative for chest pain, palpitations and leg swelling.   Gastrointestinal:  Negative for abdominal pain, constipation, diarrhea, nausea and vomiting.   Genitourinary:  Negative for difficulty urinating, dysuria and frequency.   Musculoskeletal:  Positive for back pain. Negative for arthralgias, gait problem and myalgias.   Neurological:  Positive for numbness. Negative for dizziness, syncope, weakness, light-headedness and headaches.   Psychiatric/Behavioral:  Negative for confusion. The patient is not nervous/anxious.        Objective     /78   Pulse 70   Temp 97.5 °F (36.4 °C)   Ht 5' 9\" (1.753 m)   Wt 105 kg (231 lb)   SpO2 97%   BMI 34.11 kg/m²     Physical Exam  Vitals and nursing note reviewed.   Constitutional:       General: She is not in acute distress.     Appearance: Normal appearance. She is not ill-appearing.   HENT:      Head: Normocephalic and atraumatic.      Mouth/Throat:      Mouth: Mucous membranes are moist.   Eyes:      Extraocular Movements: Extraocular movements intact.      Conjunctiva/sclera: Conjunctivae normal.      Pupils: Pupils are equal, round, and reactive to light.   Musculoskeletal:         General: Tenderness present. No swelling or deformity.      Comments: LS Spine w/o gross deformities, increase temp, erythema, swelling, or lesions. Tenderness midline L4-S1. ROM wnl. Spasms none. LE strength 5/5 with tone/ROM WNL. DTR 2/4.  No gait abnormalities(toe/heel walking).     Neurological:      General: No focal deficit present.      Mental Status: She is alert and oriented to person, place, and time. Mental status is at baseline.   Psychiatric:         Mood and Affect: Mood normal.         Behavior: Behavior normal.         Thought Content: Thought content normal.         Judgment: Judgment normal. "       Administrative Statements

## 2024-09-04 DIAGNOSIS — M51.26 HNP (HERNIATED NUCLEUS PULPOSUS), LUMBAR: ICD-10-CM

## 2024-09-04 DIAGNOSIS — G89.29 CHRONIC MIDLINE LOW BACK PAIN WITH RIGHT-SIDED SCIATICA: ICD-10-CM

## 2024-09-04 DIAGNOSIS — I25.118 CORONARY ARTERY DISEASE OF NATIVE ARTERY OF NATIVE HEART WITH STABLE ANGINA PECTORIS (HCC): ICD-10-CM

## 2024-09-04 DIAGNOSIS — K21.00 GASTROESOPHAGEAL REFLUX DISEASE WITH ESOPHAGITIS WITHOUT HEMORRHAGE: ICD-10-CM

## 2024-09-04 DIAGNOSIS — M48.061 SPINAL STENOSIS OF LUMBAR REGION WITHOUT NEUROGENIC CLAUDICATION: ICD-10-CM

## 2024-09-04 DIAGNOSIS — M54.41 CHRONIC MIDLINE LOW BACK PAIN WITH RIGHT-SIDED SCIATICA: ICD-10-CM

## 2024-09-05 RX ORDER — RANOLAZINE 500 MG/1
500 TABLET, EXTENDED RELEASE ORAL 2 TIMES DAILY
Qty: 60 TABLET | Refills: 0 | Status: SHIPPED | OUTPATIENT
Start: 2024-09-05

## 2024-09-05 RX ORDER — METHOCARBAMOL 500 MG/1
500 TABLET, FILM COATED ORAL 4 TIMES DAILY
Qty: 90 TABLET | Refills: 0 | Status: SHIPPED | OUTPATIENT
Start: 2024-09-05

## 2024-09-05 RX ORDER — OMEPRAZOLE 40 MG/1
40 CAPSULE, DELAYED RELEASE ORAL DAILY
Qty: 90 CAPSULE | Refills: 0 | Status: SHIPPED | OUTPATIENT
Start: 2024-09-05

## 2024-09-12 DIAGNOSIS — I21.4 NSTEMI (NON-ST ELEVATED MYOCARDIAL INFARCTION) (HCC): ICD-10-CM

## 2024-09-12 DIAGNOSIS — I10 ESSENTIAL HYPERTENSION: ICD-10-CM

## 2024-09-12 RX ORDER — AMLODIPINE BESYLATE 5 MG/1
5 TABLET ORAL DAILY
Qty: 90 TABLET | Refills: 1 | Status: SHIPPED | OUTPATIENT
Start: 2024-09-12

## 2024-09-12 RX ORDER — ATORVASTATIN CALCIUM 80 MG/1
80 TABLET, FILM COATED ORAL DAILY
Qty: 90 TABLET | Refills: 1 | Status: SHIPPED | OUTPATIENT
Start: 2024-09-12

## 2024-09-14 NOTE — PATIENT INSTRUCTIONS
"Patient Education     Carb counting for adults with diabetes   The Basics   Written by the doctors and editors at [facility]   What is carb counting? -- This is a type of meal planning that many people with diabetes use. It is a way to figure out how many carbohydrates, or \"carbs,\" you eat.  The body breaks down the food we eat into 3 main types of nutrients: carbs, proteins, and fats. Carbs are sugars and starches that come from food. The body uses carbs for energy.  Why do I need to count carbs? -- People with diabetes need to pay attention to how many carbs they eat. This is because carbs raise your blood sugar level.  Carb counting helps you:   Choose the right amount of insulin to take before meals and snacks - If you take insulin before meals, the dose depends on several things, including how many carbs you plan to eat. (It also depends on how much you plan to exercise and your blood sugar level.)   Plan your meals and snacks for the day - You can use carb counting to figure out how many carbs to eat at each meal and snack. This helps you make sure that you eat the right amount over the entire day.   Keep your blood sugar levels well managed - Spreading out the carbs you eat over a whole day can help keep your blood sugar from getting too high. If you take insulin or another diabetes medicine that can cause low blood sugar, eating about the same amount of carbs at each meal every day also helps keep your blood sugar from getting too low. Reducing the amount of carbs you eat can help you manage your diabetes better and prevent medical problems that diabetes can cause.  Your doctor, nurse, or dietitian (food expert) can help you figure out how many carbs to try to eat each day. This will depend on your eating habits, weight, activity level, and which diabetes medicines you take.  People who take insulin before meals might need to be very careful when they count the carbs in every meal and snack. This is so they " "can give themselves the right amount of insulin. If the insulin dose doesn't match the amount of carbs, their blood sugar might get too low or too high. Other people might be able to be a little more flexible as long as they get about the same amount of carbs at each meal or throughout the day.  Which foods have carbs? -- Foods with a lot of carbs include:   Grains - These include bread, pasta, rice, and cereal.   Fruits and starchy vegetables - Starchy vegetables include potatoes, corn, and squash.   Milk and other dairy products - Dairy products include cheese and yogurt.   Foods with added sugar - These include sweets and baked goods likes cookies and cakes, as well as sugary drinks like juice and soda.  It is best to get most of your carbs from fruits, vegetables, whole grains (like whole-wheat bread, whole-grain cereals, and brown rice), and low-fat milk and dairy products.  How do I count carbs? -- To count carbs in packaged foods, check the food's nutrition label (if it has one).  On the label (figure 1), check for:   \"Total Carbohydrate\" number - This tells you how many carbs are in 1 serving size of the food. If you eat 1 serving, then the number of carbs you eat is the same as the number of total carbohydrates.   \"Serving size\" - This tells you how much food is in 1 serving. If you have 2 servings, the number of carbs will be 2 times the number of carbohydrates listed.   \"Dietary Fiber\" - Fiber is a carb that is not digested, which means that it does not raise blood sugar. Foods with a lot of fiber can help manage your blood sugar. If a food has more than 5 grams (g) of fiber, you need less insulin to cover the total carbs in that food. So, if you are calculating an insulin dose, only count the carbs that are not from fiber (figure 1).  What is exchange planning? -- Exchange planning, or the \"exchange system,\" is a way for people to plan their meals without reading labels. This can be helpful since many " "foods don't come with a nutrition label.  The exchange system involves knowing how much of different foods have about 15 grams of carbs (table 1 and table 2 and table 3). Your doctor, nurse, or dietitian gives you a certain number of \"carb choices\" to eat with each meal and snack (table 4). Each \"choice\" is a portion of food that has about 15 grams of carbs. Knowing your options makes it easier to \"exchange\" 1 carb choice for another as you plan your meals and snacks. For example, 1 small apple could be exchanged for 1/3 cup of pasta.  How can I plan my meals? -- First, make sure that you know how many carbs you should be eating each day. Ask your doctor, nurse, or dietitian if you are not sure.  Here are some tips that might help:   Spread out your carbs over 4 to 6 small meals each day instead of 3 big ones.   Eat a similar number of carbs at each meal, for example, at each dinner.   Eat your meals at a similar time each day.   Plan your meals ahead of time.   Use the \"plate method.\" This is a simpler way to make sure that you get a good balance of carbs and other nutrients with each meal. It is not as exact as counting all of your carbs, but it can be helpful for people who prefer a simpler approach. If you take insulin before meals, it is generally better to adjust your insulin dose by counting how many carbs you plan to eat or using the exchange planning strategy.  For the plate method, you start with a plate about 9 inches (23 cm) across. Fill it with (figure 2):   1/2 non-starchy vegetables   1/4 protein   1/4 carbs   Follow your doctor's instructions for how and when to check your blood sugar. This can help you learn how certain foods affect your blood sugar.   Keep track of your meals and blood sugar levels. Show this to your doctor or nurse so they can adjust your treatment if needed. If you take insulin, you will also need to keep track of your exercise patterns and how much insulin you give yourself with " "each dose.   If you take insulin, make sure that you understand how to use it. This includes knowing how to adjust the dose based on your blood sugar level and what you plan to eat. Foods that have a lot of protein or fat also can affect your blood sugar level. Some people need to adjust their insulin doses when they eat these foods.   Remember that other things besides carbs can raise or lower your blood sugar level. These things can include exercise, getting sick, drinking alcohol, traveling, and stress. If you take insulin, make sure that you know how and when to adjust your dose in these situations.  If you are having trouble counting carbs or managing your blood sugar, talk to your doctor or nurse. They can help. A dietitian can also help you plan specific menus that will give you the right amount of carbs each day.  For more information, you can also get a book on counting carbs or check the American Diabetes Association website (www.diabetes.org).  All topics are updated as new evidence becomes available and our peer review process is complete.  This topic retrieved from [employer] on: Mar 27, 2024.  Topic 72483 Version 11.0  Release: 32.2.4 - C32.85  © 2024 UpToDate, Inc. and/or its affiliates. All rights reserved.  figure 1: Counting carbohydrates     To figure out the \"carb count\" in 1 serving, start with the number of grams of total carbohydrates (46 grams), then subtract the number of grams of dietary fiber (7 grams). It's also important to look at the serving size. In this example, the carb count is 39 grams. You can use this number when counting carbs for your insulin dose.  Graphic 08735 Version 8.0  table 1: Bread and grains with 15 grams of carbs*  Bread    Food  Serving size    Bagel 1/4 large bagel (1 oz)   Biscuit 1 biscuit (2.5 inches across)   Bread, reduced calorie, light 2 slices (1.5 oz)   Cornbread 1.75 inch cube (1.5 oz)   English muffin 1/2 muffin   Hot dog or hamburger bun 1/2 bun (3/4 oz) " "  Naan, chapati, or roti 1 oz   Pancake 1 pancake (4 inches across, 1/4 inch thick)   Angelica (6 inches across) 1/2 angelica   Tortilla, corn 1 small tortilla (6 inches across)   Tortilla, flour (white or whole wheat) 1 small tortilla (6 inches across) or 1/3 large tortilla (10 inches across)   Waffle 1 waffle (4-inch square or 4 inches across)   Cereals and grains (including pasta and rice)    Food  Serving size (cooked)    Barley, couscous, millet, pasta (white or whole wheat, all shapes and sizes), polenta, quinoa (all colors), or rice (white, brown, and other colors and types) 1/3 cup   Bran cereal (twigs, buds, or flakes), shredded wheat (plain), or sugar-coated cereal 1/2 cup   Bulgur, kasha, tabbouleh (tabouli), or wild rice 1/2 cup   Granola cereal 1/4 cup   Hot cereal (oats, oatmeal, grits) 1/2 cup   Unsweetened, ready-to-eat cereal 3/4 cup   * For bread and grains, 15 grams of carbs is considered 1 serving or \"choice\" for people who need to count carbs.  Graphic 737532 Version 1.0  table 2: Fruits with 15 grams of carbs*  Food  Serving size    Applesauce, unsweetened 1/2 cup   Banana 1 extra small banana, about 4 inches long (4 oz)   Blueberries 3/4 cup   Dried fruits (blueberries, cherries, cranberries, mixed fruit, raisins) 2 tbsp   Fruit, canned 1/2 cup   Fruit, whole, small (apple) 1 small fruit (4 oz)   Fruit, whole, medium (nectarine, orange, pear, tangerine) 1 medium fruit (6 oz)   Fruit juice, unsweetened 1/2 cup   Grapes 17 small grapes (3 oz)   Melon, diced 1 cup   Strawberries, whole 1 and 1/4 cups   When listed, weight (oz) includes skin and seeds. If you are not sure if your fruit is the right size for 1 serving, you can use a food scale to check the weight.  * For fruits, 15 grams of carbs is considered 1 serving or \"choice\" for people who need to count carbs.  Graphic 039516 Version 1.0  table 3: Starchy vegetables with 15 grams of carbs*  Food  Serving size (cooked)    Cassava, dasheen, or " "plantain 1/3 cup   Corn, green peas, mixed vegetables, or parsnips 1/2 cup   Marinara, pasta, or spaghetti sauce 1/2 cup   Mixed vegetables (with corn or peas) 1 cup   Potato, baked with skin 1/4 large (3 oz)   Potato, Icelandic-fried (oven-baked) 1 cup (2 oz)   Potato, mashed with milk and fat 1/2 cup   Squash, winter (acorn, butternut) 1 cup   Yam or sweet potato, plain 1/2 cup (3 and 1/2 oz)   If you are not sure if your vegetable is the right size for 1 serving, you can use a food scale to check the weight.  * For starchy vegetables, 15 grams of carbs is considered 1 serving or \"choice\" for people who need to count carbs.  Graphic 318543 Version 1.0  table 4: Sample exchange system meal plan  Time  Exchange pattern  Sample menu  Carbohydrate count (g)    8 am 3 carbohydrate group    2 starch 1 English muffin 30    1 fruit 1 1/4 c strawberries 15    1 protein group 1/4 c cottage cheese -    1 fat group 1 tsp margarine -      Total: 45    12 noon 4 carbohydrate group    2 starch 2 slices of bread 30    1 fruit 1 orange 15    1 vegetable 1 c salad -    1 milk 8 oz skim milk 12    3 protein group 3 oz chicken -    1 fat group 1 tbsp low fat morales -      Total: 57    3 pm 1 carbohydrate group    1 fruit or 1 starch 1 apple or 6 crackers 15      Total: 15    6 pm 4 carbohydrate group    2 starch 1 c potato 30    1 fruit 1/2 c fruit salad 15    1 vegetable 1 c salad -    1 milk 8 oz skim milk 12    6 protein group 6 oz fish -    1 fat group 2 tbsp low fat salad dressing -      Total: 57    9 pm 1 carbohydrate group    1 starch 6 crackers 15    1 protein 2 tbsp peanut butter -      Total: 15    Graphic 48906 Version 3.0  figure 2: The \"plate method\"     For the plate method, you start with a plate about 9 inches (23 cm) across. Then fill it with 1/2 non-starchy vegetables, 1/4 protein, and 1/4 carbs.  Graphic 136187 Version 2.0  Consumer Information Use and Disclaimer   Disclaimer: This generalized information is a limited " summary of diagnosis, treatment, and/or medication information. It is not meant to be comprehensive and should be used as a tool to help the user understand and/or assess potential diagnostic and treatment options. It does NOT include all information about conditions, treatments, medications, side effects, or risks that may apply to a specific patient. It is not intended to be medical advice or a substitute for the medical advice, diagnosis, or treatment of a health care provider based on the health care provider's examination and assessment of a patient's specific and unique circumstances. Patients must speak with a health care provider for complete information about their health, medical questions, and treatment options, including any risks or benefits regarding use of medications. This information does not endorse any treatments or medications as safe, effective, or approved for treating a specific patient. UpToDate, Inc. and its affiliates disclaim any warranty or liability relating to this information or the use thereof.The use of this information is governed by the Terms of Use, available at https://www.wolterskluwer.com/en/know/clinical-effectiveness-terms. 2024© UpToDate, Inc. and its affiliates and/or licensors. All rights reserved.  Copyright   © 2024 UpToDate, Inc. and/or its affiliates. All rights reserved.

## 2024-09-18 ENCOUNTER — OFFICE VISIT (OUTPATIENT)
Dept: ENDOCRINOLOGY | Facility: CLINIC | Age: 64
End: 2024-09-18
Payer: COMMERCIAL

## 2024-09-18 VITALS
HEIGHT: 69 IN | TEMPERATURE: 98.4 F | BODY MASS INDEX: 33.8 KG/M2 | SYSTOLIC BLOOD PRESSURE: 128 MMHG | DIASTOLIC BLOOD PRESSURE: 68 MMHG | WEIGHT: 228.2 LBS | OXYGEN SATURATION: 93 % | HEART RATE: 70 BPM

## 2024-09-18 DIAGNOSIS — Z79.4 TYPE 2 DIABETES MELLITUS WITH DIABETIC POLYNEUROPATHY, WITH LONG-TERM CURRENT USE OF INSULIN (HCC): Primary | ICD-10-CM

## 2024-09-18 DIAGNOSIS — E66.09 CLASS 1 OBESITY DUE TO EXCESS CALORIES WITH SERIOUS COMORBIDITY AND BODY MASS INDEX (BMI) OF 32.0 TO 32.9 IN ADULT: ICD-10-CM

## 2024-09-18 DIAGNOSIS — I10 ESSENTIAL HYPERTENSION: ICD-10-CM

## 2024-09-18 DIAGNOSIS — E11.42 TYPE 2 DIABETES MELLITUS WITH DIABETIC POLYNEUROPATHY, WITH LONG-TERM CURRENT USE OF INSULIN (HCC): Primary | ICD-10-CM

## 2024-09-18 DIAGNOSIS — E78.2 MIXED HYPERLIPIDEMIA: ICD-10-CM

## 2024-09-18 LAB — SL AMB POCT HEMOGLOBIN AIC: 6.4 (ref ?–6.5)

## 2024-09-18 PROCEDURE — 83036 HEMOGLOBIN GLYCOSYLATED A1C: CPT | Performed by: STUDENT IN AN ORGANIZED HEALTH CARE EDUCATION/TRAINING PROGRAM

## 2024-09-18 PROCEDURE — 95251 CONT GLUC MNTR ANALYSIS I&R: CPT | Performed by: STUDENT IN AN ORGANIZED HEALTH CARE EDUCATION/TRAINING PROGRAM

## 2024-09-18 PROCEDURE — 99214 OFFICE O/P EST MOD 30 MIN: CPT | Performed by: STUDENT IN AN ORGANIZED HEALTH CARE EDUCATION/TRAINING PROGRAM

## 2024-09-18 NOTE — ASSESSMENT & PLAN NOTE
Lifestyle modification including regular daily activity and balanced diet was encouraged.  Continue Mounjaro 2.5 mg weekly for now, this will be increased to 15 mg weekly next visit.

## 2024-09-18 NOTE — PROGRESS NOTES
Ambulatory Visit  Name: Ines Ivy      : 1960      MRN: [id number]  Encounter Provider: Jonathan Galvan MD  Encounter Date: 2024   Encounter department: [facility] [place]    Assessment & Plan  Type 2 diabetes mellitus with diabetic polyneuropathy, with long-term current use of insulin ([facility])    Lab Results   Component Value Date    HGBA1C 6.4 2024   Diabetes is well-controlled with A1c of 6.4% and time in range more than 80%.  We will continue current regimen at this time.  At her next visit we will increase Mounjaro to 15 mg weekly and to decrease insulin dose accordingly.  Ophthalmology and podiatry follow-up.  Return back in 3 months.  Labs prior to next visit.    Orders:    POCT hemoglobin A1c    Hemoglobin A1C; Future    Comprehensive metabolic panel; Future    Lipid Panel with Direct LDL reflex; Future    Albumin / creatinine urine ratio; Future    Mixed hyperlipidemia  Continue Lipitor 80 mg daily.      Orders:    Hemoglobin A1C; Future    Comprehensive metabolic panel; Future    Lipid Panel with Direct LDL reflex; Future    Albumin / creatinine urine ratio; Future    Essential hypertension  Blood pressure at goal, 128/68.  The goal is less than 130/80.  Continue current regimen.    Orders:    Hemoglobin A1C; Future    Comprehensive metabolic panel; Future    Lipid Panel with Direct LDL reflex; Future    Albumin / creatinine urine ratio; Future    Class 1 obesity due to excess calories with serious comorbidity and body mass index (BMI) of 32.0 to 32.9 in adult  Lifestyle modification including regular daily activity and balanced diet was encouraged.  Continue Mounjaro 2.5 mg weekly for now, this will be increased to 15 mg weekly next visit.             History of Present Illness     Ines Ivy is a 64 y.o. female who presents for follow-up for type 2 diabetes.    Current regimen:   Basaglar 55 units qam and 60 units before  bed  Mounjaro 12.5 mg daily  Jardiance 25 mg daily       Ines Ivy   Device used,dexcom G7  Home use       Indication   Type 2 Diabetes      More than 72 hours of data was reviewed. Report to be scanned to chart.     Date Range: sep 5 - 18th    Analysis of data:   Average Glucose: 150 mg/dl  Coefficient of Variation: x   SD : 37 mg/dl   Time in Target Range: 81%   Time Above Range: 19%   Time Below Range: <1%         History obtained from : patient  Review of Systems   Constitutional:  Negative for appetite change, fatigue and unexpected weight change.   HENT:  Negative for trouble swallowing and voice change.    Cardiovascular:  Negative for chest pain and palpitations.   Gastrointestinal:  Negative for abdominal pain, constipation, diarrhea, nausea and vomiting.   Endocrine: Negative for cold intolerance, heat intolerance, polydipsia, polyphagia and polyuria.     Medical History Reviewed by provider this encounter:       Current Outpatient Medications on File Prior to Visit   Medication Sig Dispense Refill    amLODIPine (NORVASC) 5 mg tablet Take 1 tablet by mouth once daily 90 tablet 1    aspirin 81 MG tablet Take 81 mg by mouth daily.      atorvastatin (LIPITOR) 80 mg tablet Take 1 tablet by mouth once daily 90 tablet 1    Continuous Glucose Sensor (Dexcom G7 Sensor) Use 1 Device every 10 days 9 each 3    docusate sodium (COLACE) 100 mg capsule Take 100 mg by mouth 2 (two) times a day      Empagliflozin (Jardiance) 25 MG TABS Take 1 tablet (25 mg total) by mouth every morning 90 tablet 3    ergocalciferol (VITAMIN D2) 50,000 units TAKE 1 CAPSULE BY MOUTH ONCE A WEEK FOR  8  DOSES 8 capsule 0    furosemide (LASIX) 40 mg tablet Take 1 tablet (40 mg total) by mouth 2 (two) times a day 180 tablet 1    gabapentin (NEURONTIN) 400 mg capsule Take 1 capsule (400 mg total) by mouth 3 (three) times a day 270 capsule 1    Insulin Glargine Solostar (Basaglar KwikPen) 100 UNIT/ML SOPN 60 units sq q PM 90 mL 1     Insulin Pen Needle 32G X 6 MM MISC Use in the morning 100 each 3    losartan (COZAAR) 100 MG tablet Take 1 tablet by mouth once daily 90 tablet 1    meloxicam (MOBIC) 15 mg tablet Take 1 tablet (15 mg total) by mouth daily 30 tablet 1    methocarbamol (ROBAXIN) 500 mg tablet Take 1 tablet (500 mg total) by mouth 4 (four) times a day 90 tablet 0    metoprolol succinate (TOPROL-XL) 100 mg 24 hr tablet Take 1 tablet by mouth once daily 90 tablet 1    omeprazole (PriLOSEC) 40 MG capsule Take 1 capsule (40 mg total) by mouth daily 90 capsule 0    ranolazine (RANEXA) 500 mg 12 hr tablet Take 1 tablet (500 mg total) by mouth 2 (two) times a day 60 tablet 0    tirzepatide (Mounjaro) 12.5 MG/0.5ML INJECT 12.5 MG UNDER THE SKIN ONCE A WEEK 6 mL 1    TURMERIC PO Take by mouth       No current facility-administered medications on file prior to visit.          Objective     There were no vitals taken for this visit.    Physical Exam  Vitals and nursing note reviewed.   Constitutional:       General: She is not in acute distress.     Appearance: She is well-developed.   HENT:      Head: Normocephalic and atraumatic.   Eyes:      Conjunctiva/sclera: Conjunctivae normal.   Cardiovascular:      Rate and Rhythm: Normal rate and regular rhythm.      Heart sounds: No murmur heard.  Pulmonary:      Effort: Pulmonary effort is normal. No respiratory distress.      Breath sounds: Normal breath sounds.   Abdominal:      Palpations: Abdomen is soft.      Tenderness: There is no abdominal tenderness.   Musculoskeletal:         General: No swelling.      Cervical back: Neck supple.   Skin:     General: Skin is warm and dry.      Capillary Refill: Capillary refill takes less than 2 seconds.   Neurological:      Mental Status: She is alert.   Psychiatric:         Mood and Affect: Mood normal.

## 2024-09-18 NOTE — ASSESSMENT & PLAN NOTE
Continue Lipitor 80 mg daily.      Orders:    Hemoglobin A1C; Future    Comprehensive metabolic panel; Future    Lipid Panel with Direct LDL reflex; Future    Albumin / creatinine urine ratio; Future

## 2024-09-18 NOTE — ASSESSMENT & PLAN NOTE
Blood pressure at goal, 128/68.  The goal is less than 130/80.  Continue current regimen.    Orders:    Hemoglobin A1C; Future    Comprehensive metabolic panel; Future    Lipid Panel with Direct LDL reflex; Future    Albumin / creatinine urine ratio; Future

## 2024-09-18 NOTE — ASSESSMENT & PLAN NOTE
Lab Results   Component Value Date    HGBA1C 6.4 06/05/2024   Diabetes is well-controlled with A1c of 6.4% and time in range more than 80%.  We will continue current regimen at this time.  At her next visit we will increase Mounjaro to 15 mg weekly and to decrease insulin dose accordingly.  Ophthalmology and podiatry follow-up.  Return back in 3 months.  Labs prior to next visit.    Orders:    POCT hemoglobin A1c    Hemoglobin A1C; Future    Comprehensive metabolic panel; Future    Lipid Panel with Direct LDL reflex; Future    Albumin / creatinine urine ratio; Future

## 2024-09-24 ENCOUNTER — OFFICE VISIT (OUTPATIENT)
Dept: INTERNAL MEDICINE CLINIC | Facility: CLINIC | Age: 64
End: 2024-09-24
Payer: COMMERCIAL

## 2024-09-24 VITALS
DIASTOLIC BLOOD PRESSURE: 68 MMHG | BODY MASS INDEX: 34.14 KG/M2 | OXYGEN SATURATION: 98 % | HEIGHT: 69 IN | SYSTOLIC BLOOD PRESSURE: 122 MMHG | HEART RATE: 73 BPM | TEMPERATURE: 96.8 F | WEIGHT: 230.5 LBS

## 2024-09-24 DIAGNOSIS — I25.10 CORONARY ARTERY DISEASE INVOLVING NATIVE CORONARY ARTERY OF NATIVE HEART WITHOUT ANGINA PECTORIS: ICD-10-CM

## 2024-09-24 DIAGNOSIS — I50.32 CHRONIC DIASTOLIC (CONGESTIVE) HEART FAILURE (HCC): ICD-10-CM

## 2024-09-24 DIAGNOSIS — H40.1130 PRIMARY OPEN ANGLE GLAUCOMA OF BOTH EYES, UNSPECIFIED GLAUCOMA STAGE: ICD-10-CM

## 2024-09-24 DIAGNOSIS — M15.0 PRIMARY OSTEOARTHRITIS INVOLVING MULTIPLE JOINTS: ICD-10-CM

## 2024-09-24 DIAGNOSIS — M15.9 PRIMARY OSTEOARTHRITIS INVOLVING MULTIPLE JOINTS: ICD-10-CM

## 2024-09-24 DIAGNOSIS — Z12.11 SCREENING FOR COLON CANCER: ICD-10-CM

## 2024-09-24 DIAGNOSIS — M48.061 SPINAL STENOSIS OF LUMBAR REGION WITHOUT NEUROGENIC CLAUDICATION: ICD-10-CM

## 2024-09-24 DIAGNOSIS — E11.42 TYPE 2 DIABETES MELLITUS WITH DIABETIC POLYNEUROPATHY, WITH LONG-TERM CURRENT USE OF INSULIN (HCC): Primary | ICD-10-CM

## 2024-09-24 DIAGNOSIS — F41.1 GAD (GENERALIZED ANXIETY DISORDER): ICD-10-CM

## 2024-09-24 DIAGNOSIS — Z79.4 TYPE 2 DIABETES MELLITUS WITH DIABETIC POLYNEUROPATHY, WITH LONG-TERM CURRENT USE OF INSULIN (HCC): Primary | ICD-10-CM

## 2024-09-24 DIAGNOSIS — G62.9 NEUROPATHY: ICD-10-CM

## 2024-09-24 DIAGNOSIS — I10 ESSENTIAL HYPERTENSION: ICD-10-CM

## 2024-09-24 DIAGNOSIS — Z85.3 HISTORY OF BREAST CANCER: ICD-10-CM

## 2024-09-24 DIAGNOSIS — Z23 ENCOUNTER FOR IMMUNIZATION: ICD-10-CM

## 2024-09-24 DIAGNOSIS — G89.29 CHRONIC MIDLINE LOW BACK PAIN WITH RIGHT-SIDED SCIATICA: ICD-10-CM

## 2024-09-24 DIAGNOSIS — M54.41 CHRONIC MIDLINE LOW BACK PAIN WITH RIGHT-SIDED SCIATICA: ICD-10-CM

## 2024-09-24 DIAGNOSIS — M51.26 HNP (HERNIATED NUCLEUS PULPOSUS), LUMBAR: ICD-10-CM

## 2024-09-24 DIAGNOSIS — E78.2 MIXED HYPERLIPIDEMIA: ICD-10-CM

## 2024-09-24 PROCEDURE — 99214 OFFICE O/P EST MOD 30 MIN: CPT | Performed by: INTERNAL MEDICINE

## 2024-09-24 RX ORDER — MELOXICAM 15 MG/1
15 TABLET ORAL DAILY
Qty: 30 TABLET | Refills: 0 | Status: SHIPPED | OUTPATIENT
Start: 2024-09-24

## 2024-09-24 NOTE — ASSESSMENT & PLAN NOTE
Orders:    CBC and differential; Future    Comprehensive metabolic panel; Future    Lipid Panel with Direct LDL reflex; Future    TSH, 3rd generation; Future    Vitamin D 25 hydroxy; Future

## 2024-09-24 NOTE — PROGRESS NOTES
Ambulatory Visit  Name: Ines Ivy      : 1960      MRN: [id number]  Encounter Provider: Jorge L Najera DO  Encounter Date: 2024   Encounter department: [facility]    Assessment & Plan  Type 2 diabetes mellitus with diabetic polyneuropathy, with long-term current use of insulin (HCC)    Lab Results   Component Value Date    HGBA1C 6.4 2024       Orders:    CBC and differential; Future    Comprehensive metabolic panel; Future    Lipid Panel with Direct LDL reflex; Future    TSH, 3rd generation; Future    Vitamin D 25 hydroxy; Future    Encounter for immunization    Orders:    Pneumococcal Conjugate Vaccine 20-valent (Pcv20)    TDAP VACCINE GREATER THAN OR EQUAL TO 8YO IM    Screening for colon cancer    Orders:    Ambulatory Referral to Gastroenterology; Future    Essential hypertension    Orders:    CBC and differential; Future    Comprehensive metabolic panel; Future    Coronary artery disease involving native coronary artery of native heart without angina pectoris    Orders:    CBC and differential; Future    Comprehensive metabolic panel; Future    Lipid Panel with Direct LDL reflex; Future    TSH, 3rd generation; Future    Vitamin D 25 hydroxy; Future    Chronic diastolic (congestive) heart failure (HCC)  Wt Readings from Last 3 Encounters:   24 105 kg (230 lb 8 oz)   24 104 kg (228 lb 3.2 oz)   24 105 kg (231 lb)                    Neuropathy         Primary osteoarthritis involving multiple joints         Primary open angle glaucoma of both eyes, unspecified glaucoma stage         Mixed hyperlipidemia    Orders:    Comprehensive metabolic panel; Future    Lipid Panel with Direct LDL reflex; Future    TSH, 3rd generation; Future  A/P: Doing ok and well check labs. Recent HgA1c was good at 6.4. Appreciate endo input.Discussed DEANNA and defers meds. To continue with counseling. . Discussed vaccines and defers all. Discussed eye exam and recently  "had an exam and will need to track down the results. . Continue current treatment otherwise and RTC three months for routine.  History of breast cancer         DEANNA (generalized anxiety disorder)            History of Present Illness     WF RTC for f/u DM, HTN, etc. Doing ok and no new issues. Remains active w/o difficulty and no falls. Sugars less than 140 and no low sugar events. Denies CP, SOB, edema, palpitations, orthopnea or PND. Chronic pain is manageable. Glaucoma is stable. Breast ca is in remission. Due for labs, eye exam,  and vaccines. DEANNA is  up  and has an appt to see counseling today. Recent HgA1c was good.           Review of Systems   Constitutional:  Negative for activity change, chills, diaphoresis, fatigue and fever.   HENT: Negative.     Eyes:  Negative for visual disturbance.   Respiratory:  Negative for cough, chest tightness, shortness of breath and wheezing.    Cardiovascular:  Negative for chest pain, palpitations and leg swelling.   Gastrointestinal:  Negative for abdominal pain, constipation, diarrhea, nausea and vomiting.   Endocrine: Negative for cold intolerance and heat intolerance.   Genitourinary:  Negative for difficulty urinating, dysuria and frequency.   Musculoskeletal:  Negative for arthralgias, gait problem and myalgias.   Neurological:  Negative for dizziness, seizures, syncope, weakness, light-headedness and headaches.   Psychiatric/Behavioral:  Negative for confusion, dysphoric mood and sleep disturbance. The patient is nervous/anxious.            Objective     /68 (BP Location: Right arm, Patient Position: Sitting)   Pulse 73   Temp (!) 96.8 °F (36 °C) (Tympanic)   Ht 5' 9\" (1.753 m)   Wt 105 kg (230 lb 8 oz)   SpO2 98%   BMI 34.04 kg/m²     Physical Exam  Vitals and nursing note reviewed.   Constitutional:       General: She is not in acute distress.     Appearance: Normal appearance. She is not ill-appearing.   HENT:      Head: Normocephalic and atraumatic.    "   Mouth/Throat:      Mouth: Mucous membranes are moist.   Eyes:      Extraocular Movements: Extraocular movements intact.      Conjunctiva/sclera: Conjunctivae normal.      Pupils: Pupils are equal, round, and reactive to light.   Neck:      Vascular: No carotid bruit.   Cardiovascular:      Rate and Rhythm: Normal rate and regular rhythm.      Heart sounds: Normal heart sounds. No murmur heard.  Pulmonary:      Effort: Pulmonary effort is normal. No respiratory distress.      Breath sounds: Normal breath sounds. No wheezing, rhonchi or rales.   Abdominal:      General: Bowel sounds are normal. There is no distension.      Palpations: Abdomen is soft.      Tenderness: There is no abdominal tenderness.   Musculoskeletal:      Cervical back: Neck supple.      Right lower leg: No edema.      Left lower leg: No edema.   Neurological:      General: No focal deficit present.      Mental Status: She is alert and oriented to person, place, and time. Mental status is at baseline.   Psychiatric:         Mood and Affect: Mood normal.         Behavior: Behavior normal.         Thought Content: Thought content normal.         Judgment: Judgment normal.

## 2024-09-24 NOTE — ASSESSMENT & PLAN NOTE
Orders:    Comprehensive metabolic panel; Future    Lipid Panel with Direct LDL reflex; Future    TSH, 3rd generation; Future  A/P: Doing ok and well check labs. Recent HgA1c was good at 6.4. Appreciate endo input.Discussed DEANNA and defers meds. To continue with counseling. . Discussed vaccines and defers all. Discussed eye exam and recently had an exam and will need to track down the results. . Continue current treatment otherwise and RTC three months for routine.

## 2024-09-24 NOTE — ASSESSMENT & PLAN NOTE
Wt Readings from Last 3 Encounters:   09/24/24 105 kg (230 lb 8 oz)   09/18/24 104 kg (228 lb 3.2 oz)   08/21/24 105 kg (231 lb)

## 2024-09-24 NOTE — ASSESSMENT & PLAN NOTE
Lab Results   Component Value Date    HGBA1C 6.4 09/18/2024       Orders:    CBC and differential; Future    Comprehensive metabolic panel; Future    Lipid Panel with Direct LDL reflex; Future    TSH, 3rd generation; Future    Vitamin D 25 hydroxy; Future

## 2024-09-30 DIAGNOSIS — I25.118 CORONARY ARTERY DISEASE OF NATIVE ARTERY OF NATIVE HEART WITH STABLE ANGINA PECTORIS (HCC): ICD-10-CM

## 2024-09-30 DIAGNOSIS — I21.4 NSTEMI (NON-ST ELEVATED MYOCARDIAL INFARCTION) (HCC): ICD-10-CM

## 2024-09-30 RX ORDER — METOPROLOL SUCCINATE 100 MG/1
100 TABLET, EXTENDED RELEASE ORAL DAILY
Qty: 90 TABLET | Refills: 1 | Status: SHIPPED | OUTPATIENT
Start: 2024-09-30

## 2024-09-30 RX ORDER — RANOLAZINE 500 MG/1
500 TABLET, EXTENDED RELEASE ORAL 2 TIMES DAILY
Qty: 60 TABLET | Refills: 1 | Status: SHIPPED | OUTPATIENT
Start: 2024-09-30

## 2024-10-10 DIAGNOSIS — Z79.4 TYPE 2 DIABETES MELLITUS WITH DIABETIC POLYNEUROPATHY, WITH LONG-TERM CURRENT USE OF INSULIN (HCC): ICD-10-CM

## 2024-10-10 DIAGNOSIS — E11.42 TYPE 2 DIABETES MELLITUS WITH DIABETIC POLYNEUROPATHY, WITH LONG-TERM CURRENT USE OF INSULIN (HCC): ICD-10-CM

## 2024-10-10 DIAGNOSIS — E11.9 ENCOUNTER FOR DIABETIC FOOT EXAM (HCC): ICD-10-CM

## 2024-10-10 RX ORDER — EMPAGLIFLOZIN 25 MG/1
25 TABLET, FILM COATED ORAL EVERY MORNING
Qty: 90 TABLET | Refills: 0 | Status: SHIPPED | OUTPATIENT
Start: 2024-10-10

## 2024-10-10 RX ORDER — ERGOCALCIFEROL 1.25 MG/1
CAPSULE, LIQUID FILLED ORAL
Qty: 8 CAPSULE | Refills: 0 | Status: SHIPPED | OUTPATIENT
Start: 2024-10-10

## 2024-10-11 ENCOUNTER — OFFICE VISIT (OUTPATIENT)
Dept: URGENT CARE | Facility: CLINIC | Age: 64
End: 2024-10-11
Payer: COMMERCIAL

## 2024-10-11 VITALS
TEMPERATURE: 97.9 F | SYSTOLIC BLOOD PRESSURE: 154 MMHG | WEIGHT: 231.6 LBS | DIASTOLIC BLOOD PRESSURE: 68 MMHG | HEIGHT: 69 IN | RESPIRATION RATE: 18 BRPM | OXYGEN SATURATION: 98 % | BODY MASS INDEX: 34.3 KG/M2 | HEART RATE: 76 BPM

## 2024-10-11 DIAGNOSIS — J40 SINOBRONCHITIS: Primary | ICD-10-CM

## 2024-10-11 DIAGNOSIS — J32.9 SINOBRONCHITIS: Primary | ICD-10-CM

## 2024-10-11 PROCEDURE — G0382 LEV 3 HOSP TYPE B ED VISIT: HCPCS | Performed by: PHYSICIAN ASSISTANT

## 2024-10-11 PROCEDURE — S9083 URGENT CARE CENTER GLOBAL: HCPCS | Performed by: PHYSICIAN ASSISTANT

## 2024-10-11 RX ORDER — DOXYCYCLINE 100 MG/1
100 CAPSULE ORAL EVERY 12 HOURS SCHEDULED
Qty: 20 CAPSULE | Refills: 0 | Status: SHIPPED | OUTPATIENT
Start: 2024-10-11 | End: 2024-10-21

## 2024-10-11 NOTE — LETTER
October 11, 2024     Patient: Ines Ivy   YOB: 1960   Date of Visit: 10/11/2024       To Whom It May Concern:    It is my medical opinion that Ines Ivy may return to work on 10/13/24.    If you have any questions or concerns, please don't hesitate to call.         Sincerely,        Michelle Behler, PA-C    CC: No Recipients
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
## 2024-10-14 ENCOUNTER — VBI (OUTPATIENT)
Dept: ADMINISTRATIVE | Facility: OTHER | Age: 64
End: 2024-10-14

## 2024-10-14 ENCOUNTER — DOCUMENTATION (OUTPATIENT)
Dept: ADMINISTRATIVE | Facility: OTHER | Age: 64
End: 2024-10-14

## 2024-10-14 NOTE — PROGRESS NOTES
Urgent Care BP  Received: 3 days ago  Sarah Abbott RN  P Patient Reported Team         Blood pressure elevated  Appointment department: [facility]  Appointment provider: * No providers found *  Blood pressure  10/11/24 1448 154/68  10/11/24 1445 149/69  Inactive  Date User Comment   10/11/24 1448 Sarah Abbott RN [[id number]] None

## 2024-10-14 NOTE — PROGRESS NOTES
10/14/24 1:32 PM    Patient was called after the Urgent Care visit ; a message was left for the patient to return the call    Thank you.  Anjali Terry  [facility]

## 2024-10-15 NOTE — TELEPHONE ENCOUNTER
10/14/24 3:06 PM    Patient contacted post ED visit, first outreach attempt made. Message was left for patient to return a call to the VBI Department at [facility]: Phone [phone].    Thank you.  Anjali Terry  [facility]    
10/15/24 2:24 PM    Patient contacted post ED visit, second outreach attempt made. Message was left for patient to return a call to the VBI Department at [facility]: Phone [phone].    Thank you.  Anjali Terry  [facility]      
10/15/24 2:27 PM    Patient was called after the Urgent Care visit ; a message was left for the patient to return the call    Thank you.  Anjali Terry  [facility]             
no

## 2024-10-22 DIAGNOSIS — E11.42 TYPE 2 DIABETES MELLITUS WITH DIABETIC POLYNEUROPATHY, WITH LONG-TERM CURRENT USE OF INSULIN (HCC): ICD-10-CM

## 2024-10-22 DIAGNOSIS — Z79.4 TYPE 2 DIABETES MELLITUS WITH DIABETIC POLYNEUROPATHY, WITH LONG-TERM CURRENT USE OF INSULIN (HCC): ICD-10-CM

## 2024-10-22 NOTE — TELEPHONE ENCOUNTER
Reason for call:   [x] Refill   [] Prior Auth  [] Other:     Office:   [] PCP/Provider -   [x] Specialty/Provider - DIABETES [facility] ENDOCRINOLOGY [facility]     Medication: tirzepatide (Mounjaro) 12.5 MG/0.5ML     Dose/Frequency: INJECT 12.5 MG UNDER THE SKIN ONCE A WEEK     Quantity: 6 mL    Pharmacy: [id number]    Does the patient have enough for 3 days?   [] Yes   [x] No - Send as HP to POD    How are you tolerating the medication?   [] Nausea  [] Vomiting  [] Diarrhea  [x] Asymptomatic  [] Other:    Last visit weight: 228    Current weight: 226    Date of last injection: 10/22/24    How many injections do you have left: 0    Would you like an increase in your dose?  [x] Yes   [] No

## 2024-10-25 ENCOUNTER — TELEPHONE (OUTPATIENT)
Dept: ENDOCRINOLOGY | Facility: CLINIC | Age: 64
End: 2024-10-25

## 2024-10-30 NOTE — TELEPHONE ENCOUNTER
PA for tirzepatide (Mounjaro) 12.5 MG/0.5ML SUBMITTED     via    [x]CMM-KEY: [id number]      Office notes sent, clinical questions answered. Awaiting determination    Turnaround time for your insurance to make a decision on your Prior Authorization can take 7-21 business days.

## 2024-11-11 DIAGNOSIS — I10 ESSENTIAL HYPERTENSION: ICD-10-CM

## 2024-11-12 RX ORDER — AMLODIPINE BESYLATE 5 MG/1
5 TABLET ORAL DAILY
Qty: 90 TABLET | Refills: 1 | Status: SHIPPED | OUTPATIENT
Start: 2024-11-12

## 2024-11-24 DIAGNOSIS — K21.00 GASTROESOPHAGEAL REFLUX DISEASE WITH ESOPHAGITIS WITHOUT HEMORRHAGE: ICD-10-CM

## 2024-11-24 DIAGNOSIS — I25.118 CORONARY ARTERY DISEASE OF NATIVE ARTERY OF NATIVE HEART WITH STABLE ANGINA PECTORIS (HCC): ICD-10-CM

## 2024-11-25 RX ORDER — OMEPRAZOLE 40 MG/1
40 CAPSULE, DELAYED RELEASE ORAL DAILY
Qty: 90 CAPSULE | Refills: 1 | Status: SHIPPED | OUTPATIENT
Start: 2024-11-25

## 2024-11-25 RX ORDER — RANOLAZINE 500 MG/1
500 TABLET, EXTENDED RELEASE ORAL 2 TIMES DAILY
Qty: 60 TABLET | Refills: 2 | Status: SHIPPED | OUTPATIENT
Start: 2024-11-25

## 2024-12-04 ENCOUNTER — TELEPHONE (OUTPATIENT)
Age: 64
End: 2024-12-04

## 2024-12-04 NOTE — TELEPHONE ENCOUNTER
"Patient called in stating the last prescription sent to the pharmacy for her tirzepatide (Mounjaro) 12.5 MG/0.5ML was actually supposed to be increased to 15mg.    States Dr Galvan had said \"decreasing the insulin he wanted to increase the mounjaro\"    States the last prescription sent was still the 12.5mg.  Requesting we send the 15mg as discussed.  Please send to [facility] [place].     Patient would like a call back when prescription is sent.  "

## 2024-12-05 DIAGNOSIS — Z79.4 TYPE 2 DIABETES MELLITUS WITH DIABETIC POLYNEUROPATHY, WITH LONG-TERM CURRENT USE OF INSULIN (HCC): Primary | ICD-10-CM

## 2024-12-05 DIAGNOSIS — E11.42 TYPE 2 DIABETES MELLITUS WITH DIABETIC POLYNEUROPATHY, WITH LONG-TERM CURRENT USE OF INSULIN (HCC): Primary | ICD-10-CM

## 2024-12-05 NOTE — TELEPHONE ENCOUNTER
I called and left a detailed message that her script was sent to [facility] in [place] as requested

## 2024-12-07 NOTE — PATIENT INSTRUCTIONS
"Patient Education     Carb counting for adults with diabetes   The Basics   Written by the doctors and editors at [facility]   What is carb counting? -- This is a type of meal planning that many people with diabetes use. It is a way to figure out how many carbohydrates, or \"carbs,\" you eat.  The body breaks down the food we eat into 3 main types of nutrients: carbs, proteins, and fats. Carbs are sugars and starches that come from food. The body uses carbs for energy.  Why do I need to count carbs? -- People with diabetes need to pay attention to how many carbs they eat. This is because carbs raise your blood sugar level.  Carb counting helps you:   Choose the right amount of insulin to take before meals and snacks - If you take insulin before meals, the dose depends on several things, including how many carbs you plan to eat. (It also depends on how much you plan to exercise and your blood sugar level.)   Plan your meals and snacks for the day - You can use carb counting to figure out how many carbs to eat at each meal and snack. This helps you make sure that you eat the right amount over the entire day.   Keep your blood sugar levels well managed - Spreading out the carbs you eat over a whole day can help keep your blood sugar from getting too high. If you take insulin or another diabetes medicine that can cause low blood sugar, eating about the same amount of carbs at each meal every day also helps keep your blood sugar from getting too low. Reducing the amount of carbs you eat can help you manage your diabetes better and prevent medical problems that diabetes can cause.  Your doctor, nurse, or dietitian (food expert) can help you figure out how many carbs to try to eat each day. This will depend on your eating habits, weight, activity level, and which diabetes medicines you take.  People who take insulin before meals might need to be very careful when they count the carbs in every meal and snack. This is so they " "can give themselves the right amount of insulin. If the insulin dose doesn't match the amount of carbs, their blood sugar might get too low or too high. Other people might be able to be a little more flexible as long as they get about the same amount of carbs at each meal or throughout the day.  Which foods have carbs? -- Foods with a lot of carbs include:   Grains - These include bread, pasta, rice, and cereal.   Fruits and starchy vegetables - Starchy vegetables include potatoes, corn, and squash.   Milk and other dairy products - Dairy products include cheese and yogurt.   Foods with added sugar - These include sweets and baked goods likes cookies and cakes, as well as sugary drinks like juice and soda.  It is best to get most of your carbs from fruits, vegetables, whole grains (like whole-wheat bread, whole-grain cereals, and brown rice), and low-fat milk and dairy products.  How do I count carbs? -- To count carbs in packaged foods, check the food's nutrition label (if it has one).  On the label (figure 1), check for:   \"Total Carbohydrate\" number - This tells you how many carbs are in 1 serving size of the food. If you eat 1 serving, then the number of carbs you eat is the same as the number of total carbohydrates.   \"Serving size\" - This tells you how much food is in 1 serving. If you have 2 servings, the number of carbs will be 2 times the number of carbohydrates listed.   \"Dietary Fiber\" - Fiber is a carb that is not digested, which means that it does not raise blood sugar. Foods with a lot of fiber can help manage your blood sugar. If a food has more than 5 grams (g) of fiber, you need less insulin to cover the total carbs in that food. So, if you are calculating an insulin dose, only count the carbs that are not from fiber (figure 1).  What is exchange planning? -- Exchange planning, or the \"exchange system,\" is a way for people to plan their meals without reading labels. This can be helpful since many " "foods don't come with a nutrition label.  The exchange system involves knowing how much of different foods have about 15 grams of carbs (table 1 and table 2 and table 3). Your doctor, nurse, or dietitian gives you a certain number of \"carb choices\" to eat with each meal and snack (table 4). Each \"choice\" is a portion of food that has about 15 grams of carbs. Knowing your options makes it easier to \"exchange\" 1 carb choice for another as you plan your meals and snacks. For example, 1 small apple could be exchanged for 1/3 cup of pasta.  How can I plan my meals? -- First, make sure that you know how many carbs you should be eating each day. Ask your doctor, nurse, or dietitian if you are not sure.  Here are some tips that might help:   Spread out your carbs over 4 to 6 small meals each day instead of 3 big ones.   Eat a similar number of carbs at each meal, for example, at each dinner.   Eat your meals at a similar time each day.   Plan your meals ahead of time.   Use the \"plate method.\" This is a simpler way to make sure that you get a good balance of carbs and other nutrients with each meal. It is not as exact as counting all of your carbs, but it can be helpful for people who prefer a simpler approach. If you take insulin before meals, it is generally better to adjust your insulin dose by counting how many carbs you plan to eat or using the exchange planning strategy.  For the plate method, you start with a plate about 9 inches (23 cm) across. Fill it with (figure 2):   1/2 non-starchy vegetables   1/4 protein   1/4 carbs   Follow your doctor's instructions for how and when to check your blood sugar. This can help you learn how certain foods affect your blood sugar.   Keep track of your meals and blood sugar levels. Show this to your doctor or nurse so they can adjust your treatment if needed. If you take insulin, you will also need to keep track of your exercise patterns and how much insulin you give yourself with " "each dose.   If you take insulin, make sure that you understand how to use it. This includes knowing how to adjust the dose based on your blood sugar level and what you plan to eat. Foods that have a lot of protein or fat also can affect your blood sugar level. Some people need to adjust their insulin doses when they eat these foods.   Remember that other things besides carbs can raise or lower your blood sugar level. These things can include exercise, getting sick, drinking alcohol, traveling, and stress. If you take insulin, make sure that you know how and when to adjust your dose in these situations.  If you are having trouble counting carbs or managing your blood sugar, talk to your doctor or nurse. They can help. A dietitian can also help you plan specific menus that will give you the right amount of carbs each day.  For more information, you can also get a book on counting carbs or check the American Diabetes Association website (www.diabetes.org).  All topics are updated as new evidence becomes available and our peer review process is complete.  This topic retrieved from [employer] on: Mar 27, 2024.  Topic 74627 Version 11.0  Release: 32.2.4 - C32.85  © 2024 UpToDate, Inc. and/or its affiliates. All rights reserved.  figure 1: Counting carbohydrates     To figure out the \"carb count\" in 1 serving, start with the number of grams of total carbohydrates (46 grams), then subtract the number of grams of dietary fiber (7 grams). It's also important to look at the serving size. In this example, the carb count is 39 grams. You can use this number when counting carbs for your insulin dose.  Graphic 96254 Version 8.0  table 1: Bread and grains with 15 grams of carbs*  Bread    Food  Serving size    Bagel 1/4 large bagel (1 oz)   Biscuit 1 biscuit (2.5 inches across)   Bread, reduced calorie, light 2 slices (1.5 oz)   Cornbread 1.75 inch cube (1.5 oz)   English muffin 1/2 muffin   Hot dog or hamburger bun 1/2 bun (3/4 oz) " "  Naan, chapati, or roti 1 oz   Pancake 1 pancake (4 inches across, 1/4 inch thick)   Angelica (6 inches across) 1/2 angelica   Tortilla, corn 1 small tortilla (6 inches across)   Tortilla, flour (white or whole wheat) 1 small tortilla (6 inches across) or 1/3 large tortilla (10 inches across)   Waffle 1 waffle (4-inch square or 4 inches across)   Cereals and grains (including pasta and rice)    Food  Serving size (cooked)    Barley, couscous, millet, pasta (white or whole wheat, all shapes and sizes), polenta, quinoa (all colors), or rice (white, brown, and other colors and types) 1/3 cup   Bran cereal (twigs, buds, or flakes), shredded wheat (plain), or sugar-coated cereal 1/2 cup   Bulgur, kasha, tabbouleh (tabouli), or wild rice 1/2 cup   Granola cereal 1/4 cup   Hot cereal (oats, oatmeal, grits) 1/2 cup   Unsweetened, ready-to-eat cereal 3/4 cup   * For bread and grains, 15 grams of carbs is considered 1 serving or \"choice\" for people who need to count carbs.  Graphic 069609 Version 1.0  table 2: Fruits with 15 grams of carbs*  Food  Serving size    Applesauce, unsweetened 1/2 cup   Banana 1 extra small banana, about 4 inches long (4 oz)   Blueberries 3/4 cup   Dried fruits (blueberries, cherries, cranberries, mixed fruit, raisins) 2 tbsp   Fruit, canned 1/2 cup   Fruit, whole, small (apple) 1 small fruit (4 oz)   Fruit, whole, medium (nectarine, orange, pear, tangerine) 1 medium fruit (6 oz)   Fruit juice, unsweetened 1/2 cup   Grapes 17 small grapes (3 oz)   Melon, diced 1 cup   Strawberries, whole 1 and 1/4 cups   When listed, weight (oz) includes skin and seeds. If you are not sure if your fruit is the right size for 1 serving, you can use a food scale to check the weight.  * For fruits, 15 grams of carbs is considered 1 serving or \"choice\" for people who need to count carbs.  Graphic [id number] Version 1.0  table 3: Starchy vegetables with 15 grams of carbs*  Food  Serving size (cooked)    Cassava, dasheen, or " "plantain 1/3 cup   Corn, green peas, mixed vegetables, or parsnips 1/2 cup   Marinara, pasta, or spaghetti sauce 1/2 cup   Mixed vegetables (with corn or peas) 1 cup   Potato, baked with skin 1/4 large (3 oz)   Potato, Khmer-fried (oven-baked) 1 cup (2 oz)   Potato, mashed with milk and fat 1/2 cup   Squash, winter (acorn, butternut) 1 cup   Yam or sweet potato, plain 1/2 cup (3 and 1/2 oz)   If you are not sure if your vegetable is the right size for 1 serving, you can use a food scale to check the weight.  * For starchy vegetables, 15 grams of carbs is considered 1 serving or \"choice\" for people who need to count carbs.  Graphic [id number] Version 1.0  table 4: Sample exchange system meal plan  Time  Exchange pattern  Sample menu  Carbohydrate count (g)    8 am 3 carbohydrate group    2 starch 1 English muffin 30    1 fruit 1 1/4 c strawberries 15    1 protein group 1/4 c cottage cheese -    1 fat group 1 tsp margarine -      Total: 45    12 noon 4 carbohydrate group    2 starch 2 slices of bread 30    1 fruit 1 orange 15    1 vegetable 1 c salad -    1 milk 8 oz skim milk 12    3 protein group 3 oz chicken -    1 fat group 1 tbsp low fat morales -      Total: 57    3 pm 1 carbohydrate group    1 fruit or 1 starch 1 apple or 6 crackers 15      Total: 15    6 pm 4 carbohydrate group    2 starch 1 c potato 30    1 fruit 1/2 c fruit salad 15    1 vegetable 1 c salad -    1 milk 8 oz skim milk 12    6 protein group 6 oz fish -    1 fat group 2 tbsp low fat salad dressing -      Total: 57    9 pm 1 carbohydrate group    1 starch 6 crackers 15    1 protein 2 tbsp peanut butter -      Total: 15    Graphic 40877 Version 3.0  figure 2: The \"plate method\"     For the plate method, you start with a plate about 9 inches (23 cm) across. Then fill it with 1/2 non-starchy vegetables, 1/4 protein, and 1/4 carbs.  Graphic 733490 Version 2.0  Consumer Information Use and Disclaimer   Disclaimer: This generalized information is a limited " summary of diagnosis, treatment, and/or medication information. It is not meant to be comprehensive and should be used as a tool to help the user understand and/or assess potential diagnostic and treatment options. It does NOT include all information about conditions, treatments, medications, side effects, or risks that may apply to a specific patient. It is not intended to be medical advice or a substitute for the medical advice, diagnosis, or treatment of a health care provider based on the health care provider's examination and assessment of a patient's specific and unique circumstances. Patients must speak with a health care provider for complete information about their health, medical questions, and treatment options, including any risks or benefits regarding use of medications. This information does not endorse any treatments or medications as safe, effective, or approved for treating a specific patient. UpToDate, Inc. and its affiliates disclaim any warranty or liability relating to this information or the use thereof.The use of this information is governed by the Terms of Use, available at https://www.wolterskluwer.com/en/know/clinical-effectiveness-terms. 2024© UpToDate, Inc. and its affiliates and/or licensors. All rights reserved.  Copyright   © 2024 UpToDate, Inc. and/or its affiliates. All rights reserved.

## 2024-12-17 ENCOUNTER — OFFICE VISIT (OUTPATIENT)
Dept: INTERNAL MEDICINE CLINIC | Facility: CLINIC | Age: 64
End: 2024-12-17
Payer: COMMERCIAL

## 2024-12-17 VITALS
HEART RATE: 95 BPM | BODY MASS INDEX: 34.21 KG/M2 | OXYGEN SATURATION: 73 % | SYSTOLIC BLOOD PRESSURE: 122 MMHG | WEIGHT: 231 LBS | HEIGHT: 69 IN | TEMPERATURE: 97.5 F | DIASTOLIC BLOOD PRESSURE: 70 MMHG

## 2024-12-17 DIAGNOSIS — Z12.11 ENCOUNTER FOR COLORECTAL CANCER SCREENING: ICD-10-CM

## 2024-12-17 DIAGNOSIS — Z23 ENCOUNTER FOR IMMUNIZATION: ICD-10-CM

## 2024-12-17 DIAGNOSIS — Z85.3 HISTORY OF BREAST CANCER: ICD-10-CM

## 2024-12-17 DIAGNOSIS — E11.42 TYPE 2 DIABETES MELLITUS WITH DIABETIC POLYNEUROPATHY, WITH LONG-TERM CURRENT USE OF INSULIN (HCC): Primary | ICD-10-CM

## 2024-12-17 DIAGNOSIS — G62.9 NEUROPATHY: ICD-10-CM

## 2024-12-17 DIAGNOSIS — M15.0 PRIMARY OSTEOARTHRITIS INVOLVING MULTIPLE JOINTS: ICD-10-CM

## 2024-12-17 DIAGNOSIS — Z79.4 TYPE 2 DIABETES MELLITUS WITH DIABETIC POLYNEUROPATHY, WITH LONG-TERM CURRENT USE OF INSULIN (HCC): Primary | ICD-10-CM

## 2024-12-17 DIAGNOSIS — E66.811 CLASS 1 OBESITY DUE TO EXCESS CALORIES WITH SERIOUS COMORBIDITY AND BODY MASS INDEX (BMI) OF 32.0 TO 32.9 IN ADULT: ICD-10-CM

## 2024-12-17 DIAGNOSIS — Z12.31 ENCOUNTER FOR SCREENING MAMMOGRAM FOR BREAST CANCER: ICD-10-CM

## 2024-12-17 DIAGNOSIS — E78.2 MIXED HYPERLIPIDEMIA: ICD-10-CM

## 2024-12-17 DIAGNOSIS — I25.10 CORONARY ARTERY DISEASE INVOLVING NATIVE CORONARY ARTERY OF NATIVE HEART WITHOUT ANGINA PECTORIS: ICD-10-CM

## 2024-12-17 DIAGNOSIS — E66.09 CLASS 1 OBESITY DUE TO EXCESS CALORIES WITH SERIOUS COMORBIDITY AND BODY MASS INDEX (BMI) OF 32.0 TO 32.9 IN ADULT: ICD-10-CM

## 2024-12-17 DIAGNOSIS — E55.9 VITAMIN D DEFICIENCY: ICD-10-CM

## 2024-12-17 DIAGNOSIS — I50.32 CHRONIC DIASTOLIC (CONGESTIVE) HEART FAILURE (HCC): ICD-10-CM

## 2024-12-17 DIAGNOSIS — J06.9 UPPER RESPIRATORY TRACT INFECTION, UNSPECIFIED TYPE: ICD-10-CM

## 2024-12-17 DIAGNOSIS — I10 ESSENTIAL HYPERTENSION: ICD-10-CM

## 2024-12-17 DIAGNOSIS — Z12.12 ENCOUNTER FOR COLORECTAL CANCER SCREENING: ICD-10-CM

## 2024-12-17 LAB — SL AMB POCT HEMOGLOBIN AIC: 6.6 (ref ?–6.5)

## 2024-12-17 PROCEDURE — 99214 OFFICE O/P EST MOD 30 MIN: CPT | Performed by: INTERNAL MEDICINE

## 2024-12-17 PROCEDURE — 83036 HEMOGLOBIN GLYCOSYLATED A1C: CPT | Performed by: INTERNAL MEDICINE

## 2024-12-17 RX ORDER — GUAIFENESIN 600 MG/1
600 TABLET, EXTENDED RELEASE ORAL EVERY 12 HOURS SCHEDULED
Start: 2024-12-17

## 2024-12-17 RX ORDER — AZITHROMYCIN 250 MG/1
TABLET, FILM COATED ORAL
Qty: 6 TABLET | Refills: 0 | Status: SHIPPED | OUTPATIENT
Start: 2024-12-17 | End: 2024-12-22

## 2024-12-17 RX ORDER — FLUTICASONE PROPIONATE 50 MCG
1 SPRAY, SUSPENSION (ML) NASAL DAILY
Qty: 16 G | Refills: 0 | Status: SHIPPED | OUTPATIENT
Start: 2024-12-17

## 2024-12-17 NOTE — ASSESSMENT & PLAN NOTE
Orders:    Albumin / creatinine urine ratio; Future    CBC and differential; Future    Comprehensive metabolic panel; Future    Lipid Panel with Direct LDL reflex; Future    TSH, 3rd generation; Future

## 2024-12-17 NOTE — ASSESSMENT & PLAN NOTE
Wt Readings from Last 3 Encounters:   12/17/24 105 kg (231 lb)   10/11/24 105 kg (231 lb 9.6 oz)   09/24/24 105 kg (230 lb 8 oz)

## 2024-12-17 NOTE — ASSESSMENT & PLAN NOTE
Lab Results   Component Value Date    HGBA1C 6.6 (A) 12/17/2024       Orders:    POCT hemoglobin A1c    Albumin / creatinine urine ratio; Future    CBC and differential; Future    Comprehensive metabolic panel; Future    Lipid Panel with Direct LDL reflex; Future    TSH, 3rd generation; Future

## 2024-12-17 NOTE — PROGRESS NOTES
Name: Ines Ivy      : 1960      MRN: [id number]  Encounter Provider: Jorge L Najera DO  Encounter Date: 2024   Encounter department: [facility]  :  Assessment & Plan  Type 2 diabetes mellitus with diabetic polyneuropathy, with long-term current use of insulin (HCC)    Lab Results   Component Value Date    HGBA1C 6.6 (A) 2024       Orders:    POCT hemoglobin A1c    Albumin / creatinine urine ratio; Future    CBC and differential; Future    Comprehensive metabolic panel; Future    Lipid Panel with Direct LDL reflex; Future    TSH, 3rd generation; Future    Encounter for immunization    Orders:    Zoster Vaccine Recombinant IM    TDAP VACCINE GREATER THAN OR EQUAL TO 6YO IM    Pneumococcal Conjugate Vaccine 20-valent (Pcv20)    influenza vaccine, recombinant, PF, 0.5 mL IM (Flublok)    Encounter for screening mammogram for breast cancer    Orders:    Mammo screening bilateral w 3d and cad; Future    Encounter for colorectal cancer screening    Orders:    Ambulatory Referral to Gastroenterology; Future    Essential hypertension    Orders:    Albumin / creatinine urine ratio; Future    Comprehensive metabolic panel; Future    TSH, 3rd generation; Future    Coronary artery disease involving native coronary artery of native heart without angina pectoris    Orders:    Albumin / creatinine urine ratio; Future    CBC and differential; Future    Comprehensive metabolic panel; Future    Lipid Panel with Direct LDL reflex; Future    TSH, 3rd generation; Future    Chronic diastolic (congestive) heart failure (HCC)  Wt Readings from Last 3 Encounters:   24 105 kg (231 lb)   10/11/24 105 kg (231 lb 9.6 oz)   24 105 kg (230 lb 8 oz)                    Neuropathy         Primary osteoarthritis involving multiple joints         History of breast cancer         Mixed hyperlipidemia    Orders:    Comprehensive metabolic panel; Future    Lipid Panel with Direct LDL  reflex; Future    Class 1 obesity due to excess calories with serious comorbidity and body mass index (BMI) of 32.0 to 32.9 in adult           Vitamin D deficiency    Orders:    Vitamin D 25 hydroxy; Future    Upper respiratory tract infection, unspecified type    Orders:    azithromycin (ZITHROMAX) 250 mg tablet; Take 2 tablets today then 1 tablet daily x 4 days    guaiFENesin (Mucinex) 600 mg 12 hr tablet; Take 1 tablet (600 mg total) by mouth every 12 (twelve) hours    fluticasone (FLONASE) 50 mcg/act nasal spray; 1 spray into each nostril daily      A/P:  Doing ok and will check labs. In office HgA1c was good at 6.6. Endo just increased her GLP-1 and lowered her metformin. Will treat her URI. . Discussed vaccines and defers all. Order mammo and CRC. Continue current treatment and RTC three months for routine.      History of Present Illness     WF RTC for f/u DM, HTN, etc. Doing ok and no new issues except for a slight URI. Remains active w/o difficulty and no falls. Sugars less than 140 and no low sugar events. Denies CP, SOB, edema, palpitations, orthopnea or PND. Chronic pain is manageable. Breast ca is in remission. Due for labs, CRC, mammo and vaccines.       Review of Systems   Constitutional:  Negative for activity change, chills, diaphoresis, fatigue and fever.   HENT:  Positive for congestion, postnasal drip and rhinorrhea. Negative for ear discharge, ear pain, facial swelling, sinus pressure, sinus pain and sore throat.    Eyes:  Negative for visual disturbance.   Respiratory:  Negative for cough, chest tightness, shortness of breath and wheezing.    Cardiovascular:  Negative for chest pain, palpitations and leg swelling.   Gastrointestinal:  Negative for abdominal pain, constipation, diarrhea, nausea and vomiting.   Endocrine: Negative for cold intolerance and heat intolerance.   Genitourinary:  Negative for difficulty urinating, dysuria and frequency.   Musculoskeletal:  Negative for arthralgias,  "gait problem and myalgias.   Neurological:  Negative for dizziness, seizures, syncope, weakness, light-headedness and headaches.   Psychiatric/Behavioral:  Negative for confusion, dysphoric mood and sleep disturbance. The patient is not nervous/anxious.        Objective   /70   Pulse 95   Temp 97.5 °F (36.4 °C)   Ht 5' 9\" (1.753 m)   Wt 105 kg (231 lb)   SpO2 (!) 73%   BMI 34.11 kg/m²      Physical Exam  Vitals and nursing note reviewed.   Constitutional:       General: She is not in acute distress.     Appearance: Normal appearance. She is obese. She is not ill-appearing.   HENT:      Head: Normocephalic and atraumatic.      Mouth/Throat:      Mouth: Mucous membranes are moist.   Eyes:      Extraocular Movements: Extraocular movements intact.      Conjunctiva/sclera: Conjunctivae normal.      Pupils: Pupils are equal, round, and reactive to light.   Neck:      Vascular: No carotid bruit.   Cardiovascular:      Rate and Rhythm: Normal rate and regular rhythm.      Heart sounds: Normal heart sounds. No murmur heard.  Pulmonary:      Effort: Pulmonary effort is normal. No respiratory distress.      Breath sounds: Normal breath sounds. No wheezing, rhonchi or rales.   Abdominal:      General: Bowel sounds are normal. There is no distension.      Palpations: Abdomen is soft.      Tenderness: There is no abdominal tenderness.   Musculoskeletal:      Cervical back: Neck supple.      Right lower leg: No edema.      Left lower leg: No edema.   Neurological:      General: No focal deficit present.      Mental Status: She is alert and oriented to person, place, and time. Mental status is at baseline.   Psychiatric:         Mood and Affect: Mood normal.         Behavior: Behavior normal.         Thought Content: Thought content normal.         Judgment: Judgment normal.         "

## 2024-12-27 DIAGNOSIS — E11.42 TYPE 2 DIABETES MELLITUS WITH DIABETIC POLYNEUROPATHY, WITH LONG-TERM CURRENT USE OF INSULIN (HCC): ICD-10-CM

## 2024-12-27 DIAGNOSIS — Z79.4 TYPE 2 DIABETES MELLITUS WITH DIABETIC POLYNEUROPATHY, WITH LONG-TERM CURRENT USE OF INSULIN (HCC): ICD-10-CM

## 2024-12-27 RX ORDER — INSULIN GLARGINE 100 [IU]/ML
INJECTION, SOLUTION SUBCUTANEOUS
Qty: 90 ML | Refills: 1 | Status: SHIPPED | OUTPATIENT
Start: 2024-12-27

## 2025-01-06 ENCOUNTER — HOSPITAL ENCOUNTER (EMERGENCY)
Facility: HOSPITAL | Age: 65
Discharge: HOME | End: 2025-01-06
Attending: EMERGENCY MEDICINE
Payer: COMMERCIAL

## 2025-01-06 ENCOUNTER — APPOINTMENT (EMERGENCY)
Dept: RADIOLOGY | Facility: HOSPITAL | Age: 65
End: 2025-01-06
Payer: COMMERCIAL

## 2025-01-06 VITALS
OXYGEN SATURATION: 95 % | WEIGHT: 231 LBS | DIASTOLIC BLOOD PRESSURE: 70 MMHG | SYSTOLIC BLOOD PRESSURE: 156 MMHG | BODY MASS INDEX: 34.11 KG/M2 | RESPIRATION RATE: 20 BRPM | TEMPERATURE: 97.1 F | HEART RATE: 73 BPM

## 2025-01-06 DIAGNOSIS — M79.645 PAIN OF LEFT THUMB: Primary | ICD-10-CM

## 2025-01-06 PROCEDURE — 99284 EMERGENCY DEPT VISIT MOD MDM: CPT | Performed by: EMERGENCY MEDICINE

## 2025-01-06 PROCEDURE — 73140 X-RAY EXAM OF FINGER(S): CPT

## 2025-01-06 PROCEDURE — 99284 EMERGENCY DEPT VISIT MOD MDM: CPT

## 2025-01-06 RX ORDER — IBUPROFEN 600 MG/1
600 TABLET, FILM COATED ORAL ONCE
Status: COMPLETED | OUTPATIENT
Start: 2025-01-06 | End: 2025-01-06

## 2025-01-06 RX ADMIN — DEXAMETHASONE SODIUM PHOSPHATE 10 MG: 10 INJECTION, SOLUTION INTRAMUSCULAR; INTRAVENOUS at 12:47

## 2025-01-06 RX ADMIN — IBUPROFEN 600 MG: 600 TABLET, FILM COATED ORAL at 12:57
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
## 2025-01-06 NOTE — Clinical Note
Ines Ivy was seen and treated in our emergency department on 1/6/2025.                Diagnosis:     Ines  may return to work on return date.    She may return on this date: 01/09/2025         If you have any questions or concerns, please don't hesitate to call.      Carson Villafana, DO    ______________________________           _______________          _______________  Hospital Representative                              Date                                Time

## 2025-01-06 NOTE — DISCHARGE INSTRUCTIONS
If you develop any new or worsening symptoms please immediately return to your nearest emergency department.    fall

## 2025-01-15 ENCOUNTER — OFFICE VISIT (OUTPATIENT)
Dept: ENDOCRINOLOGY | Facility: CLINIC | Age: 65
End: 2025-01-15
Payer: COMMERCIAL

## 2025-01-15 VITALS
SYSTOLIC BLOOD PRESSURE: 142 MMHG | TEMPERATURE: 98 F | HEART RATE: 72 BPM | HEIGHT: 69 IN | WEIGHT: 228.8 LBS | RESPIRATION RATE: 18 BRPM | BODY MASS INDEX: 33.89 KG/M2 | DIASTOLIC BLOOD PRESSURE: 74 MMHG | OXYGEN SATURATION: 93 %

## 2025-01-15 DIAGNOSIS — I10 ESSENTIAL HYPERTENSION: ICD-10-CM

## 2025-01-15 DIAGNOSIS — E66.811 CLASS 1 OBESITY DUE TO EXCESS CALORIES WITH SERIOUS COMORBIDITY AND BODY MASS INDEX (BMI) OF 32.0 TO 32.9 IN ADULT: ICD-10-CM

## 2025-01-15 DIAGNOSIS — G89.29 CHRONIC MIDLINE LOW BACK PAIN WITH RIGHT-SIDED SCIATICA: ICD-10-CM

## 2025-01-15 DIAGNOSIS — E78.2 MIXED HYPERLIPIDEMIA: ICD-10-CM

## 2025-01-15 DIAGNOSIS — E55.9 VITAMIN D DEFICIENCY: ICD-10-CM

## 2025-01-15 DIAGNOSIS — Z79.4 TYPE 2 DIABETES MELLITUS WITH DIABETIC POLYNEUROPATHY, WITH LONG-TERM CURRENT USE OF INSULIN (HCC): Primary | ICD-10-CM

## 2025-01-15 DIAGNOSIS — E66.09 CLASS 1 OBESITY DUE TO EXCESS CALORIES WITH SERIOUS COMORBIDITY AND BODY MASS INDEX (BMI) OF 32.0 TO 32.9 IN ADULT: ICD-10-CM

## 2025-01-15 DIAGNOSIS — Z79.4 TYPE 2 DIABETES MELLITUS WITH DIABETIC POLYNEUROPATHY, WITH LONG-TERM CURRENT USE OF INSULIN (HCC): ICD-10-CM

## 2025-01-15 DIAGNOSIS — M51.26 HNP (HERNIATED NUCLEUS PULPOSUS), LUMBAR: ICD-10-CM

## 2025-01-15 DIAGNOSIS — M54.41 CHRONIC MIDLINE LOW BACK PAIN WITH RIGHT-SIDED SCIATICA: ICD-10-CM

## 2025-01-15 DIAGNOSIS — M48.061 SPINAL STENOSIS OF LUMBAR REGION WITHOUT NEUROGENIC CLAUDICATION: ICD-10-CM

## 2025-01-15 DIAGNOSIS — E11.42 TYPE 2 DIABETES MELLITUS WITH DIABETIC POLYNEUROPATHY, WITH LONG-TERM CURRENT USE OF INSULIN (HCC): ICD-10-CM

## 2025-01-15 DIAGNOSIS — E11.42 TYPE 2 DIABETES MELLITUS WITH DIABETIC POLYNEUROPATHY, WITH LONG-TERM CURRENT USE OF INSULIN (HCC): Primary | ICD-10-CM

## 2025-01-15 PROCEDURE — 95251 CONT GLUC MNTR ANALYSIS I&R: CPT | Performed by: STUDENT IN AN ORGANIZED HEALTH CARE EDUCATION/TRAINING PROGRAM

## 2025-01-15 PROCEDURE — 99214 OFFICE O/P EST MOD 30 MIN: CPT | Performed by: STUDENT IN AN ORGANIZED HEALTH CARE EDUCATION/TRAINING PROGRAM

## 2025-01-15 RX ORDER — GABAPENTIN 400 MG/1
400 CAPSULE ORAL 3 TIMES DAILY
Qty: 270 CAPSULE | Refills: 0 | Status: SHIPPED | OUTPATIENT
Start: 2025-01-15

## 2025-01-15 NOTE — PROGRESS NOTES
Name: Ines Ivy      : 1960      MRN: [id number]  Encounter Provider: Jonathan Galvan MD  Encounter Date: 1/15/2025   Encounter department: [facility] [place]  :  Assessment & Plan  Type 2 diabetes mellitus with diabetic polyneuropathy, with long-term current use of insulin (HCC)    Lab Results   Component Value Date    HGBA1C 6.6 (A) 2024     Diabetes remains well-controlled with A1c of 6.6%, and she has tolerated current regimen well with no adverse reaction and no/minimal hypoglycemia.  We will maintain current regimen for now.  Ophthalmology and podiatry follow-up.  Return back in 4 months.  Labs prior to next visit.         Essential hypertension  Blood pressure goal is less than 130/80, blood pressure at today's visit is 142/74, she checks her blood pressure at home which are at goal.  Continue current regimen including losartan.           Class 1 obesity due to excess calories with serious comorbidity and body mass index (BMI) of 32.0 to 32.9 in adult         Mixed hyperlipidemia  Continue Lipitor 80 mg daily.         Vitamin D deficiency  Continue vitamin D supplementation.             History of Present Illness   HPI  Ines Ivy is a 64 y.o. female who presents for follow-up for type 2 diabetes with long-term current use of insulin.      Basaglar 55 units qam and 60 units before bed  Mounjaro 15 mg daily  Jardiance 25 mg daily    Ines Ivy   Device used,dexcom G7  Home use       Indication   Type 2 Diabetes      More than 72 hours of data was reviewed. Report to be scanned to chart.     Date Range:  -     Analysis of data:   Average Glucose: 163 mg/dl  Coefficient of Variation: x   SD :  42 mg/dl   Time in Target Range: 69%   Time Above Range: 21%   Time Below Range: <1%       Eye exam : UTD   Foot exam : utd    RAAS inhibitor: Losartan 100 mg daily  Statin: Lipitor 80 mg daily          Review of Systems  "  Constitutional:  Negative for fatigue.   Endocrine: Negative for polydipsia and polyuria.          Objective   /74 (BP Location: Right arm, Patient Position: Sitting, Cuff Size: Large)   Pulse 72   Temp 98 °F (36.7 °C) (Temporal)   Resp 18   Ht 5' 9\" (1.753 m)   Wt 104 kg (228 lb 12.8 oz)   SpO2 93%   BMI 33.79 kg/m²      Physical Exam  Vitals and nursing note reviewed.   Constitutional:       General: She is not in acute distress.     Appearance: She is well-developed.   HENT:      Head: Normocephalic and atraumatic.   Cardiovascular:      Rate and Rhythm: Normal rate and regular rhythm.   Pulmonary:      Effort: Pulmonary effort is normal. No respiratory distress.   Musculoskeletal:         General: No swelling.      Cervical back: Neck supple.   Skin:     General: Skin is warm and dry.   Neurological:      Mental Status: She is alert.             Component      Latest Ref Rng 12/17/2024   Hemoglobin A1C      <=6.5  6.6 !       Legend:  ! Abnormal  "

## 2025-01-15 NOTE — ASSESSMENT & PLAN NOTE
Blood pressure goal is less than 130/80, blood pressure at today's visit is 142/74, she checks her blood pressure at home which are at goal.  Continue current regimen including losartan.

## 2025-01-15 NOTE — ASSESSMENT & PLAN NOTE
Lab Results   Component Value Date    HGBA1C 6.6 (A) 12/17/2024     Diabetes remains well-controlled with A1c of 6.6%, and she has tolerated current regimen well with no adverse reaction and no/minimal hypoglycemia.  We will maintain current regimen for now.  Ophthalmology and podiatry follow-up.  Return back in 4 months.  Labs prior to next visit.

## 2025-01-16 ENCOUNTER — OFFICE VISIT (OUTPATIENT)
Dept: OBGYN CLINIC | Facility: CLINIC | Age: 65
End: 2025-01-16
Payer: COMMERCIAL

## 2025-01-16 DIAGNOSIS — M65.312 TRIGGER THUMB OF LEFT HAND: Primary | ICD-10-CM

## 2025-01-16 DIAGNOSIS — M79.645 PAIN OF LEFT THUMB: ICD-10-CM

## 2025-01-16 PROCEDURE — 99204 OFFICE O/P NEW MOD 45 MIN: CPT | Performed by: SURGERY

## 2025-01-16 NOTE — PROGRESS NOTES
Assessment    Left trigger thumb      Plan    -Patient is a candidate for left trigger thumb release under local anesthesia, and she wishes to pursue this option.  -She does not wish to have injections for this problem.  Risks, benefits and alternative treatments were discussed with the patient. These included but are not limited to: bleeding, infection, damage to nerves, vessels or tendons, allergic reaction to agents, possible increase in pain, tendon or ligament rupture, weakening of bone or soft tissues, and/or elevation in blood sugar. Patient understands and would like to proceed with the proposed procedure.    -Follow-up 2 weeks after surgery.        Subjective     HPI    Patient ID:  Ines Ivy is a RHD 64 y.o. female here for evaluation of the left thumb.  According to the patient, she has a roughly 3 to 4-week history of left thumb clicking and locking with associated pain that came on without acute trauma or injury to the area.  She states there is occasional pins-and-needles in her thumb but no persistent numbness and tingling.  She works as a  at the [employer] and frequently stands all shift with her thumbs in her pockets which she thinks may be the cause of this.  She did not go to the ER for acute onset of the symptoms x-rays did not reveal any acute findings.  She was given a thumb spica brace which does help at times especially at work.  She has no history of surgery to the left hand or thumb.      The following portions of the patient's history were reviewed and updated as appropriate: allergies, current medications, past family history, past medical history, past social history, past surgical history, and problem list.    Review of Systems     Objective    Imaging:  Left thumb x-rays 1/6/2025    FINDINGS:     No acute fracture or dislocation.     No significant degenerative changes.     No lytic or blastic osseous lesion.     Unremarkable soft tissues.     IMPRESSION:      No acute osseous abnormality.    Physical Exam     General appearance:  NAD   Cardiac:  Regular rate  Lungs:  Unlabored breathing  Abdomen:  Non-distended    Orthopedic Examination:  Left thumb     Inspection: No open wounds or erythema.  No ecchymosis or swelling.  No bony abnormality.    Palpation: Tender to palpation A1 pulley with palpable nodule.  Nontender CMC joint and first dorsal extensor compartment.    Range-of-motion: Crepitus and clicking with thumb range of motion.  No active locking.    Strength: 5/5 thumb flexion extension,     Sensation: Normal    Special Tests: Good cap refill at the fingertip  Palpable radial pulse

## 2025-02-05 DIAGNOSIS — R06.02 SHORTNESS OF BREATH: ICD-10-CM

## 2025-02-06 RX ORDER — FUROSEMIDE 40 MG/1
40 TABLET ORAL 2 TIMES DAILY
Qty: 180 TABLET | Refills: 1 | Status: SHIPPED | OUTPATIENT
Start: 2025-02-06

## 2025-02-11 DIAGNOSIS — I25.118 CORONARY ARTERY DISEASE OF NATIVE ARTERY OF NATIVE HEART WITH STABLE ANGINA PECTORIS (HCC): ICD-10-CM

## 2025-02-11 RX ORDER — RANOLAZINE 500 MG/1
500 TABLET, EXTENDED RELEASE ORAL 2 TIMES DAILY
Qty: 60 TABLET | Refills: 0 | Status: SHIPPED | OUTPATIENT
Start: 2025-02-11

## 2025-02-21 ENCOUNTER — HOSPITAL ENCOUNTER (OUTPATIENT)
Age: 65
Setting detail: OUTPATIENT SURGERY
Discharge: HOME | End: 2025-02-21
Attending: SURGERY | Admitting: SURGERY
Payer: COMMERCIAL

## 2025-02-21 VITALS
RESPIRATION RATE: 17 BRPM | HEIGHT: 70 IN | HEART RATE: 80 BPM | WEIGHT: 229.8 LBS | DIASTOLIC BLOOD PRESSURE: 67 MMHG | OXYGEN SATURATION: 98 % | TEMPERATURE: 97.9 F | SYSTOLIC BLOOD PRESSURE: 153 MMHG | BODY MASS INDEX: 32.9 KG/M2

## 2025-02-21 DIAGNOSIS — Z48.89 AFTERCARE FOLLOWING SURGERY: Primary | ICD-10-CM

## 2025-02-21 PROCEDURE — 26055 INCISE FINGER TENDON SHEATH: CPT | Performed by: SURGERY

## 2025-02-21 PROCEDURE — 26055 INCISE FINGER TENDON SHEATH: CPT | Performed by: PHYSICIAN ASSISTANT

## 2025-02-21 PROCEDURE — NC001 PR NO CHARGE: Performed by: SURGERY

## 2025-02-21 RX ORDER — MAGNESIUM HYDROXIDE 1200 MG/15ML
LIQUID ORAL AS NEEDED
Status: DISCONTINUED | OUTPATIENT
Start: 2025-02-21 | End: 2025-02-21 | Stop reason: HOSPADM

## 2025-02-21 RX ORDER — ACETAMINOPHEN 325 MG/1
650 TABLET ORAL EVERY 6 HOURS PRN
Status: DISCONTINUED | OUTPATIENT
Start: 2025-02-21 | End: 2025-02-21 | Stop reason: HOSPADM

## 2025-02-21 RX ORDER — NAPROXEN 500 MG/1
500 TABLET ORAL 2 TIMES DAILY WITH MEALS
Qty: 10 TABLET | Refills: 0 | Status: SHIPPED | OUTPATIENT
Start: 2025-02-21 | End: 2025-02-26

## 2025-02-21 NOTE — H&P
Hand Surgery H&P    [id number]  Ines Ivy      Assessment     Left trigger thumb        Plan     -Patient is a candidate for left trigger thumb release under local anesthesia, and she wishes to pursue this option today  -Consent on chart  -Follow-up 2 weeks after surgery.           Subjective      HPI     Patient ID:  Ines Ivy is a RHD 64 y.o. female here for evaluation of the left thumb.  According to the patient, she has a roughly 3 to 4-week history of left thumb clicking and locking with associated pain that came on without acute trauma or injury to the area.  She states there is occasional pins-and-needles in her thumb but no persistent numbness and tingling.  She works as a  at the [employer] and frequently stands all shift with her thumbs in her pockets which she thinks may be the cause of this.  She did not go to the ER for acute onset of the symptoms x-rays did not reveal any acute findings.  She was given a thumb spica brace which does help at times especially at work.  She has no history of surgery to the left hand or thumb.        The following portions of the patient's history were reviewed and updated as appropriate: allergies, current medications, past family history, past medical history, past social history, past surgical history, and problem list.     Review of Systems      Objective     Imaging:  Left thumb x-rays 1/6/2025     FINDINGS:     No acute fracture or dislocation.     No significant degenerative changes.     No lytic or blastic osseous lesion.     Unremarkable soft tissues.     IMPRESSION:     No acute osseous abnormality.     Physical Exam      General appearance:  NAD   Cardiac:  Regular rate  Lungs:  Unlabored breathing  Abdomen:  Non-distended     Orthopedic Examination:  Left thumb      Inspection: No open wounds or erythema.  No ecchymosis or swelling.  No bony abnormality.     Palpation: Tender to palpation A1 pulley with palpable nodule.   Nontender CMC joint and first dorsal extensor compartment.     Range-of-motion: Crepitus and clicking with thumb range of motion.  No active locking.     Strength: 5/5 thumb flexion extension,      Sensation: Normal     Special Tests: Good cap refill at the fingertip  Palpable radial pulse

## 2025-02-21 NOTE — LETTER
[facility]TH[facility] OPERATING ROOM  [address] [address]  Dept: [phone]    February 21, 2025     Patient: Ines Ivy   YOB: 1960   Date of Visit: 2/21/2025       Date of Visit: 2/21/2025       To Whom it May Concern:    Ines Ivy is under the professional care of Dr. Gonzalez. She underwent surgery on 2/21/2025.  She may return to work 2/27/2025 with restrictions that include no lifting, pushing, or pulling > 2lbs until post-op visit on 3/6/2025.  Further updates will be provided then.    If you have any questions or concerns, please don't hesitate to call.         Sincerely,          Dayne Verdin PA-C

## 2025-02-21 NOTE — LETTER
[facility] ORTHOPEDIC [facility] OPERATING ROOM  [address] [address]  Dept: [phone]    February 21, 2025     Patient: Ines Ivy   YOB: 1960   Date of Visit: 2/21/2025       To Whom it May Concern:    Ines Ivy is under the professional care of Dr. Gonzalez. She underwent surgery on 2/21/2025.  She may return to work 2/27/2025 with restrictions that include no lifting, pushing, or pulling > 2lbs    If you have any questions or concerns, please don't hesitate to call.         Sincerely,          Dayne Verdin PA-C

## 2025-02-21 NOTE — DISCHARGE INSTR - AVS FIRST PAGE
Elevate hand above heart as much as possible to help pain and swelling.  May use hand for simple tasks, but no heavy lifting or tight squeezing x 2 weeks.  Keep operative bandage clean and dry. You may remove bandage in 4 days, just place a band-aid over incision.  You are permitted to shower after 4 days with band-aid off.  Perform simple finger motion exercises: opening & closing fingers 10 x every hr.  Follow-up in the office per your scheduled post-op appointment for suture removal 3/6/2025.

## 2025-02-21 NOTE — OP NOTE
OPERATIVE REPORT  PATIENT NAME: Ines Ivy    :  1960  MRN: [id number]  Pt Location: [facility] OR ROOM 06    SURGERY DATE: 2025    Surgeons and Role:     * Joe Gonzalez MD - Primary     * Dayne Verdin PA-C    Preop Diagnosis:  Trigger thumb of left hand [M65.312]    Post-Op Diagnosis Codes:     * Trigger thumb of left hand [M65.312]    Procedure(s):  Left - RELEASE TRIGGER FINGER. LEFT THUMB    Specimen(s):  * No specimens in log *    Estimated Blood Loss:   Minimal    Drains:  * No LDAs found *    Anesthesia Type:   Local    Operative Indications:  Trigger thumb of left hand [M65.312]      Operative Findings:  Full active thumb flexion and extension after A1 pulley release      Complications:   None    Procedure and Technique:  The patient's left hand was cleansed with alcohol.  A field block was performed with marcaine 0.25% with epinephrine and lidocaine 1% with epinephrine.  The left upper extremity was then prepped and draped in a sterile fashion.  An incision was made in the palmar digital crease in line with the thumb.  Dissection was performed down to the flexor tendon sheath.  The A1 pulley was identified, and incised with a scalpel.  The release was extended proximally and distally with tenotomy scissors.  The patient was asked to flex and extend the digit which she was able to do without any evidence of locking.  Preoperatively she could not flex or extend fully.  The wound was irrigated with normal saline, and closed with 4-0 nylon sutures in an horizontal mattress interrupted manner.  Xeroform was applied followed by 4x4 gauze.  An ace bandage over wrap was applied.  The patient was transferred to phase 2 recovery in stable condition.      I was present for the entire procedure.    Patient Disposition:  PACU        My Assistant was necessary throughout the procedure(s) for retraction and positioning.    I understand that section 1842 (b)(7)(D) of the Social Security Act generally  prohibits Medicare physician fee schedule payment for the services of assistants-at-surgery in teaching hospitals when qualified residents are available to furnish such services. I certify that the services for which payment is claimed were medically necessary, and that no qualified resident was available to perform the services. I further understand that these services are subject to post-payment review by the Medicare carrier.        SIGNATURE: Joe Gonzalez MD  DATE: February 21, 2025  TIME: 11:06 AM

## 2025-03-06 ENCOUNTER — OFFICE VISIT (OUTPATIENT)
Dept: OBGYN CLINIC | Facility: CLINIC | Age: 65
End: 2025-03-06

## 2025-03-06 DIAGNOSIS — M65.312 TRIGGER THUMB OF LEFT HAND: Primary | ICD-10-CM

## 2025-03-06 PROCEDURE — 99024 POSTOP FOLLOW-UP VISIT: CPT | Performed by: SURGERY

## 2025-03-06 NOTE — PROGRESS NOTES
HPI:  Pt is a 65 yo female s/p left trigger thumb release on 2/21/25.  Pt states that she is doing well.  Denies fever/chills.  She notes minimal discomfort.     PE:  LUE:  incision healing well, some dried skin at site, no erythema, no induration, no exudate, sutures in place, normal thumb active IP flexion and extension, SILT    A/P:  Pt is a 65 yo female s/p left trigger thumb release on 2/21/25.  -Sutures removed.  -Scar massage.  -Increase activity as tolerated.  -No restrictions for work.  -F/u PRN

## 2025-03-09 DIAGNOSIS — I25.118 CORONARY ARTERY DISEASE OF NATIVE ARTERY OF NATIVE HEART WITH STABLE ANGINA PECTORIS (HCC): ICD-10-CM

## 2025-03-09 DIAGNOSIS — I21.4 NSTEMI (NON-ST ELEVATED MYOCARDIAL INFARCTION) (HCC): ICD-10-CM

## 2025-03-10 RX ORDER — METOPROLOL SUCCINATE 100 MG/1
100 TABLET, EXTENDED RELEASE ORAL DAILY
Qty: 90 TABLET | Refills: 0 | Status: SHIPPED | OUTPATIENT
Start: 2025-03-10

## 2025-03-10 RX ORDER — RANOLAZINE 500 MG/1
500 TABLET, EXTENDED RELEASE ORAL 2 TIMES DAILY
Qty: 60 TABLET | Refills: 0 | Status: SHIPPED | OUTPATIENT
Start: 2025-03-10

## 2025-04-04 DIAGNOSIS — Z79.4 TYPE 2 DIABETES MELLITUS WITH DIABETIC POLYNEUROPATHY, WITH LONG-TERM CURRENT USE OF INSULIN (HCC): ICD-10-CM

## 2025-04-04 DIAGNOSIS — E11.42 TYPE 2 DIABETES MELLITUS WITH DIABETIC POLYNEUROPATHY, WITH LONG-TERM CURRENT USE OF INSULIN (HCC): ICD-10-CM

## 2025-04-04 DIAGNOSIS — M51.26 HNP (HERNIATED NUCLEUS PULPOSUS), LUMBAR: ICD-10-CM

## 2025-04-04 DIAGNOSIS — I25.118 CORONARY ARTERY DISEASE OF NATIVE ARTERY OF NATIVE HEART WITH STABLE ANGINA PECTORIS (HCC): ICD-10-CM

## 2025-04-04 DIAGNOSIS — M54.41 CHRONIC MIDLINE LOW BACK PAIN WITH RIGHT-SIDED SCIATICA: ICD-10-CM

## 2025-04-04 DIAGNOSIS — M48.061 SPINAL STENOSIS OF LUMBAR REGION WITHOUT NEUROGENIC CLAUDICATION: ICD-10-CM

## 2025-04-04 DIAGNOSIS — G89.29 CHRONIC MIDLINE LOW BACK PAIN WITH RIGHT-SIDED SCIATICA: ICD-10-CM

## 2025-04-04 RX ORDER — GABAPENTIN 400 MG/1
400 CAPSULE ORAL 3 TIMES DAILY
Qty: 270 CAPSULE | Refills: 0 | Status: SHIPPED | OUTPATIENT
Start: 2025-04-04

## 2025-04-04 RX ORDER — RANOLAZINE 500 MG/1
500 TABLET, EXTENDED RELEASE ORAL 2 TIMES DAILY
Qty: 60 TABLET | Refills: 0 | Status: SHIPPED | OUTPATIENT
Start: 2025-04-04

## 2025-04-25 DIAGNOSIS — E11.42 TYPE 2 DIABETES MELLITUS WITH DIABETIC POLYNEUROPATHY, WITH LONG-TERM CURRENT USE OF INSULIN (HCC): ICD-10-CM

## 2025-04-25 DIAGNOSIS — M48.061 SPINAL STENOSIS OF LUMBAR REGION WITHOUT NEUROGENIC CLAUDICATION: ICD-10-CM

## 2025-04-25 DIAGNOSIS — E11.9 ENCOUNTER FOR DIABETIC FOOT EXAM (HCC): ICD-10-CM

## 2025-04-25 DIAGNOSIS — Z79.4 TYPE 2 DIABETES MELLITUS WITH DIABETIC POLYNEUROPATHY, WITH LONG-TERM CURRENT USE OF INSULIN (HCC): ICD-10-CM

## 2025-04-25 DIAGNOSIS — I21.4 NSTEMI (NON-ST ELEVATED MYOCARDIAL INFARCTION) (HCC): ICD-10-CM

## 2025-04-25 DIAGNOSIS — G89.29 CHRONIC MIDLINE LOW BACK PAIN WITH RIGHT-SIDED SCIATICA: ICD-10-CM

## 2025-04-25 DIAGNOSIS — M51.26 HNP (HERNIATED NUCLEUS PULPOSUS), LUMBAR: ICD-10-CM

## 2025-04-25 DIAGNOSIS — M54.41 CHRONIC MIDLINE LOW BACK PAIN WITH RIGHT-SIDED SCIATICA: ICD-10-CM

## 2025-04-25 DIAGNOSIS — I10 ESSENTIAL HYPERTENSION: ICD-10-CM

## 2025-04-27 NOTE — PATIENT INSTRUCTIONS
"Patient Education     Carb counting for adults with diabetes   The Basics   Written by the doctors and editors at [facility]   What is carb counting? -- This is a type of meal planning that many people with diabetes use. It is a way to figure out how many carbohydrates, or \"carbs,\" you eat.  The body breaks down the food we eat into 3 main types of nutrients: carbs, proteins, and fats. Carbs are sugars and starches that come from food. The body uses carbs for energy.  Why do I need to count carbs? -- People with diabetes need to pay attention to how many carbs they eat. This is because carbs raise your blood sugar level.  Carb counting helps you:   Choose the right amount of insulin to take before meals and snacks - If you take insulin before meals, the dose depends on several things, including how many carbs you plan to eat. (It also depends on how much you plan to exercise and your blood sugar level.)   Plan your meals and snacks for the day - You can use carb counting to figure out how many carbs to eat at each meal and snack. This helps you make sure that you eat the right amount over the entire day.   Keep your blood sugar levels well managed - Spreading out the carbs you eat over a whole day can help keep your blood sugar from getting too high. If you take insulin or another diabetes medicine that can cause low blood sugar, eating about the same amount of carbs at each meal every day also helps keep your blood sugar from getting too low. Reducing the amount of carbs you eat can help you manage your diabetes better and prevent medical problems that diabetes can cause.  Your doctor, nurse, or dietitian (food expert) can help you figure out how many carbs to try to eat each day. This will depend on your eating habits, weight, activity level, and which diabetes medicines you take.  People who take insulin before meals might need to be very careful when they count the carbs in every meal and snack. This is so they " "can give themselves the right amount of insulin. If the insulin dose doesn't match the amount of carbs, their blood sugar might get too low or too high. Other people might be able to be a little more flexible as long as they get about the same amount of carbs at each meal or throughout the day.  Which foods have carbs? -- Foods with a lot of carbs include:   Grains - These include bread, pasta, rice, and cereal.   Fruits and starchy vegetables - Starchy vegetables include potatoes, corn, and squash.   Milk and other dairy products - Dairy products include cheese and yogurt.   Foods with added sugar - These include sweets and baked goods likes cookies and cakes, as well as sugary drinks like juice and soda.  It is best to get most of your carbs from fruits, vegetables, whole grains (like whole-wheat bread, whole-grain cereals, and brown rice), and low-fat milk and dairy products.  How do I count carbs? -- To count carbs in packaged foods, check the food's nutrition label (if it has one).  On the label (figure 1), check for:   \"Total Carbohydrate\" number - This tells you how many carbs are in 1 serving size of the food. If you eat 1 serving, then the number of carbs you eat is the same as the number of total carbohydrates.   \"Serving size\" - This tells you how much food is in 1 serving. If you have 2 servings, the number of carbs will be 2 times the number of carbohydrates listed.   \"Dietary Fiber\" - Fiber is a carb that is not digested, which means that it does not raise blood sugar. Foods with a lot of fiber can help manage your blood sugar. If a food has more than 5 grams (g) of fiber, you need less insulin to cover the total carbs in that food. So, if you are calculating an insulin dose, only count the carbs that are not from fiber (figure 1).  What is exchange planning? -- Exchange planning, or the \"exchange system,\" is a way for people to plan their meals without reading labels. This can be helpful since many " "foods don't come with a nutrition label.  The exchange system involves knowing how much of different foods have about 15 grams of carbs (table 1 and table 2 and table 3). Your doctor, nurse, or dietitian gives you a certain number of \"carb choices\" to eat with each meal and snack (table 4). Each \"choice\" is a portion of food that has about 15 grams of carbs. Knowing your options makes it easier to \"exchange\" 1 carb choice for another as you plan your meals and snacks. For example, 1 small apple could be exchanged for 1/3 cup of pasta.  How can I plan my meals? -- First, make sure that you know how many carbs you should be eating each day. Ask your doctor, nurse, or dietitian if you are not sure.  Here are some tips that might help:   Spread out your carbs over 4 to 6 small meals each day instead of 3 big ones.   Eat a similar number of carbs at each meal, for example, at each dinner.   Eat your meals at a similar time each day.   Plan your meals ahead of time.   Use the \"plate method.\" This is a simpler way to make sure that you get a good balance of carbs and other nutrients with each meal. It is not as exact as counting all of your carbs, but it can be helpful for people who prefer a simpler approach. If you take insulin before meals, it is generally better to adjust your insulin dose by counting how many carbs you plan to eat or using the exchange planning strategy.  For the plate method, you start with a plate about 9 inches (23 cm) across. Fill it with (figure 2):   1/2 non-starchy vegetables   1/4 protein   1/4 carbs   Follow your doctor's instructions for how and when to check your blood sugar. This can help you learn how certain foods affect your blood sugar.   Keep track of your meals and blood sugar levels. Show this to your doctor or nurse so they can adjust your treatment if needed. If you take insulin, you will also need to keep track of your exercise patterns and how much insulin you give yourself with " "each dose.   If you take insulin, make sure that you understand how to use it. This includes knowing how to adjust the dose based on your blood sugar level and what you plan to eat. Foods that have a lot of protein or fat also can affect your blood sugar level. Some people need to adjust their insulin doses when they eat these foods.   Remember that other things besides carbs can raise or lower your blood sugar level. These things can include exercise, getting sick, drinking alcohol, traveling, and stress. If you take insulin, make sure that you know how and when to adjust your dose in these situations.  If you are having trouble counting carbs or managing your blood sugar, talk to your doctor or nurse. They can help. A dietitian can also help you plan specific menus that will give you the right amount of carbs each day.  For more information, you can also get a book on counting carbs or check the American Diabetes Association website (www.diabetes.org).  All topics are updated as new evidence becomes available and our peer review process is complete.  This topic retrieved from [employer] on: Mar 27, 2024.  Topic 79272 Version 11.0  Release: 32.2.4 - C32.85  © 2024 [employer] and/or its affiliates. All rights reserved.  figure 1: Counting carbohydrates     To figure out the \"carb count\" in 1 serving, start with the number of grams of total carbohydrates (46 grams), then subtract the number of grams of dietary fiber (7 grams). It's also important to look at the serving size. In this example, the carb count is 39 grams. You can use this number when counting carbs for your insulin dose.  Graphic 27205 Version 8.0  table 1: Bread and grains with 15 grams of carbs*  Bread    Food  Serving size    Bagel 1/4 large bagel (1 oz)   Biscuit 1 biscuit (2.5 inches across)   Bread, reduced calorie, light 2 slices (1.5 oz)   Cornbread 1.75 inch cube (1.5 oz)   English muffin 1/2 muffin   Hot dog or hamburger bun 1/2 bun (3/4 oz) " "  Naan, chapati, or roti 1 oz   Pancake 1 pancake (4 inches across, 1/4 inch thick)   Angelica (6 inches across) 1/2 angelica   Tortilla, corn 1 small tortilla (6 inches across)   Tortilla, flour (white or whole wheat) 1 small tortilla (6 inches across) or 1/3 large tortilla (10 inches across)   Waffle 1 waffle (4-inch square or 4 inches across)   Cereals and grains (including pasta and rice)    Food  Serving size (cooked)    Barley, couscous, millet, pasta (white or whole wheat, all shapes and sizes), polenta, quinoa (all colors), or rice (white, brown, and other colors and types) 1/3 cup   Bran cereal (twigs, buds, or flakes), shredded wheat (plain), or sugar-coated cereal 1/2 cup   Bulgur, kasha, tabbouleh (tabouli), or wild rice 1/2 cup   Granola cereal 1/4 cup   Hot cereal (oats, oatmeal, grits) 1/2 cup   Unsweetened, ready-to-eat cereal 3/4 cup   * For bread and grains, 15 grams of carbs is considered 1 serving or \"choice\" for people who need to count carbs.  Graphic 159869 Version 1.0  table 2: Fruits with 15 grams of carbs*  Food  Serving size    Applesauce, unsweetened 1/2 cup   Banana 1 extra small banana, about 4 inches long (4 oz)   Blueberries 3/4 cup   Dried fruits (blueberries, cherries, cranberries, mixed fruit, raisins) 2 tbsp   Fruit, canned 1/2 cup   Fruit, whole, small (apple) 1 small fruit (4 oz)   Fruit, whole, medium (nectarine, orange, pear, tangerine) 1 medium fruit (6 oz)   Fruit juice, unsweetened 1/2 cup   Grapes 17 small grapes (3 oz)   Melon, diced 1 cup   Strawberries, whole 1 and 1/4 cups   When listed, weight (oz) includes skin and seeds. If you are not sure if your fruit is the right size for 1 serving, you can use a food scale to check the weight.  * For fruits, 15 grams of carbs is considered 1 serving or \"choice\" for people who need to count carbs.  Graphic 455754 Version 1.0  table 3: Starchy vegetables with 15 grams of carbs*  Food  Serving size (cooked)    Cassava, dasheen, or " "plantain 1/3 cup   Corn, green peas, mixed vegetables, or parsnips 1/2 cup   Marinara, pasta, or spaghetti sauce 1/2 cup   Mixed vegetables (with corn or peas) 1 cup   Potato, baked with skin 1/4 large (3 oz)   Potato, Maltese-fried (oven-baked) 1 cup (2 oz)   Potato, mashed with milk and fat 1/2 cup   Squash, winter (acorn, butternut) 1 cup   Yam or sweet potato, plain 1/2 cup (3 and 1/2 oz)   If you are not sure if your vegetable is the right size for 1 serving, you can use a food scale to check the weight.  * For starchy vegetables, 15 grams of carbs is considered 1 serving or \"choice\" for people who need to count carbs.  Graphic 633564 Version 1.0  table 4: Sample exchange system meal plan  Time  Exchange pattern  Sample menu  Carbohydrate count (g)    8 am 3 carbohydrate group    2 starch 1 English muffin 30    1 fruit 1 1/4 c strawberries 15    1 protein group 1/4 c cottage cheese -    1 fat group 1 tsp margarine -      Total: 45    12 noon 4 carbohydrate group    2 starch 2 slices of bread 30    1 fruit 1 orange 15    1 vegetable 1 c salad -    1 milk 8 oz skim milk 12    3 protein group 3 oz chicken -    1 fat group 1 tbsp low fat morales -      Total: 57    3 pm 1 carbohydrate group    1 fruit or 1 starch 1 apple or 6 crackers 15      Total: 15    6 pm 4 carbohydrate group    2 starch 1 c potato 30    1 fruit 1/2 c fruit salad 15    1 vegetable 1 c salad -    1 milk 8 oz skim milk 12    6 protein group 6 oz fish -    1 fat group 2 tbsp low fat salad dressing -      Total: 57    9 pm 1 carbohydrate group    1 starch 6 crackers 15    1 protein 2 tbsp peanut butter -      Total: 15    Graphic 32159 Version 3.0  figure 2: The \"plate method\"     For the plate method, you start with a plate about 9 inches (23 cm) across. Then fill it with 1/2 non-starchy vegetables, 1/4 protein, and 1/4 carbs.  Graphic 800649 Version 2.0  Consumer Information Use and Disclaimer   Disclaimer: This generalized information is a limited " summary of diagnosis, treatment, and/or medication information. It is not meant to be comprehensive and should be used as a tool to help the user understand and/or assess potential diagnostic and treatment options. It does NOT include all information about conditions, treatments, medications, side effects, or risks that may apply to a specific patient. It is not intended to be medical advice or a substitute for the medical advice, diagnosis, or treatment of a health care provider based on the health care provider's examination and assessment of a patient's specific and unique circumstances. Patients must speak with a health care provider for complete information about their health, medical questions, and treatment options, including any risks or benefits regarding use of medications. This information does not endorse any treatments or medications as safe, effective, or approved for treating a specific patient. [employer] and its affiliates disclaim any warranty or liability relating to this information or the use thereof.The use of this information is governed by the Terms of Use, available at https://www.wolterskluwer.com/en/know/clinical-effectiveness-terms. 2024© [employer] and its affiliates and/or licensors. All rights reserved.  Copyright   © 2024 [employer] and/or its affiliates. All rights reserved.

## 2025-04-28 RX ORDER — METHOCARBAMOL 500 MG/1
500 TABLET, FILM COATED ORAL 4 TIMES DAILY
Qty: 90 TABLET | Refills: 0 | Status: SHIPPED | OUTPATIENT
Start: 2025-04-28

## 2025-04-28 RX ORDER — ATORVASTATIN CALCIUM 80 MG/1
80 TABLET, FILM COATED ORAL DAILY
Qty: 90 TABLET | Refills: 1 | Status: SHIPPED | OUTPATIENT
Start: 2025-04-28

## 2025-04-28 RX ORDER — LOSARTAN POTASSIUM 100 MG/1
100 TABLET ORAL DAILY
Qty: 90 TABLET | Refills: 1 | Status: SHIPPED | OUTPATIENT
Start: 2025-04-28

## 2025-04-30 ENCOUNTER — CONSULT (OUTPATIENT)
Dept: PAIN MEDICINE | Facility: CLINIC | Age: 65
End: 2025-04-30
Payer: COMMERCIAL

## 2025-04-30 VITALS — HEIGHT: 70 IN | WEIGHT: 233 LBS | BODY MASS INDEX: 33.36 KG/M2

## 2025-04-30 DIAGNOSIS — M54.41 CHRONIC RIGHT-SIDED LOW BACK PAIN WITH RIGHT-SIDED SCIATICA: Primary | ICD-10-CM

## 2025-04-30 DIAGNOSIS — G89.29 CHRONIC RIGHT-SIDED LOW BACK PAIN WITH RIGHT-SIDED SCIATICA: Primary | ICD-10-CM

## 2025-04-30 DIAGNOSIS — G89.4 CHRONIC PAIN SYNDROME: ICD-10-CM

## 2025-04-30 DIAGNOSIS — F41.9 ANXIETY: ICD-10-CM

## 2025-04-30 DIAGNOSIS — M54.16 LUMBAR RADICULOPATHY: ICD-10-CM

## 2025-04-30 PROCEDURE — 99204 OFFICE O/P NEW MOD 45 MIN: CPT | Performed by: STUDENT IN AN ORGANIZED HEALTH CARE EDUCATION/TRAINING PROGRAM

## 2025-04-30 RX ORDER — LORAZEPAM 0.5 MG/1
0.5 TABLET ORAL
Qty: 1 TABLET | Refills: 0 | Status: SHIPPED | OUTPATIENT
Start: 2025-04-30
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
## 2025-05-02 DIAGNOSIS — I25.118 CORONARY ARTERY DISEASE OF NATIVE ARTERY OF NATIVE HEART WITH STABLE ANGINA PECTORIS (HCC): ICD-10-CM

## 2025-05-02 DIAGNOSIS — K21.00 GASTROESOPHAGEAL REFLUX DISEASE WITH ESOPHAGITIS WITHOUT HEMORRHAGE: ICD-10-CM

## 2025-05-02 DIAGNOSIS — I10 ESSENTIAL HYPERTENSION: ICD-10-CM

## 2025-05-02 RX ORDER — AMLODIPINE BESYLATE 5 MG/1
5 TABLET ORAL DAILY
Qty: 90 TABLET | Refills: 1 | Status: SHIPPED | OUTPATIENT
Start: 2025-05-02

## 2025-05-02 RX ORDER — RANOLAZINE 500 MG/1
500 TABLET, EXTENDED RELEASE ORAL 2 TIMES DAILY
Qty: 60 TABLET | Refills: 0 | Status: SHIPPED | OUTPATIENT
Start: 2025-05-02

## 2025-05-02 RX ORDER — OMEPRAZOLE 40 MG/1
40 CAPSULE, DELAYED RELEASE ORAL DAILY
Qty: 90 CAPSULE | Refills: 1 | Status: SHIPPED | OUTPATIENT
Start: 2025-05-02

## 2025-05-06 ENCOUNTER — TELEPHONE (OUTPATIENT)
Age: 65
End: 2025-05-06

## 2025-05-06 NOTE — TELEPHONE ENCOUNTER
Caller: Tyrese ( [employer])    Doctor: Dr. Ponce     Reason for call: Prior Authorization needed for MRI lumbar spine pt scheduled for 5/19/25.  Sending [employer] to [facility]   [address] [place] [address]    NPI # [id number]    Call back#: [phone]  Fax # [phone]

## 2025-05-07 ENCOUNTER — OFFICE VISIT (OUTPATIENT)
Dept: INTERNAL MEDICINE CLINIC | Facility: CLINIC | Age: 65
End: 2025-05-07
Payer: COMMERCIAL

## 2025-05-07 VITALS
BODY MASS INDEX: 33.07 KG/M2 | SYSTOLIC BLOOD PRESSURE: 124 MMHG | TEMPERATURE: 97.5 F | HEART RATE: 63 BPM | WEIGHT: 231 LBS | HEIGHT: 70 IN | OXYGEN SATURATION: 98 % | DIASTOLIC BLOOD PRESSURE: 74 MMHG

## 2025-05-07 DIAGNOSIS — Z12.11 SCREENING FOR COLON CANCER: ICD-10-CM

## 2025-05-07 DIAGNOSIS — Z79.4 TYPE 2 DIABETES MELLITUS WITH DIABETIC POLYNEUROPATHY, WITH LONG-TERM CURRENT USE OF INSULIN (HCC): Primary | ICD-10-CM

## 2025-05-07 DIAGNOSIS — E66.09 CLASS 1 OBESITY DUE TO EXCESS CALORIES WITH SERIOUS COMORBIDITY AND BODY MASS INDEX (BMI) OF 32.0 TO 32.9 IN ADULT: ICD-10-CM

## 2025-05-07 DIAGNOSIS — E66.811 CLASS 1 OBESITY DUE TO EXCESS CALORIES WITH SERIOUS COMORBIDITY AND BODY MASS INDEX (BMI) OF 32.0 TO 32.9 IN ADULT: ICD-10-CM

## 2025-05-07 DIAGNOSIS — E11.9 ENCOUNTER FOR DIABETIC FOOT EXAM (HCC): ICD-10-CM

## 2025-05-07 DIAGNOSIS — Z12.31 ENCOUNTER FOR SCREENING MAMMOGRAM FOR BREAST CANCER: ICD-10-CM

## 2025-05-07 DIAGNOSIS — Z23 ENCOUNTER FOR IMMUNIZATION: ICD-10-CM

## 2025-05-07 DIAGNOSIS — I25.10 CORONARY ARTERY DISEASE INVOLVING NATIVE CORONARY ARTERY OF NATIVE HEART WITHOUT ANGINA PECTORIS: ICD-10-CM

## 2025-05-07 DIAGNOSIS — I10 ESSENTIAL HYPERTENSION: ICD-10-CM

## 2025-05-07 DIAGNOSIS — M15.0 PRIMARY OSTEOARTHRITIS INVOLVING MULTIPLE JOINTS: ICD-10-CM

## 2025-05-07 DIAGNOSIS — E78.2 MIXED HYPERLIPIDEMIA: ICD-10-CM

## 2025-05-07 DIAGNOSIS — Z85.3 HISTORY OF BREAST CANCER: ICD-10-CM

## 2025-05-07 DIAGNOSIS — E11.42 TYPE 2 DIABETES MELLITUS WITH DIABETIC POLYNEUROPATHY, WITH LONG-TERM CURRENT USE OF INSULIN (HCC): Primary | ICD-10-CM

## 2025-05-07 DIAGNOSIS — E55.9 VITAMIN D DEFICIENCY: ICD-10-CM

## 2025-05-07 PROBLEM — I21.4 ACUTE NON-ST SEGMENT ELEVATION MYOCARDIAL INFARCTION (HCC): Status: ACTIVE | Noted: 2024-04-17

## 2025-05-07 PROBLEM — I21.4 ACUTE NON-ST SEGMENT ELEVATION MYOCARDIAL INFARCTION (HCC): Status: RESOLVED | Noted: 2024-04-17 | Resolved: 2025-05-07

## 2025-05-07 PROBLEM — M13.0 POLYARTHROPATHY: Status: ACTIVE | Noted: 2024-04-17

## 2025-05-07 LAB — SL AMB POCT HEMOGLOBIN AIC: 6.6 (ref ?–6.5)

## 2025-05-07 PROCEDURE — 99214 OFFICE O/P EST MOD 30 MIN: CPT | Performed by: INTERNAL MEDICINE

## 2025-05-07 PROCEDURE — 83036 HEMOGLOBIN GLYCOSYLATED A1C: CPT | Performed by: INTERNAL MEDICINE

## 2025-05-07 NOTE — ASSESSMENT & PLAN NOTE
Lab Results   Component Value Date    HGBA1C 6.6 (A) 05/07/2025       Orders:    POCT hemoglobin A1c    Albumin / creatinine urine ratio; Future    CBC and differential; Future    Comprehensive metabolic panel; Future    TSH, 3rd generation; Future    Lipid Panel with Direct LDL reflex; Future    Diabetic foot exam; Future

## 2025-05-07 NOTE — PROGRESS NOTES
Name: Ines Ivy      : 1960      MRN: [id number]  Encounter Provider: Jorge L Najera DO  Encounter Date: 2025   Encounter department: [facility]  :  Assessment & Plan  Encounter for immunization    Orders:    TDAP VACCINE GREATER THAN OR EQUAL TO 8YO IM    Pneumococcal Conjugate Vaccine 20-valent (Pcv20)    Zoster Vaccine Recombinant IM    Encounter for screening mammogram for breast cancer    Orders:    Mammo screening bilateral w 3d and cad; Future    Screening for colon cancer    Orders:    Cologuard    Type 2 diabetes mellitus with diabetic polyneuropathy, with long-term current use of insulin (HCC)    Lab Results   Component Value Date    HGBA1C 6.6 (A) 2025       Orders:    POCT hemoglobin A1c    Albumin / creatinine urine ratio; Future    CBC and differential; Future    Comprehensive metabolic panel; Future    TSH, 3rd generation; Future    Lipid Panel with Direct LDL reflex; Future    Diabetic foot exam; Future    Essential hypertension    Orders:    Albumin / creatinine urine ratio; Future    Comprehensive metabolic panel; Future    Coronary artery disease involving native coronary artery of native heart without angina pectoris    Orders:    CBC and differential; Future    Comprehensive metabolic panel; Future    Primary osteoarthritis involving multiple joints         History of breast cancer         Class 1 obesity due to excess calories with serious comorbidity and body mass index (BMI) of 32.0 to 32.9 in adult           Mixed hyperlipidemia    Orders:    Comprehensive metabolic panel; Future    Lipid Panel with Direct LDL reflex; Future    Encounter for diabetic foot exam (HCC)         Vitamin D deficiency    Orders:    Vitamin D 25 hydroxy; Future    A./P: Doing ok and will check labs. In office HgA1c was good at 6.6. . Order mammo and CRC. Discussed vaccines defers all. Keep f/u with pain management, endo, and for MRI.. Foot exam completed. Continue  "current treatment and RTC three months for routine and annual.        History of Present Illness   WF RTC for f/u DM, CAD, etc. Doing ok and no new issues. Seeing pain management and has an MRI ordered. Remains active w/o difficulty and no falls. Sugars less than 140 and no low sugar events. Denies CP, SOB, edema, palpitations, orthopnea or PND. Chronic pain is manageable.Glaucoma controlled. Breast ca is in remission. Due for labs, CRC, mammo, foot exam, and vaccines.       Review of Systems   Constitutional:  Negative for activity change, chills, diaphoresis, fatigue and fever.   HENT: Negative.     Eyes:  Negative for visual disturbance.   Respiratory:  Negative for cough, chest tightness, shortness of breath and wheezing.    Cardiovascular:  Negative for chest pain, palpitations and leg swelling.   Gastrointestinal:  Negative for abdominal pain, constipation, diarrhea, nausea and vomiting.   Endocrine: Negative for cold intolerance and heat intolerance.   Genitourinary:  Negative for difficulty urinating, dysuria and frequency.   Musculoskeletal:  Negative for arthralgias, gait problem and myalgias.   Neurological:  Negative for dizziness, seizures, syncope, weakness, light-headedness and headaches.   Psychiatric/Behavioral:  Negative for confusion, dysphoric mood and sleep disturbance. The patient is not nervous/anxious.        Objective   /74 (BP Location: Left arm, Patient Position: Sitting)   Pulse 63   Temp 97.5 °F (36.4 °C) (Tympanic)   Ht 5' 10\" (1.778 m)   Wt 105 kg (231 lb)   SpO2 98%   BMI 33.15 kg/m²      Physical Exam  Vitals and nursing note reviewed.   Constitutional:       General: She is not in acute distress.     Appearance: Normal appearance. She is not ill-appearing.   HENT:      Head: Normocephalic and atraumatic.      Mouth/Throat:      Mouth: Mucous membranes are moist.   Eyes:      Extraocular Movements: Extraocular movements intact.      Conjunctiva/sclera: Conjunctivae " normal.      Pupils: Pupils are equal, round, and reactive to light.   Neck:      Vascular: No carotid bruit.   Cardiovascular:      Rate and Rhythm: Normal rate and regular rhythm.      Pulses: no weak pulses.           Dorsalis pedis pulses are 1+ on the right side and 1+ on the left side.        Posterior tibial pulses are 1+ on the right side and 1+ on the left side.      Heart sounds: Normal heart sounds. No murmur heard.  Pulmonary:      Effort: Pulmonary effort is normal. No respiratory distress.      Breath sounds: Normal breath sounds. No wheezing, rhonchi or rales.   Abdominal:      General: Bowel sounds are normal. There is no distension.      Palpations: Abdomen is soft.      Tenderness: There is no abdominal tenderness.   Musculoskeletal:      Cervical back: Neck supple.      Right lower leg: No edema.      Left lower leg: No edema.   Feet:      Right foot:      Skin integrity: No ulcer, skin breakdown, erythema, warmth, callus or dry skin.      Left foot:      Skin integrity: No ulcer, skin breakdown, erythema, warmth, callus or dry skin.   Neurological:      General: No focal deficit present.      Mental Status: She is alert and oriented to person, place, and time. Mental status is at baseline.   Psychiatric:         Mood and Affect: Mood normal.         Behavior: Behavior normal.         Thought Content: Thought content normal.         Judgment: Judgment normal.           Patient's shoes and socks removed.    Right Foot/Ankle   Right Foot Inspection  Skin Exam: skin normal and skin intact. No dry skin, no warmth, no callus, no erythema, no maceration, no abnormal color, no pre-ulcer, no ulcer and no callus.     Toe Exam: ROM and strength within normal limits. No swelling, no tenderness, erythema and  no right toe deformity    Sensory   Monofilament testing: intact    Vascular  Capillary refills: < 3 seconds  The right DP pulse is 1+. The right PT pulse is 1+.     Left Foot/Ankle  Left Foot  Inspection  Skin Exam: skin normal and skin intact. No dry skin, no warmth, no erythema, no maceration, normal color, no pre-ulcer, no ulcer and no callus.     Toe Exam: ROM and strength within normal limits. No swelling, no tenderness, no erythema and no left toe deformity.     Sensory   Monofilament testing: intact    Vascular  Capillary refills: < 3 seconds  The left DP pulse is 1+. The left PT pulse is 1+.     Assign Risk Category  No deformity present  No loss of protective sensation  No weak pulses  Risk: 0

## 2025-05-07 NOTE — PROGRESS NOTES
"Adult Annual Physical  Name: Ines Ivy      : 1960      MRN: [id number]  Encounter Provider: Jorge L Najera DO  Encounter Date: 2025   Encounter department: [facility]    :  Assessment & Plan        Preventive Screenings:    - Cervical cancer screening: screening not indicated   - Breast cancer screening: has history of breast cancer     Immunizations:  - Immunizations due: Prevnar 20, Tdap and Zoster (Shingrix)         History of Present Illness     Adult Annual Physical:  Patient presents for annual physical.     Diet and Physical Activity:  - Diet/Nutrition: no special diet.  - Exercise: walking, 5-7 times a week on average and 1-2 hours on average.    Depression Screening:  - PHQ-2 Score: 0    General Health:  - Sleep: sleeps poorly and 4-6 hours of sleep on average.  - Hearing: normal hearing right ear and decreased hearing left ear.  - Vision: vision problems, wears glasses and most recent eye exam > 1 year ago.  - Dental: no dental visits for > 1 year and does not brush teeth regularly. soaks dentures    /GYN Health:  - Follows with GYN: yes.   - Menopause: postmenopausal.   - History of STDs: no    Advanced Care Planning:  - Has an advanced directive?: no    - Has a durable medical POA?: yes    - ACP document given to patient?: no      Review of Systems      Objective   /74 (BP Location: Left arm, Patient Position: Sitting)   Pulse 63   Temp 97.5 °F (36.4 °C) (Tympanic)   Ht 5' 10\" (1.778 m)   Wt 105 kg (231 lb)   SpO2 98%   BMI 33.15 kg/m²     Physical Exam    "

## 2025-05-08 DIAGNOSIS — E11.42 TYPE 2 DIABETES MELLITUS WITH DIABETIC POLYNEUROPATHY, WITH LONG-TERM CURRENT USE OF INSULIN (HCC): ICD-10-CM

## 2025-05-08 DIAGNOSIS — Z79.4 TYPE 2 DIABETES MELLITUS WITH DIABETIC POLYNEUROPATHY, WITH LONG-TERM CURRENT USE OF INSULIN (HCC): ICD-10-CM

## 2025-05-08 RX ORDER — TIRZEPATIDE 15 MG/.5ML
1 INJECTION, SOLUTION SUBCUTANEOUS WEEKLY
Qty: 6 ML | Refills: 1 | Status: SHIPPED | OUTPATIENT
Start: 2025-05-08

## 2025-05-28 ENCOUNTER — OFFICE VISIT (OUTPATIENT)
Dept: PAIN MEDICINE | Facility: CLINIC | Age: 65
End: 2025-05-28
Payer: COMMERCIAL

## 2025-05-28 VITALS — WEIGHT: 237 LBS | BODY MASS INDEX: 33.93 KG/M2 | HEIGHT: 70 IN

## 2025-05-28 DIAGNOSIS — I21.4 NSTEMI (NON-ST ELEVATED MYOCARDIAL INFARCTION) (HCC): ICD-10-CM

## 2025-05-28 DIAGNOSIS — I25.118 CORONARY ARTERY DISEASE OF NATIVE ARTERY OF NATIVE HEART WITH STABLE ANGINA PECTORIS (HCC): ICD-10-CM

## 2025-05-28 DIAGNOSIS — M51.361 DEGENERATION OF INTERVERTEBRAL DISC OF LUMBAR REGION WITH LOWER EXTREMITY PAIN: ICD-10-CM

## 2025-05-28 DIAGNOSIS — G89.4 CHRONIC PAIN SYNDROME: Primary | ICD-10-CM

## 2025-05-28 PROCEDURE — 99214 OFFICE O/P EST MOD 30 MIN: CPT | Performed by: STUDENT IN AN ORGANIZED HEALTH CARE EDUCATION/TRAINING PROGRAM

## 2025-05-28 RX ORDER — MELOXICAM 15 MG/1
15 TABLET ORAL DAILY
COMMUNITY

## 2025-05-28 NOTE — PROGRESS NOTES
"Name: Ines Ivy      : 1960      MRN: [id number]  Encounter Provider: Avelina Ponce MD  Encounter Date: 2025   Encounter department: [facility] [place]  :    Portions of the record may have been created with voice recognition software. Occasional wrong word or \"sound a like\" substitutions may have occurred due to the inherent limitations of voice recognition software. Read the chart carefully and recognize, using context, where substitutions have occurred. Contact me with any questions.       Assessment & Plan  Chronic pain syndrome  Degeneration of intervertebral disc of lumbar region with lower extremity pain    65 y o f here for a follow up visit with regards to chronic low back pain with intermittent radiation into RLE. Since last visit, she has continued chiropractic treatment with limited benefit. She notes stable pain symptoms, without new or progressive weakness, saddle anesthesia, bbi.  Recent lumbar spine MRI reviewed and shows multilevel DDD with variable canal and foraminal narrowing.      Patient defers interventional treatment options.    Recommend course of physical therapy for optimizing function and ambulation.    Follow up in 2 months or as needed.      Orders:    Ambulatory referral to Physical Therapy; Future          My impressions and treatment recommendations were discussed in detail with the patient who verbalized understanding and had no further questions.  Discharge instructions were provided. I personally saw and examined the patient and I agree with the above discussed plan of care.    History of Present Illness     Ines Ivy is a 65 y.o. female who presents for a follow up office visit in regards to Back Pain, Leg Pain (Right ), and Buttocks Pain (Right ).       Review of Systems   Constitutional:  Negative for fever.   HENT:  Negative for sinus pain.    Eyes:  Negative for redness.   Respiratory:  Negative for shortness of " "breath.    Cardiovascular:  Negative for palpitations.   Gastrointestinal:  Negative for abdominal pain and nausea.   Endocrine: Negative for polydipsia.   Genitourinary:  Negative for difficulty urinating.   Musculoskeletal:  Positive for gait problem. Negative for myalgias.   Skin:  Negative for rash.   Neurological:  Negative for seizures and headaches.   Psychiatric/Behavioral:  Negative for decreased concentration. The patient is not nervous/anxious.    All other systems reviewed and are negative.      Medical History Reviewed by provider this encounter:  Tobacco  Allergies  Meds  Problems  Med Hx  Surg Hx  Fam Hx     .  Medications Ordered Prior to Encounter[1]   Social History[2]     Objective   Ht 5' 10\" (1.778 m)   Wt 108 kg (237 lb)   BMI 34.01 kg/m²      Pain Score:   4      Ht 5' 10\" (1.778 m)   Wt 108 kg (237 lb)   BMI 34.01 kg/m²     Constitutional: normal, well developed, well nourished, alert, in no distress and non-toxic and no overt pain behavior.  Eyes: anicteric  HEENT: grossly intact  Neck: supple, symmetric, trachea midline and no masses   Pulmonary:even and unlabored  Cardiovascular:No edema or pitting edema present  Skin:Normal without rashes or lesions and well hydrated  Psychiatric:Mood and affect appropriate  Neurologic:Cranial Nerves II-XII grossly intact  Musculoskeletal: uneven gait                 [1]   Current Outpatient Medications on File Prior to Visit   Medication Sig Dispense Refill    amLODIPine (NORVASC) 5 mg tablet Take 1 tablet by mouth once daily 90 tablet 1    atorvastatin (LIPITOR) 80 mg tablet Take 1 tablet (80 mg total) by mouth daily 90 tablet 1    Continuous Glucose Sensor (Dexcom G7 Sensor) Use 1 Device every 10 days 9 each 3    docusate sodium (COLACE) 100 mg capsule Take 100 mg by mouth in the morning and 100 mg in the evening.      Empagliflozin (Jardiance) 25 MG TABS Take 1 tablet (25 mg total) by mouth every morning 90 tablet 1    furosemide (LASIX) 40 " mg tablet Take 1 tablet (40 mg total) by mouth 2 (two) times a day 180 tablet 1    gabapentin (NEURONTIN) 400 mg capsule TAKE 1 CAPSULE BY MOUTH THREE TIMES DAILY 270 capsule 0    Insulin Glargine Solostar (Basaglar KwikPen) 100 UNIT/ML SOPN INJECT 55 UNITS SUBCUTANEOUSLY IN THE MORNING AND 60 IN THE EVENING 90 mL 1    Insulin Pen Needle 32G X 6 MM MISC Use in the morning 100 each 3    losartan (COZAAR) 100 MG tablet Take 1 tablet (100 mg total) by mouth daily 90 tablet 1    meloxicam (MOBIC) 15 mg tablet Take 15 mg by mouth daily      methocarbamol (ROBAXIN) 500 mg tablet Take 1 tablet (500 mg total) by mouth 4 (four) times a day 90 tablet 0    omeprazole (PriLOSEC) 40 MG capsule Take 1 capsule by mouth once daily 90 capsule 1    Tirzepatide (Mounjaro) 15 MG/0.5ML SOAJ INJECT 1 SYRINGE SUBCUTANEOUSLY ONCE A WEEK 6 mL 1    TURMERIC PO Take by mouth      aspirin 81 MG tablet Take 81 mg by mouth daily. (Patient not taking: Reported on 2025)      ergocalciferol (VITAMIN D2) 50,000 units TAKE 1 CAPSULE BY MOUTH ONCE A WEEK FOR  8  DOSES (Patient not taking: Reported on 2025) 8 capsule 0    LORazepam (Ativan) 0.5 mg tablet Take 1 tablet (0.5 mg total) by mouth once in imaging for anxiety for up to 1 dose (Patient not taking: Reported on 2025) 1 tablet 0     No current facility-administered medications on file prior to visit.   [2]   Social History  Tobacco Use    Smoking status: Former     Current packs/day: 0.00     Types: Cigarettes     Quit date:      Years since quittin.4     Passive exposure: Current    Smokeless tobacco: Never   Vaping Use    Vaping status: Never Used   Substance and Sexual Activity    Alcohol use: Not Currently     Comment: quit years ago    Drug use: Not Currently    Sexual activity: Not Currently     Partners: Male

## 2025-05-29 RX ORDER — METOPROLOL SUCCINATE 100 MG/1
100 TABLET, EXTENDED RELEASE ORAL DAILY
Qty: 90 TABLET | Refills: 1 | Status: SHIPPED | OUTPATIENT
Start: 2025-05-29

## 2025-05-29 RX ORDER — RANOLAZINE 500 MG/1
500 TABLET, EXTENDED RELEASE ORAL 2 TIMES DAILY
Qty: 60 TABLET | Refills: 0 | Status: SHIPPED | OUTPATIENT
Start: 2025-05-29

## 2025-06-03 ENCOUNTER — APPOINTMENT (OUTPATIENT)
Dept: LAB | Facility: CLINIC | Age: 65
End: 2025-06-03
Attending: INTERNAL MEDICINE
Payer: COMMERCIAL

## 2025-06-03 DIAGNOSIS — I10 ESSENTIAL HYPERTENSION: ICD-10-CM

## 2025-06-03 DIAGNOSIS — E78.2 MIXED HYPERLIPIDEMIA: ICD-10-CM

## 2025-06-03 DIAGNOSIS — Z79.4 TYPE 2 DIABETES MELLITUS WITH DIABETIC POLYNEUROPATHY, WITH LONG-TERM CURRENT USE OF INSULIN (HCC): ICD-10-CM

## 2025-06-03 DIAGNOSIS — E55.9 VITAMIN D DEFICIENCY: ICD-10-CM

## 2025-06-03 DIAGNOSIS — D64.9 ANEMIA, UNSPECIFIED TYPE: ICD-10-CM

## 2025-06-03 DIAGNOSIS — I25.10 CORONARY ARTERY DISEASE INVOLVING NATIVE CORONARY ARTERY OF NATIVE HEART WITHOUT ANGINA PECTORIS: ICD-10-CM

## 2025-06-03 DIAGNOSIS — E11.42 TYPE 2 DIABETES MELLITUS WITH DIABETIC POLYNEUROPATHY, WITH LONG-TERM CURRENT USE OF INSULIN (HCC): ICD-10-CM

## 2025-06-03 LAB
25(OH)D3 SERPL-MCNC: 22.2 NG/ML (ref 30–100)
ALBUMIN SERPL BCG-MCNC: 4.4 G/DL (ref 3.5–5)
ALP SERPL-CCNC: 78 U/L (ref 34–104)
ALT SERPL W P-5'-P-CCNC: 18 U/L (ref 7–52)
ANION GAP SERPL CALCULATED.3IONS-SCNC: 8 MMOL/L (ref 4–13)
AST SERPL W P-5'-P-CCNC: 18 U/L (ref 13–39)
BASOPHILS # BLD AUTO: 0.03 THOUSANDS/ÂΜL (ref 0–0.1)
BASOPHILS NFR BLD AUTO: 1 % (ref 0–1)
BILIRUB SERPL-MCNC: 0.77 MG/DL (ref 0.2–1)
BUN SERPL-MCNC: 26 MG/DL (ref 5–25)
CALCIUM SERPL-MCNC: 9.2 MG/DL (ref 8.4–10.2)
CHLORIDE SERPL-SCNC: 104 MMOL/L (ref 96–108)
CHOLEST SERPL-MCNC: 163 MG/DL (ref ?–200)
CO2 SERPL-SCNC: 34 MMOL/L (ref 21–32)
CREAT SERPL-MCNC: 0.73 MG/DL (ref 0.6–1.3)
CREAT UR-MCNC: 137.7 MG/DL
EOSINOPHIL # BLD AUTO: 0.11 THOUSAND/ÂΜL (ref 0–0.61)
EOSINOPHIL NFR BLD AUTO: 3 % (ref 0–6)
ERYTHROCYTE [DISTWIDTH] IN BLOOD BY AUTOMATED COUNT: 13.1 % (ref 11.6–15.1)
EST. AVERAGE GLUCOSE BLD GHB EST-MCNC: 134 MG/DL
GFR SERPL CREATININE-BSD FRML MDRD: 86 ML/MIN/1.73SQ M
GLUCOSE P FAST SERPL-MCNC: 97 MG/DL (ref 65–99)
HBA1C MFR BLD: 6.3 %
HCT VFR BLD AUTO: 36.9 % (ref 34.8–46.1)
HDLC SERPL-MCNC: 45 MG/DL
HGB BLD-MCNC: 12.2 G/DL (ref 11.5–15.4)
IMM GRANULOCYTES # BLD AUTO: 0.01 THOUSAND/UL (ref 0–0.2)
IMM GRANULOCYTES NFR BLD AUTO: 0 % (ref 0–2)
LDLC SERPL CALC-MCNC: 86 MG/DL (ref 0–100)
LYMPHOCYTES # BLD AUTO: 1.5 THOUSANDS/ÂΜL (ref 0.6–4.47)
LYMPHOCYTES NFR BLD AUTO: 35 % (ref 14–44)
MCH RBC QN AUTO: 28.8 PG (ref 26.8–34.3)
MCHC RBC AUTO-ENTMCNC: 33.1 G/DL (ref 31.4–37.4)
MCV RBC AUTO: 87 FL (ref 82–98)
MICROALBUMIN UR-MCNC: 10.2 MG/L
MICROALBUMIN/CREAT 24H UR: 7 MG/G CREATININE (ref 0–30)
MONOCYTES # BLD AUTO: 0.34 THOUSAND/ÂΜL (ref 0.17–1.22)
MONOCYTES NFR BLD AUTO: 8 % (ref 4–12)
NEUTROPHILS # BLD AUTO: 2.26 THOUSANDS/ÂΜL (ref 1.85–7.62)
NEUTS SEG NFR BLD AUTO: 53 % (ref 43–75)
NRBC BLD AUTO-RTO: 0 /100 WBCS
PLATELET # BLD AUTO: 183 THOUSANDS/UL (ref 149–390)
PMV BLD AUTO: 11.7 FL (ref 8.9–12.7)
POTASSIUM SERPL-SCNC: 4.2 MMOL/L (ref 3.5–5.3)
PROT SERPL-MCNC: 6.8 G/DL (ref 6.4–8.4)
RBC # BLD AUTO: 4.24 MILLION/UL (ref 3.81–5.12)
SODIUM SERPL-SCNC: 146 MMOL/L (ref 135–147)
TRIGL SERPL-MCNC: 161 MG/DL (ref ?–150)
TSH SERPL DL<=0.05 MIU/L-ACNC: 1.22 UIU/ML (ref 0.45–4.5)
WBC # BLD AUTO: 4.25 THOUSAND/UL (ref 4.31–10.16)

## 2025-06-03 PROCEDURE — 36415 COLL VENOUS BLD VENIPUNCTURE: CPT

## 2025-06-03 PROCEDURE — 82306 VITAMIN D 25 HYDROXY: CPT

## 2025-06-03 PROCEDURE — 84443 ASSAY THYROID STIM HORMONE: CPT

## 2025-06-03 PROCEDURE — 80053 COMPREHEN METABOLIC PANEL: CPT

## 2025-06-03 PROCEDURE — 85025 COMPLETE CBC W/AUTO DIFF WBC: CPT

## 2025-06-03 PROCEDURE — 82570 ASSAY OF URINE CREATININE: CPT

## 2025-06-03 PROCEDURE — 83036 HEMOGLOBIN GLYCOSYLATED A1C: CPT

## 2025-06-03 PROCEDURE — 82043 UR ALBUMIN QUANTITATIVE: CPT

## 2025-06-03 PROCEDURE — 80061 LIPID PANEL: CPT

## 2025-06-04 ENCOUNTER — RESULTS FOLLOW-UP (OUTPATIENT)
Dept: ENDOCRINOLOGY | Facility: CLINIC | Age: 65
End: 2025-06-04

## 2025-06-04 ENCOUNTER — RESULTS FOLLOW-UP (OUTPATIENT)
Dept: INTERNAL MEDICINE CLINIC | Facility: CLINIC | Age: 65
End: 2025-06-04

## 2025-06-04 ENCOUNTER — EVALUATION (OUTPATIENT)
Dept: PHYSICAL THERAPY | Age: 65
End: 2025-06-04
Attending: STUDENT IN AN ORGANIZED HEALTH CARE EDUCATION/TRAINING PROGRAM
Payer: COMMERCIAL

## 2025-06-04 DIAGNOSIS — M51.362 DEGENERATION OF INTERVERTEBRAL DISC OF LUMBAR REGION WITH DISCOGENIC BACK PAIN AND LOWER EXTREMITY PAIN: ICD-10-CM

## 2025-06-04 DIAGNOSIS — G89.4 CHRONIC PAIN SYNDROME: Primary | ICD-10-CM

## 2025-06-04 PROCEDURE — 97110 THERAPEUTIC EXERCISES: CPT

## 2025-06-04 PROCEDURE — 97161 PT EVAL LOW COMPLEX 20 MIN: CPT

## 2025-06-04 NOTE — PROGRESS NOTES
PT Evaluation     Today's date: 2025  Patient name: Ines Ivy  : 1960  MRN: [id number]  Referring provider: Avelina Ponce MD  Dx:   Encounter Diagnosis     ICD-10-CM    1. Chronic pain syndrome  G89.4       2. Degeneration of intervertebral disc of lumbar region with discogenic back pain and lower extremity pain  M51.362           Start Time: 1015  Stop Time: 1115  Total time in clinic (min): 60 minutes    Assessment  Impairments: abnormal gait, abnormal or restricted ROM, activity intolerance, impaired balance, impaired physical strength, pain with function and activity limitations    Assessment details: Patient presents to PT with c/c chronic LBP that started 3+ years ago. She demonstrates minimal restriction in gross lumbar mobility with vertebral segment hypomobility. Patient has foraminal stenosis and bulging discs as confirmed by recent imaging. She demonstrates good B/L LE power and gait speed, and poor static balance due to peripheral neuropathy due to DM. Pain with prolonged sitting and carrying heavy items limits her ability to tolerate work and home tasks without frequent rest breaks and need for medication. Sx re-aggravation with lumbar flexion and posterior pelvic tilting, and sx reduction with anterior tilting and lumbar extension - indicates posterior derangement.  Discussed trying 1 month of land-PT before trialling aqua-PT if sx do not change. Patient educated and demonstrates understanding of HEP. Patient is a good candidate and will benefit from skilled PT to address LBP.     Goals  SHORT TERM:  Patient will complete phase I of HEP.   Patient will tolerate lumbar extension mobility with no restriction.   Patient will improve strength by 1/2 grade.     LONG TERM:   Patient will complete phase II of HEP.  Patient will tolerate walking 200 ft with 30lb object in order to complete work tasks.   Patient will improve strength to return to functional activities.      Plan  Patient would benefit from: skilled physical therapy    Planned therapy interventions: balance/weight bearing training, functional ROM exercises, home exercise program, therapeutic exercise, therapeutic activities, strengthening, stretching, patient/caregiver education, neuromuscular re-education, manual therapy and joint mobilization    Frequency: 1-2x week  Duration in weeks: 8  Plan of Care beginning date: 6/4/2025  Plan of Care expiration date: 7/30/2025  Treatment plan discussed with: patient  Plan details: Patient will complete skilled PT, including HEP, aqua-therapy, therapeutic exercise, therapeutic activity, neuromuscular reduction, and manual therapy, 1-2/week for 8 weeks to address LBP.    Subjective Evaluation    History of Present Illness  Mechanism of injury: Patient presents to PT with c/c chronic LBP. She reports pain started 3+ year ago, but has intensified over the last 6 months. She describes pain as intermittent dull achy and cramping (like menstrual cramps) in her low back and shooting down her R LE. She also has a hx of DM and peripheral neuropathy, and distinguishes this pain as it ends at her knee. She got a R TKA 1 year ago and feels that only resolved some of the chronic pain she has been experiencing. 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 She says she has good days and bad days at work. Carrying 30lb bags of chips around typically aggravates her pain. She says the pain in her back has causes her R LE to give out. She is the main caregiver for her , who is nonambulatory, and must do all the heavy tasks in and out of their home. She is I with ADLs, home tasks and work tasks, but notices she requires rest breaks. She says meloxicam is the only thing that has really help alleviate her pain.   Patient Goals  Patient goals for therapy: decreased pain and improved balance  Patient goal: stnading for  cooking  Pain  Current pain ratin  At best pain ratin  At worst pain rating: 10  Quality: burning, dull ache, radiating, throbbing, tight and discomfort  Relieving factors: change in position, medications and rest  Aggravating factors: sitting, lifting and overhead activity      Objective     Postural Observations  Seated posture: fair  Standing posture: fair      Active Range of Motion     Lumbar   Flexion:  WFL  Extension:  Restriction level: minimal  Left lateral flexion:  Restriction level: minimal  Right lateral flexion:  Restriction level: minimal  Left rotation:  Restriction level: minimal  Right rotation:  Restriction level: minimal  Mechanical Assessment    Cervical      Thoracic      Lumbar    Lying flexion: repeated movements  Pain location: peripheralized  Pain level: produced  Lying extension: repeated movements  Pain location: centralized  Pain level: produced    Strength/Myotome Testing     Left Hip   Planes of Motion   Flexion: 4  Abduction: 4-    Right Hip   Planes of Motion   Flexion: 4  Abduction: 4-    Left Knee   Flexion: 4  Extension: 4    Right Knee   Flexion: 4-  Extension: 4-    Left Ankle/Foot   Dorsiflexion: 4+  Plantar flexion: 4+    Right Ankle/Foot   Dorsiflexion: 4+  Plantar flexion: 4+    Tests     Lumbar   Positive sacroiliac distraction .   Negative SIJ compression.     Left   Positive slump test.   Negative crossed SLR, femoral stretch and passive SLR.     Right   Positive slump test.   Negative crossed SLR, femoral stretch and passive SLR.     Left Pelvic Girdle/Sacrum   Negative: active SLR test.     Right Pelvic Girdle/Sacrum   Negative: active SLR test.     Left Hip   Positive FADIR.   Negative URI.     Right Hip   Positive FADIR.   Negative URI.     Ambulation     Observational Gait   Walking speed and stride length within functional limits.     Functional Assessment        Comments  5xSTS: 15.02s  TU.97s     BALANCE:   -Romberg (EO): 30 steady   -Romberg (EC):  30s some swaying   -L Tandem (EO): 15s steady  -R Tandem (EO): 15s steady  -L Tandem (EC): 3s unsteady   -R Tandem (EC): 2s unsteady            Precautions: peripheral neuropathy, DM      Manuals 6/4                                                                Neuro Re-Ed                                                                                                        Ther Ex             HEP 12min                                                                                                       Ther Activity                                       Gait Training                                       Modalities                                        Access Code: [email or web address]  URL: [email or web address]  Date: 06/04/2025  Prepared by: Khadijah Marie    Exercises  - Prone Press Up  - 1 x daily - 7 x weekly - 3 sets - 10 reps  - Supine Posterior Pelvic Tilt  - 1 x daily - 7 x weekly - 3 sets - 10 reps  - Standing Lumbar Extension with Counter  - 1 x daily - 7 x weekly - 3 sets - 10 reps  - Seated Hamstring Stretch  - 1 x daily - 7 x weekly - 3 sets - 30s hold

## 2025-06-09 ENCOUNTER — OFFICE VISIT (OUTPATIENT)
Dept: PHYSICAL THERAPY | Age: 65
End: 2025-06-09
Attending: STUDENT IN AN ORGANIZED HEALTH CARE EDUCATION/TRAINING PROGRAM
Payer: COMMERCIAL

## 2025-06-09 DIAGNOSIS — M51.362 DEGENERATION OF INTERVERTEBRAL DISC OF LUMBAR REGION WITH DISCOGENIC BACK PAIN AND LOWER EXTREMITY PAIN: ICD-10-CM

## 2025-06-09 DIAGNOSIS — G89.4 CHRONIC PAIN SYNDROME: Primary | ICD-10-CM

## 2025-06-09 PROCEDURE — 97140 MANUAL THERAPY 1/> REGIONS: CPT

## 2025-06-09 PROCEDURE — 97110 THERAPEUTIC EXERCISES: CPT

## 2025-06-09 NOTE — PROGRESS NOTES
Daily Note     Today's date: 2025  Patient name: Ines Ivy  : 1960  MRN: [id number]  Referring provider: Avelina Ponce MD  Dx:   Encounter Diagnosis     ICD-10-CM    1. Chronic pain syndrome  G89.4       2. Degeneration of intervertebral disc of lumbar region with discogenic back pain and lower extremity pain  M51.362           Start Time: 1545  Stop Time: 1630  Total time in clinic (min): 45 minutes    Subjective: Patient reports she has sciatic pain in her R buttock today. She says the HEP has been fine. She's been doing her extension at work and notices temporary relief of pain.       Objective: See treatment diary below      Assessment: Tolerated treatment well. Patient practices core stability throughout exercises today without aggravation. Demonstrates lumbar extension mobility with minimal restriction. Sx reduction by end of session. Patient demonstrated fatigue post treatment, exhibited good technique with therapeutic exercises, and would benefit from continued PT      Plan: Continue per plan of care.  Progress treatment as tolerated.       Precautions: peripheral neuropathy, DM      Manuals            PA lumbar mob  MS                                                  Neuro Re-Ed                                                                                                        Ther Ex             HEP 12min            Nustep   5min           Retro walkouts  20# x10            Leg Press             TA Ball Press Down   10x10s           Standing Lumbar Ext   2x10            Supine March  2x10            PPU  x10           Prone lying + MHP   10min                        Ther Activity                                       Gait Training                                       Modalities                                        Access Code: [email or web address]  URL: [email or web address]  Date: 2025  Prepared by: Khadijah Marie    Exercises  - Prone Press Up  - 1 x daily  - 7 x weekly - 3 sets - 10 reps  - Supine Posterior Pelvic Tilt  - 1 x daily - 7 x weekly - 3 sets - 10 reps  - Standing Lumbar Extension with Counter  - 1 x daily - 7 x weekly - 3 sets - 10 reps  - Seated Hamstring Stretch  - 1 x daily - 7 x weekly - 3 sets - 30s hold

## 2025-06-10 ENCOUNTER — OFFICE VISIT (OUTPATIENT)
Dept: ENDOCRINOLOGY | Facility: CLINIC | Age: 65
End: 2025-06-10
Payer: COMMERCIAL

## 2025-06-10 VITALS
HEIGHT: 70 IN | WEIGHT: 236.8 LBS | SYSTOLIC BLOOD PRESSURE: 130 MMHG | TEMPERATURE: 98.1 F | RESPIRATION RATE: 18 BRPM | DIASTOLIC BLOOD PRESSURE: 70 MMHG | OXYGEN SATURATION: 98 % | HEART RATE: 66 BPM | BODY MASS INDEX: 33.9 KG/M2

## 2025-06-10 DIAGNOSIS — E55.9 VITAMIN D DEFICIENCY: ICD-10-CM

## 2025-06-10 DIAGNOSIS — Z13.820 OSTEOPOROSIS SCREENING: ICD-10-CM

## 2025-06-10 DIAGNOSIS — Z79.4 TYPE 2 DIABETES MELLITUS WITH DIABETIC POLYNEUROPATHY, WITH LONG-TERM CURRENT USE OF INSULIN (HCC): Primary | ICD-10-CM

## 2025-06-10 DIAGNOSIS — E11.42 TYPE 2 DIABETES MELLITUS WITH DIABETIC POLYNEUROPATHY, WITH LONG-TERM CURRENT USE OF INSULIN (HCC): Primary | ICD-10-CM

## 2025-06-10 DIAGNOSIS — I10 ESSENTIAL HYPERTENSION: ICD-10-CM

## 2025-06-10 DIAGNOSIS — E78.2 MIXED HYPERLIPIDEMIA: ICD-10-CM

## 2025-06-10 PROCEDURE — 95251 CONT GLUC MNTR ANALYSIS I&R: CPT | Performed by: STUDENT IN AN ORGANIZED HEALTH CARE EDUCATION/TRAINING PROGRAM

## 2025-06-10 PROCEDURE — 99214 OFFICE O/P EST MOD 30 MIN: CPT | Performed by: STUDENT IN AN ORGANIZED HEALTH CARE EDUCATION/TRAINING PROGRAM

## 2025-06-10 RX ORDER — INSULIN GLARGINE 100 [IU]/ML
60 INJECTION, SOLUTION SUBCUTANEOUS
Qty: 60 ML | Refills: 1 | Status: SHIPPED | OUTPATIENT
Start: 2025-06-10

## 2025-06-10 NOTE — ASSESSMENT & PLAN NOTE
Lab Results   Component Value Date    HGBA1C 6.3 (H) 06/03/2025     Her A1c level is commendably at 6.3%. The Dexcom reports indicate that her blood glucose levels have been within the desired range 89% of the time over the past two weeks, with no instances of extremely high readings exceeding 250. She is currently taking Mounjaro 15 mg weekly, Jardiance 25 mg daily, and Basaglar 60 units in the morning. She will continue with her current medication regimen. All necessary refills have been provided.  Ophthalmology and podiatry follow-up.  Return back in 5 months.  Labs before next visit.    Orders:  •  Hemoglobin A1C; Future  •  Comprehensive metabolic panel; Future  •  Insulin Glargine Solostar (Basaglar ParkerikPen) 100 UNIT/ML SOPN; Inject 0.6 mL (60 Units total) under the skin daily with breakfast 60  UNITS QAM

## 2025-06-10 NOTE — ASSESSMENT & PLAN NOTE
Her triglyceride levels have increased slightly from 134 to 161, but this is an improvement from previous levels. Her LDL level is currently at 86. She is currently on atorvastatin 80 mg daily. She will continue with her current medication regimen.
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
## 2025-06-10 NOTE — ASSESSMENT & PLAN NOTE
Her blood pressure today is 130/70 mmHg. She is currently on amlodipine 5 mg daily, losartan 100 mg daily, and metoprolol 100 mg daily. She will continue with her current medication regimen.      yes

## 2025-06-10 NOTE — ASSESSMENT & PLAN NOTE
Her vitamin D level is at 22.2. She has been advised to take an over-the-counter vitamin D supplement of 5000 IU daily.

## 2025-06-14 LAB — COLOGUARD RESULT REPORTABLE: NORMAL

## 2025-06-15 ENCOUNTER — RESULTS FOLLOW-UP (OUTPATIENT)
Dept: INTERNAL MEDICINE CLINIC | Facility: CLINIC | Age: 65
End: 2025-06-15

## 2025-06-16 ENCOUNTER — APPOINTMENT (OUTPATIENT)
Dept: PHYSICAL THERAPY | Age: 65
End: 2025-06-16
Attending: STUDENT IN AN ORGANIZED HEALTH CARE EDUCATION/TRAINING PROGRAM
Payer: COMMERCIAL

## 2025-06-17 ENCOUNTER — OFFICE VISIT (OUTPATIENT)
Dept: PHYSICAL THERAPY | Age: 65
End: 2025-06-17
Attending: STUDENT IN AN ORGANIZED HEALTH CARE EDUCATION/TRAINING PROGRAM
Payer: COMMERCIAL

## 2025-06-17 DIAGNOSIS — G89.4 CHRONIC PAIN SYNDROME: Primary | ICD-10-CM

## 2025-06-17 DIAGNOSIS — M51.362 DEGENERATION OF INTERVERTEBRAL DISC OF LUMBAR REGION WITH DISCOGENIC BACK PAIN AND LOWER EXTREMITY PAIN: ICD-10-CM

## 2025-06-17 PROCEDURE — 97110 THERAPEUTIC EXERCISES: CPT

## 2025-06-17 PROCEDURE — 97140 MANUAL THERAPY 1/> REGIONS: CPT
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
## 2025-06-18 DIAGNOSIS — E11.42 TYPE 2 DIABETES MELLITUS WITH DIABETIC POLYNEUROPATHY, WITH LONG-TERM CURRENT USE OF INSULIN (HCC): ICD-10-CM

## 2025-06-18 DIAGNOSIS — Z79.4 TYPE 2 DIABETES MELLITUS WITH DIABETIC POLYNEUROPATHY, WITH LONG-TERM CURRENT USE OF INSULIN (HCC): ICD-10-CM

## 2025-06-18 RX ORDER — ACYCLOVIR 400 MG/1
1 TABLET ORAL
Qty: 9 EACH | Refills: 1 | Status: SHIPPED | OUTPATIENT
Start: 2025-06-18

## 2025-06-19 ENCOUNTER — OFFICE VISIT (OUTPATIENT)
Dept: PHYSICAL THERAPY | Age: 65
End: 2025-06-19
Attending: STUDENT IN AN ORGANIZED HEALTH CARE EDUCATION/TRAINING PROGRAM
Payer: COMMERCIAL

## 2025-06-19 DIAGNOSIS — M51.362 DEGENERATION OF INTERVERTEBRAL DISC OF LUMBAR REGION WITH DISCOGENIC BACK PAIN AND LOWER EXTREMITY PAIN: Primary | ICD-10-CM

## 2025-06-19 DIAGNOSIS — G89.4 CHRONIC PAIN SYNDROME: ICD-10-CM

## 2025-06-19 PROCEDURE — 97140 MANUAL THERAPY 1/> REGIONS: CPT

## 2025-06-19 PROCEDURE — 97110 THERAPEUTIC EXERCISES: CPT

## 2025-06-19 NOTE — PROGRESS NOTES
Daily Note     Today's date: 2025  Patient name: Ines Ivy  : 1960  MRN: [id number]  Referring provider: Avelina Ponce MD  Dx:   Encounter Diagnosis     ICD-10-CM    1. Degeneration of intervertebral disc of lumbar region with discogenic back pain and lower extremity pain  M51.362       2. Chronic pain syndrome  G89.4           Start Time: 1545  Stop Time: 1630  Total time in clinic (min): 45 minutes    Subjective: Patient says she feels sore today because she was at work all day. Patient reports she has been feeling good after leaving PT.       Objective: See treatment diary below      Assessment: Tolerated treatment well. Patient demonstrates lumbar extension mobility with minimal restriction. Continues to make improvements in core stability. Sx reduction by end of session. Patient demonstrated fatigue post treatment, exhibited good technique with therapeutic exercises, and would benefit from continued PT      Plan: Continue per plan of care.  Progress treatment as tolerated.       Precautions: peripheral neuropathy, DM      Manuals           PA lumbar mob  MS MS MS                                                Neuro Re-Ed                                                                                                        Ther Ex             HEP 12min            Nustep   5min 5min 5min         Retro walkouts  20# x10   20# x10          Leg Press    75# 3x10          TA Ball Press Down   10x10s 10x10s 10x10s         Standing Lumbar Ext   2x10  3x10 3x10          Supine March  2x10  3x10 3x10          PPU  x10 2x10 2x10          Prone lying + MHP   10min 10min 10min                      Ther Activity                                       Gait Training                                       Modalities                                        Access Code: [email or web address]  URL: [email or web address]  Date: 2025  Prepared by: Khadijah Marie    Exercises  - Prone  Press Up  - 1 x daily - 7 x weekly - 3 sets - 10 reps  - Supine Posterior Pelvic Tilt  - 1 x daily - 7 x weekly - 3 sets - 10 reps  - Standing Lumbar Extension with Counter  - 1 x daily - 7 x weekly - 3 sets - 10 reps  - Seated Hamstring Stretch  - 1 x daily - 7 x weekly - 3 sets - 30s hold

## 2025-06-23 ENCOUNTER — OFFICE VISIT (OUTPATIENT)
Dept: PHYSICAL THERAPY | Age: 65
End: 2025-06-23
Attending: STUDENT IN AN ORGANIZED HEALTH CARE EDUCATION/TRAINING PROGRAM
Payer: COMMERCIAL

## 2025-06-23 DIAGNOSIS — G89.4 CHRONIC PAIN SYNDROME: ICD-10-CM

## 2025-06-23 DIAGNOSIS — M51.362 DEGENERATION OF INTERVERTEBRAL DISC OF LUMBAR REGION WITH DISCOGENIC BACK PAIN AND LOWER EXTREMITY PAIN: Primary | ICD-10-CM

## 2025-06-23 PROCEDURE — 97140 MANUAL THERAPY 1/> REGIONS: CPT

## 2025-06-23 PROCEDURE — 97110 THERAPEUTIC EXERCISES: CPT

## 2025-06-23 NOTE — PROGRESS NOTES
Daily Note     Today's date: 2025  Patient name: Ines Ivy  : 1960  MRN: [id number]  Referring provider: Avelina Ponce MD  Dx:   Encounter Diagnosis     ICD-10-CM    1. Degeneration of intervertebral disc of lumbar region with discogenic back pain and lower extremity pain  M51.362       2. Chronic pain syndrome  G89.4           Start Time: 1545  Stop Time: 1630  Total time in clinic (min): 45 minutes    Subjective: Patient reports sciatic pain in L buttock today.       Objective: See treatment diary below      Assessment: Tolerated treatment well. Patient continue to improve activity tolerance and lumbar extension mobility. Patient demonstrated fatigue post treatment, exhibited good technique with therapeutic exercises, and would benefit from continued PT      Plan: Continue per plan of care.  Progress treatment as tolerated.       Precautions: peripheral neuropathy, DM      Manuals         PA lumbar mob  MS MS MS MS                                               Neuro Re-Ed                                                                                                        Ther Ex             HEP 12min            Nustep   5min 5min 5min 5min        Retro walkouts  20# x10   20# x10  20# x10         Leg Press    75# 3x10  75# 3x10        TA Ball Press Down   10x10s 10x10s 10x10s 10x10s        Standing Lumbar Ext   2x10  3x10 3x10  3x10        Supine March  2x10  3x10 3x10  3x10        PPU  x10 2x10 2x10  2x10        Prone lying + MHP   10min 10min 10min 10min                     Ther Activity                                       Gait Training                                       Modalities                                        Access Code: [email or web address]  URL: [email or web address]  Date: 2025  Prepared by: Khadijah Marie    Exercises  - Prone Press Up  - 1 x daily - 7 x weekly - 3 sets - 10 reps  - Supine Posterior Pelvic Tilt  - 1 x daily - 7 x  weekly - 3 sets - 10 reps  - Standing Lumbar Extension with Counter  - 1 x daily - 7 x weekly - 3 sets - 10 reps  - Seated Hamstring Stretch  - 1 x daily - 7 x weekly - 3 sets - 30s hold

## 2025-06-26 ENCOUNTER — OFFICE VISIT (OUTPATIENT)
Dept: PHYSICAL THERAPY | Age: 65
End: 2025-06-26
Attending: STUDENT IN AN ORGANIZED HEALTH CARE EDUCATION/TRAINING PROGRAM
Payer: COMMERCIAL

## 2025-06-26 DIAGNOSIS — G89.4 CHRONIC PAIN SYNDROME: ICD-10-CM

## 2025-06-26 DIAGNOSIS — M51.362 DEGENERATION OF INTERVERTEBRAL DISC OF LUMBAR REGION WITH DISCOGENIC BACK PAIN AND LOWER EXTREMITY PAIN: Primary | ICD-10-CM

## 2025-06-26 PROCEDURE — 97140 MANUAL THERAPY 1/> REGIONS: CPT

## 2025-06-26 PROCEDURE — 97110 THERAPEUTIC EXERCISES: CPT

## 2025-06-26 NOTE — PROGRESS NOTES
Daily Note     Today's date: 2025  Patient name: Ines Ivy  : 1960  MRN: [id number]  Referring provider: Avelina Ponce MD  Dx:   Encounter Diagnosis     ICD-10-CM    1. Degeneration of intervertebral disc of lumbar region with discogenic back pain and lower extremity pain  M51.362       2. Chronic pain syndrome  G89.4           Start Time: 1545  Stop Time: 1630  Total time in clinic (min): 45 minutes    Subjective: Patient says sciatica and hips are bothering her today. Patient reports sciatica is still giving her issues, but it hasn't gotten as bad. It's not pinching as frequently as it used to.       Objective: See treatment diary below      Assessment: Tolerated treatment well. Continue to improve core stability, activity tolerance and lumbar extension mobility. Sx reduction by end of session. Patient demonstrated fatigue post treatment, exhibited good technique with therapeutic exercises, and would benefit from continued PT      Plan: Continue per plan of care.  Progress treatment as tolerated.       Precautions: peripheral neuropathy, DM      Manuals        PA lumbar mob  MS MS MS MS MS                                              Neuro Re-Ed                                                                                                        Ther Ex             HEP 12min            Nustep   5min 5min 5min 5min 5min       Retro walkouts  20# x10   20# x10  20# x10  20# x10        Leg Press    75# 3x10  75# 3x10 80# 3x10        TA Ball Press Down   10x10s 10x10s 10x10s 10x10s 10x10s       Standing Lumbar Ext   2x10  3x10 3x10  3x10 3x10        Supine March  2x10  3x10 3x10  3x10 3x10       PPU  x10 2x10 2x10  2x10 2x10       Prone lying + MHP   10min 10min 10min 10min 10min                    Ther Activity                                       Gait Training                                       Modalities                                        Access  Code: [email or web address]  URL: [email or web address]  Date: 06/04/2025  Prepared by: Khadijah Marie    Exercises  - Prone Press Up  - 1 x daily - 7 x weekly - 3 sets - 10 reps  - Supine Posterior Pelvic Tilt  - 1 x daily - 7 x weekly - 3 sets - 10 reps  - Standing Lumbar Extension with Counter  - 1 x daily - 7 x weekly - 3 sets - 10 reps  - Seated Hamstring Stretch  - 1 x daily - 7 x weekly - 3 sets - 30s hold

## 2025-06-27 DIAGNOSIS — Z79.4 TYPE 2 DIABETES MELLITUS WITH DIABETIC POLYNEUROPATHY, WITH LONG-TERM CURRENT USE OF INSULIN (HCC): ICD-10-CM

## 2025-06-27 DIAGNOSIS — M48.061 SPINAL STENOSIS OF LUMBAR REGION WITHOUT NEUROGENIC CLAUDICATION: ICD-10-CM

## 2025-06-27 DIAGNOSIS — E11.42 TYPE 2 DIABETES MELLITUS WITH DIABETIC POLYNEUROPATHY, WITH LONG-TERM CURRENT USE OF INSULIN (HCC): ICD-10-CM

## 2025-06-27 DIAGNOSIS — I25.118 CORONARY ARTERY DISEASE OF NATIVE ARTERY OF NATIVE HEART WITH STABLE ANGINA PECTORIS (HCC): ICD-10-CM

## 2025-06-27 DIAGNOSIS — M51.26 HNP (HERNIATED NUCLEUS PULPOSUS), LUMBAR: ICD-10-CM

## 2025-06-27 DIAGNOSIS — M54.41 CHRONIC MIDLINE LOW BACK PAIN WITH RIGHT-SIDED SCIATICA: ICD-10-CM

## 2025-06-27 DIAGNOSIS — G89.29 CHRONIC MIDLINE LOW BACK PAIN WITH RIGHT-SIDED SCIATICA: ICD-10-CM

## 2025-06-27 RX ORDER — GABAPENTIN 400 MG/1
400 CAPSULE ORAL 3 TIMES DAILY
Qty: 270 CAPSULE | Refills: 0 | Status: SHIPPED | OUTPATIENT
Start: 2025-06-27

## 2025-06-30 RX ORDER — RANOLAZINE 500 MG/1
500 TABLET, EXTENDED RELEASE ORAL 2 TIMES DAILY
Qty: 60 TABLET | Refills: 0 | Status: SHIPPED | OUTPATIENT
Start: 2025-06-30

## 2025-07-03 ENCOUNTER — OFFICE VISIT (OUTPATIENT)
Dept: PHYSICAL THERAPY | Age: 65
End: 2025-07-03
Attending: STUDENT IN AN ORGANIZED HEALTH CARE EDUCATION/TRAINING PROGRAM
Payer: COMMERCIAL

## 2025-07-03 DIAGNOSIS — G89.4 CHRONIC PAIN SYNDROME: ICD-10-CM

## 2025-07-03 DIAGNOSIS — M51.362 DEGENERATION OF INTERVERTEBRAL DISC OF LUMBAR REGION WITH DISCOGENIC BACK PAIN AND LOWER EXTREMITY PAIN: Primary | ICD-10-CM

## 2025-07-03 PROCEDURE — 97140 MANUAL THERAPY 1/> REGIONS: CPT

## 2025-07-03 PROCEDURE — 97110 THERAPEUTIC EXERCISES: CPT

## 2025-07-03 NOTE — PROGRESS NOTES
Daily Note     Today's date: 7/3/2025  Patient name: Ines Ivy  : 1960  MRN: [id number]  Referring provider: Avelina Ponce MD  Dx:   Encounter Diagnosis     ICD-10-CM    1. Degeneration of intervertebral disc of lumbar region with discogenic back pain and lower extremity pain  M51.362       2. Chronic pain syndrome  G89.4           Start Time: 1715  Stop Time: 1800  Total time in clinic (min): 45 minutes    Subjective: Patient reports her back has not been bothering her as much at work recently.       Objective: See treatment diary below      Assessment: Tolerated treatment well. Continues to improve activity tolerance and lumbar extension mobility with minimal restriction. Patient demonstrated fatigue post treatment, exhibited good technique with therapeutic exercises, and would benefit from continued PT      Plan: Continue per plan of care.  Progress treatment as tolerated.       Precautions: peripheral neuropathy, DM      Manuals  7/3      PA lumbar mob  MS MS MS MS MS MS                                             Neuro Re-Ed                                                                                                        Ther Ex             HEP 12min            Nustep   5min 5min 5min 5min 5min 5min      Retro walkouts  20# x10   20# x10  20# x10  20# x10  20# x10       Leg Press    75# 3x10  75# 3x10 80# 3x10  80# 3x10       TA Ball Press Down   10x10s 10x10s 10x10s 10x10s 10x10s 10x10s      Standing Lumbar Ext   2x10  3x10 3x10  3x10 3x10  3x10       Supine March  2x10  3x10 3x10  3x10 3x10 3x10      PPU  x10 2x10 2x10  2x10 2x10 3x10       Prone lying + MHP   10min 10min 10min 10min 10min 10min                   Ther Activity                                       Gait Training                                       Modalities                                        Access Code: [email or web address]  URL: [email or web address]  Date:  06/04/2025  Prepared by: Khadijah Marie    Exercises  - Prone Press Up  - 1 x daily - 7 x weekly - 3 sets - 10 reps  - Supine Posterior Pelvic Tilt  - 1 x daily - 7 x weekly - 3 sets - 10 reps  - Standing Lumbar Extension with Counter  - 1 x daily - 7 x weekly - 3 sets - 10 reps  - Seated Hamstring Stretch  - 1 x daily - 7 x weekly - 3 sets - 30s hold

## 2025-07-07 ENCOUNTER — OFFICE VISIT (OUTPATIENT)
Dept: PHYSICAL THERAPY | Age: 65
End: 2025-07-07
Attending: STUDENT IN AN ORGANIZED HEALTH CARE EDUCATION/TRAINING PROGRAM
Payer: COMMERCIAL

## 2025-07-07 DIAGNOSIS — G89.4 CHRONIC PAIN SYNDROME: ICD-10-CM

## 2025-07-07 DIAGNOSIS — M51.362 DEGENERATION OF INTERVERTEBRAL DISC OF LUMBAR REGION WITH DISCOGENIC BACK PAIN AND LOWER EXTREMITY PAIN: Primary | ICD-10-CM

## 2025-07-07 PROCEDURE — 97110 THERAPEUTIC EXERCISES: CPT

## 2025-07-07 PROCEDURE — 97140 MANUAL THERAPY 1/> REGIONS: CPT

## 2025-07-07 NOTE — PROGRESS NOTES
Daily Note     Today's date: 2025  Patient name: Ines Ivy  : 1960  MRN: [id number]  Referring provider: Avelina Ponce MD  Dx:   Encounter Diagnosis     ICD-10-CM    1. Degeneration of intervertebral disc of lumbar region with discogenic back pain and lower extremity pain  M51.362       2. Chronic pain syndrome  G89.4           Start Time: 1545  Stop Time: 1630  Total time in clinic (min): 45 minutes    Subjective: Patient reports her sciatica is really bothering her today. She had to carry chips a lot on Saturday - that's when the pain started.       Objective: See treatment diary below      Assessment: Tolerated treatment well. Continues to improve lumbar extension mobility - minima restriction. Sx reduction by end of session. Patient demonstrated fatigue post treatment, exhibited good technique with therapeutic exercises, and would benefit from continued PT      Plan: Continue per plan of care.  Progress treatment as tolerated.       Precautions: peripheral neuropathy, DM      Manuals 6/4 6/9 6/17 6/19  6/23 6/26 7/3 7/7     PA lumbar mob  MS MS MS MS MS MS MS                                            Neuro Re-Ed                                                                                                        Ther Ex             HEP 12min            Nustep   5min 5min 5min 5min 5min 5min 5min     Retro walkouts  20# x10   20# x10  20# x10  20# x10  20# x10  20# x10      Leg Press    75# 3x10  75# 3x10 80# 3x10  80# 3x10  80# 3x10     TA Ball Press Down   10x10s 10x10s 10x10s 10x10s 10x10s 10x10s 10x10s     Standing Lumbar Ext   2x10  3x10 3x10  3x10 3x10  3x10  3x10      Supine March  2x10  3x10 3x10  3x10 3x10 3x10 3x10      PPU  x10 2x10 2x10  2x10 2x10 3x10  2x10     Prone lying + MHP   10min 10min 10min 10min 10min 10min 10min                  Ther Activity                                       Gait Training                                       Modalities                                         Access Code: [email or web address]  URL: [email or web address]  Date: 06/04/2025  Prepared by: Khadijah Marie    Exercises  - Prone Press Up  - 1 x daily - 7 x weekly - 3 sets - 10 reps  - Supine Posterior Pelvic Tilt  - 1 x daily - 7 x weekly - 3 sets - 10 reps  - Standing Lumbar Extension with Counter  - 1 x daily - 7 x weekly - 3 sets - 10 reps  - Seated Hamstring Stretch  - 1 x daily - 7 x weekly - 3 sets - 30s hold

## 2025-07-14 ENCOUNTER — APPOINTMENT (OUTPATIENT)
Dept: PHYSICAL THERAPY | Age: 65
End: 2025-07-14
Attending: STUDENT IN AN ORGANIZED HEALTH CARE EDUCATION/TRAINING PROGRAM
Payer: COMMERCIAL

## 2025-07-17 ENCOUNTER — OFFICE VISIT (OUTPATIENT)
Dept: PHYSICAL THERAPY | Age: 65
End: 2025-07-17
Attending: STUDENT IN AN ORGANIZED HEALTH CARE EDUCATION/TRAINING PROGRAM
Payer: COMMERCIAL

## 2025-07-17 DIAGNOSIS — G89.4 CHRONIC PAIN SYNDROME: ICD-10-CM

## 2025-07-17 DIAGNOSIS — M51.362 DEGENERATION OF INTERVERTEBRAL DISC OF LUMBAR REGION WITH DISCOGENIC BACK PAIN AND LOWER EXTREMITY PAIN: Primary | ICD-10-CM

## 2025-07-17 PROCEDURE — 97110 THERAPEUTIC EXERCISES: CPT

## 2025-07-17 PROCEDURE — 97140 MANUAL THERAPY 1/> REGIONS: CPT

## 2025-07-17 NOTE — PROGRESS NOTES
Daily Note     Today's date: 2025  Patient name: Ines Ivy  : 1960  MRN: [id number]  Referring provider: Avelina Ponce MD  Dx:   Encounter Diagnosis     ICD-10-CM    1. Degeneration of intervertebral disc of lumbar region with discogenic back pain and lower extremity pain  M51.362       2. Chronic pain syndrome  G89.4           Start Time: 1530  Stop Time: 1615  Total time in clinic (min): 45 minutes    Subjective: Patient reports yesterday she went to  a box with several bogs of cat food in it and felt a sharp pain down her leg. IT went away after several hours. She says she can't squat all the way down because of her R knee. She reports she feels okay today.       Objective: See treatment diary below      Assessment: Tolerated treatment well. Demonstrates lumbar extension mobility with minimal restriction. Educated patient on engaging core during heavy activities - ie sled pushing or lifting cat food bags. Sx reduction by end of today's session.  Patient demonstrated fatigue post treatment, exhibited good technique with therapeutic exercises, and would benefit from continued PT      Plan: Continue per plan of care.  Progress treatment as tolerated.       Precautions: peripheral neuropathy, DM      Manuals 6/4 6/9 6/17 6/19  6/23 6/26 7/3 7/7 7/17    PA lumbar mob  MS MS MS MS MS MS MS MS                                           Neuro Re-Ed                                                                                                        Ther Ex             HEP 12min            Nustep   5min 5min 5min 5min 5min 5min 5min 5min    Retro walkouts  20# x10   20# x10  20# x10  20# x10  20# x10  20# x10  24# x10    Sled push/pull         35# 50ft x3    Leg Press    75# 3x10  75# 3x10 80# 3x10  80# 3x10  80# 3x10 85# 3x10     TA Ball Press Down   10x10s 10x10s 10x10s 10x10s 10x10s 10x10s 10x10s     Standing Lumbar Ext   2x10  3x10 3x10  3x10 3x10  3x10  3x10  3x10     Supine  March  2x10  3x10 3x10  3x10 3x10 3x10 3x10      PPU  x10 2x10 2x10  2x10 2x10 3x10  2x10 2x10    Prone lying + MHP   10min 10min 10min 10min 10min 10min 10min 10min                 Ther Activity                                       Gait Training                                       Modalities                                        Access Code: [email or web address]  URL: [email or web address]  Date: 06/04/2025  Prepared by: Khadijah Marie    Exercises  - Prone Press Up  - 1 x daily - 7 x weekly - 3 sets - 10 reps  - Supine Posterior Pelvic Tilt  - 1 x daily - 7 x weekly - 3 sets - 10 reps  - Standing Lumbar Extension with Counter  - 1 x daily - 7 x weekly - 3 sets - 10 reps  - Seated Hamstring Stretch  - 1 x daily - 7 x weekly - 3 sets - 30s hold

## 2025-07-26 DIAGNOSIS — R06.02 SHORTNESS OF BREATH: ICD-10-CM

## 2025-07-28 RX ORDER — FUROSEMIDE 40 MG/1
40 TABLET ORAL 2 TIMES DAILY
Qty: 180 TABLET | Refills: 1 | Status: SHIPPED | OUTPATIENT
Start: 2025-07-28

## 2025-07-30 ENCOUNTER — OFFICE VISIT (OUTPATIENT)
Dept: PHYSICAL THERAPY | Age: 65
End: 2025-07-30
Attending: STUDENT IN AN ORGANIZED HEALTH CARE EDUCATION/TRAINING PROGRAM
Payer: COMMERCIAL

## 2025-07-30 ENCOUNTER — OFFICE VISIT (OUTPATIENT)
Dept: PAIN MEDICINE | Facility: CLINIC | Age: 65
End: 2025-07-30
Payer: COMMERCIAL

## 2025-07-30 VITALS — HEIGHT: 70 IN | BODY MASS INDEX: 33.79 KG/M2 | WEIGHT: 236 LBS

## 2025-07-30 DIAGNOSIS — M54.16 LUMBAR RADICULITIS: ICD-10-CM

## 2025-07-30 DIAGNOSIS — I25.118 CORONARY ARTERY DISEASE OF NATIVE ARTERY OF NATIVE HEART WITH STABLE ANGINA PECTORIS (HCC): ICD-10-CM

## 2025-07-30 DIAGNOSIS — G89.4 CHRONIC PAIN SYNDROME: Primary | ICD-10-CM

## 2025-07-30 DIAGNOSIS — M51.362 DEGENERATION OF INTERVERTEBRAL DISC OF LUMBAR REGION WITH DISCOGENIC BACK PAIN AND LOWER EXTREMITY PAIN: Primary | ICD-10-CM

## 2025-07-30 DIAGNOSIS — G89.4 CHRONIC PAIN SYNDROME: ICD-10-CM

## 2025-07-30 DIAGNOSIS — M51.361 DEGENERATION OF INTERVERTEBRAL DISC OF LUMBAR REGION WITH LOWER EXTREMITY PAIN: ICD-10-CM

## 2025-07-30 PROCEDURE — 97164 PT RE-EVAL EST PLAN CARE: CPT

## 2025-07-30 PROCEDURE — 97110 THERAPEUTIC EXERCISES: CPT

## 2025-07-30 PROCEDURE — 99214 OFFICE O/P EST MOD 30 MIN: CPT | Performed by: STUDENT IN AN ORGANIZED HEALTH CARE EDUCATION/TRAINING PROGRAM

## 2025-07-31 ENCOUNTER — APPOINTMENT (OUTPATIENT)
Dept: PHYSICAL THERAPY | Age: 65
End: 2025-07-31
Attending: STUDENT IN AN ORGANIZED HEALTH CARE EDUCATION/TRAINING PROGRAM
Payer: COMMERCIAL

## 2025-07-31 RX ORDER — RANOLAZINE 500 MG/1
500 TABLET, EXTENDED RELEASE ORAL 2 TIMES DAILY
Qty: 60 TABLET | Refills: 0 | Status: SHIPPED | OUTPATIENT
Start: 2025-07-31

## 2025-08-05 ENCOUNTER — TELEPHONE (OUTPATIENT)
Dept: ENDOCRINOLOGY | Facility: CLINIC | Age: 65
End: 2025-08-05

## 2025-08-05 DIAGNOSIS — Z79.4 TYPE 2 DIABETES MELLITUS WITH DIABETIC POLYNEUROPATHY, WITH LONG-TERM CURRENT USE OF INSULIN (HCC): Primary | ICD-10-CM

## 2025-08-05 DIAGNOSIS — E11.42 TYPE 2 DIABETES MELLITUS WITH DIABETIC POLYNEUROPATHY, WITH LONG-TERM CURRENT USE OF INSULIN (HCC): Primary | ICD-10-CM

## 2025-08-05 RX ORDER — INSULIN GLARGINE 100 [IU]/ML
60 INJECTION, SOLUTION SUBCUTANEOUS DAILY
Qty: 54 ML | Refills: 1 | Status: SHIPPED | OUTPATIENT
Start: 2025-08-05

## 2025-08-07 ENCOUNTER — OFFICE VISIT (OUTPATIENT)
Dept: PHYSICAL THERAPY | Age: 65
End: 2025-08-07
Attending: STUDENT IN AN ORGANIZED HEALTH CARE EDUCATION/TRAINING PROGRAM
Payer: COMMERCIAL

## 2025-08-07 DIAGNOSIS — G89.4 CHRONIC PAIN SYNDROME: ICD-10-CM

## 2025-08-07 DIAGNOSIS — M51.362 DEGENERATION OF INTERVERTEBRAL DISC OF LUMBAR REGION WITH DISCOGENIC BACK PAIN AND LOWER EXTREMITY PAIN: Primary | ICD-10-CM

## 2025-08-07 PROCEDURE — 97110 THERAPEUTIC EXERCISES: CPT

## 2025-08-12 ENCOUNTER — OFFICE VISIT (OUTPATIENT)
Dept: INTERNAL MEDICINE CLINIC | Facility: CLINIC | Age: 65
End: 2025-08-12
Payer: COMMERCIAL

## 2025-08-14 ENCOUNTER — OFFICE VISIT (OUTPATIENT)
Dept: PHYSICAL THERAPY | Age: 65
End: 2025-08-14
Attending: STUDENT IN AN ORGANIZED HEALTH CARE EDUCATION/TRAINING PROGRAM
Payer: COMMERCIAL

## (undated) DEVICE — PREP PAD BNS: Brand: CONVERTORS

## (undated) DEVICE — INTENDED FOR TISSUE SEPARATION, AND OTHER PROCEDURES THAT REQUIRE A SHARP SURGICAL BLADE TO PUNCTURE OR CUT.: Brand: BARD-PARKER ® CARBON RIB-BACK BLADES

## (undated) DEVICE — STERILE BETHLEHEM PLASTIC HAND: Brand: CARDINAL HEALTH

## (undated) DEVICE — SUT ETHILON 4-0 PS-2 18 IN 1667H

## (undated) DEVICE — GLOVE INDICATOR PI UNDERGLOVE SZ 7.5 BLUE

## (undated) DEVICE — GLOVE INDICATOR PI UNDERGLOVE SZ 8 BLUE

## (undated) DEVICE — NEEDLE 18 G X 1 1/2

## (undated) DEVICE — NEEDLE HYPO 23G X 1-1/2 IN

## (undated) DEVICE — GLOVE SRG BIOGEL 7.5